# Patient Record
Sex: FEMALE | Race: BLACK OR AFRICAN AMERICAN | NOT HISPANIC OR LATINO
[De-identification: names, ages, dates, MRNs, and addresses within clinical notes are randomized per-mention and may not be internally consistent; named-entity substitution may affect disease eponyms.]

---

## 2017-04-05 ENCOUNTER — APPOINTMENT (OUTPATIENT)
Dept: THORACIC SURGERY | Facility: CLINIC | Age: 67
End: 2017-04-05

## 2017-04-05 VITALS
WEIGHT: 187 LBS | DIASTOLIC BLOOD PRESSURE: 84 MMHG | HEART RATE: 84 BPM | OXYGEN SATURATION: 98 % | BODY MASS INDEX: 31.16 KG/M2 | SYSTOLIC BLOOD PRESSURE: 150 MMHG | HEIGHT: 65 IN

## 2017-04-05 DIAGNOSIS — Z80.3 FAMILY HISTORY OF MALIGNANT NEOPLASM OF BREAST: ICD-10-CM

## 2017-04-05 DIAGNOSIS — Z86.59 PERSONAL HISTORY OF OTHER MENTAL AND BEHAVIORAL DISORDERS: ICD-10-CM

## 2017-04-05 DIAGNOSIS — Z91.89 OTHER SPECIFIED PERSONAL RISK FACTORS, NOT ELSEWHERE CLASSIFIED: ICD-10-CM

## 2017-04-19 ENCOUNTER — OUTPATIENT (OUTPATIENT)
Dept: OUTPATIENT SERVICES | Facility: HOSPITAL | Age: 67
LOS: 1 days | End: 2017-04-19
Payer: MEDICARE

## 2017-04-19 VITALS
HEART RATE: 78 BPM | DIASTOLIC BLOOD PRESSURE: 90 MMHG | HEIGHT: 63 IN | SYSTOLIC BLOOD PRESSURE: 160 MMHG | RESPIRATION RATE: 16 BRPM | WEIGHT: 190.04 LBS | TEMPERATURE: 98 F | OXYGEN SATURATION: 99 %

## 2017-04-19 DIAGNOSIS — Z90.710 ACQUIRED ABSENCE OF BOTH CERVIX AND UTERUS: Chronic | ICD-10-CM

## 2017-04-19 DIAGNOSIS — J84.9 INTERSTITIAL PULMONARY DISEASE, UNSPECIFIED: ICD-10-CM

## 2017-04-19 DIAGNOSIS — Z90.11 ACQUIRED ABSENCE OF RIGHT BREAST AND NIPPLE: Chronic | ICD-10-CM

## 2017-04-19 DIAGNOSIS — Z79.82 LONG TERM (CURRENT) USE OF ASPIRIN: ICD-10-CM

## 2017-04-19 DIAGNOSIS — E11.9 TYPE 2 DIABETES MELLITUS WITHOUT COMPLICATIONS: ICD-10-CM

## 2017-04-19 DIAGNOSIS — R91.8 OTHER NONSPECIFIC ABNORMAL FINDING OF LUNG FIELD: ICD-10-CM

## 2017-04-19 DIAGNOSIS — R07.9 CHEST PAIN, UNSPECIFIED: ICD-10-CM

## 2017-04-19 DIAGNOSIS — Z90.49 ACQUIRED ABSENCE OF OTHER SPECIFIED PARTS OF DIGESTIVE TRACT: Chronic | ICD-10-CM

## 2017-04-19 DIAGNOSIS — Z98.42 CATARACT EXTRACTION STATUS, LEFT EYE: Chronic | ICD-10-CM

## 2017-04-19 DIAGNOSIS — G47.33 OBSTRUCTIVE SLEEP APNEA (ADULT) (PEDIATRIC): ICD-10-CM

## 2017-04-19 LAB
ALBUMIN SERPL ELPH-MCNC: 4.5 G/DL — SIGNIFICANT CHANGE UP (ref 3.3–5)
ALP SERPL-CCNC: 71 U/L — SIGNIFICANT CHANGE UP (ref 40–120)
ALT FLD-CCNC: 13 U/L — SIGNIFICANT CHANGE UP (ref 4–33)
APPEARANCE UR: SIGNIFICANT CHANGE UP
AST SERPL-CCNC: 20 U/L — SIGNIFICANT CHANGE UP (ref 4–32)
BILIRUB SERPL-MCNC: 0.4 MG/DL — SIGNIFICANT CHANGE UP (ref 0.2–1.2)
BILIRUB UR-MCNC: NEGATIVE — SIGNIFICANT CHANGE UP
BLD GP AB SCN SERPL QL: NEGATIVE — SIGNIFICANT CHANGE UP
BLOOD UR QL VISUAL: NEGATIVE — SIGNIFICANT CHANGE UP
BUN SERPL-MCNC: 7 MG/DL — SIGNIFICANT CHANGE UP (ref 7–23)
CALCIUM SERPL-MCNC: 9.7 MG/DL — SIGNIFICANT CHANGE UP (ref 8.4–10.5)
CHLORIDE SERPL-SCNC: 100 MMOL/L — SIGNIFICANT CHANGE UP (ref 98–107)
CO2 SERPL-SCNC: 28 MMOL/L — SIGNIFICANT CHANGE UP (ref 22–31)
COLOR SPEC: SIGNIFICANT CHANGE UP
CREAT SERPL-MCNC: 0.68 MG/DL — SIGNIFICANT CHANGE UP (ref 0.5–1.3)
GLUCOSE SERPL-MCNC: 95 MG/DL — SIGNIFICANT CHANGE UP (ref 70–99)
GLUCOSE UR-MCNC: NEGATIVE — SIGNIFICANT CHANGE UP
HBA1C BLD-MCNC: 6.2 % — HIGH (ref 4–5.6)
HCT VFR BLD CALC: 43.6 % — SIGNIFICANT CHANGE UP (ref 34.5–45)
HGB BLD-MCNC: 13.3 G/DL — SIGNIFICANT CHANGE UP (ref 11.5–15.5)
KETONES UR-MCNC: NEGATIVE — SIGNIFICANT CHANGE UP
LEUKOCYTE ESTERASE UR-ACNC: NEGATIVE — SIGNIFICANT CHANGE UP
MCHC RBC-ENTMCNC: 22.4 PG — LOW (ref 27–34)
MCHC RBC-ENTMCNC: 30.5 % — LOW (ref 32–36)
MCV RBC AUTO: 73.4 FL — LOW (ref 80–100)
MUCOUS THREADS # UR AUTO: SIGNIFICANT CHANGE UP
NITRITE UR-MCNC: NEGATIVE — SIGNIFICANT CHANGE UP
PH UR: 7.5 — SIGNIFICANT CHANGE UP (ref 4.6–8)
PLATELET # BLD AUTO: 231 K/UL — SIGNIFICANT CHANGE UP (ref 150–400)
PMV BLD: 11.3 FL — SIGNIFICANT CHANGE UP (ref 7–13)
POTASSIUM SERPL-MCNC: 3.6 MMOL/L — SIGNIFICANT CHANGE UP (ref 3.5–5.3)
POTASSIUM SERPL-SCNC: 3.6 MMOL/L — SIGNIFICANT CHANGE UP (ref 3.5–5.3)
PROT SERPL-MCNC: 7.5 G/DL — SIGNIFICANT CHANGE UP (ref 6–8.3)
PROT UR-MCNC: NEGATIVE — SIGNIFICANT CHANGE UP
RBC # BLD: 5.94 M/UL — HIGH (ref 3.8–5.2)
RBC # FLD: 15.1 % — HIGH (ref 10.3–14.5)
RBC CASTS # UR COMP ASSIST: SIGNIFICANT CHANGE UP (ref 0–?)
RH IG SCN BLD-IMP: POSITIVE — SIGNIFICANT CHANGE UP
SODIUM SERPL-SCNC: 143 MMOL/L — SIGNIFICANT CHANGE UP (ref 135–145)
SP GR SPEC: 1.01 — SIGNIFICANT CHANGE UP (ref 1–1.03)
SQUAMOUS # UR AUTO: SIGNIFICANT CHANGE UP
UROBILINOGEN FLD QL: NORMAL E.U. — SIGNIFICANT CHANGE UP (ref 0.1–0.2)
WBC # BLD: 3.29 K/UL — LOW (ref 3.8–10.5)
WBC # FLD AUTO: 3.29 K/UL — LOW (ref 3.8–10.5)
WBC UR QL: SIGNIFICANT CHANGE UP (ref 0–?)

## 2017-04-19 PROCEDURE — 93010 ELECTROCARDIOGRAM REPORT: CPT

## 2017-04-19 NOTE — H&P PST ADULT - ASSESSMENT
Pt. is a 67 yo Creole speaking female.  Pt. speaks some English.  She refused  services.  Pt. is accompanied by her daughter who provided translation when needed.  Pt. has right lung masses.

## 2017-04-19 NOTE — H&P PST ADULT - REASON FOR ADMISSION
"my breathing test is low...something not normal in both lungs...I think the right had more than the left...when I was walking I had SOB"

## 2017-04-19 NOTE — H&P PST ADULT - HISTORY OF PRESENT ILLNESS
Pt. is a 65 yo female that has HOWELL.  Pt. went to a Pulmonologist.  Pt. had abnormal results with PFT's.  Pt. had a CXR which revealed DOROTEO lung nodules.

## 2017-04-19 NOTE — H&P PST ADULT - PSH
History of cholecystectomy  1980's  History of left cataract surgery  1990's  History of lumpectomy of right breast  1984  S/P FERNANDO-BSO (total abdominal hysterectomy and bilateral salpingo-oophorectomy)  1980's

## 2017-04-19 NOTE — H&P PST ADULT - PROBLEM SELECTOR PLAN 1
Pt. is scheduled for a right video assisted thoracoscopy, lung resection 4/28/17.  Pt. has paperwork that states 4/27.  Surgical Coordinator, Elaina was unavailable.  Spoke with Michelle who confirmed surgery is scheduled for 4/28/17. Pt. is scheduled for a right video assisted thoracoscopy, lung resection 4/28/17.  Pt. has paperwork that states 4/27.  Surgical Coordinator, Elaina was unavailable.  Spoke with Michelle who confirmed surgery is scheduled for 4/28/17.  Pt. had medical clearance last week.

## 2017-04-19 NOTE — H&P PST ADULT - NSANTHOSAYNRD_GEN_A_CORE
No. YRIS screening performed.  STOP BANG Legend: 0-2 = LOW Risk; 3-4 = INTERMEDIATE Risk; 5-8 = HIGH Risk

## 2017-04-19 NOTE — H&P PST ADULT - PMH
Arrhythmia    Asthma    Cataract of right eye    Depression    DM (diabetes mellitus)    GERD (gastroesophageal reflux disease)    HTN (hypertension)    Palpitations    Seasonal allergies Arrhythmia    Asthma    Cataract of right eye    Depression    DM (diabetes mellitus)    GERD (gastroesophageal reflux disease)    HTN (hypertension)    Obese    Palpitations    Seasonal allergies

## 2017-04-21 ENCOUNTER — NON-APPOINTMENT (OUTPATIENT)
Age: 67
End: 2017-04-21

## 2017-04-21 ENCOUNTER — APPOINTMENT (OUTPATIENT)
Dept: CARDIOLOGY | Facility: CLINIC | Age: 67
End: 2017-04-21

## 2017-04-21 VITALS
BODY MASS INDEX: 30.29 KG/M2 | OXYGEN SATURATION: 96 % | SYSTOLIC BLOOD PRESSURE: 157 MMHG | WEIGHT: 182 LBS | TEMPERATURE: 98.1 F | DIASTOLIC BLOOD PRESSURE: 72 MMHG | HEART RATE: 86 BPM

## 2017-04-21 VITALS — DIASTOLIC BLOOD PRESSURE: 80 MMHG | SYSTOLIC BLOOD PRESSURE: 150 MMHG

## 2017-04-21 DIAGNOSIS — M17.10 UNILATERAL PRIMARY OSTEOARTHRITIS, UNSPECIFIED KNEE: ICD-10-CM

## 2017-04-21 LAB
BACTERIA UR CULT: SIGNIFICANT CHANGE UP
SPECIMEN SOURCE: SIGNIFICANT CHANGE UP

## 2017-04-26 ENCOUNTER — APPOINTMENT (OUTPATIENT)
Dept: CARDIOLOGY | Facility: CLINIC | Age: 67
End: 2017-04-26

## 2017-04-28 ENCOUNTER — RESULT REVIEW (OUTPATIENT)
Age: 67
End: 2017-04-28

## 2017-04-28 ENCOUNTER — TRANSCRIPTION ENCOUNTER (OUTPATIENT)
Age: 67
End: 2017-04-28

## 2017-04-28 ENCOUNTER — INPATIENT (INPATIENT)
Facility: HOSPITAL | Age: 67
LOS: 0 days | Discharge: HOME | End: 2017-04-29
Attending: THORACIC SURGERY (CARDIOTHORACIC VASCULAR SURGERY) | Admitting: THORACIC SURGERY (CARDIOTHORACIC VASCULAR SURGERY)
Payer: MEDICARE

## 2017-04-28 VITALS
WEIGHT: 190.04 LBS | RESPIRATION RATE: 16 BRPM | OXYGEN SATURATION: 97 % | SYSTOLIC BLOOD PRESSURE: 153 MMHG | HEART RATE: 79 BPM | HEIGHT: 63 IN | TEMPERATURE: 98 F | DIASTOLIC BLOOD PRESSURE: 75 MMHG

## 2017-04-28 DIAGNOSIS — Z90.710 ACQUIRED ABSENCE OF BOTH CERVIX AND UTERUS: Chronic | ICD-10-CM

## 2017-04-28 DIAGNOSIS — Z90.49 ACQUIRED ABSENCE OF OTHER SPECIFIED PARTS OF DIGESTIVE TRACT: Chronic | ICD-10-CM

## 2017-04-28 DIAGNOSIS — J84.9 INTERSTITIAL PULMONARY DISEASE, UNSPECIFIED: ICD-10-CM

## 2017-04-28 DIAGNOSIS — Z98.42 CATARACT EXTRACTION STATUS, LEFT EYE: Chronic | ICD-10-CM

## 2017-04-28 DIAGNOSIS — Z90.11 ACQUIRED ABSENCE OF RIGHT BREAST AND NIPPLE: Chronic | ICD-10-CM

## 2017-04-28 LAB
CULTURE - ACID FAST SMEAR CONCENTRATED: SIGNIFICANT CHANGE UP
RH IG SCN BLD-IMP: POSITIVE — SIGNIFICANT CHANGE UP
SPECIMEN SOURCE: SIGNIFICANT CHANGE UP

## 2017-04-28 PROCEDURE — 71010: CPT | Mod: 26

## 2017-04-28 PROCEDURE — 88307 TISSUE EXAM BY PATHOLOGIST: CPT | Mod: 26

## 2017-04-28 PROCEDURE — 32666 THORACOSCOPY W/WEDGE RESECT: CPT

## 2017-04-28 RX ORDER — HYDROMORPHONE HYDROCHLORIDE 2 MG/ML
0.5 INJECTION INTRAMUSCULAR; INTRAVENOUS; SUBCUTANEOUS
Qty: 0 | Refills: 0 | Status: DISCONTINUED | OUTPATIENT
Start: 2017-04-28 | End: 2017-04-28

## 2017-04-28 RX ORDER — SODIUM CHLORIDE 9 MG/ML
1000 INJECTION, SOLUTION INTRAVENOUS
Qty: 0 | Refills: 0 | Status: DISCONTINUED | OUTPATIENT
Start: 2017-04-28 | End: 2017-04-29

## 2017-04-28 RX ORDER — ONDANSETRON 8 MG/1
4 TABLET, FILM COATED ORAL EVERY 6 HOURS
Qty: 0 | Refills: 0 | Status: DISCONTINUED | OUTPATIENT
Start: 2017-04-28 | End: 2017-04-28

## 2017-04-28 RX ORDER — HYDROMORPHONE HYDROCHLORIDE 2 MG/ML
30 INJECTION INTRAMUSCULAR; INTRAVENOUS; SUBCUTANEOUS
Qty: 0 | Refills: 0 | Status: DISCONTINUED | OUTPATIENT
Start: 2017-04-28 | End: 2017-04-29

## 2017-04-28 RX ORDER — DEXTROSE 50 % IN WATER 50 %
12.5 SYRINGE (ML) INTRAVENOUS ONCE
Qty: 0 | Refills: 0 | Status: DISCONTINUED | OUTPATIENT
Start: 2017-04-28 | End: 2017-04-29

## 2017-04-28 RX ORDER — BENZOCAINE AND MENTHOL 5; 1 G/100ML; G/100ML
1 LIQUID ORAL EVERY 6 HOURS
Qty: 0 | Refills: 0 | Status: DISCONTINUED | OUTPATIENT
Start: 2017-04-28 | End: 2017-04-29

## 2017-04-28 RX ORDER — INSULIN LISPRO 100/ML
VIAL (ML) SUBCUTANEOUS
Qty: 0 | Refills: 0 | Status: DISCONTINUED | OUTPATIENT
Start: 2017-04-28 | End: 2017-04-29

## 2017-04-28 RX ORDER — HYDROMORPHONE HYDROCHLORIDE 2 MG/ML
0.5 INJECTION INTRAMUSCULAR; INTRAVENOUS; SUBCUTANEOUS
Qty: 0 | Refills: 0 | Status: DISCONTINUED | OUTPATIENT
Start: 2017-04-28 | End: 2017-04-29

## 2017-04-28 RX ORDER — HEPARIN SODIUM 5000 [USP'U]/ML
5000 INJECTION INTRAVENOUS; SUBCUTANEOUS EVERY 8 HOURS
Qty: 0 | Refills: 0 | Status: DISCONTINUED | OUTPATIENT
Start: 2017-04-28 | End: 2017-04-29

## 2017-04-28 RX ORDER — DEXTROSE 50 % IN WATER 50 %
25 SYRINGE (ML) INTRAVENOUS ONCE
Qty: 0 | Refills: 0 | Status: DISCONTINUED | OUTPATIENT
Start: 2017-04-28 | End: 2017-04-29

## 2017-04-28 RX ORDER — HEPARIN SODIUM 5000 [USP'U]/ML
5000 INJECTION INTRAVENOUS; SUBCUTANEOUS ONCE
Qty: 0 | Refills: 0 | Status: COMPLETED | OUTPATIENT
Start: 2017-04-28 | End: 2017-04-28

## 2017-04-28 RX ORDER — ONDANSETRON 8 MG/1
4 TABLET, FILM COATED ORAL EVERY 6 HOURS
Qty: 0 | Refills: 0 | Status: DISCONTINUED | OUTPATIENT
Start: 2017-04-28 | End: 2017-04-29

## 2017-04-28 RX ORDER — DEXTROSE 50 % IN WATER 50 %
1 SYRINGE (ML) INTRAVENOUS ONCE
Qty: 0 | Refills: 0 | Status: DISCONTINUED | OUTPATIENT
Start: 2017-04-28 | End: 2017-04-29

## 2017-04-28 RX ORDER — NALOXONE HYDROCHLORIDE 4 MG/.1ML
0.1 SPRAY NASAL
Qty: 0 | Refills: 0 | Status: DISCONTINUED | OUTPATIENT
Start: 2017-04-28 | End: 2017-04-29

## 2017-04-28 RX ORDER — METFORMIN HYDROCHLORIDE 850 MG/1
500 TABLET ORAL
Qty: 0 | Refills: 0 | Status: DISCONTINUED | OUTPATIENT
Start: 2017-04-29 | End: 2017-04-29

## 2017-04-28 RX ORDER — DOCUSATE SODIUM 100 MG
100 CAPSULE ORAL THREE TIMES A DAY
Qty: 0 | Refills: 0 | Status: DISCONTINUED | OUTPATIENT
Start: 2017-04-28 | End: 2017-04-29

## 2017-04-28 RX ORDER — FAMOTIDINE 10 MG/ML
20 INJECTION INTRAVENOUS EVERY 12 HOURS
Qty: 0 | Refills: 0 | Status: DISCONTINUED | OUTPATIENT
Start: 2017-04-28 | End: 2017-04-29

## 2017-04-28 RX ORDER — NALOXONE HYDROCHLORIDE 4 MG/.1ML
0.1 SPRAY NASAL
Qty: 0 | Refills: 0 | Status: DISCONTINUED | OUTPATIENT
Start: 2017-04-28 | End: 2017-04-28

## 2017-04-28 RX ORDER — GLUCAGON INJECTION, SOLUTION 0.5 MG/.1ML
1 INJECTION, SOLUTION SUBCUTANEOUS ONCE
Qty: 0 | Refills: 0 | Status: DISCONTINUED | OUTPATIENT
Start: 2017-04-28 | End: 2017-04-29

## 2017-04-28 RX ADMIN — HYDROMORPHONE HYDROCHLORIDE 30 MILLILITER(S): 2 INJECTION INTRAMUSCULAR; INTRAVENOUS; SUBCUTANEOUS at 19:20

## 2017-04-28 RX ADMIN — Medication 20 MILLIGRAM(S): at 18:56

## 2017-04-28 RX ADMIN — Medication 100 MILLIGRAM(S): at 23:03

## 2017-04-28 RX ADMIN — HYDROMORPHONE HYDROCHLORIDE 0.5 MILLIGRAM(S): 2 INJECTION INTRAMUSCULAR; INTRAVENOUS; SUBCUTANEOUS at 10:05

## 2017-04-28 RX ADMIN — HEPARIN SODIUM 5000 UNIT(S): 5000 INJECTION INTRAVENOUS; SUBCUTANEOUS at 23:03

## 2017-04-28 RX ADMIN — HYDROMORPHONE HYDROCHLORIDE 0.5 MILLIGRAM(S): 2 INJECTION INTRAMUSCULAR; INTRAVENOUS; SUBCUTANEOUS at 10:20

## 2017-04-28 RX ADMIN — HEPARIN SODIUM 5000 UNIT(S): 5000 INJECTION INTRAVENOUS; SUBCUTANEOUS at 07:09

## 2017-04-28 RX ADMIN — FAMOTIDINE 20 MILLIGRAM(S): 10 INJECTION INTRAVENOUS at 18:02

## 2017-04-28 RX ADMIN — BENZOCAINE AND MENTHOL 1 LOZENGE: 5; 1 LIQUID ORAL at 23:03

## 2017-04-28 RX ADMIN — SODIUM CHLORIDE 30 MILLILITER(S): 9 INJECTION, SOLUTION INTRAVENOUS at 19:49

## 2017-04-28 RX ADMIN — HYDROMORPHONE HYDROCHLORIDE 0.5 MILLIGRAM(S): 2 INJECTION INTRAMUSCULAR; INTRAVENOUS; SUBCUTANEOUS at 10:27

## 2017-04-28 RX ADMIN — HYDROMORPHONE HYDROCHLORIDE 30 MILLILITER(S): 2 INJECTION INTRAMUSCULAR; INTRAVENOUS; SUBCUTANEOUS at 10:40

## 2017-04-28 RX ADMIN — HYDROMORPHONE HYDROCHLORIDE 0.5 MILLIGRAM(S): 2 INJECTION INTRAMUSCULAR; INTRAVENOUS; SUBCUTANEOUS at 11:00

## 2017-04-28 RX ADMIN — HEPARIN SODIUM 5000 UNIT(S): 5000 INJECTION INTRAVENOUS; SUBCUTANEOUS at 14:38

## 2017-04-28 RX ADMIN — Medication 100 MILLIGRAM(S): at 14:38

## 2017-04-28 RX ADMIN — SODIUM CHLORIDE 30 MILLILITER(S): 9 INJECTION, SOLUTION INTRAVENOUS at 10:00

## 2017-04-28 NOTE — ASU PATIENT PROFILE, ADULT - PMH
Arrhythmia    Asthma    Cataract of right eye    Depression    DM (diabetes mellitus)    GERD (gastroesophageal reflux disease)    HTN (hypertension)    Obese    Palpitations    Seasonal allergies

## 2017-04-29 VITALS
SYSTOLIC BLOOD PRESSURE: 120 MMHG | DIASTOLIC BLOOD PRESSURE: 86 MMHG | OXYGEN SATURATION: 96 % | HEART RATE: 70 BPM | RESPIRATION RATE: 18 BRPM | TEMPERATURE: 99 F

## 2017-04-29 LAB
BASOPHILS # BLD AUTO: 0 K/UL — SIGNIFICANT CHANGE UP (ref 0–0.2)
BASOPHILS NFR BLD AUTO: 0 % — SIGNIFICANT CHANGE UP (ref 0–2)
BUN SERPL-MCNC: 21 MG/DL — SIGNIFICANT CHANGE UP (ref 7–23)
CALCIUM SERPL-MCNC: 9.7 MG/DL — SIGNIFICANT CHANGE UP (ref 8.4–10.5)
CHLORIDE SERPL-SCNC: 99 MMOL/L — SIGNIFICANT CHANGE UP (ref 98–107)
CO2 SERPL-SCNC: 25 MMOL/L — SIGNIFICANT CHANGE UP (ref 22–31)
CREAT SERPL-MCNC: 0.8 MG/DL — SIGNIFICANT CHANGE UP (ref 0.5–1.3)
EOSINOPHIL # BLD AUTO: 0 K/UL — SIGNIFICANT CHANGE UP (ref 0–0.5)
EOSINOPHIL NFR BLD AUTO: 0 % — SIGNIFICANT CHANGE UP (ref 0–6)
GLUCOSE SERPL-MCNC: 136 MG/DL — HIGH (ref 70–99)
HCT VFR BLD CALC: 38.4 % — SIGNIFICANT CHANGE UP (ref 34.5–45)
HGB BLD-MCNC: 12 G/DL — SIGNIFICANT CHANGE UP (ref 11.5–15.5)
IMM GRANULOCYTES NFR BLD AUTO: 0.1 % — SIGNIFICANT CHANGE UP (ref 0–1.5)
LYMPHOCYTES # BLD AUTO: 0.83 K/UL — LOW (ref 1–3.3)
LYMPHOCYTES # BLD AUTO: 9.8 % — LOW (ref 13–44)
MCHC RBC-ENTMCNC: 22.7 PG — LOW (ref 27–34)
MCHC RBC-ENTMCNC: 31.3 % — LOW (ref 32–36)
MCV RBC AUTO: 72.7 FL — LOW (ref 80–100)
MONOCYTES # BLD AUTO: 0.68 K/UL — SIGNIFICANT CHANGE UP (ref 0–0.9)
MONOCYTES NFR BLD AUTO: 8 % — SIGNIFICANT CHANGE UP (ref 2–14)
NEUTROPHILS # BLD AUTO: 6.99 K/UL — SIGNIFICANT CHANGE UP (ref 1.8–7.4)
NEUTROPHILS NFR BLD AUTO: 82.1 % — HIGH (ref 43–77)
PLATELET # BLD AUTO: 194 K/UL — SIGNIFICANT CHANGE UP (ref 150–400)
PMV BLD: 11.3 FL — SIGNIFICANT CHANGE UP (ref 7–13)
POTASSIUM SERPL-MCNC: 4.4 MMOL/L — SIGNIFICANT CHANGE UP (ref 3.5–5.3)
POTASSIUM SERPL-SCNC: 4.4 MMOL/L — SIGNIFICANT CHANGE UP (ref 3.5–5.3)
RBC # BLD: 5.28 M/UL — HIGH (ref 3.8–5.2)
RBC # FLD: 15.2 % — HIGH (ref 10.3–14.5)
SODIUM SERPL-SCNC: 136 MMOL/L — SIGNIFICANT CHANGE UP (ref 135–145)
WBC # BLD: 8.51 K/UL — SIGNIFICANT CHANGE UP (ref 3.8–10.5)
WBC # FLD AUTO: 8.51 K/UL — SIGNIFICANT CHANGE UP (ref 3.8–10.5)

## 2017-04-29 PROCEDURE — 71010: CPT | Mod: 26,76

## 2017-04-29 RX ORDER — SENNA PLUS 8.6 MG/1
2 TABLET ORAL
Qty: 40 | Refills: 0
Start: 2017-04-29 | End: 2017-05-19

## 2017-04-29 RX ORDER — DOCUSATE SODIUM 100 MG
1 CAPSULE ORAL
Qty: 60 | Refills: 0
Start: 2017-04-29 | End: 2017-05-19

## 2017-04-29 RX ORDER — OXYCODONE HYDROCHLORIDE 5 MG/1
5 TABLET ORAL EVERY 4 HOURS
Qty: 0 | Refills: 0 | Status: DISCONTINUED | OUTPATIENT
Start: 2017-04-29 | End: 2017-04-29

## 2017-04-29 RX ORDER — OXYCODONE HYDROCHLORIDE 5 MG/1
10 TABLET ORAL EVERY 4 HOURS
Qty: 0 | Refills: 0 | Status: DISCONTINUED | OUTPATIENT
Start: 2017-04-29 | End: 2017-04-29

## 2017-04-29 RX ORDER — OXYCODONE HYDROCHLORIDE 5 MG/1
1 TABLET ORAL
Qty: 40 | Refills: 0 | OUTPATIENT
Start: 2017-04-29 | End: 2017-05-04

## 2017-04-29 RX ORDER — OXYCODONE HYDROCHLORIDE 5 MG/1
1 TABLET ORAL
Qty: 40 | Refills: 0
Start: 2017-04-29 | End: 2017-05-04

## 2017-04-29 RX ORDER — ACETAMINOPHEN 500 MG
650 TABLET ORAL EVERY 6 HOURS
Qty: 0 | Refills: 0 | Status: DISCONTINUED | OUTPATIENT
Start: 2017-04-29 | End: 2017-04-29

## 2017-04-29 RX ORDER — ACETAMINOPHEN 500 MG
2 TABLET ORAL
Qty: 0 | Refills: 0 | DISCHARGE
Start: 2017-04-29

## 2017-04-29 RX ORDER — DIPHENHYDRAMINE HCL 50 MG
25 CAPSULE ORAL EVERY 4 HOURS
Qty: 0 | Refills: 0 | Status: DISCONTINUED | OUTPATIENT
Start: 2017-04-29 | End: 2017-04-29

## 2017-04-29 RX ADMIN — FAMOTIDINE 20 MILLIGRAM(S): 10 INJECTION INTRAVENOUS at 18:04

## 2017-04-29 RX ADMIN — HEPARIN SODIUM 5000 UNIT(S): 5000 INJECTION INTRAVENOUS; SUBCUTANEOUS at 12:36

## 2017-04-29 RX ADMIN — Medication 100 MILLIGRAM(S): at 12:35

## 2017-04-29 RX ADMIN — HEPARIN SODIUM 5000 UNIT(S): 5000 INJECTION INTRAVENOUS; SUBCUTANEOUS at 06:00

## 2017-04-29 RX ADMIN — OXYCODONE HYDROCHLORIDE 5 MILLIGRAM(S): 5 TABLET ORAL at 16:17

## 2017-04-29 RX ADMIN — Medication 100 MILLIGRAM(S): at 06:00

## 2017-04-29 RX ADMIN — HYDROMORPHONE HYDROCHLORIDE 30 MILLILITER(S): 2 INJECTION INTRAMUSCULAR; INTRAVENOUS; SUBCUTANEOUS at 07:13

## 2017-04-29 RX ADMIN — Medication 25 MILLIGRAM(S): at 09:18

## 2017-04-29 RX ADMIN — SODIUM CHLORIDE 30 MILLILITER(S): 9 INJECTION, SOLUTION INTRAVENOUS at 07:53

## 2017-04-29 RX ADMIN — FAMOTIDINE 20 MILLIGRAM(S): 10 INJECTION INTRAVENOUS at 06:01

## 2017-04-29 RX ADMIN — Medication 20 MILLIGRAM(S): at 12:35

## 2017-04-29 RX ADMIN — METFORMIN HYDROCHLORIDE 500 MILLIGRAM(S): 850 TABLET ORAL at 18:04

## 2017-04-29 RX ADMIN — METFORMIN HYDROCHLORIDE 500 MILLIGRAM(S): 850 TABLET ORAL at 09:18

## 2017-04-29 NOTE — DISCHARGE NOTE ADULT - PLAN OF CARE
s/p right vats, Rll and RML wedge resection- continued wound healing Follow up with Dr. Seals in 7-10 days   Follow up with primary care provider in one week   Increase ambulation  Pain management  Use incentive spirometer Follow up with Dr. Seals in 7-10 days, you must call  to make an appointment   Follow up with primary care provider in one week   Increase ambulation  Pain management  Use incentive spirometer Follow up with Dr. Seals in 7-10 days, you must call  to make an appointment   Follow up with primary care provider in one week, specifically about the urinary symptoms  Increase ambulation  Pain management  Use incentive spirometer

## 2017-04-29 NOTE — DISCHARGE NOTE ADULT - HOSPITAL COURSE
Pt. is a 67 yo female that has HOWELL.  Pt. went to a Pulmonologist.  Pt. had abnormal results with PFT's.  Pt. had a CXR which revealed DOROTEO lung nodules. Patient s/p Right vats, right lower and middle lobe wedge resection on 4/28/17.  Post op course uncomplicated. Pt. is a 67 yo female that has HOWELL.  Pt. went to a Pulmonologist.  Pt. had abnormal results with PFT's.  Pt. had a CXR which revealed DOROTEO lung nodules. Patient s/p Right vats, right lower and middle lobe wedge resection on 4/28/17.  Post op course uncomplicated, chest tube was removed and she was discharged home Pt. is a 67 yo female that has HOWELL.  Pt. went to a Pulmonologist.  Pt. had abnormal results with PFT's.  Pt. had a CXR which revealed DOROTEO lung nodules. Patient s/p Right vats, right lower and middle lobe wedge resection on 4/28/17.  Post op course uncomplicated, chest tube was removed and she was discharged home. Pt. is a 65 yo female that has HOWELL.  Pt. went to a Pulmonologist.  Pt. had abnormal results with PFT's.  Pt. had a CXR which revealed DOROTEO lung nodules. Patient s/p Right vats, right lower and middle lobe wedge resection on 4/28/17.  Post op course uncomplicated, chest tube was removed and she was discharged home. Just before discharge pt complained of bladder fullness stating "hard to pee," bladder scan revealed 500ml and after getting up she was able to void 350ml. She was instructed to follow up with primary care early in the week, to continue ambulating and try to void regularly. If symptoms persist it was recommended that she return to PCP or urgent for bladder scan.

## 2017-04-29 NOTE — DISCHARGE NOTE ADULT - CARE PROVIDER_API CALL
Reese Seals (MD), Thoracic Surgery  [address] [place] [address]  Phone: [phone]  Fax: [phone]

## 2017-04-29 NOTE — DISCHARGE NOTE ADULT - MEDICATION SUMMARY - MEDICATIONS TO TAKE
I will START or STAY ON the medications listed below when I get home from the hospital:    acetaminophen 325 mg oral tablet  -- 2 tab(s) by mouth every 6 hours, As needed, Mild Pain (1 - 3)  -- Indication: For Pain    oxyCODONE 5 mg oral tablet  -- 1 to 2 tab(s) by mouth every 4 to 6  hours, As needed, Moderate Pain (4 - 6) MDD:8  -- Indication: For Pain    aspirin 81 mg oral tablet, chewable  -- 1 tab(s) by mouth once a day  -- Indication: For Vessel protection    verapamil 24 hour extended release 240 mg/24 hours oral capsule, extended release  -- 1 cap(s) by mouth once a day  -- Indication: For Blood pressure    paroxetine 20 mg oral tablet  -- 1 tab(s) by mouth once a day  -- Indication: For Home med    metformin 500 mg oral tablet  -- 1 tab(s) by mouth 2 times a day  -- Indication: For Glucose control    Pepcid AC Maximum Strength 20 mg oral tablet  -- 1 tab(s) by mouth once a day  -- It is very important that you take or use this exactly as directed.  Do not skip doses or discontinue unless directed by your doctor.  Obtain medical advice before taking any non-prescription drugs as some may affect the action of this medication.      -- Indication: For Acid reflux    Colace 100 mg oral capsule  -- 1 cap(s) by mouth 3 times a day as needed for constipation  -- Medication should be taken with plenty of water.    -- Indication: For Constipation    senna oral tablet  -- 2 tab(s) by mouth once a day (at bedtime) as needed for constipation  -- Indication: For Constipation    Vitamin D3 2000 intl units oral capsule  -- 1 cap(s) by mouth once a day  -- Indication: For Supplement

## 2017-04-29 NOTE — DISCHARGE NOTE ADULT - ADDITIONAL INSTRUCTIONS
You must remove the dressing when you return home. Take a shower, pat dry and leave to air. Continue with daily ambulation and use of incentive spirometer. Call  if you have any questions or concerns.

## 2017-04-29 NOTE — DISCHARGE NOTE ADULT - CARE PROVIDERS DIRECT ADDRESSES
[email or web address]

## 2017-04-29 NOTE — DISCHARGE NOTE ADULT - CARE PLAN
Principal Discharge DX:	Lung mass  Goal:	s/p right vats, Rll and RML wedge resection- continued wound healing  Instructions for follow-up, activity and diet:	Follow up with Dr. Seals in 7-10 days   Follow up with primary care provider in one week   Increase ambulation  Pain management  Use incentive spirometer Principal Discharge DX:	Lung mass  Goal:	s/p right vats, Rll and RML wedge resection- continued wound healing  Instructions for follow-up, activity and diet:	Follow up with Dr. Seals in 7-10 days, you must call  to make an appointment   Follow up with primary care provider in one week   Increase ambulation  Pain management  Use incentive spirometer Principal Discharge DX:	Lung mass  Goal:	s/p right vats, Rll and RML wedge resection- continued wound healing  Instructions for follow-up, activity and diet:	Follow up with Dr. Seals in 7-10 days, you must call  to make an appointment   Follow up with primary care provider in one week, specifically about the urinary symptoms  Increase ambulation  Pain management  Use incentive spirometer

## 2017-04-29 NOTE — DISCHARGE NOTE ADULT - PATIENT PORTAL LINK FT
“You can access the FollowHealth Patient Portal, offered by [facility], by registering with the following website: [email or web address]”

## 2017-05-03 LAB — CULTURE - SURGICAL SITE: SIGNIFICANT CHANGE UP

## 2017-05-05 LAB — SPECIMEN SOURCE: SIGNIFICANT CHANGE UP

## 2017-05-08 LAB — SURGICAL PATHOLOGY STUDY: SIGNIFICANT CHANGE UP

## 2017-05-10 ENCOUNTER — APPOINTMENT (OUTPATIENT)
Dept: THORACIC SURGERY | Facility: CLINIC | Age: 67
End: 2017-05-10

## 2017-05-10 VITALS
OXYGEN SATURATION: 96 % | DIASTOLIC BLOOD PRESSURE: 85 MMHG | HEIGHT: 65 IN | WEIGHT: 182 LBS | SYSTOLIC BLOOD PRESSURE: 150 MMHG | RESPIRATION RATE: 16 BRPM | HEART RATE: 85 BPM | BODY MASS INDEX: 30.32 KG/M2

## 2017-05-29 LAB — FUNGUS SPEC QL CULT: SIGNIFICANT CHANGE UP

## 2017-06-02 ENCOUNTER — APPOINTMENT (OUTPATIENT)
Dept: THORACIC SURGERY | Facility: CLINIC | Age: 67
End: 2017-06-02

## 2017-06-02 VITALS
HEIGHT: 65 IN | OXYGEN SATURATION: 98 % | DIASTOLIC BLOOD PRESSURE: 82 MMHG | HEART RATE: 82 BPM | WEIGHT: 182 LBS | BODY MASS INDEX: 30.32 KG/M2 | RESPIRATION RATE: 16 BRPM | SYSTOLIC BLOOD PRESSURE: 136 MMHG

## 2017-06-09 LAB — ACID FAST STN SPEC: SIGNIFICANT CHANGE UP

## 2017-09-01 ENCOUNTER — APPOINTMENT (OUTPATIENT)
Dept: PULMONOLOGY | Facility: CLINIC | Age: 67
End: 2017-09-01
Payer: MEDICARE

## 2017-09-01 VITALS
WEIGHT: 180 LBS | HEIGHT: 65 IN | BODY MASS INDEX: 29.99 KG/M2 | SYSTOLIC BLOOD PRESSURE: 126 MMHG | OXYGEN SATURATION: 98 % | RESPIRATION RATE: 17 BRPM | DIASTOLIC BLOOD PRESSURE: 82 MMHG | HEART RATE: 97 BPM

## 2017-09-01 PROCEDURE — 94729 DIFFUSING CAPACITY: CPT

## 2017-09-01 PROCEDURE — 94060 EVALUATION OF WHEEZING: CPT | Mod: 59

## 2017-09-01 PROCEDURE — 94620 PULMONARY STRESS TESTING SIMPLE: CPT

## 2017-09-01 PROCEDURE — 99205 OFFICE O/P NEW HI 60 MIN: CPT | Mod: 25

## 2017-09-01 PROCEDURE — 94727 GAS DIL/WSHOT DETER LNG VOL: CPT

## 2017-09-01 PROCEDURE — 71020: CPT

## 2017-09-01 RX ORDER — BUDESONIDE AND FORMOTEROL FUMARATE DIHYDRATE 160; 4.5 UG/1; UG/1
160-4.5 AEROSOL RESPIRATORY (INHALATION)
Qty: 10 | Refills: 0 | Status: DISCONTINUED | COMMUNITY
Start: 2017-05-01 | End: 2017-09-01

## 2017-09-01 RX ORDER — UMECLIDINIUM 62.5 UG/1
62.5 AEROSOL, POWDER ORAL
Qty: 30 | Refills: 0 | Status: DISCONTINUED | COMMUNITY
Start: 2017-05-01 | End: 2017-09-01

## 2017-09-01 RX ORDER — ALBUTEROL SULFATE 108 UG/1
108 (90 BASE) AEROSOL, METERED RESPIRATORY (INHALATION)
Refills: 0 | Status: DISCONTINUED | COMMUNITY
End: 2017-09-01

## 2017-10-12 ENCOUNTER — APPOINTMENT (OUTPATIENT)
Dept: ORTHOPEDIC SURGERY | Facility: CLINIC | Age: 67
End: 2017-10-12
Payer: MEDICARE

## 2017-10-12 VITALS — DIASTOLIC BLOOD PRESSURE: 86 MMHG | HEIGHT: 63 IN | SYSTOLIC BLOOD PRESSURE: 145 MMHG | HEART RATE: 68 BPM

## 2017-10-12 PROCEDURE — 99203 OFFICE O/P NEW LOW 30 MIN: CPT

## 2017-10-12 PROCEDURE — 73562 X-RAY EXAM OF KNEE 3: CPT | Mod: RT

## 2017-10-26 ENCOUNTER — APPOINTMENT (OUTPATIENT)
Dept: ORTHOPEDIC SURGERY | Facility: CLINIC | Age: 67
End: 2017-10-26
Payer: MEDICARE

## 2017-10-26 PROCEDURE — 99213 OFFICE O/P EST LOW 20 MIN: CPT

## 2017-11-03 ENCOUNTER — APPOINTMENT (OUTPATIENT)
Dept: PULMONOLOGY | Facility: CLINIC | Age: 67
End: 2017-11-03

## 2017-11-04 ENCOUNTER — LABORATORY RESULT (OUTPATIENT)
Age: 67
End: 2017-11-04

## 2017-11-04 LAB — IGE SER-MCNC: 24 IU/ML

## 2017-11-06 LAB
24R-OH-CALCIDIOL SERPL-MCNC: 57.2 PG/ML
25(OH)D3 SERPL-MCNC: 26.1 NG/ML
BASOPHILS # BLD AUTO: 0.04 K/UL
BASOPHILS NFR BLD AUTO: 0.9 %
EOSINOPHIL # BLD AUTO: 0.22 K/UL
EOSINOPHIL NFR BLD AUTO: 5.3 %
HCT VFR BLD CALC: 41.4 %
HGB BLD-MCNC: 13.2 G/DL
LYMPHOCYTES # BLD AUTO: 1.28 K/UL
LYMPHOCYTES NFR BLD AUTO: 30.7 %
MAN DIFF?: NORMAL
MCHC RBC-ENTMCNC: 22.8 PG
MCHC RBC-ENTMCNC: 31.9 GM/DL
MCV RBC AUTO: 71.4 FL
MONOCYTES # BLD AUTO: 0.25 K/UL
MONOCYTES NFR BLD AUTO: 6.1 %
NEUTROPHILS # BLD AUTO: 2.01 K/UL
NEUTROPHILS NFR BLD AUTO: 47.4 %
PLATELET # BLD AUTO: 234 K/UL
RBC # BLD: 5.8 M/UL
RBC # FLD: 15.1 %
WBC # FLD AUTO: 4.16 K/UL

## 2017-11-07 ENCOUNTER — MEDICATION RENEWAL (OUTPATIENT)
Age: 67
End: 2017-11-07

## 2017-11-07 LAB
A ALTERNATA IGE QN: <0.1 KUA/L
A FUMIGATUS IGE QN: <0.1 KUA/L
C ALBICANS IGE QN: 0.21 KUA/L
C HERBARUM IGE QN: <0.1 KUA/L
CAT DANDER IGE QN: <0.1 KUA/L
CLAM IGE QN: <0.1 KUA/L
CODFISH IGE QN: <0.1 KUA/L
COMMON RAGWEED IGE QN: <0.1 KUA/L
CORN IGE QN: <0.1 KUA/L
COW MILK IGE QN: <0.1 KUA/L
D FARINAE IGE QN: <0.1 KUA/L
D PTERONYSS IGE QN: <0.1 KUA/L
DEPRECATED A ALTERNATA IGE RAST QL: 0
DEPRECATED A FUMIGATUS IGE RAST QL: 0
DEPRECATED C ALBICANS IGE RAST QL: NORMAL
DEPRECATED C HERBARUM IGE RAST QL: 0
DEPRECATED CAT DANDER IGE RAST QL: 0
DEPRECATED CLAM IGE RAST QL: 0
DEPRECATED CODFISH IGE RAST QL: 0
DEPRECATED COMMON RAGWEED IGE RAST QL: 0
DEPRECATED CORN IGE RAST QL: 0
DEPRECATED COW MILK IGE RAST QL: 0
DEPRECATED D FARINAE IGE RAST QL: 0
DEPRECATED D PTERONYSS IGE RAST QL: 0
DEPRECATED DOG DANDER IGE RAST QL: 0
DEPRECATED EGG WHITE IGE RAST QL: 0
DEPRECATED M RACEMOSUS IGE RAST QL: 0
DEPRECATED PEANUT IGE RAST QL: 0
DEPRECATED ROACH IGE RAST QL: NORMAL
DEPRECATED SCALLOP IGE RAST QL: <0.1 KUA/L
DEPRECATED SESAME SEED IGE RAST QL: 0
DEPRECATED SHRIMP IGE RAST QL: 0
DEPRECATED SOYBEAN IGE RAST QL: 0
DEPRECATED TIMOTHY IGE RAST QL: 0
DEPRECATED WALNUT IGE RAST QL: 0
DEPRECATED WHEAT IGE RAST QL: 0
DEPRECATED WHITE OAK IGE RAST QL: 0
DOG DANDER IGE QN: <0.1 KUA/L
EGG WHITE IGE QN: <0.1 KUA/L
M RACEMOSUS IGE QN: <0.1 KUA/L
PEANUT IGE QN: <0.1 KUA/L
ROACH IGE QN: 0.24 KUA/L
SCALLOP IGE QN: 0
SCALLOP IGE QN: <0.1 KUA/L
SESAME SEED IGE QN: <0.1 KUA/L
SOYBEAN IGE QN: <0.1 KUA/L
TIMOTHY IGE QN: <0.1 KUA/L
WALNUT IGE QN: <0.1 KUA/L
WHEAT IGE QN: <0.1 KUA/L
WHITE OAK IGE QN: <0.1 KUA/L

## 2017-11-08 ENCOUNTER — RX RENEWAL (OUTPATIENT)
Age: 67
End: 2017-11-08

## 2017-11-13 ENCOUNTER — APPOINTMENT (OUTPATIENT)
Dept: PULMONOLOGY | Facility: CLINIC | Age: 67
End: 2017-11-13
Payer: MEDICARE

## 2017-11-13 VITALS
DIASTOLIC BLOOD PRESSURE: 80 MMHG | HEART RATE: 93 BPM | OXYGEN SATURATION: 99 % | SYSTOLIC BLOOD PRESSURE: 128 MMHG | WEIGHT: 180 LBS | HEIGHT: 63 IN | BODY MASS INDEX: 31.89 KG/M2

## 2017-11-13 DIAGNOSIS — Z78.9 OTHER SPECIFIED HEALTH STATUS: ICD-10-CM

## 2017-11-13 DIAGNOSIS — Z86.39 PERSONAL HISTORY OF OTHER ENDOCRINE, NUTRITIONAL AND METABOLIC DISEASE: ICD-10-CM

## 2017-11-13 DIAGNOSIS — Z86.79 PERSONAL HISTORY OF OTHER DISEASES OF THE CIRCULATORY SYSTEM: ICD-10-CM

## 2017-11-13 DIAGNOSIS — Z80.3 FAMILY HISTORY OF MALIGNANT NEOPLASM OF BREAST: ICD-10-CM

## 2017-11-13 DIAGNOSIS — Z87.09 PERSONAL HISTORY OF OTHER DISEASES OF THE RESPIRATORY SYSTEM: ICD-10-CM

## 2017-11-13 PROCEDURE — G0008: CPT

## 2017-11-13 PROCEDURE — 99214 OFFICE O/P EST MOD 30 MIN: CPT | Mod: 25

## 2017-11-13 PROCEDURE — 94010 BREATHING CAPACITY TEST: CPT

## 2017-11-13 PROCEDURE — 90662 IIV NO PRSV INCREASED AG IM: CPT

## 2017-11-27 ENCOUNTER — APPOINTMENT (OUTPATIENT)
Dept: ORTHOPEDIC SURGERY | Facility: CLINIC | Age: 67
End: 2017-11-27
Payer: MEDICARE

## 2017-11-27 PROCEDURE — 99214 OFFICE O/P EST MOD 30 MIN: CPT | Mod: 25

## 2017-11-27 PROCEDURE — 20610 DRAIN/INJ JOINT/BURSA W/O US: CPT | Mod: RT

## 2017-12-11 ENCOUNTER — APPOINTMENT (OUTPATIENT)
Dept: GASTROENTEROLOGY | Facility: CLINIC | Age: 67
End: 2017-12-11
Payer: MEDICARE

## 2017-12-11 VITALS
WEIGHT: 182 LBS | TEMPERATURE: 98.6 F | OXYGEN SATURATION: 98 % | BODY MASS INDEX: 30.32 KG/M2 | SYSTOLIC BLOOD PRESSURE: 124 MMHG | DIASTOLIC BLOOD PRESSURE: 82 MMHG | HEIGHT: 65 IN | HEART RATE: 72 BPM | RESPIRATION RATE: 15 BRPM

## 2017-12-11 DIAGNOSIS — K59.00 CONSTIPATION, UNSPECIFIED: ICD-10-CM

## 2017-12-11 PROCEDURE — 99204 OFFICE O/P NEW MOD 45 MIN: CPT

## 2018-01-11 ENCOUNTER — APPOINTMENT (OUTPATIENT)
Dept: ORTHOPEDIC SURGERY | Facility: CLINIC | Age: 68
End: 2018-01-11
Payer: MEDICARE

## 2018-01-11 PROCEDURE — 73564 X-RAY EXAM KNEE 4 OR MORE: CPT | Mod: RT

## 2018-01-11 PROCEDURE — 99213 OFFICE O/P EST LOW 20 MIN: CPT

## 2018-01-17 ENCOUNTER — APPOINTMENT (OUTPATIENT)
Dept: ORTHOPEDIC SURGERY | Facility: CLINIC | Age: 68
End: 2018-01-17
Payer: MEDICARE

## 2018-01-17 VITALS
HEART RATE: 82 BPM | BODY MASS INDEX: 30.49 KG/M2 | SYSTOLIC BLOOD PRESSURE: 153 MMHG | DIASTOLIC BLOOD PRESSURE: 79 MMHG | HEIGHT: 65 IN | WEIGHT: 183 LBS

## 2018-01-17 VITALS
DIASTOLIC BLOOD PRESSURE: 79 MMHG | SYSTOLIC BLOOD PRESSURE: 143 MMHG | HEIGHT: 65 IN | BODY MASS INDEX: 30.49 KG/M2 | WEIGHT: 183 LBS | HEART RATE: 85 BPM

## 2018-01-17 DIAGNOSIS — M95.8 OTHER SPECIFIED ACQUIRED DEFORMITIES OF MUSCULOSKELETAL SYSTEM: ICD-10-CM

## 2018-01-17 PROCEDURE — 99213 OFFICE O/P EST LOW 20 MIN: CPT

## 2018-02-12 ENCOUNTER — APPOINTMENT (OUTPATIENT)
Dept: PULMONOLOGY | Facility: CLINIC | Age: 68
End: 2018-02-12
Payer: MEDICARE

## 2018-02-12 VITALS
BODY MASS INDEX: 30.49 KG/M2 | DIASTOLIC BLOOD PRESSURE: 80 MMHG | SYSTOLIC BLOOD PRESSURE: 124 MMHG | HEIGHT: 65 IN | WEIGHT: 183 LBS | HEART RATE: 78 BPM | OXYGEN SATURATION: 97 %

## 2018-02-12 PROCEDURE — 99214 OFFICE O/P EST MOD 30 MIN: CPT | Mod: 25

## 2018-02-12 PROCEDURE — 94010 BREATHING CAPACITY TEST: CPT

## 2018-03-05 ENCOUNTER — MEDICATION RENEWAL (OUTPATIENT)
Age: 68
End: 2018-03-05

## 2018-03-27 ENCOUNTER — CHART COPY (OUTPATIENT)
Age: 68
End: 2018-03-27

## 2018-04-16 ENCOUNTER — APPOINTMENT (OUTPATIENT)
Dept: GASTROENTEROLOGY | Facility: CLINIC | Age: 68
End: 2018-04-16

## 2018-04-26 ENCOUNTER — APPOINTMENT (OUTPATIENT)
Dept: PULMONOLOGY | Facility: CLINIC | Age: 68
End: 2018-04-26
Payer: MEDICARE

## 2018-04-26 VITALS
SYSTOLIC BLOOD PRESSURE: 130 MMHG | HEART RATE: 77 BPM | OXYGEN SATURATION: 98 % | DIASTOLIC BLOOD PRESSURE: 80 MMHG | HEIGHT: 62 IN | BODY MASS INDEX: 33.49 KG/M2 | WEIGHT: 182 LBS

## 2018-04-26 PROCEDURE — 94010 BREATHING CAPACITY TEST: CPT | Mod: 59

## 2018-04-26 PROCEDURE — 94618 PULMONARY STRESS TESTING: CPT

## 2018-04-26 PROCEDURE — 99214 OFFICE O/P EST MOD 30 MIN: CPT | Mod: 25

## 2018-04-30 ENCOUNTER — APPOINTMENT (OUTPATIENT)
Dept: GASTROENTEROLOGY | Facility: CLINIC | Age: 68
End: 2018-04-30

## 2018-05-09 ENCOUNTER — APPOINTMENT (OUTPATIENT)
Dept: THORACIC SURGERY | Facility: CLINIC | Age: 68
End: 2018-05-09

## 2018-05-11 NOTE — ASU PREOP CHECKLIST - SITE MARKED BY ANESTHESIOLOGIST
Patient is being driven home by his wife. Dr Christian spoke with patient and his wife prior to discharge. Patient brought to car via wheelchair by nurse. Patient denies needing to use the bathroom at this time.   n/a

## 2018-06-08 NOTE — H&P PST ADULT - OTHER CARE PROVIDERS
2018    MD Pascale Ng [address] [facility] [address]    Patient: Bailee Casas   YOB: 2009   Date of Visit: 2018     Encounter Diagnosis     ICD-10-CM    1  Cervical (neck) region somatic dysfunction M99 01        Dear Dr Trimble Marcy:    Please review the attached Plan of Care from [facility] recent visit  Please verify that you agree therapy should continue by signing the attached document and sending it back to our office  If you have any questions or concerns, please don't hesitate to call  Sincerely,    Chaz Jeffers, PT      Referring Provider:      I certify that I have read the below Plan of Care and certify the need for these services furnished under this plan of treatment while under my care  MD Pascale Ng [address]: [phone]          PT Evaluation     Today's date: 2018  Patient name: Bailee Casas  : 2009  MRN: [id number]  Referring provider: Olga Fox MD  Dx:   Encounter Diagnosis     ICD-10-CM    1  Cervical (neck) region somatic dysfunction M99 01                   Assessment  Impairments: abnormal muscle tone, abnormal or restricted ROM and pain with function    Assessment details: Slight decrease in ROM with mild to moderate spasm left rhomboid, bilateral levator, left  Upper trap   Would benefit from manual intervention, stretching, and strengthening along with desensitazation  Understanding of Dx/Px/POC: good   Prognosis: good    Goals  STG decrease pain 4-5/10  Increase ROM WFL  LTG decrease pain 0-1/10  Upright posture  Return to normal play and activities    Plan  Patient would benefit from: skilled physical therapy  Planned therapy interventions: manual therapy, postural training, stretching, strengthening and neuromuscular re-education  Frequency: 1x week  Duration in weeks: 4  Plan details: Continue manual intervention with stretching and strengthening as tolerated        Subjective Evaluation    History of Present Illness  Mechanism of injury: I have pain in my jaw, by upper traps, my cervical paraspinals, levators,and rhomboids  Also reporting pain in arches of feet  Pain  Current pain ratin  At best pain ratin  At worst pain ratin  Quality: dull ache and tight  Relieving factors: heat  Aggravating factors: eating          Objective     Static Posture     Head  Forward  Shoulders  Rounded  Palpation   Left   Hypertonic in the levator scapulae and upper trapezius  Muscle spasm in the rhomboids  Tenderness of the cervical interspinals, intercostals, sternocleidomastoid and suboccipitals  Right   Hypertonic in the levator scapulae and upper trapezius  Tenderness of the cervical interspinals, intercostals, rhomboids, sternocleidomastoid and suboccipitals       Active Range of Motion   Cervical/Thoracic Spine   Cervical    Flexion: WFL and with pain  Extension: WFL  Left lateral flexion: WFL  Right lateral flexion: WFL  Left rotation: WFL  Right rotation: Neck active rotation right: 90%     Thoracic   Flexion: WFL  Left rotation: WFL  Right rotation: WFL      Flowsheet Rows      Most Recent Value   PT/OT G-Codes   Current Score  62   Projected Score  76   FOTO information reviewed  Yes   Assessment Type  Evaluation   G code set  Carrying, Moving & Handling Objects   Carrying, Moving and Handling Objects Current Status ()  CJ   Carrying, Moving and Handling Objects Goal Status ()  CJ          Precautions: asthma, back pain, headaches    Daily Treatment Diary     Manual              Manual MFR 10 min                                                                    Exercise Diary Modalities Dr. Markham, Pulmonologist [phone]

## 2018-06-12 ENCOUNTER — APPOINTMENT (OUTPATIENT)
Dept: PULMONOLOGY | Facility: CLINIC | Age: 68
End: 2018-06-12

## 2018-08-27 ENCOUNTER — APPOINTMENT (OUTPATIENT)
Dept: PULMONOLOGY | Facility: CLINIC | Age: 68
End: 2018-08-27

## 2018-08-31 ENCOUNTER — MOBILE ON CALL (OUTPATIENT)
Age: 68
End: 2018-08-31

## 2018-09-04 ENCOUNTER — RX RENEWAL (OUTPATIENT)
Age: 68
End: 2018-09-04

## 2018-10-18 PROBLEM — H26.9 UNSPECIFIED CATARACT: Chronic | Status: ACTIVE | Noted: 2017-04-19

## 2018-10-18 PROBLEM — J30.2 OTHER SEASONAL ALLERGIC RHINITIS: Chronic | Status: ACTIVE | Noted: 2017-04-19

## 2018-10-18 PROBLEM — E66.9 OBESITY, UNSPECIFIED: Chronic | Status: ACTIVE | Noted: 2017-04-19

## 2018-11-08 ENCOUNTER — APPOINTMENT (OUTPATIENT)
Dept: PULMONOLOGY | Facility: CLINIC | Age: 68
End: 2018-11-08
Payer: MEDICARE

## 2018-11-08 ENCOUNTER — NON-APPOINTMENT (OUTPATIENT)
Age: 68
End: 2018-11-08

## 2018-11-08 VITALS
DIASTOLIC BLOOD PRESSURE: 60 MMHG | WEIGHT: 171 LBS | RESPIRATION RATE: 17 BRPM | OXYGEN SATURATION: 99 % | HEIGHT: 64 IN | BODY MASS INDEX: 29.19 KG/M2 | HEART RATE: 67 BPM | SYSTOLIC BLOOD PRESSURE: 120 MMHG

## 2018-11-08 DIAGNOSIS — J45.909 UNSPECIFIED ASTHMA, UNCOMPLICATED: ICD-10-CM

## 2018-11-08 PROCEDURE — 94010 BREATHING CAPACITY TEST: CPT

## 2018-11-08 PROCEDURE — G0008: CPT

## 2018-11-08 PROCEDURE — 90662 IIV NO PRSV INCREASED AG IM: CPT

## 2018-11-08 PROCEDURE — 99214 OFFICE O/P EST MOD 30 MIN: CPT | Mod: 25

## 2018-11-08 NOTE — HISTORY OF PRESENT ILLNESS
[FreeTextEntry1] : Ms. Mcclain is a 68 year old female presenting to the office for a follow up visit for abnormal chest CT, asthma, GERD, carcinoid tumor of the lung, eosinophilic asthma, low vitamin D, lung opacity, snoring and shortness of breath. Her chief complaint is chest tightness. \par -She states she has pain and cramps in her toes\par -She states she has been having chest pressure lately during the day and night\par -She reports it sometimes presents with food and with cold air exposure\par -She states she has been having abdominal pain also\par -She states her weight is stable and her diet is good\par -SHe reports her balance is poor, likely secondary to her knee issues\par -She states her energy level is good\par -She states her appetite is intermittent and changes day by day\par -She states she has had some leg swelling\par -she reports her wheezing has improved\par -She states her breathing has been labored\par -she states she snores at times\par -She states she sometimes does not feel rested upon waking\par -She states she has been using her inhalers\par -She reports she had an endoscopy performed recently\par -She denies wheezing, nasal congestion, rhinitis,

## 2018-11-08 NOTE — ADDENDUM
[FreeTextEntry1] : Documented by Ramona Montano acting as a scribe for Dr. Gustavo Cornelius on 11/8/2018.\par \par All medical record entries made by the Scribe were at my, Dr. Gustavo Cornelius's, direction and personally dictated by me on 11/8/2018. I have reviewed the chart and agree that the record accurately reflects my personal performance of the history, physical exam, assessment and plan. I have also personally directed, reviewed, and agree with the discharge instructions.

## 2018-11-08 NOTE — ASSESSMENT
[FreeTextEntry1] : Ms. 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 She is currently stable from a pulmonary perspective.\par \par Ms. Najera' SOB is multifactorial due to:\par -overweight\par -out of shape\par -poor mechanics of breathing\par -asthma/restrictive dysfunction (s/p surgery)\par -emphysema \par -?cardiac\par \par Her chronic cough is multifactorial due to:\par -asthma (eosinophilic)\par -LPR/reflux\par \par problem 1: asthma\par -continue Breo Ellipta 100 1 puff QD \par -continue Tudorza 1 puff BID \par -continue Singulair 10 mg QHS \par -continue Albuterol via nebulizer PRN\par -Inhaler technique reviewed as well as oral hygiene techniques reviewed with patient. Avoidance of cold air, extremes of temperature, rescue inhaler should be used before exercise. Order of medication reviewed with patient. Recommended use of a cool mist humidifier in the bedroom. \par -Asthma is believed to be caused by inherited (genetic) and environmental factor, but its exact cause is unknown. Asthma may be triggered by allergens, lung infections, or irritants in the air. Asthma triggers are different for each person\par \par problem 2: poor mechanics of breathing\par -Proper breathing techniques were reviewed with an emphasis of exhalation. Patient instructed to breath in for 1 second and out for four seconds. Patient was encouraged to not talk while walking. \par \par problem 3: overweight\par -Weight loss, exercise, and diet control were discussed and are highly encouraged. Treatment options were given such as, aqua therapy, and contacting a nutritionist. Recommended to use the elliptical, stationary bike, less use of treadmill. Mindful eating was explained to the patient Obesity is associated with worsening asthma, shortness of breath, and potential for cardiac disease, diabetes, and other underlying medical conditions. \par \par problem 4: LPR/reflux\par -continue Zantac QHS \par -add Protonix 40mg QD in the morning \par -Rule of 2s: avoid eating too much, eating too late, eating too spicy, eating two hours before bed\par -Things to avoid including overeating, spicy foods, tight clothing, eating within three hours of bed, this list is not all inclusive. \par -For treatment of reflux, possible options discussed including diet control, H2 blockers, PPIs, as well as coating motility agents discussed as treatment options. Timing of meals and proximity of last meal to sleep were discussed. If symptoms persist, a formal gastrointestinal evaluation is needed.\par \par problem 5: allergy/postnasal drip\par -s/p blood work including: allergen panel food (-), IgE panel (-), asthma panel(-), eosinophil level (-), vitamin D level (low)\par -Recommend saline solution \par \par problem 6: carcinoid tumorlets\par -follow up CT scan 4/2018\par \par problem 7: r/o YRIS\par -recommend  home sleep study\par Treatment options discussed including CPAP/BiPAP machine, oral appliance, ProVent therapy, Oxy-Aid by [employer], new technologies, or positional sleep.Recommended use of the CPAP machine for moderate (AHI >15), moderate to severe (AHI 15-30) and severe patients (AHI > 30). Recommended weight loss which can reduce AHI especially in weight loss of greater than 5% of BMI. Positional sleep is recommended in those with low AHI, low-moderate MBI, and younger age. For severe sleep apnea, the hypoglossal nerve stimulator was recommended as well.\par \par problem 8: eosinophilia\par -she is a candidate for Nucala injections\par -The safety and efficacy of Nucala was established in three double-blind, randomized, placebo-controlled trials in patients with severe asthma. Compared to a placebo, patients with severe asthma receiving Nucala had fewer exacerbation requiring hospitalization and/or emergency department visits, and a longer time to first exacerbation.  In addition, patients with severe asthma receiving Nucala experienced greater reductions in their daily maintenance oral corticosteroid dose, while maintaining asthma control compared with patients receiving placebo. Treatment with Nucala did not result in a significant improvement in lung function, as measured by the volume of air exhaled by patients in one second. The most common side effects include: headache, injection site reactions, back pain, weakness, and fatigue; hypersensitivity reactions can occur within hours or days including swelling of the face, mouth, and tongue, fainting, dizziness, hives, breathing problems, and rash; herpes zoster infections have occurred. The drug is a monoclonal antibody that inhibits interleukin -5 which helps regular eosinophils, a type of white blood cell that contributes to asthma. The over-production of eosinophils can cause inflammation in the lungs, increasing the frequency of asthma attacks. Patients must also take other medications, including high dose inhaled corticosteroids and at least one additional asthma drug.\par \par problem 9: low vitamin D\par -continue supplemental vitamin D 50,000 every 2 weeks\par -Has been associated with asthma exacerbations and increased allergic symptoms. The goal based on recent information is maintaining levels between 50-70 and low normal is 30. Recommended 50,000 units every two weeks to once a month depending on the level. \par \par problem 10: anemia\par -referred to have a GI evaluation and was given the name of Dr. Liban Samuel's office \par \par problem 11: health maintenance \par -s/p 2018 influenza vaccine (given in office 11.8.18)\par -recommended strep pneumonia vaccines: Prevnar-13 vaccine, followed by Pneumo vaccine 23 one year following\par -recommended early intervention for URIs\par -recommended regular osteoporosis evaluations\par -recommended early dermatological evaluations\par -recommended after the age of 50 to the age of 70, colonoscopy every 5 years \par \par F/U in 4 months \par She is encouraged to call with any questions, changes, or concerns.

## 2018-11-08 NOTE — REVIEW OF SYSTEMS
[Poor Appetite] : poor appetite [Dyspnea] : dyspnea [Chest Tightness] : chest tightness [As Noted in HPI] : as noted in HPI [Snoring] : snoring [Negative] : Pulmonary Hypertension

## 2018-11-08 NOTE — PROCEDURE
[FreeTextEntry1] : PFT-spi reveals mild obstructive and mild restrictive dysfunction with FEV1 of  1.16 ,which is   62% of predicted, normal flow volume loop

## 2018-11-08 NOTE — REASON FOR VISIT
[Follow-Up] : a follow-up visit [FreeTextEntry1] : abnormal chest CT, asthma, GERD, carcinoid tumor of the lung, eosinophilic asthma, low vitamin D, lung opacity, snoring and shortness of breath

## 2019-02-22 ENCOUNTER — APPOINTMENT (OUTPATIENT)
Dept: PULMONOLOGY | Facility: CLINIC | Age: 69
End: 2019-02-22
Payer: MEDICARE

## 2019-02-22 ENCOUNTER — NON-APPOINTMENT (OUTPATIENT)
Age: 69
End: 2019-02-22

## 2019-02-22 VITALS
SYSTOLIC BLOOD PRESSURE: 110 MMHG | RESPIRATION RATE: 14 BRPM | DIASTOLIC BLOOD PRESSURE: 70 MMHG | OXYGEN SATURATION: 98 % | HEART RATE: 73 BPM | WEIGHT: 173 LBS | BODY MASS INDEX: 29.53 KG/M2 | HEIGHT: 64 IN

## 2019-02-22 VITALS
DIASTOLIC BLOOD PRESSURE: 70 MMHG | SYSTOLIC BLOOD PRESSURE: 110 MMHG | HEART RATE: 80 BPM | RESPIRATION RATE: 17 BRPM | OXYGEN SATURATION: 98 % | HEIGHT: 64 IN | BODY MASS INDEX: 29.53 KG/M2 | WEIGHT: 173 LBS

## 2019-02-22 VITALS
WEIGHT: 79.7 LBS | RESPIRATION RATE: 14 BRPM | OXYGEN SATURATION: 97 % | HEIGHT: 64 IN | BODY MASS INDEX: 13.61 KG/M2 | HEART RATE: 76 BPM | DIASTOLIC BLOOD PRESSURE: 85 MMHG | SYSTOLIC BLOOD PRESSURE: 120 MMHG

## 2019-02-22 DIAGNOSIS — Z84.2 FAMILY HISTORY OF OTHER DISEASES OF THE GENITOURINARY SYSTEM: ICD-10-CM

## 2019-02-22 DIAGNOSIS — Z83.1 FAMILY HISTORY OF OTHER INFECTIOUS AND PARASITIC DISEASES: ICD-10-CM

## 2019-02-22 DIAGNOSIS — Z80.3 FAMILY HISTORY OF MALIGNANT NEOPLASM OF BREAST: ICD-10-CM

## 2019-02-22 PROCEDURE — 71046 X-RAY EXAM CHEST 2 VIEWS: CPT

## 2019-02-22 PROCEDURE — 94060 EVALUATION OF WHEEZING: CPT

## 2019-02-22 PROCEDURE — 94727 GAS DIL/WSHOT DETER LNG VOL: CPT

## 2019-02-22 PROCEDURE — 94729 DIFFUSING CAPACITY: CPT

## 2019-02-22 PROCEDURE — 99214 OFFICE O/P EST MOD 30 MIN: CPT | Mod: 25

## 2019-02-22 NOTE — HISTORY OF PRESENT ILLNESS
[FreeTextEntry1] : Ms. Najera is a 68 year old female with a history of abnormal chest CT, asthma, GERD, dyspepsia, eosinophilic asthma, low vitamin D, lung opacity, OW, snoring and SOB presenting to the office tody for a follow up visit. Her chief complaint is \par -she states that she has been feeling depressed lately\par -she state that she has been using her albuterol inhaler, but not Breo\par -she notes that she has been sleeping poorly some nights\par -she reports that she has still been having some mild reflux\par \par -she denies any headaches, nausea, vomiting, fever, chills, sweats, chest pain, chest pressure, diarrhea, constipation, dysphagia, dizziness, leg swelling, leg pain, itchy eyes, itchy ears, or sour taste in the mouth.

## 2019-02-22 NOTE — ASSESSMENT
[FreeTextEntry1] : Ms. 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 She is currently stable from a pulmonary perspective (noncompliance).\par \par Ms. Najera' SOB is multifactorial due to:\par -overweight\par -out of shape\par -poor mechanics of breathing\par -asthma/restrictive dysfunction (s/p surgery)\par -emphysema \par -?cardiac\par \par Her chronic cough is multifactorial due to:\par -asthma (eosinophilic)\par -LPR/reflux\par \par problem 1: asthma (active) - noncompliant \par -transition to Trelegy 1 puff QD \par -continue Singulair 10 mg QHS \par -continue Albuterol via nebulizer PRN\par -Inhaler technique reviewed as well as oral hygiene techniques reviewed with patient. Avoidance of cold air, extremes of temperature, rescue inhaler should be used before exercise. Order of medication reviewed with patient. Recommended use of a cool mist humidifier in the bedroom. \par -Asthma is believed to be caused by inherited (genetic) and environmental factor, but its exact cause is unknown. Asthma may be triggered by allergens, lung infections, or irritants in the air. Asthma triggers are different for each person\par \par problem 2: poor mechanics of breathing\par -Proper breathing techniques were reviewed with an emphasis of exhalation. Patient instructed to breath in for 1 second and out for four seconds. Patient was encouraged to not talk while walking. \par \par problem 3: overweight\par -Weight loss, exercise, and diet control were discussed and are highly encouraged. Treatment options were given such as, aqua therapy, and contacting a nutritionist. Recommended to use the elliptical, stationary bike, less use of treadmill. Mindful eating was explained to the patient Obesity is associated with worsening asthma, shortness of breath, and potential for cardiac disease, diabetes, and other underlying medical conditions. \par \par problem 4: LPR/reflux (controlled)\par -continue Zantac QHS \par -continue Protonix 40mg QD in the morning \par -Rule of 2s: avoid eating too much, eating too late, eating too spicy, eating two hours before bed\par -Things to avoid including overeating, spicy foods, tight clothing, eating within three hours of bed, this list is not all inclusive. \par -For treatment of reflux, possible options discussed including diet control, H2 blockers, PPIs, as well as coating motility agents discussed as treatment options. Timing of meals and proximity of last meal to sleep were discussed. 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 Recommended weight loss which can reduce AHI especially in weight loss of greater than 5% of BMI. Positional sleep is recommended in those with low AHI, low-moderate MBI, and younger age. For severe sleep apnea, the hypoglossal nerve stimulator was recommended as well.\par \par problem 8: eosinophilia\par -she is a candidate for Nucala injections\par -The safety and efficacy of Nucala was established in three double-blind, randomized, placebo-controlled trials in patients with severe asthma. Compared to a placebo, patients with severe asthma receiving Nucala had fewer exacerbation requiring hospitalization and/or emergency department visits, and a longer time to first exacerbation.  In addition, patients with severe asthma receiving Nucala experienced greater reductions in their daily maintenance oral corticosteroid dose, while maintaining asthma control compared with patients receiving placebo. Treatment with Nucala did not result in a significant improvement in lung function, as measured by the volume of air exhaled by patients in one second. The most common side effects include: headache, injection site reactions, back pain, weakness, and fatigue; hypersensitivity reactions can occur within hours or days including swelling of the face, mouth, and tongue, fainting, dizziness, hives, breathing problems, and rash; herpes zoster infections have occurred. The drug is a monoclonal antibody that inhibits interleukin -5 which helps regular eosinophils, a type of white blood cell that contributes to asthma. The over-production of eosinophils can cause inflammation in the lungs, increasing the frequency of asthma attacks. Patients must also take other medications, including high dose inhaled corticosteroids and at least one additional asthma drug.\par \par problem 9: low vitamin D\par -continue supplemental vitamin D 50,000 every 2 weeks\par -Has been associated with asthma exacerbations and increased allergic symptoms. The goal based on recent information is maintaining levels between 50-70 and low normal is 30. Recommended 50,000 units every two weeks to once a month depending on the level. \par \par problem 10: anemia\par -referred to have a GI evaluation and was given the name of Dr. Liban Samuel's office \par \par problem 11: health maintenance \par -s/p 2018 influenza vaccine (given in office 11.8.18)\par -recommended strep pneumonia vaccines: Prevnar-13 vaccine, followed by Pneumo vaccine 23 one year following\par -recommended early intervention for URIs\par -recommended regular osteoporosis evaluations\par -recommended early dermatological evaluations\par -recommended after the age of 50 to the age of 70, colonoscopy every 5 years \par \par F/U in 4 months \par She is encouraged to call with any questions, changes, or concerns.

## 2019-02-22 NOTE — REASON FOR VISIT
[Follow-Up] : a follow-up visit [FreeTextEntry1] : abnormal chest CT, asthma, GERD, dyspepsia, eosinophilic asthma, low vitamin D, lung opacity, OW, snoring and SOB

## 2019-02-22 NOTE — PROCEDURE
[FreeTextEntry1] : PFT - spi reveals mild restrictive / moderate obstructive ; FEV1 is 0.91  which is 44% of predicted, normal flow volume loop, 31% improvement with bronchodilators. Normal volume. Mildly reduced diffusion, DLCO is 13.8 which is 67% of predicted. \par \par CXR reveals a normal sized heart; no evidence of infiltrate or effusion--s/p right lung surgery\par \par Chest CT (nov 14, 2018) reveals no active pulmonary disease

## 2019-02-22 NOTE — ADDENDUM
[FreeTextEntry1] : All medical record entries made by denisse Boland were at Dr. Gustavo Cornelius's, direction and personally dictated by me on 02/22/2019. I have reviewed the chart and agree that the record accurately reflects my personal performance of the history, physical exam, assessment and plan. I have also personally directed, reviewed, and agree with the discharge instructions.

## 2019-03-06 ENCOUNTER — RX RENEWAL (OUTPATIENT)
Age: 69
End: 2019-03-06

## 2019-03-12 ENCOUNTER — APPOINTMENT (OUTPATIENT)
Dept: PULMONOLOGY | Facility: CLINIC | Age: 69
End: 2019-03-12

## 2019-06-26 ENCOUNTER — APPOINTMENT (OUTPATIENT)
Dept: PULMONOLOGY | Facility: CLINIC | Age: 69
End: 2019-06-26
Payer: MEDICARE

## 2019-06-26 ENCOUNTER — NON-APPOINTMENT (OUTPATIENT)
Age: 69
End: 2019-06-26

## 2019-06-26 VITALS
BODY MASS INDEX: 30.9 KG/M2 | HEART RATE: 64 BPM | OXYGEN SATURATION: 98 % | DIASTOLIC BLOOD PRESSURE: 80 MMHG | SYSTOLIC BLOOD PRESSURE: 110 MMHG | HEIGHT: 64 IN | RESPIRATION RATE: 17 BRPM | WEIGHT: 181 LBS

## 2019-06-26 PROCEDURE — 94010 BREATHING CAPACITY TEST: CPT

## 2019-06-26 PROCEDURE — 99214 OFFICE O/P EST MOD 30 MIN: CPT | Mod: 25

## 2019-06-26 NOTE — REASON FOR VISIT
[Follow-Up] : a follow-up visit [FreeTextEntry1] : abnormal chest CT, asthma, carcinoid tumor of lung, chronic GERD, eosinophilic asthma, low vitamin D, snoring, OW, and SOB

## 2019-06-26 NOTE — PHYSICAL EXAM
[Normal Appearance] : normal appearance [General Appearance - Well Developed] : well developed [Well Groomed] : well groomed [General Appearance - Well Nourished] : well nourished [No Deformities] : no deformities [General Appearance - In No Acute Distress] : no acute distress [Normal Conjunctiva] : the conjunctiva exhibited no abnormalities [Eyelids - No Xanthelasma] : the eyelids demonstrated no xanthelasmas [Normal Oropharynx] : normal oropharynx [Neck Appearance] : the appearance of the neck was normal [Jugular Venous Distention Increased] : there was no jugular-venous distention [Neck Cervical Mass (___cm)] : no neck mass was observed [Thyroid Nodule] : there were no palpable thyroid nodules [Thyroid Diffuse Enlargement] : the thyroid was not enlarged [Heart Rate And Rhythm] : heart rate and rhythm were normal [Heart Sounds] : normal S1 and S2 [Murmurs] : no murmurs present [Exaggerated Use Of Accessory Muscles For Inspiration] : no accessory muscle use [Respiration, Rhythm And Depth] : normal respiratory rhythm and effort [Auscultation Breath Sounds / Voice Sounds] : lungs were clear to auscultation bilaterally [Abdomen Soft] : soft [Abdomen Tenderness] : non-tender [Abdomen Mass (___ Cm)] : no abdominal mass palpated [Abnormal Walk] : normal gait [Gait - Sufficient For Exercise Testing] : the gait was sufficient for exercise testing [Petechial Hemorrhages (___cm)] : no petechial hemorrhages [Nail Clubbing] : no clubbing of the fingernails [Cyanosis, Localized] : no localized cyanosis [Skin Color & Pigmentation] : normal skin color and pigmentation [] : no rash [No Venous Stasis] : no venous stasis [Skin Lesions] : no skin lesions [No Skin Ulcers] : no skin ulcer [No Xanthoma] : no  xanthoma was observed [Deep Tendon Reflexes (DTR)] : deep tendon reflexes were 2+ and symmetric [Sensation] : the sensory exam was normal to light touch and pinprick [No Focal Deficits] : no focal deficits [Oriented To Time, Place, And Person] : oriented to person, place, and time [Impaired Insight] : insight and judgment were intact [Affect] : the affect was normal [II] : II [FreeTextEntry1] : I:E ratio 1:3; clear

## 2019-06-26 NOTE — ADDENDUM
[FreeTextEntry1] : All medical record entries made by denisse Boland were at Dr. Gustavo Cornelius's, direction and personally dictated by me on 06/26/2019. I have reviewed the chart and agree that the record accurately reflects my personal performance of the history, physical exam, assessment and plan. I have also personally directed, reviewed, and agree with the discharge instructions.

## 2019-06-26 NOTE — PROCEDURE
[FreeTextEntry1] : PFT - spi reveals low normal flows; FEV1 is 1.03 which is 56% of predicted, normal flow volume loop

## 2019-06-26 NOTE — ASSESSMENT
[FreeTextEntry1] : Ms. 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 She is currently mildly symptomatic from a pulmonary perspective (noncompliance).\par \par Ms. Najera' SOB is multifactorial due to:\par -overweight / out of shape\par -poor mechanics of breathing\par -asthma/restrictive dysfunction (s/p surgery)\par -emphysema \par -?cardiac\par \par Her chronic cough is multifactorial due to:\par -asthma (eosinophilic)\par -LPR/reflux\par \par problem 1: asthma (improved) - compliant \par -continue Trelegy 1 puff QD \par -continue Singulair 10 mg QHS \par -continue Albuterol via nebulizer PRN\par -Inhaler technique reviewed as well as oral hygiene techniques reviewed with patient. Avoidance of cold air, extremes of temperature, rescue inhaler should be used before exercise. Order of medication reviewed with patient. Recommended use of a cool mist humidifier in the bedroom. \par -Asthma is believed to be caused by inherited (genetic) and environmental factor, but its exact cause is unknown. Asthma may be triggered by allergens, lung infections, or irritants in the air. Asthma triggers are different for each person\par \par problem 2: poor mechanics of breathing\par -Proper breathing techniques were reviewed with an emphasis of exhalation. Patient instructed to breath in for 1 second and out for four seconds. Patient was encouraged to not talk while walking. \par \par problem 3: overweight\par -Weight loss, exercise, and diet control were discussed and are highly encouraged. Treatment options were given such as, aqua therapy, and contacting a nutritionist. Recommended to use the elliptical, stationary bike, less use of treadmill. Mindful eating was explained to the patient Obesity is associated with worsening asthma, shortness of breath, and potential for cardiac disease, diabetes, and other underlying medical conditions. \par \par problem 4: LPR/reflux (controlled)\par -continue Zantac QHS \par -continue Protonix 40mg QD in the morning \par -Rule of 2s: avoid eating too much, eating too late, eating too spicy, eating two hours before bed\par -Things to avoid including overeating, spicy foods, tight clothing, eating within three hours of bed, this list is not all inclusive. \par -For treatment of reflux, possible options discussed including diet control, H2 blockers, PPIs, as well as coating motility agents discussed as treatment options. Timing of meals and proximity of last meal to sleep were discussed. 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 Recommended weight loss which can reduce AHI especially in weight loss of greater than 5% of BMI. Positional sleep is recommended in those with low AHI, low-moderate MBI, and younger age. For severe sleep apnea, the hypoglossal nerve stimulator was recommended as well.\par \par problem 8: eosinophilia\par -she is a candidate for Nucala injections\par -The safety and efficacy of Nucala was established in three double-blind, randomized, placebo-controlled trials in patients with severe asthma. Compared to a placebo, patients with severe asthma receiving Nucala had fewer exacerbation requiring hospitalization and/or emergency department visits, and a longer time to first exacerbation.  In addition, patients with severe asthma receiving Nucala experienced greater reductions in their daily maintenance oral corticosteroid dose, while maintaining asthma control compared with patients receiving placebo. Treatment with Nucala did not result in a significant improvement in lung function, as measured by the volume of air exhaled by patients in one second. The most common side effects include: headache, injection site reactions, back pain, weakness, and fatigue; hypersensitivity reactions can occur within hours or days including swelling of the face, mouth, and tongue, fainting, dizziness, hives, breathing problems, and rash; herpes zoster infections have occurred. The drug is a monoclonal antibody that inhibits interleukin -5 which helps regular eosinophils, a type of white blood cell that contributes to asthma. The over-production of eosinophils can cause inflammation in the lungs, increasing the frequency of asthma attacks. Patients must also take other medications, including high dose inhaled corticosteroids and at least one additional asthma drug.\par \par problem 9: low vitamin D\par -get blood work to include: CBC, vitamin D, LFTs, HbA1c\par -continue supplemental vitamin D 50,000 every 2 weeks (recheck)\par -Has been associated with asthma exacerbations and increased allergic symptoms. The goal based on recent information is maintaining levels between 50-70 and low normal is 30. Recommended 50,000 units every two weeks to once a month depending on the level. \par \par problem 10: anemia\par -referred to have a GI evaluation and was given the name of Dr. Liban Samuel's office  (recheck)\par \par problem 11: health maintenance \par -s/p 2018 influenza vaccine (given in office 11.8.18)\par -recommended strep pneumonia vaccines: Prevnar-13 vaccine, followed by Pneumo vaccine 23 one year following\par -recommended early intervention for URIs\par -recommended regular osteoporosis evaluations\par -recommended early dermatological evaluations\par -recommended after the age of 50 to the age of 70, colonoscopy every 5 years \par \par F/U in 4 months \par She is encouraged to call with any questions, changes, or concerns.

## 2019-09-06 ENCOUNTER — EMERGENCY (EMERGENCY)
Facility: HOSPITAL | Age: 69
LOS: 0 days | Discharge: HOME | End: 2019-09-06
Payer: MEDICARE

## 2019-09-06 VITALS
SYSTOLIC BLOOD PRESSURE: 170 MMHG | RESPIRATION RATE: 19 BRPM | WEIGHT: 178.57 LBS | DIASTOLIC BLOOD PRESSURE: 77 MMHG | TEMPERATURE: 98 F | HEIGHT: 62 IN | HEART RATE: 83 BPM

## 2019-09-06 DIAGNOSIS — E66.9 OBESITY, UNSPECIFIED: ICD-10-CM

## 2019-09-06 DIAGNOSIS — I10 ESSENTIAL (PRIMARY) HYPERTENSION: ICD-10-CM

## 2019-09-06 DIAGNOSIS — Z90.710 ACQUIRED ABSENCE OF BOTH CERVIX AND UTERUS: Chronic | ICD-10-CM

## 2019-09-06 DIAGNOSIS — H59.021 CATARACT (LENS) FRAGMENTS IN EYE FOLLOWING CATARACT SURGERY, RIGHT EYE: ICD-10-CM

## 2019-09-06 DIAGNOSIS — J30.2 OTHER SEASONAL ALLERGIC RHINITIS: ICD-10-CM

## 2019-09-06 DIAGNOSIS — M54.5 LOW BACK PAIN: ICD-10-CM

## 2019-09-06 DIAGNOSIS — Z98.42 CATARACT EXTRACTION STATUS, LEFT EYE: Chronic | ICD-10-CM

## 2019-09-06 DIAGNOSIS — J45.909 UNSPECIFIED ASTHMA, UNCOMPLICATED: ICD-10-CM

## 2019-09-06 DIAGNOSIS — R00.2 PALPITATIONS: ICD-10-CM

## 2019-09-06 DIAGNOSIS — Z79.84 LONG TERM (CURRENT) USE OF ORAL HYPOGLYCEMIC DRUGS: ICD-10-CM

## 2019-09-06 DIAGNOSIS — I49.9 CARDIAC ARRHYTHMIA, UNSPECIFIED: ICD-10-CM

## 2019-09-06 DIAGNOSIS — F32.9 MAJOR DEPRESSIVE DISORDER, SINGLE EPISODE, UNSPECIFIED: ICD-10-CM

## 2019-09-06 DIAGNOSIS — Z79.82 LONG TERM (CURRENT) USE OF ASPIRIN: ICD-10-CM

## 2019-09-06 DIAGNOSIS — M25.552 PAIN IN LEFT HIP: ICD-10-CM

## 2019-09-06 DIAGNOSIS — K21.9 GASTRO-ESOPHAGEAL REFLUX DISEASE WITHOUT ESOPHAGITIS: ICD-10-CM

## 2019-09-06 DIAGNOSIS — Z90.49 ACQUIRED ABSENCE OF OTHER SPECIFIED PARTS OF DIGESTIVE TRACT: Chronic | ICD-10-CM

## 2019-09-06 DIAGNOSIS — Z90.11 ACQUIRED ABSENCE OF RIGHT BREAST AND NIPPLE: Chronic | ICD-10-CM

## 2019-09-06 DIAGNOSIS — E11.9 TYPE 2 DIABETES MELLITUS WITHOUT COMPLICATIONS: ICD-10-CM

## 2019-09-06 PROCEDURE — 99283 EMERGENCY DEPT VISIT LOW MDM: CPT

## 2019-09-06 RX ORDER — CYCLOBENZAPRINE HYDROCHLORIDE 10 MG/1
1 TABLET, FILM COATED ORAL
Qty: 15 | Refills: 0
Start: 2019-09-06

## 2019-09-06 RX ORDER — IBUPROFEN 200 MG
1 TABLET ORAL
Qty: 15 | Refills: 0
Start: 2019-09-06

## 2019-09-06 RX ORDER — IBUPROFEN 200 MG
600 TABLET ORAL ONCE
Refills: 0 | Status: COMPLETED | OUTPATIENT
Start: 2019-09-06 | End: 2019-09-06

## 2019-09-06 RX ORDER — CYCLOBENZAPRINE HYDROCHLORIDE 10 MG/1
10 TABLET, FILM COATED ORAL ONCE
Refills: 0 | Status: COMPLETED | OUTPATIENT
Start: 2019-09-06 | End: 2019-09-06

## 2019-09-06 RX ADMIN — Medication 600 MILLIGRAM(S): at 17:54

## 2019-09-06 RX ADMIN — CYCLOBENZAPRINE HYDROCHLORIDE 10 MILLIGRAM(S): 10 TABLET, FILM COATED ORAL at 17:30

## 2019-09-06 RX ADMIN — Medication 600 MILLIGRAM(S): at 17:30

## 2019-09-06 NOTE — ED PROVIDER NOTE - OBJECTIVE STATEMENT
68 yo F PMHx DM, GERD, HTN, asthma presents to ED c/o left low back/hip pain x a few months. Pt states that she was seen by her primary doctor a few months ago, was given a medication that helped alleviate the pain for a time but now it is getting worse again. Pt states that she was evaluated by an orthopedist 4 days ago, who ordered an MRI. Pt plans to schedule an appt for next week for the study. Pt here requesting medication for pain. Pt describes pain as a constant, dull aching sensation, radiating down left leg. Denies numbness/tingling, dysuria/hematuria, trauma, fever/chills, chest pain, abd pain.

## 2019-09-06 NOTE — ED PROVIDER NOTE - CLINICAL SUMMARY MEDICAL DECISION MAKING FREE TEXT BOX
70 yo F with hx of low back pain x a few months, currently under care of orthopedics, has MRI referral, here for analgesia---> flexeril/ibuprofen, d/c

## 2019-09-06 NOTE — ED PROVIDER NOTE - PATIENT PORTAL LINK FT
You can access the FollowMyHealth Patient Portal offered by [facility] by registering at the following website: [email or web address]. By joining [employer]’s FollowMyHealth portal, you will also be able to view your health information using other applications (apps) compatible with our system.

## 2019-09-06 NOTE — ED ADULT NURSE NOTE - NSIMPLEMENTINTERV_GEN_ALL_ED
Implemented All Universal Safety Interventions:  [place] to call system. Call bell, personal items and telephone within reach. Instruct patient to call for assistance. Room bathroom lighting operational. Non-slip footwear when patient is off stretcher. Physically safe environment: no spills, clutter or unnecessary equipment. Stretcher in lowest position, wheels locked, appropriate side rails in place.

## 2019-09-06 NOTE — ED PROVIDER NOTE - PROGRESS NOTE DETAILS
Pt reports symptomatic improvement and is requesting to be discharged. Pt well appearing, ambulating without difficulty. Pt to f/u with ortho/PMD outpatient. ED return precautions given. Pt verbalizes agreement and understanding of plan. No emergent concerns at this time. Pt stable for DC home.

## 2019-09-06 NOTE — ED ADULT NURSE NOTE - CAS EDN DISCHARGE ASSESSMENT
Premarin cream as directed (no hx or fhx of breast/ovarian/uterine CA)  Vaginal affirm culture for BV and Candida-will call with results  Call or return sooner if problems/concerns
No adverse reaction to first time med in ED/Alert and oriented to person, place and time

## 2019-10-23 ENCOUNTER — APPOINTMENT (OUTPATIENT)
Dept: PULMONOLOGY | Facility: CLINIC | Age: 69
End: 2019-10-23
Payer: MEDICARE

## 2019-10-23 ENCOUNTER — NON-APPOINTMENT (OUTPATIENT)
Age: 69
End: 2019-10-23

## 2019-10-23 VITALS
OXYGEN SATURATION: 98 % | BODY MASS INDEX: 31.65 KG/M2 | HEART RATE: 81 BPM | SYSTOLIC BLOOD PRESSURE: 120 MMHG | RESPIRATION RATE: 16 BRPM | WEIGHT: 172 LBS | DIASTOLIC BLOOD PRESSURE: 70 MMHG | HEIGHT: 62 IN

## 2019-10-23 PROCEDURE — 99214 OFFICE O/P EST MOD 30 MIN: CPT | Mod: 25

## 2019-10-23 PROCEDURE — 94010 BREATHING CAPACITY TEST: CPT

## 2019-10-23 PROCEDURE — G0008: CPT

## 2019-10-23 PROCEDURE — 95012 NITRIC OXIDE EXP GAS DETER: CPT

## 2019-10-23 PROCEDURE — 90662 IIV NO PRSV INCREASED AG IM: CPT

## 2019-10-23 NOTE — HISTORY OF PRESENT ILLNESS
[FreeTextEntry1] : Ms. Najera is a 69 year old female with a history of abnormal chest CT, asthma, carcinoid tumor of lung, chronic GERD, eosinophilic asthma, low vitamin D, snoring, OW, and SOB presenting to the office today for a follow up visit. Her chief complaint is SOB.\par -she reports that she has been feeling somewhat better, although she has still been having occasional chest pain/pressure\par -she reports occasional constipation and difficulty swallowing\par -she has been sleeping well and does not snore\par -she reports occasional leg swelling at night\par -she has bene walking for exercise regularly\par -she reports occasional reflux and an associated cough \par -she repots that she still gets SOB frequently\par -she state that her energy level is often poor\par -she denies any headaches, nausea, vomiting, fever, chills, sweats, chest pain, chest pressure, diarrhea, dysphagia, dizziness, leg pain, itchy eyes, itchy ears, or sour taste in the mouth.

## 2019-10-23 NOTE — PROCEDURE
[FreeTextEntry1] : PFT - spi reveals mild restrictive / obstructive dysfunction; FEV1 is 1.09 which is 66% of predicted, normal flow volume loop \par \par FENO was 9; a normal value being less than 25\par \par Fractional exhaled nitric oxide (FENO) is regarded as a simple, noninvasive method for assessing eosinophilic airway inflammation. Produced by a variety of cells within the lung, nitric oxide (NO) concentrations are generally low in healthy individuals. However, high concentrations of NO appear to be involved in nonspecific host defense mechanisms and chronic inflammatory diseases such as asthma. The American Thoracic Society (ATS) therefore has recommended using FENO to aid in the diagnosis and monitoring of eosinophilic airway inflammation and asthma, and for identifying steroid responsive individuals whose chronic respiratory symptoms may be caused by airway inflammation.

## 2019-10-23 NOTE — ADDENDUM
[FreeTextEntry1] : All medical record entries made by denisse Boland were at Dr. Gustavo Cornelius's direction and personally dictated by me on 10/23/2019. I have reviewed the chart and agree that the record accurately reflects my personal performance of the history, physical exam, assessment and plan. I have also personally directed, reviewed, and agree with the discharge instructions.

## 2019-10-23 NOTE — PHYSICAL EXAM
[General Appearance - Well Developed] : well developed [Normal Appearance] : normal appearance [General Appearance - Well Nourished] : well nourished [Well Groomed] : well groomed [General Appearance - In No Acute Distress] : no acute distress [No Deformities] : no deformities [Normal Conjunctiva] : the conjunctiva exhibited no abnormalities [Eyelids - No Xanthelasma] : the eyelids demonstrated no xanthelasmas [Normal Oropharynx] : normal oropharynx [Neck Appearance] : the appearance of the neck was normal [Neck Cervical Mass (___cm)] : no neck mass was observed [Jugular Venous Distention Increased] : there was no jugular-venous distention [Thyroid Diffuse Enlargement] : the thyroid was not enlarged [Heart Rate And Rhythm] : heart rate and rhythm were normal [Thyroid Nodule] : there were no palpable thyroid nodules [Heart Sounds] : normal S1 and S2 [Murmurs] : no murmurs present [Exaggerated Use Of Accessory Muscles For Inspiration] : no accessory muscle use [Respiration, Rhythm And Depth] : normal respiratory rhythm and effort [Auscultation Breath Sounds / Voice Sounds] : lungs were clear to auscultation bilaterally [Abdomen Tenderness] : non-tender [Abdomen Soft] : soft [Abnormal Walk] : normal gait [Abdomen Mass (___ Cm)] : no abdominal mass palpated [Gait - Sufficient For Exercise Testing] : the gait was sufficient for exercise testing [Nail Clubbing] : no clubbing of the fingernails [Cyanosis, Localized] : no localized cyanosis [Petechial Hemorrhages (___cm)] : no petechial hemorrhages [Skin Color & Pigmentation] : normal skin color and pigmentation [] : no rash [Skin Lesions] : no skin lesions [No Venous Stasis] : no venous stasis [No Skin Ulcers] : no skin ulcer [No Xanthoma] : no  xanthoma was observed [Deep Tendon Reflexes (DTR)] : deep tendon reflexes were 2+ and symmetric [No Focal Deficits] : no focal deficits [Sensation] : the sensory exam was normal to light touch and pinprick [Oriented To Time, Place, And Person] : oriented to person, place, and time [Impaired Insight] : insight and judgment were intact [Affect] : the affect was normal [III] : III [FreeTextEntry1] : I:E ratio 1:3; clear

## 2019-10-23 NOTE — ASSESSMENT
[FreeTextEntry1] : Ms. 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 She is currently improved from a pulmonary perspective (noncompliance).\par \par Ms. Najera' SOB is multifactorial due to:\par -overweight / out of shape\par -poor mechanics of breathing\par -asthma/restrictive dysfunction (s/p surgery)\par -emphysema \par -?cardiac\par \par Her chronic cough is multifactorial due to: -(improved)\par -asthma (eosinophilic)\par -LPR/reflux\par \par problem 1: asthma (improved) - compliant \par -continue Trelegy 1 puff QD \par -continue Singulair 10 mg QHS \par -continue Albuterol via nebulizer PRN q6H\par -Inhaler technique reviewed as well as oral hygiene techniques reviewed with patient. Avoidance of cold air, extremes of temperature, rescue inhaler should be used before exercise. Order of medication reviewed with patient. Recommended use of a cool mist humidifier in the bedroom. \par -Asthma is believed to be caused by inherited (genetic) and environmental factor, but its exact cause is unknown. Asthma may be triggered by allergens, lung infections, or irritants in the air. Asthma triggers are different for each person\par \par problem 2: poor mechanics of breathing\par -Proper breathing techniques were reviewed with an emphasis of exhalation. Patient instructed to breath in for 1 second and out for four seconds. Patient was encouraged to not talk while walking. \par \par problem 3: overweight\par -Weight loss, exercise, and diet control were discussed and are highly encouraged. Treatment options were given such as, aqua therapy, and contacting a nutritionist. Recommended to use the elliptical, stationary bike, less use of treadmill. Mindful eating was explained to the patient Obesity is associated with worsening asthma, shortness of breath, and potential for cardiac disease, diabetes, and other underlying medical conditions. \par \par problem 4: LPR/reflux (controlled)\par -continue Pepcid 40 mg QHS \par -continue Protonix 40 mg QD in the morning \par -Rule of 2s: avoid eating too much, eating too late, eating too spicy, eating two hours before bed\par -Things to avoid including overeating, spicy foods, tight clothing, eating within three hours of bed, this list is not all inclusive. \par -For treatment of reflux, possible options discussed including diet control, H2 blockers, PPIs, as well as coating motility agents discussed as treatment options. Timing of meals and proximity of last meal to sleep were discussed. 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 Recommended weight loss which can reduce AHI especially in weight loss of greater than 5% of BMI. Positional sleep is recommended in those with low AHI, low-moderate MBI, and younger age. For severe sleep apnea, the hypoglossal nerve stimulator was recommended as well.\par \par problem 8: eosinophilia\par -she is a candidate for Nucala injections\par -The safety and efficacy of Nucala was established in three double-blind, randomized, placebo-controlled trials in patients with severe asthma. Compared to a placebo, patients with severe asthma receiving Nucala had fewer exacerbation requiring hospitalization and/or emergency department visits, and a longer time to first exacerbation.  In addition, patients with severe asthma receiving Nucala experienced greater reductions in their daily maintenance oral corticosteroid dose, while maintaining asthma control compared with patients receiving placebo. Treatment with Nucala did not result in a significant improvement in lung function, as measured by the volume of air exhaled by patients in one second. The most common side effects include: headache, injection site reactions, back pain, weakness, and fatigue; hypersensitivity reactions can occur within hours or days including swelling of the face, mouth, and tongue, fainting, dizziness, hives, breathing problems, and rash; herpes zoster infections have occurred. The drug is a monoclonal antibody that inhibits interleukin -5 which helps regular eosinophils, a type of white blood cell that contributes to asthma. The over-production of eosinophils can cause inflammation in the lungs, increasing the frequency of asthma attacks. Patients must also take other medications, including high dose inhaled corticosteroids and at least one additional asthma drug.\par \par problem 9: low vitamin D\par -s/p blood work to include: CBC, vitamin D, LFTs, HbA1c\par -continue supplemental vitamin D 50,000 every 2 weeks (recheck)\par -Has been associated with asthma exacerbations and increased allergic symptoms. The goal based on recent information is maintaining levels between 50-70 and low normal is 30. Recommended 50,000 units every two weeks to once a month depending on the level. \par \par problem 10: anemia\par -referred to have a GI evaluation and was given the name of Dr. Liban Samuel's office  (recheck)\par \par problem 11: health maintenance \par -s/p 2018 influenza vaccine (given in office 10/23/2019)\par -recommended strep pneumonia vaccines: Prevnar-13 vaccine, followed by Pneumo vaccine 23 one year following\par -recommended early intervention for URIs\par -recommended regular osteoporosis evaluations\par -recommended early dermatological evaluations\par -recommended after the age of 50 to the age of 70, colonoscopy every 5 years \par \par F/U in 4 months \par She is encouraged to call with any questions, changes, or concerns.

## 2020-01-29 ENCOUNTER — APPOINTMENT (OUTPATIENT)
Dept: INTERNAL MEDICINE | Facility: CLINIC | Age: 70
End: 2020-01-29
Payer: OTHER GOVERNMENT

## 2020-01-29 PROCEDURE — 99387 INIT PM E/M NEW PAT 65+ YRS: CPT

## 2020-01-29 NOTE — ASSESSMENT
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

## 2020-02-24 ENCOUNTER — APPOINTMENT (OUTPATIENT)
Dept: PULMONOLOGY | Facility: CLINIC | Age: 70
End: 2020-02-24

## 2020-04-07 ENCOUNTER — APPOINTMENT (OUTPATIENT)
Dept: PULMONOLOGY | Facility: CLINIC | Age: 70
End: 2020-04-07
Payer: MEDICARE

## 2020-04-07 VITALS
RESPIRATION RATE: 17 BRPM | SYSTOLIC BLOOD PRESSURE: 130 MMHG | DIASTOLIC BLOOD PRESSURE: 70 MMHG | TEMPERATURE: 98.5 F | HEART RATE: 77 BPM | WEIGHT: 177.6 LBS | BODY MASS INDEX: 32.68 KG/M2 | HEIGHT: 62 IN | OXYGEN SATURATION: 99 %

## 2020-04-07 PROCEDURE — 99214 OFFICE O/P EST MOD 30 MIN: CPT | Mod: 25

## 2020-04-07 NOTE — HISTORY OF PRESENT ILLNESS
[FreeTextEntry1] : Ms. Najera is a 69 year old female with a history of abnormal chest CT, asthma, carcinoid tumor of lung, chronic GERD, eosinophilic asthma, low vitamin D, snoring, OW, and SOB presenting to the office today for a follow up visit. Her chief complaint is \par - she notes she finds it hard to climb the stairways, it gets her SOB. \par - She notes she feels cramping in the abd / chest \par - she notes her bowels are fine\par - she notes she has some itchy eyes / ears \par - she denies coughing / wheezing \par - she notes her weight has been stable\par - she notes sometimes she has some leg swelling \par - she notes she has been using the trelegy \par - she notes she sometimes has hoarseness \par - she notes she has been outside and walking \par - she notes her allergies have not been too bad \par -she denies any headaches, nausea, vomiting, fever, chills, sweats, diarrhea, constipation, dysphagia, dizziness, sour taste in the mouth. \par

## 2020-04-07 NOTE — ADDENDUM
[FreeTextEntry1] : Documented by Obdulia Griffiths acting as a scribe for Dr. Gustavo Cornelius on 04/07/2020.\par \par All medical record entries made by the Scribe were at my, Dr. Gustavo Cornelius's, direction and personally dictated by me on 04/07/2020. I have reviewed the chart and agree that the record accurately reflects my personal performance of the history, physical exam, assessment and plan. I have also personally directed, reviewed, and agree with the discharge instructions.\par 
No

## 2020-04-07 NOTE — PHYSICAL EXAM
[General Appearance - Well Developed] : well developed [Normal Appearance] : normal appearance [Well Groomed] : well groomed [General Appearance - Well Nourished] : well nourished [No Deformities] : no deformities [General Appearance - In No Acute Distress] : no acute distress [Normal Conjunctiva] : the conjunctiva exhibited no abnormalities [Eyelids - No Xanthelasma] : the eyelids demonstrated no xanthelasmas [Normal Oropharynx] : normal oropharynx [III] : III [Neck Appearance] : the appearance of the neck was normal [Neck Cervical Mass (___cm)] : no neck mass was observed [Jugular Venous Distention Increased] : there was no jugular-venous distention [Thyroid Diffuse Enlargement] : the thyroid was not enlarged [Thyroid Nodule] : there were no palpable thyroid nodules [Heart Rate And Rhythm] : heart rate and rhythm were normal [Heart Sounds] : normal S1 and S2 [Murmurs] : no murmurs present [Respiration, Rhythm And Depth] : normal respiratory rhythm and effort [Exaggerated Use Of Accessory Muscles For Inspiration] : no accessory muscle use [Auscultation Breath Sounds / Voice Sounds] : lungs were clear to auscultation bilaterally [Abdomen Soft] : soft [Abdomen Tenderness] : non-tender [Abdomen Mass (___ Cm)] : no abdominal mass palpated [Abnormal Walk] : normal gait [Gait - Sufficient For Exercise Testing] : the gait was sufficient for exercise testing [Nail Clubbing] : no clubbing of the fingernails [Cyanosis, Localized] : no localized cyanosis [Petechial Hemorrhages (___cm)] : no petechial hemorrhages [Skin Color & Pigmentation] : normal skin color and pigmentation [] : no rash [No Venous Stasis] : no venous stasis [Skin Lesions] : no skin lesions [No Skin Ulcers] : no skin ulcer [No Xanthoma] : no  xanthoma was observed [Deep Tendon Reflexes (DTR)] : deep tendon reflexes were 2+ and symmetric [Sensation] : the sensory exam was normal to light touch and pinprick [No Focal Deficits] : no focal deficits [Oriented To Time, Place, And Person] : oriented to person, place, and time [Impaired Insight] : insight and judgment were intact [Affect] : the affect was normal [FreeTextEntry1] : mild kyphosis

## 2020-06-03 ENCOUNTER — APPOINTMENT (OUTPATIENT)
Dept: PULMONOLOGY | Facility: CLINIC | Age: 70
End: 2020-06-03
Payer: MEDICARE

## 2020-06-03 VITALS
OXYGEN SATURATION: 97 % | BODY MASS INDEX: 31.28 KG/M2 | HEIGHT: 62 IN | DIASTOLIC BLOOD PRESSURE: 60 MMHG | SYSTOLIC BLOOD PRESSURE: 110 MMHG | RESPIRATION RATE: 17 BRPM | HEART RATE: 80 BPM | WEIGHT: 170 LBS | TEMPERATURE: 98 F

## 2020-06-03 PROCEDURE — 71046 X-RAY EXAM CHEST 2 VIEWS: CPT

## 2020-06-03 PROCEDURE — 99214 OFFICE O/P EST MOD 30 MIN: CPT | Mod: 25

## 2020-06-03 NOTE — PROCEDURE
[FreeTextEntry1] : CT Chest w/o Contrast (11.14.2018) reveals no active pulmonary disease. \par  \par \par CXR reveals mild cardiomegaly; no evidence of infiltrate or effusion--a normal appearing chest radiograph

## 2020-06-03 NOTE — PHYSICAL EXAM
[General Appearance - Well Developed] : well developed [Normal Appearance] : normal appearance [Well Groomed] : well groomed [General Appearance - Well Nourished] : well nourished [General Appearance - In No Acute Distress] : no acute distress [No Deformities] : no deformities [Normal Conjunctiva] : the conjunctiva exhibited no abnormalities [Eyelids - No Xanthelasma] : the eyelids demonstrated no xanthelasmas [Normal Oropharynx] : normal oropharynx [III] : III [Neck Appearance] : the appearance of the neck was normal [Neck Cervical Mass (___cm)] : no neck mass was observed [Thyroid Diffuse Enlargement] : the thyroid was not enlarged [Jugular Venous Distention Increased] : there was no jugular-venous distention [Thyroid Nodule] : there were no palpable thyroid nodules [Heart Rate And Rhythm] : heart rate and rhythm were normal [Murmurs] : no murmurs present [Heart Sounds] : normal S1 and S2 [Respiration, Rhythm And Depth] : normal respiratory rhythm and effort [Exaggerated Use Of Accessory Muscles For Inspiration] : no accessory muscle use [Auscultation Breath Sounds / Voice Sounds] : lungs were clear to auscultation bilaterally [Abdomen Soft] : soft [Abdomen Tenderness] : non-tender [Abdomen Mass (___ Cm)] : no abdominal mass palpated [Abnormal Walk] : normal gait [Gait - Sufficient For Exercise Testing] : the gait was sufficient for exercise testing [Nail Clubbing] : no clubbing of the fingernails [Petechial Hemorrhages (___cm)] : no petechial hemorrhages [Cyanosis, Localized] : no localized cyanosis [] : no rash [Skin Color & Pigmentation] : normal skin color and pigmentation [No Venous Stasis] : no venous stasis [Skin Lesions] : no skin lesions [No Skin Ulcers] : no skin ulcer [No Xanthoma] : no  xanthoma was observed [Deep Tendon Reflexes (DTR)] : deep tendon reflexes were 2+ and symmetric [Sensation] : the sensory exam was normal to light touch and pinprick [No Focal Deficits] : no focal deficits [Oriented To Time, Place, And Person] : oriented to person, place, and time [Impaired Insight] : insight and judgment were intact [Affect] : the affect was normal [FreeTextEntry1] : mild kyphosis

## 2020-06-03 NOTE — REASON FOR VISIT
[Follow-Up] : a follow-up visit [Family Member] : family member [FreeTextEntry1] : abnormal chest CT, asthma, carcinoid tumor of lung, chronic GERD, eosinophilic asthma, low vitamin D, snoring, OW, and SOB

## 2020-06-03 NOTE — ADDENDUM
[FreeTextEntry1] : Documented by Abhijeet Chris acting as a scribe for Dr. Gustavo Cornelius on 06/03/2020.\par \par All medical record entries made by the Scribe were at my, Dr. Gustavo Cornelius's, direction and personally dictated by me on 06/03/2020. I have reviewed the chart and agree that the record accurately reflects my personal performance of the history, physical exam, assessment and plan. I have also personally directed, reviewed, and agree with the discharge instructions.

## 2020-06-03 NOTE — ASSESSMENT
[FreeTextEntry1] : Ms. 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 She is currently has intermittent cough.\par \par Ms. Najera' SOB is multifactorial due to:\par -overweight / out of shape\par -poor mechanics of breathing\par -asthma/restrictive dysfunction (s/p surgery)\par -emphysema \par -?cardiac\par \par Her chronic cough is multifactorial due to: -(active)\par -asthma (eosinophilic)\par -LPR/reflux\par \par problem 1: asthma (improved) - compliant \par -continue Ventolin rescue inhaler 2 inhalations before exercise, Q6H\par -continue Incruse 1 inhalation daily\par -continue Breo Ellipta 200 at 1 inhalation daily \par -continue Singulair 10 mg QHS \par -continue Albuterol via nebulizer PRN q6H\par -Inhaler technique reviewed as well as oral hygiene techniques reviewed with patient. Avoidance of cold air, extremes of temperature, rescue inhaler should be used before exercise. Order of medication reviewed with patient. Recommended use of a cool mist humidifier in the bedroom. \par -Asthma is believed to be caused by inherited (genetic) and environmental factor, but its exact cause is unknown. Asthma may be triggered by allergens, lung infections, or irritants in the air. Asthma triggers are different for each person\par \par problem 2: poor mechanics of breathing\par -Proper breathing techniques were reviewed with an emphasis of exhalation. Patient instructed to breath in for 1 second and out for four seconds. Patient was encouraged to not talk while walking. \par \par problem 3: overweight\par -Weight loss, exercise, and diet control were discussed and are highly encouraged. Treatment options were given such as, aqua therapy, and contacting a nutritionist. Recommended to use the elliptical, stationary bike, less use of treadmill. Mindful eating was explained to the patient Obesity is associated with worsening asthma, shortness of breath, and potential for cardiac disease, diabetes, and other underlying medical conditions. \par \par problem 4: LPR/reflux (?active)\par -continue Pepcid 40 mg QHS \par -continue Protonix 40 mg QD in the morning \par -Rule of 2s: avoid eating too much, eating too late, eating too spicy, eating two hours before bed\par -Things to avoid including overeating, spicy foods, tight clothing, eating within three hours of bed, this list is not all inclusive. \par -For treatment of reflux, possible options discussed including diet control, H2 blockers, PPIs, as well as coating motility agents discussed as treatment options. Timing of meals and proximity of last meal to sleep were discussed. 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 Recommended weight loss which can reduce AHI especially in weight loss of greater than 5% of BMI. Positional sleep is recommended in those with low AHI, low-moderate MBI, and younger age. For severe sleep apnea, the hypoglossal nerve stimulator was recommended as well.\par \par problem 8: eosinophilia\par -she is a candidate for Nucala injections\par -The safety and efficacy of Nucala was established in three double-blind, randomized, placebo-controlled trials in patients with severe asthma. Compared to a placebo, patients with severe asthma receiving Nucala had fewer exacerbation requiring hospitalization and/or emergency department visits, and a longer time to first exacerbation.  In addition, patients with severe asthma receiving Nucala experienced greater reductions in their daily maintenance oral corticosteroid dose, while maintaining asthma control compared with patients receiving placebo. Treatment with Nucala did not result in a significant improvement in lung function, as measured by the volume of air exhaled by patients in one second. The most common side effects include: headache, injection site reactions, back pain, weakness, and fatigue; hypersensitivity reactions can occur within hours or days including swelling of the face, mouth, and tongue, fainting, dizziness, hives, breathing problems, and rash; herpes zoster infections have occurred. The drug is a monoclonal antibody that inhibits interleukin -5 which helps regular eosinophils, a type of white blood cell that contributes to asthma. The over-production of eosinophils can cause inflammation in the lungs, increasing the frequency of asthma attacks. Patients must also take other medications, including high dose inhaled corticosteroids and at least one additional asthma drug.\par \par problem 9: low vitamin D\par -s/p blood work to include: CBC, vitamin D, LFTs, HbA1c\par -continue supplemental vitamin D 50,000 every 2 weeks (recheck)\par -Has been associated with asthma exacerbations and increased allergic symptoms. The goal based on recent information is maintaining levels between 50-70 and low normal is 30. Recommended 50,000 units every two weeks to once a month depending on the level. \par \par problem 10: anemia\par -referred to have a GI evaluation and was given the name of Dr. Liban Samuel's office  (recheck)\par \par problem 11: Health Maintenance/COVID19 Precautions:\par - Clean your hands often. Wash your hands often with soap and water for at least 20 seconds, especially after blowing your nose, coughing, or sneezing, or having been in a public place.\par - If soap and water are not available, use a hand  that contains at least 60% alcohol.\par - To the extent possible, avoid touching high-touch surfaces in public places - elevator buttons, door handles, handrails, handshaking with people, etc. Use a tissue or your sleeve to cover your hand or finger if you must touch something.\par - Wash your hands after touching surfaces in public places.\par - Avoid touching your face, nose, eyes, etc.\par - Clean and disinfect your home to remove germs: practice routine cleaning of frequently touched surfaces (for example: tables, doorknobs, light switches, handles, desks, toilets, faucets, sinks & cell phones)\par - Avoid crowds, especially in poorly ventilated spaces. Your risk of exposure to respiratory viruses like COVID-19 may increase in crowded, closed-in settings with little air circulation if there are people in the crowd who are sick. All patients are recommended to practice social distancing and stay at least 6 feet away from others.\par - Avoid all non-essential travel including plane trips, and especially avoid embarking on cruise ships.\par -If COVID-19 is spreading in your community, take extra measures to put distance between yourself and other people to further reduce your risk of being exposed to this new virus.\par -Stay home as much as possible.\par - Consider ways of getting food brought to your house through family, social, or commercial networks\par -Be aware that the virus has been known to live in the air up to 3 hours post exposure, cardboard up to 24 hours post exposure, copper up to 4 hours post exposure, steel and plastic up to 2-3 days post exposure. Risk of transmission from these surfaces are affected by many variables.\par Immune Support Recommendations:\par -OTC Vitamin C 500mg BID \par -OTC Quercetin 250-500mg BID \par -OTC Zinc 75-100mg per day \par -OTC Melatonin 1 or 2 mg a night \par -OTC Vitamin D 1-4000mg per day \par -OTC Tonic Water 8oz per day\par Asthma and COVID19:\par You need to make sure your asthma is under control. This often requires the use of inhaled corticosteroids (and sometimes oral corticosteroids). Inhaled corticosteroids do not likely reduce your immune system’s ability to fight infections, but oral corticosteroids may. It is important to use the steps above to protect yourself to limit your exposure to any respiratory virus. \par \par Problem 12: health maintenance\par -s/p influenza vaccine\par -recommended strep pneumonia vaccines after age 65: Prevnar-13 vaccine, followed by Pneumo vaccine 23 one year following\par -recommended early intervention for URIs\par -recommended regular osteoporosis evaluations\par -recommended early dermatological evaluations\par -recommended after the age of 50 to the age of 70, colonoscopy every 5 years \par \par F/U in 4 months \par She is encouraged to call with any questions, changes, or concerns.

## 2020-06-03 NOTE — HISTORY OF PRESENT ILLNESS
[FreeTextEntry1] : Ms. Najera is a 69 year old female with a history of abnormal chest CT, asthma, carcinoid tumor of lung, chronic GERD, eosinophilic asthma, low vitamin D, snoring, OW, and SOB presenting to the office today for a follow up visit. Her chief complaint is \par \par -she notes new dry intermittent unproductive cough originating late 5/2020\par -she notes cough feels to originate chest and lower throat\par -she notes sensation in throat when eating exacerbates cough\par -she notes active heartburn and reflux\par -she notes intermittent chest pain\par -she notes intermittent dysphagia\par -she notes sleeping well\par -she notes weight is stable\par -she notes on mirtazapine for sleep due to issues falling to sleep\par -she denies leaky, drippy, congested sinuses\par -she denies allergies active\par -she notes negative COVID antibody test\par -she notes breathing labor intermittently with out specific trigger\par -she notes SOB may be during dehydration\par \par -she denies any chest pressure, diarrhea, constipation, dysphagia, dizziness, sour taste in the mouth, leg swelling, leg pain, itchy eyes, itchy ears, myalgias or arthralgias.

## 2020-06-12 ENCOUNTER — NON-APPOINTMENT (OUTPATIENT)
Age: 70
End: 2020-06-12

## 2020-06-12 ENCOUNTER — APPOINTMENT (OUTPATIENT)
Dept: CARDIOLOGY | Facility: CLINIC | Age: 70
End: 2020-06-12
Payer: MEDICARE

## 2020-06-12 VITALS
HEART RATE: 82 BPM | HEIGHT: 62 IN | WEIGHT: 170 LBS | BODY MASS INDEX: 31.28 KG/M2 | DIASTOLIC BLOOD PRESSURE: 78 MMHG | SYSTOLIC BLOOD PRESSURE: 128 MMHG | RESPIRATION RATE: 16 BRPM

## 2020-06-12 PROCEDURE — 99204 OFFICE O/P NEW MOD 45 MIN: CPT

## 2020-06-12 PROCEDURE — 93000 ELECTROCARDIOGRAM COMPLETE: CPT

## 2020-06-15 RX ORDER — NAPROXEN 500 MG/1
500 TABLET ORAL TWICE DAILY
Qty: 180 | Refills: 0 | Status: COMPLETED | COMMUNITY
Start: 2017-10-26 | End: 2020-06-15

## 2020-06-15 RX ORDER — DOCUSATE SODIUM 100 MG/1
100 CAPSULE ORAL
Qty: 60 | Refills: 0 | Status: COMPLETED | COMMUNITY
Start: 2017-04-29 | End: 2020-06-15

## 2020-06-15 RX ORDER — ACLIDINIUM BROMIDE 400 UG/1
400 POWDER, METERED RESPIRATORY (INHALATION) TWICE DAILY
Qty: 3 | Refills: 2 | Status: COMPLETED | COMMUNITY
Start: 2017-09-01 | End: 2020-06-15

## 2020-06-15 RX ORDER — METFORMIN HYDROCHLORIDE 625 MG/1
TABLET ORAL
Refills: 0 | Status: COMPLETED | COMMUNITY
End: 2020-06-15

## 2020-06-15 RX ORDER — NAPROXEN SODIUM 220 MG
TABLET ORAL
Refills: 0 | Status: COMPLETED | COMMUNITY
End: 2020-06-15

## 2020-06-15 RX ORDER — PREDNISONE 10 MG/1
10 TABLET ORAL
Qty: 1 | Refills: 0 | Status: COMPLETED | COMMUNITY
Start: 2018-04-26 | End: 2020-06-15

## 2020-06-15 RX ORDER — VERAPAMIL HYDROCHLORIDE 80 MG/1
TABLET ORAL
Refills: 0 | Status: COMPLETED | COMMUNITY
End: 2020-06-15

## 2020-06-15 RX ORDER — OXYCODONE 5 MG/1
5 TABLET ORAL
Qty: 40 | Refills: 0 | Status: COMPLETED | COMMUNITY
Start: 2017-04-29 | End: 2020-06-15

## 2020-06-15 RX ORDER — OMEPRAZOLE 20 MG/1
20 CAPSULE, DELAYED RELEASE ORAL
Qty: 30 | Refills: 0 | Status: COMPLETED | COMMUNITY
Start: 2017-05-01 | End: 2020-06-15

## 2020-06-15 RX ORDER — PANTOPRAZOLE 40 MG/1
40 TABLET, DELAYED RELEASE ORAL
Qty: 90 | Refills: 1 | Status: COMPLETED | COMMUNITY
Start: 2018-11-08 | End: 2020-06-15

## 2020-06-15 RX ORDER — MONTELUKAST 10 MG/1
10 TABLET, FILM COATED ORAL
Qty: 90 | Refills: 0 | Status: COMPLETED | COMMUNITY
Start: 2017-07-13 | End: 2020-06-15

## 2020-06-24 ENCOUNTER — TRANSCRIPTION ENCOUNTER (OUTPATIENT)
Age: 70
End: 2020-06-24

## 2020-07-20 ENCOUNTER — OUTPATIENT (OUTPATIENT)
Dept: OUTPATIENT SERVICES | Facility: HOSPITAL | Age: 70
LOS: 1 days | End: 2020-07-20
Payer: MEDICARE

## 2020-07-20 ENCOUNTER — APPOINTMENT (OUTPATIENT)
Dept: CT IMAGING | Facility: IMAGING CENTER | Age: 70
End: 2020-07-20
Payer: MEDICARE

## 2020-07-20 ENCOUNTER — RESULT REVIEW (OUTPATIENT)
Age: 70
End: 2020-07-20

## 2020-07-20 DIAGNOSIS — Z90.710 ACQUIRED ABSENCE OF BOTH CERVIX AND UTERUS: Chronic | ICD-10-CM

## 2020-07-20 DIAGNOSIS — R93.89 ABNORMAL FINDINGS ON DIAGNOSTIC IMAGING OF OTHER SPECIFIED BODY STRUCTURES: ICD-10-CM

## 2020-07-20 DIAGNOSIS — Z71.89 OTHER SPECIFIED COUNSELING: ICD-10-CM

## 2020-07-20 DIAGNOSIS — Z90.11 ACQUIRED ABSENCE OF RIGHT BREAST AND NIPPLE: Chronic | ICD-10-CM

## 2020-07-20 DIAGNOSIS — Z98.42 CATARACT EXTRACTION STATUS, LEFT EYE: Chronic | ICD-10-CM

## 2020-07-20 DIAGNOSIS — Z90.49 ACQUIRED ABSENCE OF OTHER SPECIFIED PARTS OF DIGESTIVE TRACT: Chronic | ICD-10-CM

## 2020-07-20 PROCEDURE — 71046 X-RAY EXAM CHEST 2 VIEWS: CPT | Mod: 26

## 2020-07-20 PROCEDURE — 71250 CT THORAX DX C-: CPT | Mod: 26

## 2020-07-20 PROCEDURE — 71046 X-RAY EXAM CHEST 2 VIEWS: CPT

## 2020-07-20 PROCEDURE — 71250 CT THORAX DX C-: CPT

## 2020-07-24 ENCOUNTER — LABORATORY RESULT (OUTPATIENT)
Age: 70
End: 2020-07-24

## 2020-07-24 ENCOUNTER — OUTPATIENT (OUTPATIENT)
Dept: OUTPATIENT SERVICES | Facility: HOSPITAL | Age: 70
LOS: 1 days | End: 2020-07-24
Payer: MEDICARE

## 2020-07-24 ENCOUNTER — APPOINTMENT (OUTPATIENT)
Dept: INTERNAL MEDICINE | Facility: CLINIC | Age: 70
End: 2020-07-24

## 2020-07-24 VITALS
DIASTOLIC BLOOD PRESSURE: 70 MMHG | HEIGHT: 63 IN | WEIGHT: 176 LBS | BODY MASS INDEX: 31.18 KG/M2 | SYSTOLIC BLOOD PRESSURE: 128 MMHG

## 2020-07-24 DIAGNOSIS — Z90.11 ACQUIRED ABSENCE OF RIGHT BREAST AND NIPPLE: Chronic | ICD-10-CM

## 2020-07-24 DIAGNOSIS — Z90.49 ACQUIRED ABSENCE OF OTHER SPECIFIED PARTS OF DIGESTIVE TRACT: Chronic | ICD-10-CM

## 2020-07-24 DIAGNOSIS — I10 ESSENTIAL (PRIMARY) HYPERTENSION: ICD-10-CM

## 2020-07-24 DIAGNOSIS — Z98.42 CATARACT EXTRACTION STATUS, LEFT EYE: Chronic | ICD-10-CM

## 2020-07-24 DIAGNOSIS — Z90.710 ACQUIRED ABSENCE OF BOTH CERVIX AND UTERUS: Chronic | ICD-10-CM

## 2020-07-24 DIAGNOSIS — E11.9 TYPE 2 DIABETES MELLITUS WITHOUT COMPLICATIONS: ICD-10-CM

## 2020-07-24 DIAGNOSIS — K21.9 GASTRO-ESOPHAGEAL REFLUX DISEASE WITHOUT ESOPHAGITIS: ICD-10-CM

## 2020-07-24 DIAGNOSIS — Z00.00 ENCOUNTER FOR GENERAL ADULT MEDICAL EXAMINATION WITHOUT ABNORMAL FINDINGS: ICD-10-CM

## 2020-07-24 DIAGNOSIS — J45.909 UNSPECIFIED ASTHMA, UNCOMPLICATED: ICD-10-CM

## 2020-07-24 PROCEDURE — 83036 HEMOGLOBIN GLYCOSYLATED A1C: CPT

## 2020-07-24 PROCEDURE — 80053 COMPREHEN METABOLIC PANEL: CPT

## 2020-07-24 PROCEDURE — 85027 COMPLETE CBC AUTOMATED: CPT

## 2020-07-24 PROCEDURE — 87389 HIV-1 AG W/HIV-1&-2 AB AG IA: CPT

## 2020-07-24 PROCEDURE — G0439: CPT

## 2020-07-24 PROCEDURE — 86803 HEPATITIS C AB TEST: CPT

## 2020-07-24 PROCEDURE — 80061 LIPID PANEL: CPT

## 2020-07-24 PROCEDURE — 84443 ASSAY THYROID STIM HORMONE: CPT

## 2020-07-24 RX ORDER — FLUTICASONE FUROATE, UMECLIDINIUM BROMIDE AND VILANTEROL TRIFENATATE 100; 62.5; 25 UG/1; UG/1; UG/1
100-62.5-25 POWDER RESPIRATORY (INHALATION)
Qty: 3 | Refills: 1 | Status: DISCONTINUED | COMMUNITY
Start: 2019-02-22 | End: 2020-07-24

## 2020-07-24 RX ORDER — NABUMETONE 500 MG/1
500 TABLET, FILM COATED ORAL
Qty: 30 | Refills: 0 | Status: DISCONTINUED | COMMUNITY
Start: 2017-08-08 | End: 2020-07-24

## 2020-07-24 RX ORDER — LANSOPRAZOLE 30 MG/1
30 CAPSULE, DELAYED RELEASE ORAL
Refills: 0 | Status: DISCONTINUED | COMMUNITY
End: 2020-07-24

## 2020-07-24 RX ORDER — OMEPRAZOLE 40 MG/1
40 CAPSULE, DELAYED RELEASE ORAL
Qty: 30 | Refills: 3 | Status: DISCONTINUED | COMMUNITY
Start: 2017-12-11 | End: 2020-07-24

## 2020-07-24 RX ORDER — RANITIDINE 300 MG/1
300 TABLET ORAL
Qty: 1 | Refills: 1 | Status: DISCONTINUED | COMMUNITY
Start: 2017-09-01 | End: 2020-07-24

## 2020-07-24 RX ORDER — IBUPROFEN 800 MG
TABLET ORAL
Refills: 0 | Status: DISCONTINUED | COMMUNITY
End: 2020-07-24

## 2020-07-24 RX ORDER — DICLOFENAC SODIUM 10 MG/G
1 GEL TOPICAL
Qty: 100 | Refills: 0 | Status: DISCONTINUED | COMMUNITY
Start: 2017-08-08 | End: 2020-07-24

## 2020-07-24 RX ORDER — DESLORATADINE 5 MG/1
5 TABLET ORAL DAILY
Qty: 90 | Refills: 1 | Status: DISCONTINUED | COMMUNITY
Start: 2019-06-26 | End: 2020-07-24

## 2020-07-24 RX ORDER — PAROXETINE HYDROCHLORIDE 20 MG/1
20 TABLET, FILM COATED ORAL
Refills: 0 | Status: DISCONTINUED | COMMUNITY
End: 2020-07-24

## 2020-07-24 RX ORDER — NAPROXEN 500 MG/1
500 TABLET ORAL
Qty: 1 | Refills: 1 | Status: DISCONTINUED | COMMUNITY
Start: 2018-01-11 | End: 2020-07-24

## 2020-07-24 RX ORDER — FLUTICASONE PROPIONATE 50 UG/1
50 SPRAY, METERED NASAL
Qty: 16 | Refills: 0 | Status: DISCONTINUED | COMMUNITY
Start: 2017-05-01 | End: 2020-07-24

## 2020-07-24 RX ORDER — ACETAMINOPHEN 325 MG/1
325 TABLET, FILM COATED ORAL
Refills: 0 | Status: DISCONTINUED | COMMUNITY
End: 2020-07-24

## 2020-07-24 RX ORDER — WHEAT DEXTRIN 3 G/4 G
POWDER (GRAM) ORAL
Qty: 1 | Refills: 5 | Status: DISCONTINUED | OUTPATIENT
Start: 2017-12-11 | End: 2020-07-24

## 2020-07-24 RX ORDER — METFORMIN HYDROCHLORIDE 500 MG/1
500 TABLET, COATED ORAL
Refills: 0 | Status: DISCONTINUED | COMMUNITY
End: 2020-07-24

## 2020-07-24 RX ORDER — SENNOSIDES 8.6 MG
8.6 TABLET ORAL
Qty: 40 | Refills: 0 | Status: DISCONTINUED | COMMUNITY
Start: 2017-04-29 | End: 2020-07-24

## 2020-07-24 RX ORDER — IBUPROFEN 600 MG/1
600 TABLET, FILM COATED ORAL
Qty: 90 | Refills: 0 | Status: DISCONTINUED | COMMUNITY
Start: 2017-09-20 | End: 2020-07-24

## 2020-07-24 RX ORDER — MELOXICAM 15 MG/1
15 TABLET ORAL DAILY
Qty: 90 | Refills: 1 | Status: DISCONTINUED | COMMUNITY
Start: 2017-10-12 | End: 2020-07-24

## 2020-07-24 RX ORDER — DOCUSATE SODIUM 100 MG/1
100 CAPSULE ORAL
Qty: 30 | Refills: 0 | Status: DISCONTINUED | OUTPATIENT
Start: 2017-12-11 | End: 2020-07-24

## 2020-07-24 RX ORDER — ASPIRIN ENTERIC COATED TABLETS 81 MG 81 MG/1
81 TABLET, DELAYED RELEASE ORAL
Refills: 0 | Status: DISCONTINUED | COMMUNITY
End: 2020-07-24

## 2020-07-24 NOTE — PHYSICAL EXAM
[No Acute Distress] : no acute distress [Well Developed] : well developed [Well-Appearing] : well-appearing [Well Nourished] : well nourished [Normal Sclera/Conjunctiva] : normal sclera/conjunctiva [EOMI] : extraocular movements intact [PERRL] : pupils equal round and reactive to light [Normal Outer Ear/Nose] : the outer ears and nose were normal in appearance [No JVD] : no jugular venous distention [Normal Oropharynx] : the oropharynx was normal [No Lymphadenopathy] : no lymphadenopathy [Supple] : supple [Thyroid Normal, No Nodules] : the thyroid was normal and there were no nodules present [No Respiratory Distress] : no respiratory distress  [Clear to Auscultation] : lungs were clear to auscultation bilaterally [No Accessory Muscle Use] : no accessory muscle use [Normal Rate] : normal rate  [No Murmur] : no murmur heard [Normal S1, S2] : normal S1 and S2 [Regular Rhythm] : with a regular rhythm [No Varicosities] : no varicosities [No Abdominal Bruit] : a ~M bruit was not heard ~T in the abdomen [No Carotid Bruits] : no carotid bruits [No Edema] : there was no peripheral edema [Pedal Pulses Present] : the pedal pulses are present [No Extremity Clubbing/Cyanosis] : no extremity clubbing/cyanosis [No Palpable Aorta] : no palpable aorta [Soft] : abdomen soft [Non-distended] : non-distended [Non Tender] : non-tender [No HSM] : no HSM [No Masses] : no abdominal mass palpated [Normal Bowel Sounds] : normal bowel sounds [Normal Posterior Cervical Nodes] : no posterior cervical lymphadenopathy [Normal Anterior Cervical Nodes] : no anterior cervical lymphadenopathy [No CVA Tenderness] : no CVA  tenderness [No Spinal Tenderness] : no spinal tenderness [No Joint Swelling] : no joint swelling [No Rash] : no rash [Grossly Normal Strength/Tone] : grossly normal strength/tone [Normal Gait] : normal gait [Coordination Grossly Intact] : coordination grossly intact [Deep Tendon Reflexes (DTR)] : deep tendon reflexes were 2+ and symmetric [No Focal Deficits] : no focal deficits [Normal Affect] : the affect was normal [Normal Insight/Judgement] : insight and judgment were intact

## 2020-07-26 NOTE — HISTORY OF PRESENT ILLNESS
[de-identified] : 69-year-old female with past medical history significant for hypertension, asthma, history of bronchitis, non-insulin-dependent diabetes mellitus, hyperlipidemia, status post right knee replacement, who comes in to establish care. Patient has no recent illnesses, exposures, travel. Patient has been feeling well. ROS negative except for occasional HOWELL for which she follows up with cardiology and pulmnology. Patient still works as a  and can walk ten minutes unassisted with no history of falls. [facility] and will try to get [facility]. Patient lives with daughter who assists in health care management.

## 2020-07-26 NOTE — END OF VISIT
[] : Resident [FreeTextEntry3] : Agree with plan. Review of labs reveals minimal elevation of serum calcium. Repeat at next visit with PTH.

## 2020-07-26 NOTE — PLAN
[FreeTextEntry1] : #DM\par continue metformin 500 BID\par Patient checking sugars daily in normal range\par follows with opto and podiatrist although diabetic foot exam today was normal \par Patient has endocrine follow up appoint scheduled although instructed that would likely not be necessary given good control of diabetes.\par reinforced control of diet and exercise and weight\par f/u A1c and Microalb/Cr although already on ARB\par \par #asthma \par follows with Pulm\par continue controller Breo, has not required albuterol PRN\par continue singuilar \par \par #HTN \par BP controlled with current regimen will continue losartan HCTZ 50/12.5 \par ASA 81, verapamil 240 for occasional palpitations and BP control\par also follows with cardiology \par \par #GERD\par patient tried multiple PPIs in the past, now controlled on daily panto 40 and daily pepcid in PM although could be downgraded for as necessary\par will be f/u by GI for colon cancer screening \par \par # HCM\par CBC, CMP, TSH, A1c, Lipid Panel, Microalb/Cr, Hep C, HIV\par DEXA scan ordered\par patient has mammogram scheduled and will fax the results over\par Cscope referral in \par patient will bring in immunization record \par PHQ2 neg\par \par can follow up in 3-6 months or earlier if necessary\par \par Medardo Butcher PGY2\par

## 2020-07-26 NOTE — ASSESSMENT
[FreeTextEntry1] : 69-year-old female with past medical history significant for hypertension, asthma, history of bronchitis, non-insulin-dependent diabetes mellitus, hyperlipidemia, status post right knee replacement, who comes in to establish care

## 2020-07-27 ENCOUNTER — TRANSCRIPTION ENCOUNTER (OUTPATIENT)
Age: 70
End: 2020-07-27

## 2020-07-28 LAB
CREAT SPEC-SCNC: 52 MG/DL
MICROALBUMIN 24H UR DL<=1MG/L-MCNC: <1.2 MG/DL
MICROALBUMIN/CREAT 24H UR-RTO: NORMAL MG/G

## 2020-08-07 ENCOUNTER — APPOINTMENT (OUTPATIENT)
Dept: PULMONOLOGY | Facility: CLINIC | Age: 70
End: 2020-08-07

## 2020-08-07 ENCOUNTER — APPOINTMENT (OUTPATIENT)
Dept: CARDIOLOGY | Facility: CLINIC | Age: 70
End: 2020-08-07

## 2020-08-13 ENCOUNTER — TRANSCRIPTION ENCOUNTER (OUTPATIENT)
Age: 70
End: 2020-08-13

## 2020-08-17 ENCOUNTER — APPOINTMENT (OUTPATIENT)
Dept: OBGYN | Facility: CLINIC | Age: 70
End: 2020-08-17
Payer: MEDICARE

## 2020-08-17 VITALS
DIASTOLIC BLOOD PRESSURE: 85 MMHG | HEIGHT: 63 IN | WEIGHT: 177 LBS | BODY MASS INDEX: 31.36 KG/M2 | SYSTOLIC BLOOD PRESSURE: 152 MMHG

## 2020-08-17 DIAGNOSIS — R92.2 INCONCLUSIVE MAMMOGRAM: ICD-10-CM

## 2020-08-17 PROCEDURE — 99397 PER PM REEVAL EST PAT 65+ YR: CPT

## 2020-08-17 NOTE — PHYSICAL EXAM
[Awake] : awake [Alert] : alert [Acute Distress] : no acute distress [Mass] : no breast mass [Nipple Discharge] : no nipple discharge [Axillary LAD] : no axillary lymphadenopathy [Soft] : soft [Oriented x3] : oriented to person, place, and time [Tender] : non tender [Normal] : external genitalia [No Bleeding] : there was no active vaginal bleeding [Absent] : absent [FreeTextEntry4] : mild atrophy

## 2020-08-19 ENCOUNTER — APPOINTMENT (OUTPATIENT)
Dept: RADIOLOGY | Facility: IMAGING CENTER | Age: 70
End: 2020-08-19
Payer: MEDICARE

## 2020-08-19 ENCOUNTER — OUTPATIENT (OUTPATIENT)
Dept: OUTPATIENT SERVICES | Facility: HOSPITAL | Age: 70
LOS: 1 days | End: 2020-08-19
Payer: MEDICARE

## 2020-08-19 ENCOUNTER — RESULT REVIEW (OUTPATIENT)
Age: 70
End: 2020-08-19

## 2020-08-19 ENCOUNTER — APPOINTMENT (OUTPATIENT)
Dept: DERMATOLOGY | Facility: CLINIC | Age: 70
End: 2020-08-19
Payer: MEDICARE

## 2020-08-19 VITALS — TEMPERATURE: 96.4 F

## 2020-08-19 DIAGNOSIS — Z90.49 ACQUIRED ABSENCE OF OTHER SPECIFIED PARTS OF DIGESTIVE TRACT: Chronic | ICD-10-CM

## 2020-08-19 DIAGNOSIS — Z90.11 ACQUIRED ABSENCE OF RIGHT BREAST AND NIPPLE: Chronic | ICD-10-CM

## 2020-08-19 DIAGNOSIS — Z98.42 CATARACT EXTRACTION STATUS, LEFT EYE: Chronic | ICD-10-CM

## 2020-08-19 DIAGNOSIS — Z00.8 ENCOUNTER FOR OTHER GENERAL EXAMINATION: ICD-10-CM

## 2020-08-19 DIAGNOSIS — Z90.710 ACQUIRED ABSENCE OF BOTH CERVIX AND UTERUS: Chronic | ICD-10-CM

## 2020-08-19 PROCEDURE — 77080 DXA BONE DENSITY AXIAL: CPT

## 2020-08-19 PROCEDURE — 99203 OFFICE O/P NEW LOW 30 MIN: CPT

## 2020-08-19 PROCEDURE — 77080 DXA BONE DENSITY AXIAL: CPT | Mod: 26

## 2020-08-24 ENCOUNTER — APPOINTMENT (OUTPATIENT)
Dept: CT IMAGING | Facility: CLINIC | Age: 70
End: 2020-08-24

## 2020-08-24 ENCOUNTER — APPOINTMENT (OUTPATIENT)
Dept: RADIOLOGY | Facility: HOSPITAL | Age: 70
End: 2020-08-24

## 2020-09-03 ENCOUNTER — OUTPATIENT (OUTPATIENT)
Dept: OUTPATIENT SERVICES | Facility: HOSPITAL | Age: 70
LOS: 1 days | End: 2020-09-03
Payer: MEDICARE

## 2020-09-03 ENCOUNTER — RESULT REVIEW (OUTPATIENT)
Age: 70
End: 2020-09-03

## 2020-09-03 ENCOUNTER — APPOINTMENT (OUTPATIENT)
Dept: CT IMAGING | Facility: CLINIC | Age: 70
End: 2020-09-03
Payer: MEDICARE

## 2020-09-03 DIAGNOSIS — Z90.710 ACQUIRED ABSENCE OF BOTH CERVIX AND UTERUS: Chronic | ICD-10-CM

## 2020-09-03 DIAGNOSIS — Z90.49 ACQUIRED ABSENCE OF OTHER SPECIFIED PARTS OF DIGESTIVE TRACT: Chronic | ICD-10-CM

## 2020-09-03 DIAGNOSIS — Z98.42 CATARACT EXTRACTION STATUS, LEFT EYE: Chronic | ICD-10-CM

## 2020-09-03 DIAGNOSIS — Z90.11 ACQUIRED ABSENCE OF RIGHT BREAST AND NIPPLE: Chronic | ICD-10-CM

## 2020-09-03 DIAGNOSIS — Z00.8 ENCOUNTER FOR OTHER GENERAL EXAMINATION: ICD-10-CM

## 2020-09-03 PROCEDURE — 74263 CT COLONOGRAPHY SCREENING: CPT | Mod: 26

## 2020-09-03 PROCEDURE — 74263 CT COLONOGRAPHY SCREENING: CPT

## 2020-09-12 ENCOUNTER — OUTPATIENT (OUTPATIENT)
Dept: OUTPATIENT SERVICES | Facility: HOSPITAL | Age: 70
LOS: 1 days | End: 2020-09-12
Payer: MEDICARE

## 2020-09-12 ENCOUNTER — APPOINTMENT (OUTPATIENT)
Dept: ULTRASOUND IMAGING | Facility: IMAGING CENTER | Age: 70
End: 2020-09-12
Payer: MEDICARE

## 2020-09-12 DIAGNOSIS — Z98.42 CATARACT EXTRACTION STATUS, LEFT EYE: Chronic | ICD-10-CM

## 2020-09-12 DIAGNOSIS — Z90.710 ACQUIRED ABSENCE OF BOTH CERVIX AND UTERUS: Chronic | ICD-10-CM

## 2020-09-12 DIAGNOSIS — Z90.11 ACQUIRED ABSENCE OF RIGHT BREAST AND NIPPLE: Chronic | ICD-10-CM

## 2020-09-12 DIAGNOSIS — R10.2 PELVIC AND PERINEAL PAIN: ICD-10-CM

## 2020-09-12 DIAGNOSIS — Z90.49 ACQUIRED ABSENCE OF OTHER SPECIFIED PARTS OF DIGESTIVE TRACT: Chronic | ICD-10-CM

## 2020-09-12 DIAGNOSIS — Z00.8 ENCOUNTER FOR OTHER GENERAL EXAMINATION: ICD-10-CM

## 2020-09-12 PROCEDURE — 76856 US EXAM PELVIC COMPLETE: CPT | Mod: 26

## 2020-09-12 PROCEDURE — 76856 US EXAM PELVIC COMPLETE: CPT

## 2020-09-12 PROCEDURE — 76830 TRANSVAGINAL US NON-OB: CPT

## 2020-09-12 PROCEDURE — 76830 TRANSVAGINAL US NON-OB: CPT | Mod: 26

## 2020-10-02 ENCOUNTER — APPOINTMENT (OUTPATIENT)
Dept: INTERNAL MEDICINE | Facility: CLINIC | Age: 70
End: 2020-10-02

## 2020-10-06 ENCOUNTER — APPOINTMENT (OUTPATIENT)
Dept: PLASTIC SURGERY | Facility: CLINIC | Age: 70
End: 2020-10-06
Payer: MEDICARE

## 2020-10-06 VITALS — WEIGHT: 177 LBS | HEIGHT: 63 IN | BODY MASS INDEX: 31.36 KG/M2

## 2020-10-06 PROCEDURE — 99203 OFFICE O/P NEW LOW 30 MIN: CPT

## 2020-10-07 NOTE — HISTORY OF PRESENT ILLNESS
[FreeTextEntry1] : 70 years old Patient presents to the office at the request of Dr Wilkerson for a possible lipoma on her left upper arm.\par Pt states area has been increasing in size since august 2020 and denies any pain, discharge or discomfort.\par Pt saw derm and no treatment was given.\par No imaging done.\par Here to discuss possible excision. 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

## 2020-10-07 NOTE — REASON FOR VISIT
[Consultation] : a consultation visit [Family Member] : family member [FreeTextEntry1] : Dr Katiuska Wilkerson

## 2020-10-07 NOTE — ASSESSMENT
[FreeTextEntry1] : Pt was seen and examined together by RON Kingsley and Dr. Kory Barker. Assessment and plan formulated and discussed at time of visit.\par

## 2020-10-27 ENCOUNTER — APPOINTMENT (OUTPATIENT)
Dept: CARDIOLOGY | Facility: CLINIC | Age: 70
End: 2020-10-27
Payer: MEDICARE

## 2020-10-27 ENCOUNTER — APPOINTMENT (OUTPATIENT)
Dept: PULMONOLOGY | Facility: CLINIC | Age: 70
End: 2020-10-27
Payer: MEDICARE

## 2020-10-27 VITALS
SYSTOLIC BLOOD PRESSURE: 120 MMHG | WEIGHT: 175 LBS | BODY MASS INDEX: 31.01 KG/M2 | OXYGEN SATURATION: 98 % | HEIGHT: 63 IN | DIASTOLIC BLOOD PRESSURE: 70 MMHG | TEMPERATURE: 97.3 F | HEART RATE: 90 BPM | RESPIRATION RATE: 17 BRPM

## 2020-10-27 VITALS
RESPIRATION RATE: 15 BRPM | HEART RATE: 74 BPM | SYSTOLIC BLOOD PRESSURE: 128 MMHG | BODY MASS INDEX: 31.01 KG/M2 | DIASTOLIC BLOOD PRESSURE: 85 MMHG | HEIGHT: 63 IN | WEIGHT: 175 LBS

## 2020-10-27 PROCEDURE — 99072 ADDL SUPL MATRL&STAF TM PHE: CPT

## 2020-10-27 PROCEDURE — 99214 OFFICE O/P EST MOD 30 MIN: CPT

## 2020-10-27 PROCEDURE — 99213 OFFICE O/P EST LOW 20 MIN: CPT

## 2020-10-27 NOTE — ADDENDUM
[FreeTextEntry1] : Documented by Abhijeet Chris acting as a scribe for Dr. Gustavo Cornelius on 10/27/2020.\par \par All medical record entries made by the Scribe were at my, Dr. Gustavo Cornelius's, direction and personally dictated by me on 10/27/2020 . I have reviewed the chart and agree that the record accurately reflects my personal performance of the history, physical exam, assessment and plan. I have also personally directed, reviewed, and agree with the discharge instructions. \par

## 2020-10-27 NOTE — PROCEDURE
[FreeTextEntry1] : CT (7/20/2020) reveals s/p right middle/right lower lobe wedge resection without evidence of new or recurrent disease. \par

## 2020-10-27 NOTE — HISTORY OF PRESENT ILLNESS
[FreeTextEntry1] : Ms. Najera is a 70 year old female with a history of abnormal chest CT, asthma, carcinoid tumor of lung, chronic GERD, eosinophilic asthma, low vitamin D, snoring, OW, and SOB presenting to the office today for a follow up visit. Her chief complaint is \par \par -she notes generally feeling well\par -she notes compliance with Rx\par -she notes intermittent Dizziness and poor balance during exertion, when changing positions, lasting a couple minutes then resolving\par -she notes hydrating adequately\par -she notes poor balance exacerbated by orthopedic discomforts, improved upon sitting and resting\par -she notes intermittent palpitations, generally rare and lasting two to three minutes\par -she denies ankle swelling\par -she denies cough\par -she denies wheeze\par -she denies sinus issues during change of weather\par -she notes sleeping well\par \par -denies any chest pain, chest pressure, diarrhea, constipation, dysphagia, dizziness, leg swelling, leg pain, myalgias, arthralgias, itchy eyes, itchy ears, heartburn, reflux, or sour taste in the mouth.

## 2020-10-31 ENCOUNTER — OUTPATIENT (OUTPATIENT)
Dept: OUTPATIENT SERVICES | Facility: HOSPITAL | Age: 70
LOS: 1 days | End: 2020-10-31
Payer: MEDICARE

## 2020-10-31 ENCOUNTER — APPOINTMENT (OUTPATIENT)
Dept: ULTRASOUND IMAGING | Facility: IMAGING CENTER | Age: 70
End: 2020-10-31
Payer: MEDICARE

## 2020-10-31 DIAGNOSIS — Z90.11 ACQUIRED ABSENCE OF RIGHT BREAST AND NIPPLE: Chronic | ICD-10-CM

## 2020-10-31 DIAGNOSIS — Z90.710 ACQUIRED ABSENCE OF BOTH CERVIX AND UTERUS: Chronic | ICD-10-CM

## 2020-10-31 DIAGNOSIS — Z98.42 CATARACT EXTRACTION STATUS, LEFT EYE: Chronic | ICD-10-CM

## 2020-10-31 DIAGNOSIS — Z90.49 ACQUIRED ABSENCE OF OTHER SPECIFIED PARTS OF DIGESTIVE TRACT: Chronic | ICD-10-CM

## 2020-10-31 DIAGNOSIS — R22.9 LOCALIZED SWELLING, MASS AND LUMP, UNSPECIFIED: ICD-10-CM

## 2020-10-31 PROCEDURE — 76882 US LMTD JT/FCL EVL NVASC XTR: CPT | Mod: 26,LT

## 2020-10-31 PROCEDURE — 76882 US LMTD JT/FCL EVL NVASC XTR: CPT

## 2020-11-03 ENCOUNTER — LABORATORY RESULT (OUTPATIENT)
Age: 70
End: 2020-11-03

## 2020-11-03 ENCOUNTER — APPOINTMENT (OUTPATIENT)
Dept: PLASTIC SURGERY | Facility: CLINIC | Age: 70
End: 2020-11-03
Payer: MEDICARE

## 2020-11-03 PROCEDURE — 99072 ADDL SUPL MATRL&STAF TM PHE: CPT

## 2020-11-03 PROCEDURE — 24075 EXC ARM/ELBOW LES SC < 3 CM: CPT

## 2020-11-03 PROCEDURE — 13120 CMPLX RPR S/A/L 1.1-2.5 CM: CPT | Mod: 58

## 2020-11-03 NOTE — REASON FOR VISIT
[Procedure: _________] : a [unfilled] procedure visit [Family Member] : family member [FreeTextEntry1] : ex bx and closure of Left upper arm mass.

## 2020-11-03 NOTE — PROCEDURE
[FreeTextEntry6] : Preop dx: lipoma,left arm\par Postopdx: same\par Procedure: excision 2.5m lipoma left arm  and complex closure of arm 2.5cm\par Anesthesia: local 1% w/ epi\par Specimens: to path\par Procedure:\par 	IC obtained. Lesion demarcated.  Lido 1% w/ epi injected.  15 blade used to incise skin.  Lesion encountered in the subcutaneous plane and dissected circumferentially. Removed in its entirety, 2.5cm.  Skin edges widely undermined and closed in layers with monocryl for a 2.5cm complex closure. Mastisol and steristrips placed.  No complications.  Specimen sent to path\par

## 2020-11-11 ENCOUNTER — APPOINTMENT (OUTPATIENT)
Dept: PLASTIC SURGERY | Facility: CLINIC | Age: 70
End: 2020-11-11
Payer: MEDICARE

## 2020-11-11 PROCEDURE — 99024 POSTOP FOLLOW-UP VISIT: CPT

## 2020-11-12 NOTE — HISTORY OF PRESENT ILLNESS
[FreeTextEntry1] : DOP: 11/3/20 s/p ex bx and closure of Left upper arm mass.  No excessive bleeding. No fevers. No odor. No purulent discharge. No excessive pain.\par Path c/w lipoma

## 2020-11-24 ENCOUNTER — APPOINTMENT (OUTPATIENT)
Dept: GASTROENTEROLOGY | Facility: HOSPITAL | Age: 70
End: 2020-11-24

## 2020-12-06 ENCOUNTER — INPATIENT (INPATIENT)
Facility: HOSPITAL | Age: 70
LOS: 2 days | Discharge: HOME | DRG: 179 | End: 2020-12-09
Attending: GENERAL ACUTE CARE HOSPITAL | Admitting: HOSPITALIST
Payer: MEDICARE

## 2020-12-06 VITALS
RESPIRATION RATE: 18 BRPM | HEIGHT: 62 IN | OXYGEN SATURATION: 97 % | TEMPERATURE: 99 F | HEART RATE: 122 BPM | WEIGHT: 169.98 LBS | DIASTOLIC BLOOD PRESSURE: 98 MMHG | SYSTOLIC BLOOD PRESSURE: 187 MMHG

## 2020-12-06 DIAGNOSIS — I10 ESSENTIAL (PRIMARY) HYPERTENSION: ICD-10-CM

## 2020-12-06 DIAGNOSIS — K21.9 GASTRO-ESOPHAGEAL REFLUX DISEASE WITHOUT ESOPHAGITIS: ICD-10-CM

## 2020-12-06 DIAGNOSIS — J45.909 UNSPECIFIED ASTHMA, UNCOMPLICATED: ICD-10-CM

## 2020-12-06 DIAGNOSIS — Z90.49 ACQUIRED ABSENCE OF OTHER SPECIFIED PARTS OF DIGESTIVE TRACT: Chronic | ICD-10-CM

## 2020-12-06 DIAGNOSIS — Z29.9 ENCOUNTER FOR PROPHYLACTIC MEASURES, UNSPECIFIED: ICD-10-CM

## 2020-12-06 DIAGNOSIS — Z98.42 CATARACT EXTRACTION STATUS, LEFT EYE: Chronic | ICD-10-CM

## 2020-12-06 DIAGNOSIS — U07.1 COVID-19: ICD-10-CM

## 2020-12-06 DIAGNOSIS — R00.0 TACHYCARDIA, UNSPECIFIED: ICD-10-CM

## 2020-12-06 DIAGNOSIS — Z90.710 ACQUIRED ABSENCE OF BOTH CERVIX AND UTERUS: Chronic | ICD-10-CM

## 2020-12-06 DIAGNOSIS — F32.9 MAJOR DEPRESSIVE DISORDER, SINGLE EPISODE, UNSPECIFIED: ICD-10-CM

## 2020-12-06 DIAGNOSIS — Z90.11 ACQUIRED ABSENCE OF RIGHT BREAST AND NIPPLE: Chronic | ICD-10-CM

## 2020-12-06 DIAGNOSIS — E11.9 TYPE 2 DIABETES MELLITUS WITHOUT COMPLICATIONS: ICD-10-CM

## 2020-12-06 LAB
ALBUMIN SERPL ELPH-MCNC: 4.7 G/DL — SIGNIFICANT CHANGE UP (ref 3.3–5)
ALP SERPL-CCNC: 81 U/L — SIGNIFICANT CHANGE UP (ref 40–120)
ALT FLD-CCNC: 48 U/L — HIGH (ref 10–45)
ANION GAP SERPL CALC-SCNC: 12 MMOL/L — SIGNIFICANT CHANGE UP (ref 5–17)
ANISOCYTOSIS BLD QL: SLIGHT — SIGNIFICANT CHANGE UP
APPEARANCE UR: CLEAR — SIGNIFICANT CHANGE UP
AST SERPL-CCNC: 38 U/L — SIGNIFICANT CHANGE UP (ref 10–40)
BASE EXCESS BLDV CALC-SCNC: 5.3 MMOL/L — HIGH (ref -2–2)
BASE EXCESS BLDV CALC-SCNC: 5.6 MMOL/L — HIGH (ref -2–2)
BASOPHILS # BLD AUTO: 0 K/UL — SIGNIFICANT CHANGE UP (ref 0–0.2)
BASOPHILS NFR BLD AUTO: 0 % — SIGNIFICANT CHANGE UP (ref 0–2)
BILIRUB SERPL-MCNC: 0.4 MG/DL — SIGNIFICANT CHANGE UP (ref 0.2–1.2)
BILIRUB UR-MCNC: NEGATIVE — SIGNIFICANT CHANGE UP
BUN SERPL-MCNC: 13 MG/DL — SIGNIFICANT CHANGE UP (ref 7–23)
CA-I SERPL-SCNC: 1.26 MMOL/L — SIGNIFICANT CHANGE UP (ref 1.12–1.3)
CA-I SERPL-SCNC: 1.35 MMOL/L — HIGH (ref 1.12–1.3)
CALCIUM SERPL-MCNC: 11.7 MG/DL — HIGH (ref 8.4–10.5)
CHLORIDE BLDV-SCNC: 103 MMOL/L — SIGNIFICANT CHANGE UP (ref 96–108)
CHLORIDE BLDV-SCNC: 108 MMOL/L — SIGNIFICANT CHANGE UP (ref 96–108)
CHLORIDE SERPL-SCNC: 100 MMOL/L — SIGNIFICANT CHANGE UP (ref 96–108)
CO2 BLDV-SCNC: 33 MMOL/L — HIGH (ref 22–30)
CO2 BLDV-SCNC: 34 MMOL/L — HIGH (ref 22–30)
CO2 SERPL-SCNC: 27 MMOL/L — SIGNIFICANT CHANGE UP (ref 22–31)
COLOR SPEC: COLORLESS — SIGNIFICANT CHANGE UP
CREAT SERPL-MCNC: 0.72 MG/DL — SIGNIFICANT CHANGE UP (ref 0.5–1.3)
D DIMER BLD IA.RAPID-MCNC: 438 NG/ML DDU — HIGH
DACRYOCYTES BLD QL SMEAR: SLIGHT — SIGNIFICANT CHANGE UP
DIFF PNL FLD: NEGATIVE — SIGNIFICANT CHANGE UP
EOSINOPHIL # BLD AUTO: 0.19 K/UL — SIGNIFICANT CHANGE UP (ref 0–0.5)
EOSINOPHIL NFR BLD AUTO: 3.6 % — SIGNIFICANT CHANGE UP (ref 0–6)
GAS PNL BLDV: 136 MMOL/L — SIGNIFICANT CHANGE UP (ref 135–145)
GAS PNL BLDV: 140 MMOL/L — SIGNIFICANT CHANGE UP (ref 135–145)
GAS PNL BLDV: SIGNIFICANT CHANGE UP
GLUCOSE BLDV-MCNC: 115 MG/DL — HIGH (ref 70–99)
GLUCOSE BLDV-MCNC: 137 MG/DL — HIGH (ref 70–99)
GLUCOSE SERPL-MCNC: 109 MG/DL — HIGH (ref 70–99)
GLUCOSE UR QL: NEGATIVE — SIGNIFICANT CHANGE UP
HCO3 BLDV-SCNC: 31 MMOL/L — HIGH (ref 21–29)
HCO3 BLDV-SCNC: 32 MMOL/L — HIGH (ref 21–29)
HCT VFR BLD CALC: 44.6 % — SIGNIFICANT CHANGE UP (ref 34.5–45)
HCT VFR BLDA CALC: 37 % — LOW (ref 39–50)
HCT VFR BLDA CALC: 43 % — SIGNIFICANT CHANGE UP (ref 39–50)
HGB BLD CALC-MCNC: 12.1 G/DL — SIGNIFICANT CHANGE UP (ref 11.5–15.5)
HGB BLD CALC-MCNC: 13.9 G/DL — SIGNIFICANT CHANGE UP (ref 11.5–15.5)
HGB BLD-MCNC: 13.5 G/DL — SIGNIFICANT CHANGE UP (ref 11.5–15.5)
HYPOCHROMIA BLD QL: SLIGHT — SIGNIFICANT CHANGE UP
KETONES UR-MCNC: NEGATIVE — SIGNIFICANT CHANGE UP
LACTATE BLDV-MCNC: 1.5 MMOL/L — SIGNIFICANT CHANGE UP (ref 0.7–2)
LACTATE BLDV-MCNC: 2.5 MMOL/L — HIGH (ref 0.7–2)
LEUKOCYTE ESTERASE UR-ACNC: NEGATIVE — SIGNIFICANT CHANGE UP
LYMPHOCYTES # BLD AUTO: 1.04 K/UL — SIGNIFICANT CHANGE UP (ref 1–3.3)
LYMPHOCYTES # BLD AUTO: 20 % — SIGNIFICANT CHANGE UP (ref 13–44)
MANUAL SMEAR VERIFICATION: SIGNIFICANT CHANGE UP
MCHC RBC-ENTMCNC: 22.5 PG — LOW (ref 27–34)
MCHC RBC-ENTMCNC: 30.3 GM/DL — LOW (ref 32–36)
MCV RBC AUTO: 74.3 FL — LOW (ref 80–100)
MICROCYTES BLD QL: SLIGHT — SIGNIFICANT CHANGE UP
MONOCYTES # BLD AUTO: 0.61 K/UL — SIGNIFICANT CHANGE UP (ref 0–0.9)
MONOCYTES NFR BLD AUTO: 11.8 % — SIGNIFICANT CHANGE UP (ref 2–14)
NEUTROPHILS # BLD AUTO: 3.36 K/UL — SIGNIFICANT CHANGE UP (ref 1.8–7.4)
NEUTROPHILS NFR BLD AUTO: 64.6 % — SIGNIFICANT CHANGE UP (ref 43–77)
NITRITE UR-MCNC: NEGATIVE — SIGNIFICANT CHANGE UP
NT-PROBNP SERPL-SCNC: 33 PG/ML — SIGNIFICANT CHANGE UP (ref 0–300)
OTHER CELLS CSF MANUAL: 5 ML/DL — LOW (ref 18–22)
OTHER CELLS CSF MANUAL: 7 ML/DL — LOW (ref 18–22)
OVALOCYTES BLD QL SMEAR: SLIGHT — SIGNIFICANT CHANGE UP
PCO2 BLDV: 52 MMHG — HIGH (ref 35–50)
PCO2 BLDV: 58 MMHG — HIGH (ref 35–50)
PH BLDV: 7.36 — SIGNIFICANT CHANGE UP (ref 7.35–7.45)
PH BLDV: 7.39 — SIGNIFICANT CHANGE UP (ref 7.35–7.45)
PH UR: 7 — SIGNIFICANT CHANGE UP (ref 5–8)
PLAT MORPH BLD: NORMAL — SIGNIFICANT CHANGE UP
PLATELET # BLD AUTO: 217 K/UL — SIGNIFICANT CHANGE UP (ref 150–400)
PO2 BLDV: 23 MMHG — LOW (ref 25–45)
PO2 BLDV: 23 MMHG — LOW (ref 25–45)
POIKILOCYTOSIS BLD QL AUTO: SLIGHT — SIGNIFICANT CHANGE UP
POLYCHROMASIA BLD QL SMEAR: SLIGHT — SIGNIFICANT CHANGE UP
POTASSIUM BLDV-SCNC: 3.6 MMOL/L — SIGNIFICANT CHANGE UP (ref 3.5–5.3)
POTASSIUM BLDV-SCNC: 4.3 MMOL/L — SIGNIFICANT CHANGE UP (ref 3.5–5.3)
POTASSIUM SERPL-MCNC: 3.8 MMOL/L — SIGNIFICANT CHANGE UP (ref 3.5–5.3)
POTASSIUM SERPL-SCNC: 3.8 MMOL/L — SIGNIFICANT CHANGE UP (ref 3.5–5.3)
PROT SERPL-MCNC: 7.8 G/DL — SIGNIFICANT CHANGE UP (ref 6–8.3)
PROT UR-MCNC: NEGATIVE — SIGNIFICANT CHANGE UP
RBC # BLD: 6 M/UL — HIGH (ref 3.8–5.2)
RBC # FLD: 15.2 % — HIGH (ref 10.3–14.5)
RBC BLD AUTO: ABNORMAL
SAO2 % BLDV: 30 % — LOW (ref 67–88)
SAO2 % BLDV: 34 % — LOW (ref 67–88)
SARS-COV-2 RNA SPEC QL NAA+PROBE: DETECTED
SODIUM SERPL-SCNC: 139 MMOL/L — SIGNIFICANT CHANGE UP (ref 135–145)
SP GR SPEC: 1.01 — LOW (ref 1.01–1.02)
TARGETS BLD QL SMEAR: SLIGHT — SIGNIFICANT CHANGE UP
TROPONIN T, HIGH SENSITIVITY RESULT: 8 NG/L — SIGNIFICANT CHANGE UP (ref 0–51)
TROPONIN T, HIGH SENSITIVITY RESULT: 8 NG/L — SIGNIFICANT CHANGE UP (ref 0–51)
UROBILINOGEN FLD QL: NEGATIVE — SIGNIFICANT CHANGE UP
WBC # BLD: 5.2 K/UL — SIGNIFICANT CHANGE UP (ref 3.8–10.5)
WBC # FLD AUTO: 5.2 K/UL — SIGNIFICANT CHANGE UP (ref 3.8–10.5)

## 2020-12-06 PROCEDURE — 71045 X-RAY EXAM CHEST 1 VIEW: CPT | Mod: 26

## 2020-12-06 PROCEDURE — 99223 1ST HOSP IP/OBS HIGH 75: CPT | Mod: AI

## 2020-12-06 PROCEDURE — 71275 CT ANGIOGRAPHY CHEST: CPT | Mod: 26

## 2020-12-06 PROCEDURE — 93010 ELECTROCARDIOGRAM REPORT: CPT

## 2020-12-06 PROCEDURE — 99285 EMERGENCY DEPT VISIT HI MDM: CPT

## 2020-12-06 RX ORDER — ENOXAPARIN SODIUM 100 MG/ML
40 INJECTION SUBCUTANEOUS EVERY 12 HOURS
Refills: 0 | Status: DISCONTINUED | OUTPATIENT
Start: 2020-12-06 | End: 2020-12-09

## 2020-12-06 RX ORDER — MONTELUKAST 4 MG/1
10 TABLET, CHEWABLE ORAL DAILY
Refills: 0 | Status: DISCONTINUED | OUTPATIENT
Start: 2020-12-06 | End: 2020-12-09

## 2020-12-06 RX ORDER — MIRTAZAPINE 45 MG/1
30 TABLET, ORALLY DISINTEGRATING ORAL AT BEDTIME
Refills: 0 | Status: DISCONTINUED | OUTPATIENT
Start: 2020-12-06 | End: 2020-12-09

## 2020-12-06 RX ORDER — VERAPAMIL HCL 240 MG
240 CAPSULE, EXTENDED RELEASE PELLETS 24 HR ORAL AT BEDTIME
Refills: 0 | Status: DISCONTINUED | OUTPATIENT
Start: 2020-12-06 | End: 2020-12-09

## 2020-12-06 RX ORDER — FLUTICASONE FUROATE AND VILANTEROL TRIFENATATE 100; 25 UG/1; UG/1
1 POWDER RESPIRATORY (INHALATION)
Qty: 0 | Refills: 0 | DISCHARGE

## 2020-12-06 RX ORDER — ASPIRIN/CALCIUM CARB/MAGNESIUM 324 MG
81 TABLET ORAL DAILY
Refills: 0 | Status: DISCONTINUED | OUTPATIENT
Start: 2020-12-07 | End: 2020-12-09

## 2020-12-06 RX ORDER — DEXAMETHASONE 0.5 MG/5ML
6 ELIXIR ORAL DAILY
Refills: 0 | Status: DISCONTINUED | OUTPATIENT
Start: 2020-12-07 | End: 2020-12-08

## 2020-12-06 RX ORDER — PANTOPRAZOLE SODIUM 20 MG/1
40 TABLET, DELAYED RELEASE ORAL
Refills: 0 | Status: DISCONTINUED | OUTPATIENT
Start: 2020-12-06 | End: 2020-12-09

## 2020-12-06 RX ORDER — SODIUM CHLORIDE 9 MG/ML
500 INJECTION INTRAMUSCULAR; INTRAVENOUS; SUBCUTANEOUS ONCE
Refills: 0 | Status: COMPLETED | OUTPATIENT
Start: 2020-12-06 | End: 2020-12-06

## 2020-12-06 RX ORDER — ARIPIPRAZOLE 15 MG/1
5 TABLET ORAL DAILY
Refills: 0 | Status: DISCONTINUED | OUTPATIENT
Start: 2020-12-06 | End: 2020-12-09

## 2020-12-06 RX ORDER — ALBUTEROL 90 UG/1
2 AEROSOL, METERED ORAL EVERY 6 HOURS
Refills: 0 | Status: DISCONTINUED | OUTPATIENT
Start: 2020-12-06 | End: 2020-12-09

## 2020-12-06 RX ORDER — ATORVASTATIN CALCIUM 80 MG/1
20 TABLET, FILM COATED ORAL AT BEDTIME
Refills: 0 | Status: DISCONTINUED | OUTPATIENT
Start: 2020-12-06 | End: 2020-12-09

## 2020-12-06 RX ORDER — DEXAMETHASONE 0.5 MG/5ML
6 ELIXIR ORAL ONCE
Refills: 0 | Status: COMPLETED | OUTPATIENT
Start: 2020-12-06 | End: 2020-12-06

## 2020-12-06 RX ADMIN — Medication 6 MILLIGRAM(S): at 16:53

## 2020-12-06 RX ADMIN — SODIUM CHLORIDE 500 MILLILITER(S): 9 INJECTION INTRAMUSCULAR; INTRAVENOUS; SUBCUTANEOUS at 16:56

## 2020-12-06 RX ADMIN — ALBUTEROL 2 PUFF(S): 90 AEROSOL, METERED ORAL at 16:55

## 2020-12-06 RX ADMIN — ALBUTEROL 2 PUFF(S): 90 AEROSOL, METERED ORAL at 23:23

## 2020-12-06 RX ADMIN — SODIUM CHLORIDE 500 MILLILITER(S): 9 INJECTION INTRAMUSCULAR; INTRAVENOUS; SUBCUTANEOUS at 19:10

## 2020-12-06 NOTE — H&P ADULT - PROBLEM SELECTOR PLAN 1
cough, maintaining O2 saturation on RA.  Mildly tachycardic, CTA negative for PE  - c/w dexamethason 6 mg IV x10 days  - start anticoagulation to prevent clotting  - obtain covid immunology labs  - monitor LFTs (mild AST elev noted at admission)   - continuous pulse ox cough, maintaining O2 saturation on RA.  Mildly tachycardic, CTA negative for PE  D dimer 438  Mild ALT elevation: 48  Covid 19 pcr positive  - c/w dexamethason 6 mg IV x10 days  - start anticoagulation: enoxaparin 40 mg IV bid  - F/up covid immunology labs  - monitor LFTs (mild AST elev noted at admission)   - continuous pulse ox  - tessalon tid

## 2020-12-06 NOTE — H&P ADULT - NSHPPHYSICALEXAM_GEN_ALL_CORE
PHYSICAL EXAM:   GENERAL: Alert. Not confused. No acute distress. Not thin. Not cachectic. Not obese.  HEAD:  Atraumatic. Normocephalic.  EYES: EOMI. PERRLA. Normal conjunctiva/sclera.  ENT: Neck supple. No JVD. Moist oral mucosa. Not edentulous. No thrush.  LYMPH: Normal supraclavicular/cervical lymph nodes.   CARDIAC: +tachy, Not joselyn. Regular rhythm. Not irregularly irregular. S1. S2. No murmur. No rub. No distant heart sounds.  LUNG/CHEST: CTAB. BS equal bilaterally. No wheezes. No rales. No rhonchi.  ABDOMEN: Soft. No tenderness. No distension. No fluid wave. Normal bowel sounds.  BACK: No midline/vertebral tenderness. No flank tenderness.  VASCULAR: +2 b/l radial or ulnar pulses. Palpable DP pulses.  EXTREMITIES:  No clubbing. No cyanosis. No edema. Moving all 4.  NEUROLOGY: A&Ox3. Non-focal exam. Cranial nerves intact. Normal speech. Sensation intact.  PSYCH: Normal behavior. Normal affect.  SKIN: No jaundice. No erythema. No rash/lesion.  Vascular Access:     ICU Vital Signs Last 24 Hrs  T(C): 37.2 (06 Dec 2020 20:15), Max: 37.2 (06 Dec 2020 12:21)  T(F): 99 (06 Dec 2020 20:15), Max: 99 (06 Dec 2020 20:15)  HR: 105 (06 Dec 2020 20:15) (99 - 122)  BP: 142/76 (06 Dec 2020 20:15) (133/69 - 187/98)  BP(mean): --  ABP: --  ABP(mean): --  RR: 18 (06 Dec 2020 20:15) (17 - 20)  SpO2: 97% (06 Dec 2020 20:15) (97% - 100%)      I&O's Summary

## 2020-12-06 NOTE — ED PROVIDER NOTE - CLINICAL SUMMARY MEDICAL DECISION MAKING FREE TEXT BOX
Adult female with cough shortness of breath, Hx asthma concerns for asthma, acs, covid, pna, check xr, labs, ekg, trop tx neb and reassess  Raul Lopes MD, Facep

## 2020-12-06 NOTE — ED ADULT NURSE NOTE - OBJECTIVE STATEMENT
70y f pt c/o cough this am; pt describes it as dry, no sputum, no blood; pt denies contact with covid; pt and daughter concerned with pt hx of asthma; pt feels symptoms are similar to asthma exacerbation; aox3; no resp distress; no sob; no chest pain; no abd pain; no n/v/d; denies fever/chills; pt and daughter state concern because diabetic; iv placed; labs drawn; call bell within reach; safety/comfort maintained; adv pt need urine sample; ekg done; pt on cardiac monitor in sinus tach; pt swabbed for covid; pt tolerated all procedures well

## 2020-12-06 NOTE — H&P ADULT - NSHPLABSRESULTS_GEN_ALL_CORE
Personally reviewed labs.   Personally reviewed imaging.   Personally reviewed EKG.                         13.5   5.20  )-----------( 217      ( 06 Dec 2020 14:20 )             44.6   12-    139  |  100  |  13  ----------------------------<  109<H>  3.8   |  27  |  0.72    Ca    11.7<H>      06 Dec 2020 14:20    TPro  7.8  /  Alb  4.7  /  TBili  0.4  /  DBili  x   /  AST  38  /  ALT  48<H>  /  AlkPhos  81  12      LIVER FUNCTIONS - ( 06 Dec 2020 14:20 )  Alb: 4.7 g/dL / Pro: 7.8 g/dL / ALK PHOS: 81 U/L / ALT: 48 U/L / AST: 38 U/L / GGT: x           Urinalysis Basic - ( 06 Dec 2020 15:02 )    Color: Colorless / Appearance: Clear / S.008 / pH: x  Gluc: x / Ketone: Negative  / Bili: Negative / Urobili: Negative   Blood: x / Protein: Negative / Nitrite: Negative   Leuk Esterase: Negative / RBC: x / WBC x   Sq Epi: x / Non Sq Epi: x / Bacteria: x Personally reviewed labs.   Personally reviewed imaging.   Personally reviewed EKG.                         13.5   5.20  )-----------( 217      ( 06 Dec 2020 14:20 )             44.6   12-    139  |  100  |  13  ----------------------------<  109<H>  3.8   |  27  |  0.72    Ca    11.7<H>      06 Dec 2020 14:20    TPro  7.8  /  Alb  4.7  /  TBili  0.4  /  DBili  x   /  AST  38  /  ALT  48<H>  /  AlkPhos  81  12      LIVER FUNCTIONS - ( 06 Dec 2020 14:20 )  Alb: 4.7 g/dL / Pro: 7.8 g/dL / ALK PHOS: 81 U/L / ALT: 48 U/L / AST: 38 U/L / GGT: x           Urinalysis Basic - ( 06 Dec 2020 15:02 )    Color: Colorless / Appearance: Clear / S.008 / pH: x  Gluc: x / Ketone: Negative  / Bili: Negative / Urobili: Negative   Blood: x / Protein: Negative / Nitrite: Negative   Leuk Esterase: Negative / RBC: x / WBC x   Sq Epi: x / Non Sq Epi: x / Bacteria: x    EKG: NSR w/o acute ischemic changes  CTA: no pulmonary emboli.

## 2020-12-06 NOTE — ED PROVIDER NOTE - CARE PLAN
Principal Discharge DX:	Tachycardia   Principal Discharge DX:	Tachycardia  Secondary Diagnosis:	COVID-19

## 2020-12-06 NOTE — H&P ADULT - NSHPREVIEWOFSYSTEMS_GEN_ALL_CORE
REVIEW OF SYSTEMS:  CONSTITUTIONAL: No weakness. No fevers. No chills. No rigors. No weight loss. No night sweats. No poor appetite.  EYES: No blurry or double vision. No eye pain.  ENT: No hearing difficulty. No vertigo. No dysphagia. No sore throat. No Sinusitis/rhinorrhea.   NECK: No pain. No stiffness/rigidity.  CARDIAC: No chest pain. No palpitations. No lightheadedness. No syncope.  RESPIRATORY: +cough. No SOB. No hemoptysis.  GASTROINTESTINAL: No abdominal pain. No nausea. No vomiting. No hematemesis. No diarrhea. No constipation. No melena. No hematochezia.  GENITOURINARY: No dysuria. No frequency. No hesitancy. No hematuria. No oliguria.  NEUROLOGICAL: No numbness/tingling. No focal weakness. No urinary or fecal incontinence. No headache. No unsteady gait.  BACK: No back pain. No flank pain.  EXTREMITIES: No lower extremity edema. Full ROM. No joint pain.  SKIN: No rashes. No itching. No other lesions.  PSYCHIATRIC: No depression. No anxiety. No SI/HI.  ALLERGIC: No lip swelling. No hives.  All other review of systems is negative unless indicated above.  Unless indicated above, unable to assess ROS 2/2

## 2020-12-06 NOTE — ED PROVIDER NOTE - OBJECTIVE STATEMENT
hx w pt daughter, phone/ pt  Private Physician Zachariah Otero, PCP, Gustavo Cornelius Pulmonary, [facility]  70y female pmh Asthma, Depression, DMT2,Gerd,Palps, HTN, Sp FERNANDO, Astrid, Lumpectomy pt comes c/o cough since this am. No sputum,hemoptysis,fever,chills. PT states symptoms might be c/w prior Ashtma attack, No abd pain,cp, nvdc, , hx w pt daughter, phone/ pt  Private Physician Zachariah Otero, PCP, Gustavo Cornelius [facility], [facility]  70y female pmh Asthma, Depression, DMT2,Gerd,Palps, HTN, Sp FERNANDO, Astrid, Lumpectomy pt comes c/o cough since this am. No sputum,hemoptysis,fever,chills. PT states symptoms might be c/w prior Ashtma attack, No abd pain,cp, nvdc, ,no sore throat/pain. hx w pt daughter, phone/ pt  Private Physician Zachariah Otero, PCP, Gust[facility] Cornelius [facility], [facility]  70y female pmh Asthma, Depression, DMT2,Gerd,Palps, HTN, Sp FERNANDO, Astrid, Lumpectomy pt comes c/o cough since this am. No sputum,hemoptysis,fever,chills. PT states symptoms might be c/w prior Ashtma attack, No abd pain,cp, nvdc, ,no sore throat/pain.    PA note: 70 year olf female with hx of asthma, nonsmoker, DM, HTN presents to the ED with mild dry cough x 1 day. reports sob only with coughing and denies chest pain. no fever/chills/body aches. patient states she does not "feel bad" however her sx feel different than her typical asthma. never been hospitalized for asthma. patient used her PRN pump this morning with some relief.

## 2020-12-06 NOTE — H&P ADULT - HISTORY OF PRESENT ILLNESS
70 yr old female with history of Asthma, Depression, DMT2, Gerd, and 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 Daughter notes that the patient was with her sister and another friend who had been coughing last night. Patient notes that she had a sore throat the day before and then woke up today with a dry, non productive cough. Denies associated shortness of breath, chest pain, or palpitations. Although does note that she has a history of exertional dyspnea (ie up and down stairs, walking a few blocks) but this has been at her baseline. Dtr also endorses history of palpitations for which she is followed by cards outpt, but has not recently complained of palpitations.  Patient also denies fever/chills/body aches. Patient with history of asthma and feels that current symptoms are different than her typical asthma.    In ED, maintaining O2 saturation on room air but noted to be tachycardic. CTA obtained revealing no pulmonary emboli.     EKG: NSR w/ normal axis, no acute ischemic changes   70 yr old female with history of Asthma, Depression, DMT2, Gerd, and 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 Daughter notes that the patient was with her sister and another friend who had been coughing last night. Patient notes that she had a sore throat the day before and then woke up today with a dry, non productive cough. Denies associated shortness of breath, chest pain, or palpitations. Although does note that she has a history of exertional dyspnea (ie up and down stairs, walking a few blocks) but this has been at her baseline. Dtr also endorses history of palpitations for which she is followed by cards outpt, but has not recently complained of palpitations.  Patient also denies fever/chills/body aches. Patient with history of asthma and feels that current symptoms are different than her typical asthma.    In ED, maintaining O2 saturation on room air but noted to be tachycardic. CTA obtained revealing no pulmonary emboli.     EKG: NSR w/ normal axis, no acute ischemic changes    Hx and med rec obtained from daughter at [phone].

## 2020-12-06 NOTE — H&P ADULT - NSICDXPASTSURGICALHX_GEN_ALL_CORE_FT
PAST SURGICAL HISTORY:  History of cholecystectomy 1980's    History of left cataract surgery 1990's    History of lumpectomy of right breast 1984    S/P FERNANDO-BSO (total abdominal hysterectomy and bilateral salpingo-oophorectomy) 1980's

## 2020-12-06 NOTE — H&P ADULT - NSICDXPASTMEDICALHX_GEN_ALL_CORE_FT
PAST MEDICAL HISTORY:  Arrhythmia     Asthma     Cataract of right eye     Depression     DM (diabetes mellitus)     GERD (gastroesophageal reflux disease)     HTN (hypertension)     Obese     Palpitations     Seasonal allergies

## 2020-12-06 NOTE — H&P ADULT - PROBLEM SELECTOR PLAN 3
-stable  -c/w albuterol inhaler prn -stable; not in acute exacerbation  - home meds continued: montelukast, breo equialent, albuterol prn

## 2020-12-07 LAB
24R-OH-CALCIDIOL SERPL-MCNC: 35 NG/ML — SIGNIFICANT CHANGE UP (ref 30–80)
ALBUMIN SERPL ELPH-MCNC: 4.4 G/DL — SIGNIFICANT CHANGE UP (ref 3.3–5)
ALP SERPL-CCNC: 71 U/L — SIGNIFICANT CHANGE UP (ref 40–120)
ALT FLD-CCNC: 46 U/L — HIGH (ref 10–45)
ANION GAP SERPL CALC-SCNC: 14 MMOL/L — SIGNIFICANT CHANGE UP (ref 5–17)
AST SERPL-CCNC: 28 U/L — SIGNIFICANT CHANGE UP (ref 10–40)
BASOPHILS # BLD AUTO: 0.01 K/UL — SIGNIFICANT CHANGE UP (ref 0–0.2)
BASOPHILS NFR BLD AUTO: 0.2 % — SIGNIFICANT CHANGE UP (ref 0–2)
BILIRUB SERPL-MCNC: 0.3 MG/DL — SIGNIFICANT CHANGE UP (ref 0.2–1.2)
BUN SERPL-MCNC: 15 MG/DL — SIGNIFICANT CHANGE UP (ref 7–23)
CALCIUM SERPL-MCNC: 11.3 MG/DL — HIGH (ref 8.4–10.5)
CHLORIDE SERPL-SCNC: 100 MMOL/L — SIGNIFICANT CHANGE UP (ref 96–108)
CK SERPL-CCNC: 85 U/L — SIGNIFICANT CHANGE UP (ref 25–170)
CO2 SERPL-SCNC: 24 MMOL/L — SIGNIFICANT CHANGE UP (ref 22–31)
CREAT SERPL-MCNC: 0.91 MG/DL — SIGNIFICANT CHANGE UP (ref 0.5–1.3)
CRP SERPL-MCNC: 0.55 MG/DL — HIGH (ref 0–0.4)
CRP SERPL-MCNC: 0.56 MG/DL — HIGH (ref 0–0.4)
EOSINOPHIL # BLD AUTO: 0 K/UL — SIGNIFICANT CHANGE UP (ref 0–0.5)
EOSINOPHIL NFR BLD AUTO: 0 % — SIGNIFICANT CHANGE UP (ref 0–6)
FERRITIN SERPL-MCNC: 90 NG/ML — SIGNIFICANT CHANGE UP (ref 15–150)
FIBRINOGEN PPP-MCNC: 559 MG/DL — HIGH (ref 290–520)
GLUCOSE BLDC GLUCOMTR-MCNC: 145 MG/DL — HIGH (ref 70–99)
GLUCOSE BLDC GLUCOMTR-MCNC: 152 MG/DL — HIGH (ref 70–99)
GLUCOSE BLDC GLUCOMTR-MCNC: 159 MG/DL — HIGH (ref 70–99)
GLUCOSE BLDC GLUCOMTR-MCNC: 168 MG/DL — HIGH (ref 70–99)
GLUCOSE SERPL-MCNC: 159 MG/DL — HIGH (ref 70–99)
HCT VFR BLD CALC: 43.6 % — SIGNIFICANT CHANGE UP (ref 34.5–45)
HGB BLD-MCNC: 13.2 G/DL — SIGNIFICANT CHANGE UP (ref 11.5–15.5)
IGA FLD-MCNC: 289 MG/DL — SIGNIFICANT CHANGE UP (ref 84–499)
IGG FLD-MCNC: 1111 MG/DL — SIGNIFICANT CHANGE UP (ref 610–1660)
IGM SERPL-MCNC: 80 MG/DL — SIGNIFICANT CHANGE UP (ref 35–242)
IMM GRANULOCYTES NFR BLD AUTO: 0.2 % — SIGNIFICANT CHANGE UP (ref 0–1.5)
KAPPA LC SER QL IFE: 1.91 MG/DL — SIGNIFICANT CHANGE UP (ref 0.33–1.94)
KAPPA/LAMBDA FREE LIGHT CHAIN RATIO, SERUM: 1.57 RATIO — SIGNIFICANT CHANGE UP (ref 0.26–1.65)
LAMBDA LC SER QL IFE: 1.22 MG/DL — SIGNIFICANT CHANGE UP (ref 0.57–2.63)
LDH SERPL L TO P-CCNC: 157 U/L — SIGNIFICANT CHANGE UP (ref 50–242)
LDH SERPL L TO P-CCNC: 161 U/L — SIGNIFICANT CHANGE UP (ref 50–242)
LYMPHOCYTES # BLD AUTO: 1.44 K/UL — SIGNIFICANT CHANGE UP (ref 1–3.3)
LYMPHOCYTES # BLD AUTO: 28 % — SIGNIFICANT CHANGE UP (ref 13–44)
MCHC RBC-ENTMCNC: 22.1 PG — LOW (ref 27–34)
MCHC RBC-ENTMCNC: 30.3 GM/DL — LOW (ref 32–36)
MCV RBC AUTO: 73 FL — LOW (ref 80–100)
MONOCYTES # BLD AUTO: 0.78 K/UL — SIGNIFICANT CHANGE UP (ref 0–0.9)
MONOCYTES NFR BLD AUTO: 15.1 % — HIGH (ref 2–14)
NEUTROPHILS # BLD AUTO: 2.91 K/UL — SIGNIFICANT CHANGE UP (ref 1.8–7.4)
NEUTROPHILS NFR BLD AUTO: 56.5 % — SIGNIFICANT CHANGE UP (ref 43–77)
NRBC # BLD: 0 /100 WBCS — SIGNIFICANT CHANGE UP (ref 0–0)
NT-PROBNP SERPL-SCNC: 70 PG/ML — SIGNIFICANT CHANGE UP (ref 0–300)
PHOSPHATE SERPL-MCNC: 2.6 MG/DL — SIGNIFICANT CHANGE UP (ref 2.5–4.5)
PLATELET # BLD AUTO: 248 K/UL — SIGNIFICANT CHANGE UP (ref 150–400)
POTASSIUM SERPL-MCNC: 3.8 MMOL/L — SIGNIFICANT CHANGE UP (ref 3.5–5.3)
POTASSIUM SERPL-SCNC: 3.8 MMOL/L — SIGNIFICANT CHANGE UP (ref 3.5–5.3)
PROT SERPL-MCNC: 7.7 G/DL — SIGNIFICANT CHANGE UP (ref 6–8.3)
PTH-INTACT FLD-MCNC: 71 PG/ML — HIGH (ref 15–65)
RBC # BLD: 5.97 M/UL — HIGH (ref 3.8–5.2)
RBC # FLD: 15.1 % — HIGH (ref 10.3–14.5)
SARS-COV-2 IGG SERPL QL IA: NEGATIVE — SIGNIFICANT CHANGE UP
SARS-COV-2 IGM SERPL IA-ACNC: <0.1 INDEX — SIGNIFICANT CHANGE UP
SODIUM SERPL-SCNC: 138 MMOL/L — SIGNIFICANT CHANGE UP (ref 135–145)
T4 FREE SERPL-MCNC: 1.6 NG/DL — SIGNIFICANT CHANGE UP (ref 0.9–1.8)
TRIGL SERPL-MCNC: 30 MG/DL — SIGNIFICANT CHANGE UP
TSH SERPL-MCNC: 0.34 UIU/ML — SIGNIFICANT CHANGE UP (ref 0.27–4.2)
WBC # BLD: 5.15 K/UL — SIGNIFICANT CHANGE UP (ref 3.8–10.5)
WBC # FLD AUTO: 5.15 K/UL — SIGNIFICANT CHANGE UP (ref 3.8–10.5)

## 2020-12-07 RX ORDER — INSULIN LISPRO 100/ML
VIAL (ML) SUBCUTANEOUS
Refills: 0 | Status: DISCONTINUED | OUTPATIENT
Start: 2020-12-07 | End: 2020-12-09

## 2020-12-07 RX ORDER — DEXTROSE 50 % IN WATER 50 %
25 SYRINGE (ML) INTRAVENOUS ONCE
Refills: 0 | Status: DISCONTINUED | OUTPATIENT
Start: 2020-12-07 | End: 2020-12-09

## 2020-12-07 RX ORDER — SODIUM CHLORIDE 9 MG/ML
1000 INJECTION, SOLUTION INTRAVENOUS
Refills: 0 | Status: DISCONTINUED | OUTPATIENT
Start: 2020-12-07 | End: 2020-12-09

## 2020-12-07 RX ORDER — BUDESONIDE AND FORMOTEROL FUMARATE DIHYDRATE 160; 4.5 UG/1; UG/1
2 AEROSOL RESPIRATORY (INHALATION) DAILY
Refills: 0 | Status: DISCONTINUED | OUTPATIENT
Start: 2020-12-07 | End: 2020-12-09

## 2020-12-07 RX ORDER — HYDROCHLOROTHIAZIDE 25 MG
12.5 TABLET ORAL DAILY
Refills: 0 | Status: DISCONTINUED | OUTPATIENT
Start: 2020-12-07 | End: 2020-12-08

## 2020-12-07 RX ORDER — GLUCAGON INJECTION, SOLUTION 0.5 MG/.1ML
1 INJECTION, SOLUTION SUBCUTANEOUS ONCE
Refills: 0 | Status: DISCONTINUED | OUTPATIENT
Start: 2020-12-07 | End: 2020-12-09

## 2020-12-07 RX ORDER — INSULIN LISPRO 100/ML
VIAL (ML) SUBCUTANEOUS AT BEDTIME
Refills: 0 | Status: DISCONTINUED | OUTPATIENT
Start: 2020-12-07 | End: 2020-12-09

## 2020-12-07 RX ORDER — METFORMIN HYDROCHLORIDE 850 MG/1
1 TABLET ORAL
Qty: 0 | Refills: 0 | DISCHARGE

## 2020-12-07 RX ORDER — LOSARTAN POTASSIUM 100 MG/1
50 TABLET, FILM COATED ORAL DAILY
Refills: 0 | Status: DISCONTINUED | OUTPATIENT
Start: 2020-12-07 | End: 2020-12-09

## 2020-12-07 RX ORDER — DEXTROSE 50 % IN WATER 50 %
15 SYRINGE (ML) INTRAVENOUS ONCE
Refills: 0 | Status: DISCONTINUED | OUTPATIENT
Start: 2020-12-07 | End: 2020-12-09

## 2020-12-07 RX ORDER — VERAPAMIL HCL 240 MG
1 CAPSULE, EXTENDED RELEASE PELLETS 24 HR ORAL
Qty: 0 | Refills: 0 | DISCHARGE

## 2020-12-07 RX ORDER — DEXTROSE 50 % IN WATER 50 %
12.5 SYRINGE (ML) INTRAVENOUS ONCE
Refills: 0 | Status: DISCONTINUED | OUTPATIENT
Start: 2020-12-07 | End: 2020-12-09

## 2020-12-07 RX ADMIN — ATORVASTATIN CALCIUM 20 MILLIGRAM(S): 80 TABLET, FILM COATED ORAL at 00:13

## 2020-12-07 RX ADMIN — ALBUTEROL 2 PUFF(S): 90 AEROSOL, METERED ORAL at 05:17

## 2020-12-07 RX ADMIN — ALBUTEROL 2 PUFF(S): 90 AEROSOL, METERED ORAL at 17:12

## 2020-12-07 RX ADMIN — Medication 100 MILLIGRAM(S): at 21:33

## 2020-12-07 RX ADMIN — Medication 100 MILLIGRAM(S): at 05:17

## 2020-12-07 RX ADMIN — PANTOPRAZOLE SODIUM 40 MILLIGRAM(S): 20 TABLET, DELAYED RELEASE ORAL at 05:17

## 2020-12-07 RX ADMIN — Medication 100 MILLIGRAM(S): at 13:49

## 2020-12-07 RX ADMIN — Medication 81 MILLIGRAM(S): at 13:49

## 2020-12-07 RX ADMIN — ALBUTEROL 2 PUFF(S): 90 AEROSOL, METERED ORAL at 13:52

## 2020-12-07 RX ADMIN — MONTELUKAST 10 MILLIGRAM(S): 4 TABLET, CHEWABLE ORAL at 13:49

## 2020-12-07 RX ADMIN — Medication 240 MILLIGRAM(S): at 00:13

## 2020-12-07 RX ADMIN — ARIPIPRAZOLE 5 MILLIGRAM(S): 15 TABLET ORAL at 13:52

## 2020-12-07 RX ADMIN — ENOXAPARIN SODIUM 40 MILLIGRAM(S): 100 INJECTION SUBCUTANEOUS at 17:12

## 2020-12-07 RX ADMIN — ATORVASTATIN CALCIUM 20 MILLIGRAM(S): 80 TABLET, FILM COATED ORAL at 21:33

## 2020-12-07 RX ADMIN — LOSARTAN POTASSIUM 50 MILLIGRAM(S): 100 TABLET, FILM COATED ORAL at 05:17

## 2020-12-07 RX ADMIN — MIRTAZAPINE 30 MILLIGRAM(S): 45 TABLET, ORALLY DISINTEGRATING ORAL at 00:13

## 2020-12-07 RX ADMIN — MIRTAZAPINE 30 MILLIGRAM(S): 45 TABLET, ORALLY DISINTEGRATING ORAL at 21:33

## 2020-12-07 RX ADMIN — ALBUTEROL 2 PUFF(S): 90 AEROSOL, METERED ORAL at 23:08

## 2020-12-07 RX ADMIN — Medication 12.5 MILLIGRAM(S): at 05:17

## 2020-12-07 RX ADMIN — Medication 240 MILLIGRAM(S): at 21:33

## 2020-12-07 RX ADMIN — ENOXAPARIN SODIUM 40 MILLIGRAM(S): 100 INJECTION SUBCUTANEOUS at 05:17

## 2020-12-07 RX ADMIN — BUDESONIDE AND FORMOTEROL FUMARATE DIHYDRATE 2 PUFF(S): 160; 4.5 AEROSOL RESPIRATORY (INHALATION) at 13:52

## 2020-12-07 NOTE — PROGRESS NOTE ADULT - SUBJECTIVE AND OBJECTIVE BOX
Patient is a 70y old  Female who presents with a chief complaint of Covid 19 (06 Dec 2020 21:16)                                                               INTERVAL HPI/OVERNIGHT EVENTS:    REVIEW OF SYSTEMS:     CONSTITUTIONAL: No weakness, fevers or chills  RESPIRATORY: No cough, wheezing,  No shortness of breath  CARDIOVASCULAR: No chest pain or palpitations  GASTROINTESTINAL: No abdominal pain  . No nausea, vomiting, or hematemesis; No diarrhea or constipation. No melena or hematochezia.  GENITOURINARY: No dysuria, frequency or hematuria  NEUROLOGICAL: No numbness or weakness                                                                                                                                                                                                                                                                               Medications:  MEDICATIONS  (STANDING):  ALBUTerol    90 MICROgram(s) HFA Inhaler 2 Puff(s) Inhalation every 6 hours  ARIPiprazole 5 milliGRAM(s) Oral daily  aspirin  chewable 81 milliGRAM(s) Oral daily  atorvastatin 20 milliGRAM(s) Oral at bedtime  benzonatate 100 milliGRAM(s) Oral three times a day  budesonide  80 MICROgram(s)/formoterol 4.5 MICROgram(s) Inhaler 2 Puff(s) Inhalation daily  dexAMETHasone  Injectable 6 milliGRAM(s) IV Push daily  enoxaparin Injectable 40 milliGRAM(s) SubCutaneous every 12 hours  hydrochlorothiazide 12.5 milliGRAM(s) Oral daily  losartan 50 milliGRAM(s) Oral daily  mirtazapine 30 milliGRAM(s) Oral at bedtime  montelukast 10 milliGRAM(s) Oral daily  pantoprazole    Tablet 40 milliGRAM(s) Oral before breakfast  verapamil  milliGRAM(s) Oral at bedtime    MEDICATIONS  (PRN):       Allergies    No Known Allergies    Intolerances      Vital Signs Last 24 Hrs  T(C): 36.9 (07 Dec 2020 11:05), Max: 37.2 (06 Dec 2020 20:15)  T(F): 98.4 (07 Dec 2020 11:05), Max: 99 (06 Dec 2020 20:15)  HR: 80 (07 Dec 2020 11:05) (80 - 105)  BP: 136/78 (07 Dec 2020 13:48) (134/72 - 159/92)  BP(mean): --  RR: 18 (07 Dec 2020 19:24) (17 - 18)  SpO2: 97% (07 Dec 2020 19:24) (96% - 97%)  CAPILLARY BLOOD GLUCOSE      POCT Blood Glucose.: 152 mg/dL (07 Dec 2020 16:53)  POCT Blood Glucose.: 168 mg/dL (07 Dec 2020 11:55)  POCT Blood Glucose.: 145 mg/dL (07 Dec 2020 07:51)      12-06 @ 07:01  -  12-07 @ 07:00  --------------------------------------------------------  IN: 200 mL / OUT: 0 mL / NET: 200 mL    12-07 @ 07:01  -  12-07 @ 19:41  --------------------------------------------------------  IN: 240 mL / OUT: 0 mL / NET: 240 mL      Physical Exam:    Daily     Daily   General:  NAD   HEENT:  Nonicteric, PERRLA  CV:  RRR, S1S2   Lungs:  CTA B/L, no wheezes, rales, rhonchi  Abdomen:  Soft, non-tender, no distended, positive BS  Extremities:   no edema   Neuro:  AAOx3, non-focal, grossly intact                                                                                                                                                                                                                                                                                                LABS:                               13.2   5.15  )-----------( 248      ( 07 Dec 2020 15:00 )             43.6                      12-07    138  |  100  |  15  ----------------------------<  159<H>  3.8   |  24  |  0.91    Ca    11.3<H>      07 Dec 2020 15:00  Phos  2.6     12-07    TPro  7.7  /  Alb  4.4  /  TBili  0.3  /  DBili  x   /  AST  28  /  ALT  46<H>  /  AlkPhos  71  12-07                       RADIOLOGY & ADDITIONAL TESTS         I personally reviewed: [  ]EKG   [  ]CXR    [  ] CT      A/P:         Discussed with :     [facility] consultants' Notes   Time spent :

## 2020-12-07 NOTE — PROGRESS NOTE ADULT - ASSESSMENT
70 yr old female presents with cough. Tachycardic in ED. Found to be Covid 19 positive.     Problem/Plan - 1:  ·  Problem: COVID-19.  Plan: cough, maintaining O2 saturation on RA.  Mildly tachycardic, CTA negative for PE  D dimer 438  Mild ALT elevation: 48  Covid 19 pcr positive  no indication for dexamethason : will dc   cont  anticoagulation: enoxaparin 40 mg IV bid  - continuous pulse ox  - tessalon tid.     Problem/Plan - 2:  ·  Problem: sinus Tachycardia.  Plan: -  CTA neg  for pe   likley sec to COVID   will check TSH and free T4    Problem/Plan - 3:  ·  Problem: Asthma.  Plan: -stable;    Problem/Plan - 4:  ·  Problem: GERD (gastroesophageal reflux disease).  Plan: c/w pantoprazole.     Problem/Plan - 5:  ·  Problem: Depression.  Plan: c/w mirtazapine and aripiprazole.    Problem/Plan - 6:  Problem: HTN (hypertension). Plan: Controlled  c/w home meds- losartan, hctz, verapamil.    Problem/Plan - 7:  ·  Problem: DM (diabetes mellitus).  Plan: SS insulin w/ fingersticks  home med: metfomin 500 mg bid.     Problem/Plan - 8:  ·  Problem: hypercalcemia : repeat and check Obrien ..  ? etiology   will consult renal

## 2020-12-08 LAB
A1C WITH ESTIMATED AVERAGE GLUCOSE RESULT: 7 % — HIGH (ref 4–5.6)
BASOPHILS # BLD AUTO: 0.02 K/UL — SIGNIFICANT CHANGE UP (ref 0–0.2)
BASOPHILS NFR BLD AUTO: 0.3 % — SIGNIFICANT CHANGE UP (ref 0–2)
CRP SERPL-MCNC: 0.36 MG/DL — SIGNIFICANT CHANGE UP (ref 0–0.4)
EOSINOPHIL # BLD AUTO: 0.02 K/UL — SIGNIFICANT CHANGE UP (ref 0–0.5)
EOSINOPHIL NFR BLD AUTO: 0.3 % — SIGNIFICANT CHANGE UP (ref 0–6)
ESTIMATED AVERAGE GLUCOSE: 154 MG/DL — HIGH (ref 68–114)
FERRITIN SERPL-MCNC: 99 NG/ML — SIGNIFICANT CHANGE UP (ref 15–150)
GLUCOSE BLDC GLUCOMTR-MCNC: 170 MG/DL — HIGH (ref 70–99)
GLUCOSE BLDC GLUCOMTR-MCNC: 201 MG/DL — HIGH (ref 70–99)
GLUCOSE BLDC GLUCOMTR-MCNC: 250 MG/DL — HIGH (ref 70–99)
GLUCOSE BLDC GLUCOMTR-MCNC: 260 MG/DL — HIGH (ref 70–99)
HCT VFR BLD CALC: 40.7 % — SIGNIFICANT CHANGE UP (ref 34.5–45)
HGB BLD-MCNC: 12.7 G/DL — SIGNIFICANT CHANGE UP (ref 11.5–15.5)
IMM GRANULOCYTES NFR BLD AUTO: 0.2 % — SIGNIFICANT CHANGE UP (ref 0–1.5)
LDH SERPL L TO P-CCNC: 190 U/L — SIGNIFICANT CHANGE UP (ref 50–242)
LYMPHOCYTES # BLD AUTO: 1.35 K/UL — SIGNIFICANT CHANGE UP (ref 1–3.3)
LYMPHOCYTES # BLD AUTO: 22.4 % — SIGNIFICANT CHANGE UP (ref 13–44)
MCHC RBC-ENTMCNC: 22.7 PG — LOW (ref 27–34)
MCHC RBC-ENTMCNC: 31.2 GM/DL — LOW (ref 32–36)
MCV RBC AUTO: 72.7 FL — LOW (ref 80–100)
MONOCYTES # BLD AUTO: 0.69 K/UL — SIGNIFICANT CHANGE UP (ref 0–0.9)
MONOCYTES NFR BLD AUTO: 11.5 % — SIGNIFICANT CHANGE UP (ref 2–14)
NEUTROPHILS # BLD AUTO: 3.93 K/UL — SIGNIFICANT CHANGE UP (ref 1.8–7.4)
NEUTROPHILS NFR BLD AUTO: 65.3 % — SIGNIFICANT CHANGE UP (ref 43–77)
NRBC # BLD: 0 /100 WBCS — SIGNIFICANT CHANGE UP (ref 0–0)
PLATELET # BLD AUTO: 213 K/UL — SIGNIFICANT CHANGE UP (ref 150–400)
RBC # BLD: 5.6 M/UL — HIGH (ref 3.8–5.2)
RBC # FLD: 14.8 % — HIGH (ref 10.3–14.5)
WBC # BLD: 6.02 K/UL — SIGNIFICANT CHANGE UP (ref 3.8–10.5)
WBC # FLD AUTO: 6.02 K/UL — SIGNIFICANT CHANGE UP (ref 3.8–10.5)

## 2020-12-08 RX ORDER — CINACALCET 30 MG/1
30 TABLET, FILM COATED ORAL DAILY
Refills: 0 | Status: DISCONTINUED | OUTPATIENT
Start: 2020-12-08 | End: 2020-12-09

## 2020-12-08 RX ORDER — SODIUM CHLORIDE 9 MG/ML
1000 INJECTION INTRAMUSCULAR; INTRAVENOUS; SUBCUTANEOUS
Refills: 0 | Status: DISCONTINUED | OUTPATIENT
Start: 2020-12-08 | End: 2020-12-08

## 2020-12-08 RX ORDER — SODIUM CHLORIDE 9 MG/ML
1000 INJECTION INTRAMUSCULAR; INTRAVENOUS; SUBCUTANEOUS
Refills: 0 | Status: DISCONTINUED | OUTPATIENT
Start: 2020-12-08 | End: 2020-12-09

## 2020-12-08 RX ADMIN — BUDESONIDE AND FORMOTEROL FUMARATE DIHYDRATE 2 PUFF(S): 160; 4.5 AEROSOL RESPIRATORY (INHALATION) at 12:24

## 2020-12-08 RX ADMIN — Medication 81 MILLIGRAM(S): at 12:23

## 2020-12-08 RX ADMIN — Medication 100 MILLIGRAM(S): at 13:23

## 2020-12-08 RX ADMIN — Medication 1: at 08:16

## 2020-12-08 RX ADMIN — Medication 12.5 MILLIGRAM(S): at 05:58

## 2020-12-08 RX ADMIN — MONTELUKAST 10 MILLIGRAM(S): 4 TABLET, CHEWABLE ORAL at 12:23

## 2020-12-08 RX ADMIN — ARIPIPRAZOLE 5 MILLIGRAM(S): 15 TABLET ORAL at 12:23

## 2020-12-08 RX ADMIN — ENOXAPARIN SODIUM 40 MILLIGRAM(S): 100 INJECTION SUBCUTANEOUS at 17:36

## 2020-12-08 RX ADMIN — ALBUTEROL 2 PUFF(S): 90 AEROSOL, METERED ORAL at 23:11

## 2020-12-08 RX ADMIN — ALBUTEROL 2 PUFF(S): 90 AEROSOL, METERED ORAL at 12:23

## 2020-12-08 RX ADMIN — SODIUM CHLORIDE 50 MILLILITER(S): 9 INJECTION INTRAMUSCULAR; INTRAVENOUS; SUBCUTANEOUS at 13:23

## 2020-12-08 RX ADMIN — Medication 2: at 17:08

## 2020-12-08 RX ADMIN — Medication 6 MILLIGRAM(S): at 05:58

## 2020-12-08 RX ADMIN — PANTOPRAZOLE SODIUM 40 MILLIGRAM(S): 20 TABLET, DELAYED RELEASE ORAL at 05:58

## 2020-12-08 RX ADMIN — MIRTAZAPINE 30 MILLIGRAM(S): 45 TABLET, ORALLY DISINTEGRATING ORAL at 21:05

## 2020-12-08 RX ADMIN — ATORVASTATIN CALCIUM 20 MILLIGRAM(S): 80 TABLET, FILM COATED ORAL at 21:05

## 2020-12-08 RX ADMIN — Medication 2: at 12:26

## 2020-12-08 RX ADMIN — ENOXAPARIN SODIUM 40 MILLIGRAM(S): 100 INJECTION SUBCUTANEOUS at 05:58

## 2020-12-08 RX ADMIN — ALBUTEROL 2 PUFF(S): 90 AEROSOL, METERED ORAL at 05:58

## 2020-12-08 RX ADMIN — Medication 240 MILLIGRAM(S): at 21:05

## 2020-12-08 RX ADMIN — ALBUTEROL 2 PUFF(S): 90 AEROSOL, METERED ORAL at 17:37

## 2020-12-08 RX ADMIN — Medication 1: at 21:26

## 2020-12-08 RX ADMIN — Medication 100 MILLIGRAM(S): at 21:06

## 2020-12-08 RX ADMIN — LOSARTAN POTASSIUM 50 MILLIGRAM(S): 100 TABLET, FILM COATED ORAL at 05:58

## 2020-12-08 RX ADMIN — Medication 100 MILLIGRAM(S): at 05:58

## 2020-12-08 NOTE — PHYSICAL THERAPY INITIAL EVALUATION ADULT - GENERAL OBSERVATIONS, REHAB EVAL
pt maximo 30 min eval well. pt rec'd at [facility], tele, pulse ox, on room air, NAD. pt agreed to session.

## 2020-12-08 NOTE — PHYSICAL THERAPY INITIAL EVALUATION ADULT - ADDITIONAL COMMENTS
pt states she lives with daughter in an apartment, 3 steps to enter, no steps inside. Pt states prior to admission being independent with all functional mobility and ADLs. pt states she was working and active prior to admission. pt states daughter available to assist as needed. pt denies owning any DME.

## 2020-12-08 NOTE — CONSULT NOTE ADULT - SUBJECTIVE AND OBJECTIVE BOX
NEPHROLOGY - NSN    Patient seen and examined.    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 Daughter notes that the patient was with her sister and another friend who had been coughing last night. Patient notes that she had a sore throat the day before and then woke up today with a dry, non productive cough. Denies associated shortness of breath, chest pain, or palpitations. Although does note that she has a history of exertional dyspnea (ie up and down stairs, walking a few blocks) but this has been at her baseline. Dtr also endorses history of palpitations for which she is followed by cards outpt, but has not recently complained of palpitations.  Patient also denies fever/chills/body aches. Patient with history of asthma and feels that current symptoms are different than her typical asthma.    In ED, maintaining O2 saturation on room air but noted to be tachycardic. CTA obtained revealing no pulmonary emboli.     EKG: NSR w/ normal axis, no acute ischemic changes    Hx and med rec obtained from daughter at [phone]. (06 Dec 2020 21:16)      PAST MEDICAL & SURGICAL HISTORY:  Obese    Cataract of right eye    Palpitations    Seasonal allergies    Asthma    GERD (gastroesophageal reflux disease)    Depression    Arrhythmia    HTN (hypertension)    DM (diabetes mellitus)    History of lumpectomy of right breast      History of left cataract surgery  &#x27;s    History of cholecystectomy  &#x27;s    S/P FERNANDO-BSO (total abdominal hysterectomy and bilateral salpingo-oophorectomy)  &#x27;s        MEDICATIONS  (STANDING):  ALBUTerol    90 MICROgram(s) HFA Inhaler 2 Puff(s) Inhalation every 6 hours  ARIPiprazole 5 milliGRAM(s) Oral daily  aspirin  chewable 81 milliGRAM(s) Oral daily  atorvastatin 20 milliGRAM(s) Oral at bedtime  benzonatate 100 milliGRAM(s) Oral three times a day  budesonide  80 MICROgram(s)/formoterol 4.5 MICROgram(s) Inhaler 2 Puff(s) Inhalation daily  dexAMETHasone  Injectable 6 milliGRAM(s) IV Push daily  dextrose 40% Gel 15 Gram(s) Oral once  dextrose 5%. 1000 milliLiter(s) (50 mL/Hr) IV Continuous <Continuous>  dextrose 5%. 1000 milliLiter(s) (100 mL/Hr) IV Continuous <Continuous>  dextrose 50% Injectable 25 Gram(s) IV Push once  dextrose 50% Injectable 12.5 Gram(s) IV Push once  dextrose 50% Injectable 25 Gram(s) IV Push once  enoxaparin Injectable 40 milliGRAM(s) SubCutaneous every 12 hours  glucagon  Injectable 1 milliGRAM(s) IntraMuscular once  hydrochlorothiazide 12.5 milliGRAM(s) Oral daily  insulin lispro (ADMELOG) corrective regimen sliding scale   SubCutaneous three times a day before meals  insulin lispro (ADMELOG) corrective regimen sliding scale   SubCutaneous at bedtime  losartan 50 milliGRAM(s) Oral daily  mirtazapine 30 milliGRAM(s) Oral at bedtime  montelukast 10 milliGRAM(s) Oral daily  pantoprazole    Tablet 40 milliGRAM(s) Oral before breakfast  verapamil  milliGRAM(s) Oral at bedtime      Allergies    No Known Allergies    Intolerances        SOCIAL HISTORY:  Denies alcohol abuse, drug abuse or tobacco usage.     FAMILY HISTORY:  No family history of COPD        VITALS:  T(C): 37.6 (20 @ 05:20), Max: 37.6 (20 @ 05:20)  HR: 94 (20 @ 05:20) (80 - 94)  BP: 132/75 (20 @ 05:20) (132/75 - 159/92)  RR: 18 (20 @ 05:20) (17 - 18)  SpO2: 96% (20 @ 05:20) (96% - 97%)    REVIEW OF SYSTEMS:  Denies any nausea, vomiting, diarrhea + cough  All other pertinent systems are reviewed and are negative.    PHYSICAL EXAM:  Constitutional: NAD  HEENT: EOMI  Neck:  No JVD, supple   Respiratory: CTA B/L  Cardiovascular: S1 and S2, RRR  Gastrointestinal: + BS, soft, NT, ND  Extremities: No peripheral edema, + peripheral pulses  Neurological: A/O x 3, CN2-12 intact  Psychiatric: Normal mood, normal affect  : No Grewal  Skin: No rashes, C/D/I  Access: Not applicable    I and O's:     @ 07:01  -  12-07 @ 07:00  --------------------------------------------------------  IN: 200 mL / OUT: 0 mL / NET: 200 mL     @ 07:01  -   @ 07:00  --------------------------------------------------------  IN: 240 mL / OUT: 0 mL / NET: 240 mL          LABS:                        12.7   6.02  )-----------( 213      ( 08 Dec 2020 06:24 )             40.7         138  |  100  |  15  ----------------------------<  159<H>  3.8   |  24  |  0.91    Ca    11.3<H>      07 Dec 2020 15:00  Phos  2.6         TPro  7.7  /  Alb  4.4  /  TBili  0.3  /  DBili  x   /  AST  28  /  ALT  46<H>  /  AlkPhos  71        URINE:  Urinalysis Basic - ( 06 Dec 2020 15:02 )    Color: Colorless / Appearance: Clear / S.008 / pH: x  Gluc: x / Ketone: Negative  / Bili: Negative / Urobili: Negative   Blood: x / Protein: Negative / Nitrite: Negative   Leuk Esterase: Negative / RBC: x / WBC x   Sq Epi: x / Non Sq Epi: x / Bacteria: x        RADIOLOGY & ADDITIONAL STUDIES:      < from: CT Angio Chest w/ IV Cont (20 @ 17:44) >    EXAM:  CT ANGIO CHEST (W)AW IC                            PROCEDURE DATE:  2020            INTERPRETATION:  CLINICAL INFORMATION: Shortness of breath, tachycardic. History of carcinoid tumor, status post right middle lobe/right lower lobe wedge resection.    COMPARISON: Chest CT 2020.    PROCEDURE:  CT Angiography of the Chest.  90 ml of Omnipaque 350 was injected intravenously. 10 ml were discarded.  Sagittal and coronal reformats were performed as well as 3D (MIP) reconstructions.    FINDINGS:    LUNGS AND AIRWAYS: Patent central airways. Subsegmental bibasilar, lingular and right middle lobe atelectasis. Status post wedge resection of the right middle/right lower lobe with stable postsurgical changes.  PLEURA: No pleural effusion.  MEDIASTINUM AND MARIA DEL CARMEN: No lymphadenopathy.  VESSELS: No pulmonary emboli. Thoracic aorta and main pulmonary artery normal in caliber.  HEART: Heart size is normal. No pericardial effusion. Trace pericardial fluid.  CHEST WALL AND LOWER NECK: Unchanged bilateral breast calcifications.  VISUALIZED UPPER ABDOMEN: Within normal limits.  BONES: Mild degenerative changes.    IMPRESSION:  No pulmonary emboli.    Stable post right middle/right lower lobe wedge resection changes.                NEHA JOHNSON MD; Fellow Radiology  This document has been electronically signed.  FLAQUITO BENITES MD; Attending Radiologist  This document has been electronically signed. Dec  6 2020  6:01PM    < end of copied text >   NEPHROLOGY - NSN    Patient seen and examined.    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 Daughter notes that the patient was with her sister and another friend who had been coughing last night. Patient notes that she had a sore throat the day before and then woke up today with a dry, non productive cough. Denies associated shortness of breath, chest pain, or palpitations. Although does note that she has a history of exertional dyspnea (ie up and down stairs, walking a few blocks) but this has been at her baseline. Dtr also endorses history of palpitations for which she is followed by cards outpt, but has not recently complained of palpitations.  Patient also denies fever/chills/body aches. Patient with history of asthma and feels that current symptoms are different than her typical asthma.    In ED, maintaining O2 saturation on room air but noted to be tachycardic. CTA obtained revealing no pulmonary emboli.     EKG: NSR w/ normal axis, no acute ischemic changes    Hx and med rec obtained from daughter at [phone]. (06 Dec 2020 21:16)  Pt found to have hypercalcemia.  no prior hx of hypercalcemia.  per the pt she was taking Vit D once a week ()      PAST MEDICAL & SURGICAL HISTORY:  Obese    Cataract of right eye    Palpitations    Seasonal allergies    Asthma    GERD (gastroesophageal reflux disease)    Depression    Arrhythmia    HTN (hypertension)    DM (diabetes mellitus)    History of lumpectomy of right breast      History of left cataract surgery  &#x27;s    History of cholecystectomy  &#x27;s    S/P FERNANDO-BSO (total abdominal hysterectomy and bilateral salpingo-oophorectomy)  &#x27;s        MEDICATIONS  (STANDING):  ALBUTerol    90 MICROgram(s) HFA Inhaler 2 Puff(s) Inhalation every 6 hours  ARIPiprazole 5 milliGRAM(s) Oral daily  aspirin  chewable 81 milliGRAM(s) Oral daily  atorvastatin 20 milliGRAM(s) Oral at bedtime  benzonatate 100 milliGRAM(s) Oral three times a day  budesonide  80 MICROgram(s)/formoterol 4.5 MICROgram(s) Inhaler 2 Puff(s) Inhalation daily  dexAMETHasone  Injectable 6 milliGRAM(s) IV Push daily  dextrose 40% Gel 15 Gram(s) Oral once  dextrose 5%. 1000 milliLiter(s) (50 mL/Hr) IV Continuous <Continuous>  dextrose 5%. 1000 milliLiter(s) (100 mL/Hr) IV Continuous <Continuous>  dextrose 50% Injectable 25 Gram(s) IV Push once  dextrose 50% Injectable 12.5 Gram(s) IV Push once  dextrose 50% Injectable 25 Gram(s) IV Push once  enoxaparin Injectable 40 milliGRAM(s) SubCutaneous every 12 hours  glucagon  Injectable 1 milliGRAM(s) IntraMuscular once  hydrochlorothiazide 12.5 milliGRAM(s) Oral daily  insulin lispro (ADMELOG) corrective regimen sliding scale   SubCutaneous three times a day before meals  insulin lispro (ADMELOG) corrective regimen sliding scale   SubCutaneous at bedtime  losartan 50 milliGRAM(s) Oral daily  mirtazapine 30 milliGRAM(s) Oral at bedtime  montelukast 10 milliGRAM(s) Oral daily  pantoprazole    Tablet 40 milliGRAM(s) Oral before breakfast  verapamil  milliGRAM(s) Oral at bedtime      Allergies    No Known Allergies    Intolerances        SOCIAL HISTORY:  Denies alcohol abuse, drug abuse or tobacco usage.     FAMILY HISTORY:  No family history of COPD        VITALS:  T(C): 37.6 (20 @ 05:20), Max: 37.6 (20 @ 05:20)  HR: 94 (20 @ 05:20) (80 - 94)  BP: 132/75 (20 @ 05:20) (132/75 - 159/92)  RR: 18 (20 @ 05:20) (17 - 18)  SpO2: 96% (20 @ 05:20) (96% - 97%)    REVIEW OF SYSTEMS:  Denies any nausea, vomiting, diarrhea + cough  All other pertinent systems are reviewed and are negative.    PHYSICAL EXAM:  Constitutional: NAD  HEENT: EOMI  Neck:  No JVD, supple   Respiratory: CTA B/L  Cardiovascular: S1 and S2, RRR  Gastrointestinal: + BS, soft, NT, ND  Extremities: No peripheral edema, + peripheral pulses  Neurological: A/O x 3, CN2-12 intact  Psychiatric: Normal mood, normal affect  : No Grewal  Skin: No rashes, C/D/I  Access: Not applicable    I and O's:     @ 07:  -   @ 07:00  --------------------------------------------------------  IN: 200 mL / OUT: 0 mL / NET: 200 mL     @ 07:  -   @ 07:00  --------------------------------------------------------  IN: 240 mL / OUT: 0 mL / NET: 240 mL          LABS:                        12.7   6.02  )-----------( 213      ( 08 Dec 2020 06:24 )             40.7         138  |  100  |  15  ----------------------------<  159<H>  3.8   |  24  |  0.91    Ca    11.3<H>      07 Dec 2020 15:00  Phos  2.6         TPro  7.7  /  Alb  4.4  /  TBili  0.3  /  DBili  x   /  AST  28  /  ALT  46<H>  /  AlkPhos  71        URINE:  Urinalysis Basic - ( 06 Dec 2020 15:02 )    Color: Colorless / Appearance: Clear / S.008 / pH: x  Gluc: x / Ketone: Negative  / Bili: Negative / Urobili: Negative   Blood: x / Protein: Negative / Nitrite: Negative   Leuk Esterase: Negative / RBC: x / WBC x   Sq Epi: x / Non Sq Epi: x / Bacteria: x        RADIOLOGY & ADDITIONAL STUDIES:      < from: CT Angio Chest w/ IV Cont (20 @ 17:44) >    EXAM:  CT ANGIO CHEST (W)AW IC                            PROCEDURE DATE:  2020            INTERPRETATION:  CLINICAL INFORMATION: Shortness of breath, tachycardic. History of carcinoid tumor, status post right middle lobe/right lower lobe wedge resection.    COMPARISON: Chest CT 2020.    PROCEDURE:  CT Angiography of the Chest.  90 ml of Omnipaque 350 was injected intravenously. 10 ml were discarded.  Sagittal and coronal reformats were performed as well as 3D (MIP) reconstructions.    FINDINGS:    LUNGS AND AIRWAYS: Patent central airways. Subsegmental bibasilar, lingular and right middle lobe atelectasis. Status post wedge resection of the right middle/right lower lobe with stable postsurgical changes.  PLEURA: No pleural effusion.  MEDIASTINUM AND MARIA DEL CARMEN: No lymphadenopathy.  VESSELS: No pulmonary emboli. Thoracic aorta and main pulmonary artery normal in caliber.  HEART: Heart size is normal. No pericardial effusion. Trace pericardial fluid.  CHEST WALL AND LOWER NECK: Unchanged bilateral breast calcifications.  VISUALIZED UPPER ABDOMEN: Within normal limits.  BONES: Mild degenerative changes.    IMPRESSION:  No pulmonary emboli.    Stable post right middle/right lower lobe wedge resection changes.                NEHA JOHNSON MD; Fellow Radiology  This document has been electronically signed.  FLAQUITO BENITES MD; Attending Radiologist  This document has been electronically signed. Dec  6 2020  6:01PM    < end of copied text >

## 2020-12-08 NOTE — PROGRESS NOTE ADULT - ASSESSMENT
70 yr old female presents with cough. Tachycardic in ED. Found to be Covid 19 positive.     Problem/Plan - 1:  ·  Problem: COVID-19.  Plan: cough, maintaining O2 saturation on RA.  Mildly tachycardic, CTA negative for PE  D dimer 438  Mild ALT elevation: 48  Covid 19 pcr positive  no indication for dexamethason : will dc   cont  anticoagulation: enoxaparin 40 mg IV bid  - continuous pulse ox  - tessalon tid.     Problem/Plan - 2:  ·  Problem: sinus Tachycardia.  Plan: -  CTA neg  for pe   likley sec to COVID    TSH and free T4: border line hyper : consult endo     Problem/Plan - 3:  ·  Problem: Asthma.  Plan: -stable;    Problem/Plan - 4:  ·  Problem: GERD (gastroesophageal reflux disease).  Plan: c/w pantoprazole.     Problem/Plan - 5:  ·  Problem: Depression.  Plan: c/w mirtazapine and aripiprazole.    Problem/Plan - 6:  Problem: HTN (hypertension). Plan: Controlled  c/w home meds- losartan, hctz, verapamil.    Problem/Plan - 7:  ·  Problem: DM (diabetes mellitus).  Plan: SS insulin w/ fingersticks  home med: metfomin 500 mg bid.     Problem/Plan - 8:  ·  Problem: hypercalcemia : repeat and check Obrien ..  primary hyperthyroid   d/w  : sensipar started ..ivf for 10 hours   monitor arnav

## 2020-12-08 NOTE — PHYSICAL THERAPY INITIAL EVALUATION ADULT - PERTINENT HX OF CURRENT PROBLEM, REHAB EVAL
69 y/o F with h/o asthma, depression, RML and RLL wedge resection now p/w cough, tachycardia, hypercalcemia, +COVID-19. CHEST XRAY: clear. CTA CHEST: no PE. stable post right middle/right lower lobe wedge resection changes.

## 2020-12-08 NOTE — CONSULT NOTE ADULT - ASSESSMENT
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

## 2020-12-08 NOTE — PROGRESS NOTE ADULT - SUBJECTIVE AND OBJECTIVE BOX
Patient is a 70y old  Female who presents with a chief complaint of Covid 19 (08 Dec 2020 08:20)                                                               INTERVAL HPI/OVERNIGHT EVENTS:    REVIEW OF SYSTEMS:     CONSTITUTIONAL: No weakness, fevers or chills  EYES/ENT: No visual changes , no ear ache   NECK: No pain or stiffness  RESPIRATORY: No cough, wheezing,  No shortness of breath  CARDIOVASCULAR: No chest pain or palpitations  GASTROINTESTINAL: No abdominal pain  . No nausea, vomiting, or hematemesis; No diarrhea or constipation. No melena or hematochezia.  GENITOURINARY: No dysuria, frequency or hematuria  NEUROLOGICAL: No numbness or weakness  SKIN: No itching, burning, rashes, or lesions                                                                                                                                                                                                                                                                                 Medications:  MEDICATIONS  (STANDING):  ALBUTerol    90 MICROgram(s) HFA Inhaler 2 Puff(s) Inhalation every 6 hours  ARIPiprazole 5 milliGRAM(s) Oral daily  aspirin  chewable 81 milliGRAM(s) Oral daily  atorvastatin 20 milliGRAM(s) Oral at bedtime  benzonatate 100 milliGRAM(s) Oral three times a day  budesonide  80 MICROgram(s)/formoterol 4.5 MICROgram(s) Inhaler 2 Puff(s) Inhalation daily  cinacalcet 30 milliGRAM(s) Oral daily  dextrose 40% Gel 15 Gram(s) Oral once  dextrose 5%. 1000 milliLiter(s) (50 mL/Hr) IV Continuous <Continuous>  dextrose 5%. 1000 milliLiter(s) (100 mL/Hr) IV Continuous <Continuous>  dextrose 50% Injectable 25 Gram(s) IV Push once  dextrose 50% Injectable 12.5 Gram(s) IV Push once  dextrose 50% Injectable 25 Gram(s) IV Push once  enoxaparin Injectable 40 milliGRAM(s) SubCutaneous every 12 hours  glucagon  Injectable 1 milliGRAM(s) IntraMuscular once  insulin lispro (ADMELOG) corrective regimen sliding scale   SubCutaneous three times a day before meals  insulin lispro (ADMELOG) corrective regimen sliding scale   SubCutaneous at bedtime  losartan 50 milliGRAM(s) Oral daily  mirtazapine 30 milliGRAM(s) Oral at bedtime  montelukast 10 milliGRAM(s) Oral daily  pantoprazole    Tablet 40 milliGRAM(s) Oral before breakfast  sodium chloride 0.9%. 1000 milliLiter(s) (50 mL/Hr) IV Continuous <Continuous>  verapamil  milliGRAM(s) Oral at bedtime    MEDICATIONS  (PRN):       Allergies    No Known Allergies    Intolerances      Vital Signs Last 24 Hrs  T(C): 37.4 (08 Dec 2020 11:48), Max: 37.6 (08 Dec 2020 05:20)  T(F): 99.3 (08 Dec 2020 11:48), Max: 99.7 (08 Dec 2020 05:20)  HR: 112 (08 Dec 2020 12:40) (80 - 112)  BP: 132/87 (08 Dec 2020 12:40) (126/82 - 146/78)  BP(mean): --  RR: 18 (08 Dec 2020 11:48) (18 - 18)  SpO2: 97% (08 Dec 2020 12:40) (94% - 97%)  CAPILLARY BLOOD GLUCOSE      POCT Blood Glucose.: 250 mg/dL (08 Dec 2020 12:20)  POCT Blood Glucose.: 170 mg/dL (08 Dec 2020 07:53)  POCT Blood Glucose.: 159 mg/dL (07 Dec 2020 21:49)      12-07 @ 07:01  -  12-08 @ 07:00  --------------------------------------------------------  IN: 240 mL / OUT: 0 mL / NET: 240 mL      Physical Exam:    Daily     Daily   General:  Well appearing, NAD, not cachetic  HEENT:  Nonicteric, PERRLA  CV:  RRR, S1S2   Lungs:  CTA B/L, no wheezes, rales, rhonchi  Abdomen:  Soft, non-tender, no distended, positive BS  Extremities:  2+ pulses, no c/c, no edema  Skin:  Warm and dry, no rashes  :  No prince  Neuro:  AAOx3, non-focal, grossly intact                                                                                                                                                                                                                                                                                                LABS:                               12.7   6.02  )-----------( 213      ( 08 Dec 2020 06:24 )             40.7                      12-07    138  |  100  |  15  ----------------------------<  159<H>  3.8   |  24  |  0.91    Ca    11.3<H>      07 Dec 2020 15:00  Phos  2.6     12-07    TPro  7.7  /  Alb  4.4  /  TBili  0.3  /  DBili  x   /  AST  28  /  ALT  46<H>  /  AlkPhos  71  12-07                       RADIOLOGY & ADDITIONAL TESTS         I personally reviewed: [  ]EKG   [  ]CXR    [  ] CT      A/P:         Discussed with :     [facility] consultants' Notes   Time spent :

## 2020-12-09 ENCOUNTER — TRANSCRIPTION ENCOUNTER (OUTPATIENT)
Age: 70
End: 2020-12-09

## 2020-12-09 VITALS
OXYGEN SATURATION: 95 % | RESPIRATION RATE: 18 BRPM | TEMPERATURE: 100 F | HEART RATE: 86 BPM | DIASTOLIC BLOOD PRESSURE: 79 MMHG | SYSTOLIC BLOOD PRESSURE: 133 MMHG

## 2020-12-09 LAB
% ALBUMIN: 53.8 % — SIGNIFICANT CHANGE UP
% ALPHA 1: 4.9 % — SIGNIFICANT CHANGE UP
% ALPHA 2: 10.3 % — SIGNIFICANT CHANGE UP
% BETA: 13.6 % — SIGNIFICANT CHANGE UP
% GAMMA: 17.4 % — SIGNIFICANT CHANGE UP
ALBUMIN SERPL ELPH-MCNC: 3.5 G/DL — LOW (ref 3.6–5.5)
ALBUMIN SERPL ELPH-MCNC: 4.1 G/DL — SIGNIFICANT CHANGE UP (ref 3.3–5)
ALBUMIN/GLOB SERPL ELPH: 1.2 RATIO — SIGNIFICANT CHANGE UP
ALP SERPL-CCNC: 65 U/L — SIGNIFICANT CHANGE UP (ref 40–120)
ALPHA1 GLOB SERPL ELPH-MCNC: 0.3 G/DL — SIGNIFICANT CHANGE UP (ref 0.1–0.4)
ALPHA2 GLOB SERPL ELPH-MCNC: 0.7 G/DL — SIGNIFICANT CHANGE UP (ref 0.5–1)
ALT FLD-CCNC: 40 U/L — SIGNIFICANT CHANGE UP (ref 10–45)
ANION GAP SERPL CALC-SCNC: 14 MMOL/L — SIGNIFICANT CHANGE UP (ref 5–17)
AST SERPL-CCNC: 28 U/L — SIGNIFICANT CHANGE UP (ref 10–40)
B-GLOBULIN SERPL ELPH-MCNC: 0.9 G/DL — SIGNIFICANT CHANGE UP (ref 0.5–1)
BILIRUB SERPL-MCNC: 0.2 MG/DL — SIGNIFICANT CHANGE UP (ref 0.2–1.2)
BUN SERPL-MCNC: 22 MG/DL — SIGNIFICANT CHANGE UP (ref 7–23)
CALCIUM SERPL-MCNC: 10.5 MG/DL — SIGNIFICANT CHANGE UP (ref 8.4–10.5)
CHLORIDE SERPL-SCNC: 99 MMOL/L — SIGNIFICANT CHANGE UP (ref 96–108)
CO2 SERPL-SCNC: 24 MMOL/L — SIGNIFICANT CHANGE UP (ref 22–31)
CREAT SERPL-MCNC: 0.79 MG/DL — SIGNIFICANT CHANGE UP (ref 0.5–1.3)
CRP SERPL-MCNC: 0.7 MG/DL — HIGH (ref 0–0.4)
FERRITIN SERPL-MCNC: 113 NG/ML — SIGNIFICANT CHANGE UP (ref 15–150)
FIBRINOGEN PPP-MCNC: 552 MG/DL — HIGH (ref 290–520)
GAMMA GLOBULIN: 1.1 G/DL — SIGNIFICANT CHANGE UP (ref 0.6–1.6)
GLUCOSE BLDC GLUCOMTR-MCNC: 143 MG/DL — HIGH (ref 70–99)
GLUCOSE BLDC GLUCOMTR-MCNC: 156 MG/DL — HIGH (ref 70–99)
GLUCOSE SERPL-MCNC: 166 MG/DL — HIGH (ref 70–99)
HCT VFR BLD CALC: 40.5 % — SIGNIFICANT CHANGE UP (ref 34.5–45)
HGB BLD-MCNC: 12.4 G/DL — SIGNIFICANT CHANGE UP (ref 11.5–15.5)
LDH SERPL L TO P-CCNC: 174 U/L — SIGNIFICANT CHANGE UP (ref 50–242)
MCHC RBC-ENTMCNC: 22.1 PG — LOW (ref 27–34)
MCHC RBC-ENTMCNC: 30.6 GM/DL — LOW (ref 32–36)
MCV RBC AUTO: 72.3 FL — LOW (ref 80–100)
NRBC # BLD: 0 /100 WBCS — SIGNIFICANT CHANGE UP (ref 0–0)
NT-PROBNP SERPL-SCNC: 46 PG/ML — SIGNIFICANT CHANGE UP (ref 0–300)
PLATELET # BLD AUTO: 210 K/UL — SIGNIFICANT CHANGE UP (ref 150–400)
POTASSIUM SERPL-MCNC: 3.9 MMOL/L — SIGNIFICANT CHANGE UP (ref 3.5–5.3)
POTASSIUM SERPL-SCNC: 3.9 MMOL/L — SIGNIFICANT CHANGE UP (ref 3.5–5.3)
PROT PATTERN SERPL ELPH-IMP: SIGNIFICANT CHANGE UP
PROT SERPL-MCNC: 6.5 G/DL — SIGNIFICANT CHANGE UP (ref 6–8.3)
PROT SERPL-MCNC: 6.5 G/DL — SIGNIFICANT CHANGE UP (ref 6–8.3)
RBC # BLD: 5.6 M/UL — HIGH (ref 3.8–5.2)
RBC # FLD: 14.6 % — HIGH (ref 10.3–14.5)
SODIUM SERPL-SCNC: 137 MMOL/L — SIGNIFICANT CHANGE UP (ref 135–145)
WBC # BLD: 4.79 K/UL — SIGNIFICANT CHANGE UP (ref 3.8–10.5)
WBC # FLD AUTO: 4.79 K/UL — SIGNIFICANT CHANGE UP (ref 3.8–10.5)

## 2020-12-09 RX ORDER — ALBUTEROL 90 UG/1
2 AEROSOL, METERED ORAL
Qty: 240 | Refills: 0
Start: 2020-12-09 | End: 2021-01-07

## 2020-12-09 RX ORDER — LOSARTAN POTASSIUM 100 MG/1
1 TABLET, FILM COATED ORAL
Qty: 30 | Refills: 0
Start: 2020-12-09 | End: 2021-01-07

## 2020-12-09 RX ORDER — ALBUTEROL 90 UG/1
2 AEROSOL, METERED ORAL
Qty: 0 | Refills: 0 | DISCHARGE

## 2020-12-09 RX ORDER — CINACALCET 30 MG/1
1 TABLET, FILM COATED ORAL
Qty: 30 | Refills: 0
Start: 2020-12-09 | End: 2021-01-07

## 2020-12-09 RX ORDER — LOSARTAN POTASSIUM 100 MG/1
1 TABLET, FILM COATED ORAL
Qty: 2 | Refills: 0
Start: 2020-12-09 | End: 2020-12-10

## 2020-12-09 RX ORDER — CHOLECALCIFEROL (VITAMIN D3) 125 MCG
1 CAPSULE ORAL
Qty: 0 | Refills: 0 | DISCHARGE

## 2020-12-09 RX ORDER — LOSARTAN/HYDROCHLOROTHIAZIDE 100MG-25MG
1 TABLET ORAL
Qty: 0 | Refills: 0 | DISCHARGE

## 2020-12-09 RX ADMIN — ENOXAPARIN SODIUM 40 MILLIGRAM(S): 100 INJECTION SUBCUTANEOUS at 05:14

## 2020-12-09 RX ADMIN — Medication 100 MILLIGRAM(S): at 05:15

## 2020-12-09 RX ADMIN — PANTOPRAZOLE SODIUM 40 MILLIGRAM(S): 20 TABLET, DELAYED RELEASE ORAL at 05:15

## 2020-12-09 RX ADMIN — BUDESONIDE AND FORMOTEROL FUMARATE DIHYDRATE 2 PUFF(S): 160; 4.5 AEROSOL RESPIRATORY (INHALATION) at 12:35

## 2020-12-09 RX ADMIN — ALBUTEROL 2 PUFF(S): 90 AEROSOL, METERED ORAL at 12:35

## 2020-12-09 RX ADMIN — ARIPIPRAZOLE 5 MILLIGRAM(S): 15 TABLET ORAL at 12:34

## 2020-12-09 RX ADMIN — ALBUTEROL 2 PUFF(S): 90 AEROSOL, METERED ORAL at 05:14

## 2020-12-09 RX ADMIN — LOSARTAN POTASSIUM 50 MILLIGRAM(S): 100 TABLET, FILM COATED ORAL at 05:15

## 2020-12-09 RX ADMIN — CINACALCET 30 MILLIGRAM(S): 30 TABLET, FILM COATED ORAL at 12:35

## 2020-12-09 RX ADMIN — Medication 1: at 12:14

## 2020-12-09 RX ADMIN — Medication 100 MILLIGRAM(S): at 15:30

## 2020-12-09 RX ADMIN — MONTELUKAST 10 MILLIGRAM(S): 4 TABLET, CHEWABLE ORAL at 12:35

## 2020-12-09 RX ADMIN — Medication 81 MILLIGRAM(S): at 12:34

## 2020-12-09 NOTE — DISCHARGE NOTE PROVIDER - NSDCFUADDINST_GEN_ALL_CORE_FT
-Follow up with your PCP or endocrinology in 4-6 weeks to have repeat thyroid function tests. -Follow up with your PCP or endocrinology in 4-6 weeks to have repeat thyroid function tests.   -Restart hydrochlorothiazide on 12/12 Saturday  -follow up with nephrology  -follow up with PCP -Follow up with your PCP or endocrinology in 4-6 weeks to have repeat thyroid function tests.   -Restart hydrochlorothiazide/Losartan (home medication) on 12/12 Saturday. Take losartan 50 mg daily until then (for two days).  -follow up with nephrology  -follow up with PCP

## 2020-12-09 NOTE — DISCHARGE NOTE PROVIDER - NSDCMRMEDTOKEN_GEN_ALL_CORE_FT
albuterol 200 mcg inhalation capsule: 2 puff(s) inhaled every 6 hours, As Needed  ARIPiprazole 5 mg oral tablet: 1 tab(s) orally once a day  aspirin 81 mg oral tablet, chewable: 1 tab(s) orally once a day  atorvastatin 20 mg oral tablet: 1 tab(s) orally once a day  Breo Ellipta 100 mcg-25 mcg/inh inhalation powder: 1 puff(s) inhaled once a day  losartan-hydrochlorothiazide 50mg-12.5mg oral tablet: 1 tab(s) orally once a day  metformin 500 mg oral tablet: 1 tab(s) orally 2 times a day  mirtazapine 30 mg oral tablet: 1 tab(s) orally once a day (at bedtime)  montelukast 10 mg oral tablet: 1 tab(s) orally once a day  Multiple Vitamins oral tablet: 1 tab(s) orally once a day  pantoprazole 40 mg oral delayed release tablet: 1 tab(s) orally once a day  verapamil 24 hour extended release 240 mg/24 hours oral capsule, extended release: 1 cap(s) orally once a day  Vitamin D3 2000 intl units oral capsule: 1 cap(s) orally once a day  Vraylar 1.5 mg oral capsule: 1 cap(s) orally once a day   ARIPiprazole 5 mg oral tablet: 1 tab(s) orally once a day  aspirin 81 mg oral tablet, chewable: 1 tab(s) orally once a day  atorvastatin 20 mg oral tablet: 1 tab(s) orally once a day  Breo Ellipta 100 mcg-25 mcg/inh inhalation powder: 1 puff(s) inhaled once a day  metformin 500 mg oral tablet: 1 tab(s) orally 2 times a day  mirtazapine 30 mg oral tablet: 1 tab(s) orally once a day (at bedtime)  montelukast 10 mg oral tablet: 1 tab(s) orally once a day  pantoprazole 40 mg oral delayed release tablet: 1 tab(s) orally once a day  verapamil 24 hour extended release 240 mg/24 hours oral capsule, extended release: 1 cap(s) orally once a day  Vraylar 1.5 mg oral capsule: 1 cap(s) orally once a day   albuterol 90 mcg/inh inhalation aerosol: 2 puff(s) inhaled every 6 hours  as needed for SOB/wheezing  ARIPiprazole 5 mg oral tablet: 1 tab(s) orally once a day  aspirin 81 mg oral tablet, chewable: 1 tab(s) orally once a day  atorvastatin 20 mg oral tablet: 1 tab(s) orally once a day  benzonatate 100 mg oral capsule: 1 cap(s) orally 3 times a day as needed for cough  Breo Ellipta 100 mcg-25 mcg/inh inhalation powder: 1 puff(s) inhaled once a day  cinacalcet 30 mg oral tablet: 1 tab(s) orally once a day  losartan 50 mg oral tablet: 1 tab(s) orally once a day  metformin 500 mg oral tablet: 1 tab(s) orally 2 times a day  mirtazapine 30 mg oral tablet: 1 tab(s) orally once a day (at bedtime)  montelukast 10 mg oral tablet: 1 tab(s) orally once a day  pantoprazole 40 mg oral delayed release tablet: 1 tab(s) orally once a day  verapamil 24 hour extended release 240 mg/24 hours oral capsule, extended release: 1 cap(s) orally once a day  Vraylar 1.5 mg oral capsule: 1 cap(s) orally once a day   albuterol 90 mcg/inh inhalation aerosol: 2 puff(s) inhaled every 6 hours  as needed for SOB/wheezing  ARIPiprazole 5 mg oral tablet: 1 tab(s) orally once a day  aspirin 81 mg oral tablet, chewable: 1 tab(s) orally once a day  atorvastatin 20 mg oral tablet: 1 tab(s) orally once a day  benzonatate 100 mg oral capsule: 1 cap(s) orally 3 times a day as needed for cough  Breo Ellipta 100 mcg-25 mcg/inh inhalation powder: 1 puff(s) inhaled once a day  cinacalcet 30 mg oral tablet: 1 tab(s) orally once a day  losartan 50 mg oral tablet: 1 tab(s) orally once a day  stop on 12/11/20.  Restart Hydrochlorthiazide 12.5 mg-Losartan 50 mg on 12/12/20.  metformin 500 mg oral tablet: 1 tab(s) orally 2 times a day  mirtazapine 30 mg oral tablet: 1 tab(s) orally once a day (at bedtime)  montelukast 10 mg oral tablet: 1 tab(s) orally once a day  pantoprazole 40 mg oral delayed release tablet: 1 tab(s) orally once a day  verapamil 24 hour extended release 240 mg/24 hours oral capsule, extended release: 1 cap(s) orally once a day  Vraylar 1.5 mg oral capsule: 1 cap(s) orally once a day

## 2020-12-09 NOTE — CHART NOTE - NSCHARTNOTEFT_GEN_A_CORE
[facility] Discharge Note    Patient medically cleared for discharge today, discussed with Dr. King.  Medications for discharge reviewed and confirmed with attending.  Discharge plan reviewed with patient and daughter.    Kezia Floyd PA-C  Dept of Medicine  #[id number]

## 2020-12-09 NOTE — CONSULT NOTE ADULT - PROBLEM SELECTOR RECOMMENDATION 9
Will continue current insulin regimen for now. Will continue monitoring FS, log, and FU.  Patient well-controlled on oral meds, A1C 7%. Suggest DC home on prior meds with endo FU 3 months.  Patient counseled for compliance with consistent low carb diet and exercise as tolerated outpatient.

## 2020-12-09 NOTE — DISCHARGE NOTE PROVIDER - HOSPITAL COURSE
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 Found to be tachycardic in ED and Covid 19 positive.   - For COVID-19;maintaining O2 saturation on RA.  Mildly tachycardic, CTA negative for PE  D dimer 438; Mild ALT elevation: 48. Decadron stopped. Continue tessalon TID. ON Lovenox 40 mg BID while in hospital.  - For sinus Tachycardia;   CTA neg  for pe. Tachycardia likely sec to COVID    TSH and free T4: border line hyperthyroid. Endocrinology consulted.   For hx of asthma, patient stable. For GERD, continue PPI. For hx of depression, continue mirtazapine and aripiprazole.   For HTN, controlled. Continue losartan and verapamil. For DM, on ISS in hospital. Restart home metformin on discharge.  - Course c/b hypercalcemia. Nephrology was consulted. HCTZ was stopped. S/p IVF x10 hours.  Hypercalcemia in light of elevated serum Alb ;Primary hyperparathyroidism   Per renal, can restart the  HCTZ  in 2-3 days and No need for renal sono. Sensipar started.  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 Found to be tachycardic in ED and Covid 19 positive.   - For COVID-19;maintaining O2 saturation on RA.  Mildly tachycardic, CTA negative for PE  D dimer 438; Mild ALT elevation: 48. Decadron stopped. Continue tessalon TID. ON Lovenox 40 mg BID while in hospital.  - For sinus Tachycardia;   CTA neg  for pe. Tachycardia likely sec to COVID    TSH and free T4: border line hyperthyroid. Endocrinology consulted-Patient with Borderline though euthyroid, FT4 in acceptable range.   No need for management at this time. Suggest to repeat TFTs in 4-6 weeks.  For hx of asthma, patient stable. For GERD, continue PPI. For hx of depression, continue mirtazapine and aripiprazole.   For HTN, controlled. Continue losartan and verapamil. For DM, on ISS in hospital. Restart home metformin on discharge.  - Course c/b hypercalcemia. Nephrology was consulted. HCTZ was stopped. S/p IVF x10 hours.  Hypercalcemia in light of elevated serum Alb ;Primary hyperparathyroidism   Per renal, can restart the  HCTZ  in 2-3 days and No need for renal sono. Sensipar started.  No role for surgery at her age.       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 Found to be tachycardic in ED and Covid 19 positive.   - For COVID-19;maintaining O2 saturation on RA.  Mildly tachycardic, CTA negative for PE  D dimer 438; Mild ALT elevation: 48. Decadron stopped. Continue tessalon TID. ON Lovenox 40 mg BID while in hospital.  - For sinus Tachycardia;   CTA neg  for pe. Tachycardia likely sec to COVID    TSH and free T4: border line hyperthyroid. Endocrinology consulted-Patient with Borderline though euthyroid, FT4 in acceptable range.   No need for management at this time. Suggest to repeat TFTs in 4-6 weeks.  For hx of asthma, patient stable. For GERD, continue PPI. For hx of depression, continue mirtazapine and aripiprazole.   For HTN, controlled. Continue losartan and verapamil. For DM, on ISS in hospital. Restart home metformin on discharge.  - Course c/b hypercalcemia. Nephrology was consulted. HCTZ was stopped. S/p IVF x10 hours.  Hypercalcemia in light of elevated serum Alb ;Primary hyperparathyroidism   Per renal, can restart the  HCTZ  in 2-3 days and No need for renal sono. Sensipar started.  No role for surgery at her age.      cleared for d/c on 12/9/20

## 2020-12-09 NOTE — DISCHARGE NOTE PROVIDER - NSDCCPCAREPLAN_GEN_ALL_CORE_FT
PRINCIPAL DISCHARGE DIAGNOSIS  Diagnosis: Tachycardia  Assessment and Plan of Treatment:       SECONDARY DISCHARGE DIAGNOSES  Diagnosis: COVID-19  Assessment and Plan of Treatment: You tested positive for COVID 19.  You no longer require hospitalization.  Please restrict activities outside of your home except for getting medical care.  Do not go to work, school, or public areas.  Avoid using public transportation, ride-sharing, or taxis.  Separate yourself from other people and animals in your home.  Call ahead before visiting your doctor.  Wear a facemask when you are around other people. Cover your cough and sneezes.  Clean your hands often.  Avoid sharing personal household items.  Clean all frequently touched surfaces daily.      Diagnosis: Hypercalcemia  Assessment and Plan of Treatment:     Diagnosis: HTN (hypertension)  Assessment and Plan of Treatment: Low salt diet  Activity as tolerated.  Take all medication as prescribed.  Follow up with your medical doctor for routine blood pressure monitoring at your next visit.  Notify your doctor if you have any of the following symptoms:   Dizziness, Lightheadedness, Blurry vision, Headache, Chest pain, Shortness of breath      Diagnosis: DM (diabetes mellitus)  Assessment and Plan of Treatment: HgA1C this admission.  Make sure you get your HgA1c checked every three months.  If you take oral diabetes medications, check your blood glucose two times a day.  If you take insulin, check your blood glucose before meals and at bedtime.  It's important not to skip any meals.  Keep a log of your blood glucose results and always take it with you to your doctor appointments.  Keep a list of your current medications including injectables and over the counter medications and bring this medication list with you to all your doctor appointments.  If you have not seen your ophthalmologist this year call for appointment.  Check your feet daily for redness, sores, or openings. Do not self treat. If no improvement in two days call your primary care physician for an appointment.  Low blood sugar (hypoglycemia) is a blood sugar below 70mg/dl. Check your blood sugar if you feel signs/symptoms of hypoglycemia. If your blood sugar is below 70 take 15 grams of carbohydrates (ex 4 oz of apple juice, 3-4 glucose tablets, or 4-6 oz of regular soda) wait 15 minutes and repeat blood sugar to make sure it comes up above 70.  If your blood sugar is above 70 and you are due for a meal, have a meal.  If you are not due for a meal have a snack.  This snack helps keeps your blood sugar at a safe range.      Diagnosis: GERD (gastroesophageal reflux disease)  Assessment and Plan of Treatment: HOME CARE INSTRUCTIONS  Change the factors that you can control. Ask your caregiver for guidance concerning weight loss, quitting smoking, and alcohol consumption.  Avoid foods and drinks that make your symptoms worse, such as:   Caffeine or alcoholic drinks.   Chocolate.   Peppermint or mint flavorings.  Garlic and onions.  Spicy foods.   Citrus fruits, such as oranges, yovani, or limes.   Tomato-based foods such as sauce, chili, salsa, and pizza.  Fried and fatty foods.  Avoid lying down for the 3 hours prior to your bedtime or prior to taking a nap.  Eat small, frequent meals instead of large meals.   Wear loose-fitting clothing. Do not wear anything tight around your waist that causes pressure on your stomach.  Raise the head of your bed 6 to 8 inches with wood blocks to help you sleep. Extra pillows will not help.  Only take over-the-counter or prescription medicines for pain, discomfort, or fever as directed by your caregiver.   Do not take aspirin, ibuprofen, or other nonsteroidal anti-inflammatory drugs (NSAIDs).  SEEK IMMEDIATE MEDICAL CARE IF:  You have pain in your arms, neck, jaw, teeth, or back.   Your pain increases or changes in intensity or duration.   You develop nausea, vomiting, or sweating (diaphoresis).  You develop shortness of breath, or you faint.  Your vomit is green, yellow, black, or looks like coffee grounds or blood.  Your stool is red, bloody, or black.  These symptoms could be signs of other problems, such as heart disease, gastric bleeding, or esophageal bleeding.      Diagnosis: Asthma  Assessment and Plan of Treatment: Asthma attacks happen when the airways in the lungs become narrow and inflamed  Asthma symptoms can include - Wheezing or noisy breathing, coughing, tight feeling in the chest, shortness of breath  Almost everyone with asthma has a quick-relief inhaler that works in 5- 10mins that they carry with them and long-term controller medicines control asthma and prevent future symptoms. People with frequent asthma symptoms take these 1 or 2 times each day.  You can stay away from things that cause your symptoms or make them worse. Doctors call these "triggers."  avoid them as much as possible. Some common triggers include - Dust, mold, animals, such as dogs and cats, pollen and plants, cigarette smoke, getting sick with a cold or flu (that's why it's important to get a flu shot), stress  Talk to your caregiver about an action plan for managing asthma attacks. This includes the use of a peak flow meter which measures the severity of the attack and medicines that can help stop the attack.   SEEK MEDICAL CARE IF:  You have wheezing, shortness of breath, or a cough even if taking medicine to prevent attacks.   You have thickening of sputum, sputum changes from clear or white to yellow, green, gray, or bloody.   You have any problems that may be related to the medicines you are taking (such as a rash, itching, swelling, or trouble breathing).  You are using a reliever medicine more than 2–3 times per week.  Your peak flow is still at 50–79% of personal best after following your action plan for 1 hour.  SEEK IMMEDIATE MEDICAL CARE IF:  You are short of breath even at rest,or with very little physical activity, chest pain, heart beating fast, bluish color to your lips or fingernails, fever, dizziness,   You seem to be getting worse and are unresponsive to treatment during an asthma attack.   Your peak flow is less than 50% of personal best.       PRINCIPAL DISCHARGE DIAGNOSIS  Diagnosis: COVID-19  Assessment and Plan of Treatment: You tested positive for COVID 19.  You no longer require hospitalization.  Please restrict activities outside of your home except for getting medical care.  Do not go to work, school, or public areas.  Avoid using public transportation, ride-sharing, or taxis.  Separate yourself from other people and animals in your home.  Call ahead before visiting your doctor.  Wear a facemask when you are around other people. Cover your cough and sneezes.  Clean your hands often.  Avoid sharing personal household items.  Clean all frequently touched surfaces daily.        SECONDARY DISCHARGE DIAGNOSES  Diagnosis: Tachycardia  Assessment and Plan of Treatment: Mildly tachycardic, CTA negative for Pulmonary embolism.  Follow up with PCP    Diagnosis: Hypercalcemia  Assessment and Plan of Treatment: Started on sensipar. You were given IV fluids.  Hydrochlorothiazide was stopped. But RESTART it in 3 days   on 12/12 Saturday  Per nephrology, no role for surgery.  Follow up with your PCP and nephrology as an outpatient.    Diagnosis: Abnormal thyroid blood test  Assessment and Plan of Treatment: Endocrinology called to rule out hyperthyroidism-borderline.   No need for management at this time.   Follow up with your PCP or endocrinology in 4-6 weeks to have repeat thyroid function tests.    Diagnosis: HTN (hypertension)  Assessment and Plan of Treatment:   Low salt diet  Activity as tolerated.  Take all medication as prescribed.  Follow up with your medical doctor for routine blood pressure monitoring at your next visit.  Notify your doctor if you have any of the following symptoms:   Dizziness, Lightheadedness, Blurry vision, Headache, Chest pain, Shortness of breath      Diagnosis: Depression  Assessment and Plan of Treatment: Depression  Depression is a mental illness that usually causes feelings of sadness, hopelessness, or helplessness. Some people with this disorder do not feel particularly sad but lose interest in doing things they used to enjoy. Major depressive disorder also can cause physical symptoms. It can interfere with work, school, relationships, and other normal everyday activities. If you were started on a medication, make sure to take exactly as prescribed and follow up with a psychiatrist.  SEEK IMMEDIATE MEDICAL CARE IF YOU HAVE ANY OF THE FOLLOWING SYMPTOMS: thoughts about hurting or killing yourself, thoughts about hurting or killing somebody else, hallucinations, or worsening depression.      Diagnosis: DM (diabetes mellitus)  Assessment and Plan of Treatment: HgA1C this admission 7.0  Follow up with endocrinology in 3 months.  Make sure you get your HgA1c checked every three months.  If you take oral diabetes medications, check your blood glucose two times a day.  If you take insulin, check your blood glucose before meals and at bedtime.  It's important not to skip any meals.  Keep a log of your blood glucose results and always take it with you to your doctor appointments.  Keep a list of your current medications including injectables and over the counter medications and bring this medication list with you to all your doctor appointments.  If you have not seen your ophthalmologist this year call for appointment.  Check your feet daily for redness, sores, or openings. Do not self treat. If no improvement in two days call your primary care physician for an appointment.  Low blood sugar (hypoglycemia) is a blood sugar below 70mg/dl. Check your blood sugar if you feel signs/symptoms of hypoglycemia. If your blood sugar is below 70 take 15 grams of carbohydrates (ex 4 oz of apple juice, 3-4 glucose tablets, or 4-6 oz of regular soda) wait 15 minutes and repeat blood sugar to make sure it comes up above 70.  If your blood sugar is above 70 and you are due for a meal, have a meal.  If you are not due for a meal have a snack.  This snack helps keeps your blood sugar at a safe range.      Diagnosis: GERD (gastroesophageal reflux disease)  Assessment and Plan of Treatment: HOME CARE INSTRUCTIONS  Change the factors that you can control. Ask your caregiver for guidance concerning weight loss, quitting smoking, and alcohol consumption.  Avoid foods and drinks that make your symptoms worse, such as:   Caffeine or alcoholic drinks.   Chocolate.   Peppermint or mint flavorings.  Garlic and onions.  Spicy foods.   Citrus fruits, such as oranges, yovani, or limes.   Tomato-based foods such as sauce, chili, salsa, and pizza.  Fried and fatty foods.  Avoid lying down for the 3 hours prior to your bedtime or prior to taking a nap.  Eat small, frequent meals instead of large meals.   Wear loose-fitting clothing. Do not wear anything tight around your waist that causes pressure on your stomach.  Raise the head of your bed 6 to 8 inches with wood blocks to help you sleep. Extra pillows will not help.  Only take over-the-counter or prescription medicines for pain, discomfort, or fever as directed by your caregiver.   Do not take aspirin, ibuprofen, or other nonsteroidal anti-inflammatory drugs (NSAIDs).  SEEK IMMEDIATE MEDICAL CARE IF:  You have pain in your arms, neck, jaw, teeth, or back.   Your pain increases or changes in intensity or duration.   You develop nausea, vomiting, or sweating (diaphoresis).  You develop shortness of breath, or you faint.  Your vomit is green, yellow, black, or looks like coffee grounds or blood.  Your stool is red, bloody, or black.  These symptoms could be signs of other problems, such as heart disease, gastric bleeding, or esophageal bleeding.      Diagnosis: Asthma  Assessment and Plan of Treatment: Asthma attacks happen when the airways in the lungs become narrow and inflamed  Asthma symptoms can include - Wheezing or noisy breathing, coughing, tight feeling in the chest, shortness of breath  Almost everyone with asthma has a quick-relief inhaler that works in 5- 10mins that they carry with them and long-term controller medicines control asthma and prevent future symptoms. People with frequent asthma symptoms take these 1 or 2 times each day.  You can stay away from things that cause your symptoms or make them worse. Doctors call these "triggers."  avoid them as much as possible. Some common triggers include - Dust, mold, animals, such as dogs and cats, pollen and plants, cigarette smoke, getting sick with a cold or flu (that's why it's important to get a flu shot), stress  Talk to your caregiver about an action plan for managing asthma attacks. This includes the use of a peak flow meter which measures the severity of the attack and medicines that can help stop the attack.   SEEK MEDICAL CARE IF:  You have wheezing, shortness of breath, or a cough even if taking medicine to prevent attacks.   You have thickening of sputum, sputum changes from clear or white to yellow, green, gray, or bloody.   You have any problems that may be related to the medicines you are taking (such as a rash, itching, swelling, or trouble breathing).  You are using a reliever medicine more than 2–3 times per week.  Your peak flow is still at 50–79% of personal best after following your action plan for 1 hour.  SEEK IMMEDIATE MEDICAL CARE IF:  You are short of breath even at rest,or with very little physical activity, chest pain, heart beating fast, bluish color to your lips or fingernails, fever, dizziness,   You seem to be getting worse and are unresponsive to treatment during an asthma attack.   Your peak flow is less than 50% of personal best.       PRINCIPAL DISCHARGE DIAGNOSIS  Diagnosis: COVID-19  Assessment and Plan of Treatment: You tested positive for COVID 19.  You no longer require hospitalization.  Please restrict activities outside of your home except for getting medical care.  Do not go to work, school, or public areas.  Avoid using public transportation, ride-sharing, or taxis.  Separate yourself from other people and animals in your home.  Call ahead before visiting your doctor.  Wear a facemask when you are around other people. Cover your cough and sneezes.  Clean your hands often.  Avoid sharing personal household items.  Clean all frequently touched surfaces daily.        SECONDARY DISCHARGE DIAGNOSES  Diagnosis: Tachycardia  Assessment and Plan of Treatment: Mildly tachycardic, CTA negative for Pulmonary embolism.  Follow up with PCP    Diagnosis: Hypercalcemia  Assessment and Plan of Treatment: -Started on sensipar. You were given IV fluids.  -Hydrochlorothiazide was stopped. But RESTART it in 3 days   on 12/12 Saturday.  -Restart hydrochlorothiazide/Losartan (home medication) on 12/12/20 Saturday. Take losartan 50 mg daily until then (for two days).  Per nephrology, no role for surgery.  Follow up with your PCP and nephrology as an outpatient.    Diagnosis: Abnormal thyroid blood test  Assessment and Plan of Treatment: Endocrinology called to rule out hyperthyroidism-borderline.   No need for management at this time.   Follow up with your PCP or endocrinology in 4-6 weeks to have repeat thyroid function tests.    Diagnosis: HTN (hypertension)  Assessment and Plan of Treatment:   Low salt diet  Activity as tolerated.  Take all medication as prescribed.  Follow up with your medical doctor for routine blood pressure monitoring at your next visit.  Notify your doctor if you have any of the following symptoms:   Dizziness, Lightheadedness, Blurry vision, Headache, Chest pain, Shortness of breath      Diagnosis: Depression  Assessment and Plan of Treatment: Depression  Depression is a mental illness that usually causes feelings of sadness, hopelessness, or helplessness. Some people with this disorder do not feel particularly sad but lose interest in doing things they used to enjoy. Major depressive disorder also can cause physical symptoms. It can interfere with work, school, relationships, and other normal everyday activities. If you were started on a medication, make sure to take exactly as prescribed and follow up with a psychiatrist.  SEEK IMMEDIATE MEDICAL CARE IF YOU HAVE ANY OF THE FOLLOWING SYMPTOMS: thoughts about hurting or killing yourself, thoughts about hurting or killing somebody else, hallucinations, or worsening depression.      Diagnosis: DM (diabetes mellitus)  Assessment and Plan of Treatment: HgA1C this admission 7.0  Follow up with endocrinology in 3 months.  Make sure you get your HgA1c checked every three months.  If you take oral diabetes medications, check your blood glucose two times a day.  If you take insulin, check your blood glucose before meals and at bedtime.  It's important not to skip any meals.  Keep a log of your blood glucose results and always take it with you to your doctor appointments.  Keep a list of your current medications including injectables and over the counter medications and bring this medication list with you to all your doctor appointments.  If you have not seen your ophthalmologist this year call for appointment.  Check your feet daily for redness, sores, or openings. Do not self treat. If no improvement in two days call your primary care physician for an appointment.  Low blood sugar (hypoglycemia) is a blood sugar below 70mg/dl. Check your blood sugar if you feel signs/symptoms of hypoglycemia. If your blood sugar is below 70 take 15 grams of carbohydrates (ex 4 oz of apple juice, 3-4 glucose tablets, or 4-6 oz of regular soda) wait 15 minutes and repeat blood sugar to make sure it comes up above 70.  If your blood sugar is above 70 and you are due for a meal, have a meal.  If you are not due for a meal have a snack.  This snack helps keeps your blood sugar at a safe range.      Diagnosis: GERD (gastroesophageal reflux disease)  Assessment and Plan of Treatment: HOME CARE INSTRUCTIONS  Change the factors that you can control. Ask your caregiver for guidance concerning weight loss, quitting smoking, and alcohol consumption.  Avoid foods and drinks that make your symptoms worse, such as:   Caffeine or alcoholic drinks.   Chocolate.   Peppermint or mint flavorings.  Garlic and onions.  Spicy foods.   Citrus fruits, such as oranges, yovani, or limes.   Tomato-based foods such as sauce, chili, salsa, and pizza.  Fried and fatty foods.  Avoid lying down for the 3 hours prior to your bedtime or prior to taking a nap.  Eat small, frequent meals instead of large meals.   Wear loose-fitting clothing. Do not wear anything tight around your waist that causes pressure on your stomach.  Raise the head of your bed 6 to 8 inches with wood blocks to help you sleep. Extra pillows will not help.  Only take over-the-counter or prescription medicines for pain, discomfort, or fever as directed by your caregiver.   Do not take aspirin, ibuprofen, or other nonsteroidal anti-inflammatory drugs (NSAIDs).  SEEK IMMEDIATE MEDICAL CARE IF:  You have pain in your arms, neck, jaw, teeth, or back.   Your pain increases or changes in intensity or duration.   You develop nausea, vomiting, or sweating (diaphoresis).  You develop shortness of breath, or you faint.  Your vomit is green, yellow, black, or looks like coffee grounds or blood.  Your stool is red, bloody, or black.  These symptoms could be signs of other problems, such as heart disease, gastric bleeding, or esophageal bleeding.      Diagnosis: Asthma  Assessment and Plan of Treatment: Asthma attacks happen when the airways in the lungs become narrow and inflamed  Asthma symptoms can include - Wheezing or noisy breathing, coughing, tight feeling in the chest, shortness of breath  Almost everyone with asthma has a quick-relief inhaler that works in 5- 10mins that they carry with them and long-term controller medicines control asthma and prevent future symptoms. People with frequent asthma symptoms take these 1 or 2 times each day.  You can stay away from things that cause your symptoms or make them worse. Doctors call these "triggers."  avoid them as much as possible. Some common triggers include - Dust, mold, animals, such as dogs and cats, pollen and plants, cigarette smoke, getting sick with a cold or flu (that's why it's important to get a flu shot), stress  Talk to your caregiver about an action plan for managing asthma attacks. This includes the use of a peak flow meter which measures the severity of the attack and medicines that can help stop the attack.   SEEK MEDICAL CARE IF:  You have wheezing, shortness of breath, or a cough even if taking medicine to prevent attacks.   You have thickening of sputum, sputum changes from clear or white to yellow, green, gray, or bloody.   You have any problems that may be related to the medicines you are taking (such as a rash, itching, swelling, or trouble breathing).  You are using a reliever medicine more than 2–3 times per week.  Your peak flow is still at 50–79% of personal best after following your action plan for 1 hour.  SEEK IMMEDIATE MEDICAL CARE IF:  You are short of breath even at rest,or with very little physical activity, chest pain, heart beating fast, bluish color to your lips or fingernails, fever, dizziness,   You seem to be getting worse and are unresponsive to treatment during an asthma attack.   Your peak flow is less than 50% of personal best.

## 2020-12-09 NOTE — PROGRESS NOTE ADULT - ASSESSMENT
70 yr old female presents with cough. Tachycardic in ED. Found to be Covid 19 positive.     Problem/Plan - 1:  ·  Problem: COVID-19.  Plan: cough, maintaining O2 saturation on RA.  Mildly tachycardic, CTA negative for PE  D dimer 438  Mild ALT elevation: 48  Covid 19 pcr positive  no indication for dexamethason : will dc   cont  anticoagulation: enoxaparin 40 mg IV bid  - acceptable Pulse ox on RA on exertion   - tessalon tid.     Problem/Plan - 2:  ·  Problem: sinus Tachycardia.  Plan: -  CTA neg  for pe   likley sec to COVID    TSH and free T4: border line hyper : appreciate endo input      Problem/Plan - 3:  ·  Problem: Asthma.  Plan: -stable;    Problem/Plan - 4:  ·  Problem: GERD (gastroesophageal reflux disease).  Plan: c/w pantoprazole.     Problem/Plan - 5:  ·  Problem: Depression.  Plan: c/w mirtazapine and aripiprazole.    Problem/Plan - 6:  Problem: HTN (hypertension). Plan: Controlled  c/w home meds- losartan, hctz, verapamil.    Problem/Plan - 7:  ·  Problem: DM (diabetes mellitus).  Plan: SS insulin w/ fingersticks  home med: metfomin 500 mg bid.     Problem/Plan - 8:  ·  Problem: hypercalcemia : repeat and check Obrien ..  primary hyperthyroid   d/w  : sensipar started ..ivf for 10 hours   monitor arnav     discussed with pt , np , nurse and daughter     Also discussed ACP : pt and family did not give a final decision .. full code as of now

## 2020-12-09 NOTE — CONSULT NOTE ADULT - SUBJECTIVE AND OBJECTIVE BOX
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 Daughter notes that the patient was with her sister and another friend who had been coughing last night. Patient notes that she had a sore throat the day before and then woke up today with a dry, non productive cough. Denies associated shortness of breath, chest pain, or palpitations. Although does note that she has a history of exertional dyspnea (ie up and down stairs, walking a few blocks) but this has been at her baseline. Dtr also endorses history of palpitations for which she is followed by cards outpt, but has not recently complained of palpitations.  Patient also denies fever/chills/body aches. Patient with history of asthma and feels that current symptoms are different than her typical asthma.    In ED, maintaining O2 saturation on room air but noted to be tachycardic. CTA obtained revealing no pulmonary emboli.     EKG: NSR w/ normal axis, no acute ischemic changes    Hx and med rec obtained from daughter at [phone]. (06 Dec 2020 21:16)  Patient has history of diabetes, A1C 7%, on oral meds/Metformin at home, no recent hypoglycemic episodes, no polyuria polydipsia. Patient follows up with PCP for diabetes management.  Endo was consulted for glycemic control as well as "borderline hyperthyroid".    PAST MEDICAL & SURGICAL HISTORY:  Obese    Cataract of right eye    Palpitations    Seasonal allergies    Asthma    GERD (gastroesophageal reflux disease)    Depression    Arrhythmia    HTN (hypertension)    DM (diabetes mellitus)    History of lumpectomy of right breast  1984    History of left cataract surgery  1990&#x27;s    History of cholecystectomy  1980&#x27;s    S/P FERNANDO-BSO (total abdominal hysterectomy and bilateral salpingo-oophorectomy)  1980&#x27;s        FAMILY HISTORY:  No family history of COPD        Social History:    Outpatient Medications:    MEDICATIONS  (STANDING):  ALBUTerol    90 MICROgram(s) HFA Inhaler 2 Puff(s) Inhalation every 6 hours  ARIPiprazole 5 milliGRAM(s) Oral daily  aspirin  chewable 81 milliGRAM(s) Oral daily  atorvastatin 20 milliGRAM(s) Oral at bedtime  benzonatate 100 milliGRAM(s) Oral three times a day  budesonide  80 MICROgram(s)/formoterol 4.5 MICROgram(s) Inhaler 2 Puff(s) Inhalation daily  cinacalcet 30 milliGRAM(s) Oral daily  dextrose 40% Gel 15 Gram(s) Oral once  dextrose 5%. 1000 milliLiter(s) (50 mL/Hr) IV Continuous <Continuous>  dextrose 5%. 1000 milliLiter(s) (100 mL/Hr) IV Continuous <Continuous>  dextrose 50% Injectable 25 Gram(s) IV Push once  dextrose 50% Injectable 12.5 Gram(s) IV Push once  dextrose 50% Injectable 25 Gram(s) IV Push once  enoxaparin Injectable 40 milliGRAM(s) SubCutaneous every 12 hours  glucagon  Injectable 1 milliGRAM(s) IntraMuscular once  insulin lispro (ADMELOG) corrective regimen sliding scale   SubCutaneous three times a day before meals  insulin lispro (ADMELOG) corrective regimen sliding scale   SubCutaneous at bedtime  losartan 50 milliGRAM(s) Oral daily  mirtazapine 30 milliGRAM(s) Oral at bedtime  montelukast 10 milliGRAM(s) Oral daily  pantoprazole    Tablet 40 milliGRAM(s) Oral before breakfast  sodium chloride 0.9%. 1000 milliLiter(s) (50 mL/Hr) IV Continuous <Continuous>  verapamil  milliGRAM(s) Oral at bedtime    MEDICATIONS  (PRN):      Allergies    No Known Allergies    Intolerances      Review of Systems:  Constitutional: No fever, no chills  Eyes: No blurry vision  Neuro: No tremors  HEENT: No pain, no neck swelling  Cardiovascular: No chest pain, no palpitations  Respiratory: Has SOB, no cough  GI: No nausea, vomiting, abdominal pain  : No dysuria  Skin: no rash  MSK: Has leg swelling.  Psych: no depression  Endocrine: no polyuria, polydipsia    ALL OTHER SYSTEMS REVIEWED AND NEGATIVE    UNABLE TO OBTAIN    PHYSICAL EXAM:  VITALS: T(C): 37.7 (12-09-20 @ 11:56)  T(F): 99.9 (12-09-20 @ 11:56), Max: 99.9 (12-09-20 @ 11:56)  HR: 86 (12-09-20 @ 11:56) (81 - 103)  BP: 133/79 (12-09-20 @ 11:56) (123/73 - 138/85)  RR:  (18 - 18)  SpO2:  (93% - 96%)  Wt(kg): --  GENERAL: NAD, well-groomed, well-developed  EYES: No proptosis, no lid lag  HEENT:  Atraumatic, Normocephalic  THYROID: Normal size, no palpable nodules  RESPIRATORY: Clear to auscultation bilaterally; No rales, rhonchi, wheezing  CARDIOVASCULAR: Si S2, No murmurs;  GI: Soft, non distended, normal bowel sounds  SKIN: Dry, intact, No rashes or lesions  MUSCULOSKELETAL: Has BL lower extremity edema.  NEURO:  no tremor, sensation decreased in feet BL,    POCT Blood Glucose.: 156 mg/dL (12-09-20 @ 12:07)  POCT Blood Glucose.: 143 mg/dL (12-09-20 @ 07:56)  POCT Blood Glucose.: 260 mg/dL (12-08-20 @ 21:19)  POCT Blood Glucose.: 201 mg/dL (12-08-20 @ 17:02)  POCT Blood Glucose.: 250 mg/dL (12-08-20 @ 12:20)  POCT Blood Glucose.: 170 mg/dL (12-08-20 @ 07:53)  POCT Blood Glucose.: 159 mg/dL (12-07-20 @ 21:49)  POCT Blood Glucose.: 152 mg/dL (12-07-20 @ 16:53)  POCT Blood Glucose.: 168 mg/dL (12-07-20 @ 11:55)  POCT Blood Glucose.: 145 mg/dL (12-07-20 @ 07:51)                            12.4   4.79  )-----------( 210      ( 09 Dec 2020 05:39 )             40.5       12-09    137  |  99  |  22  ----------------------------<  166<H>  3.9   |  24  |  0.79    EGFR if : 87  EGFR if non : 75    Ca    10.5      12-09  Phos  2.6     12-07    TPro  6.5  /  Alb  4.1  /  TBili  0.2  /  DBili  x   /  AST  28  /  ALT  40  /  AlkPhos  65  12-09      Thyroid Function Tests:  12-07 @ 17:45 TSH 0.34 FreeT4 1.6 T3 -- Anti TPO -- Anti Thyroglobulin Ab -- TSI --          12-07 Chol -- LDL -- HDL -- Trig 30    Radiology:                    HPI:  70 yr old female with history of Asthma, Depression, DMT2, Gerd, and 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 Daughter notes that the patient was with her sister and another friend who had been coughing last night. Patient notes that she had a sore throat the day before and then woke up today with a dry, non productive cough. Denies associated shortness of breath, chest pain, or palpitations. Although does  note that she has a history of exertional dyspnea (ie up and down stairs, walking a few blocks) but this has been at her baseline. Dtr also endorses history of palpitations for which she is followed by cards outpt, but has not recently complained of palpitations.  Patient also denies fever/chills/body aches. Patient with history of asthma and feels that current symptoms are different than her typical asthma.    In ED, maintaining O2 saturation on room air but noted to be tachycardic. CTA obtained revealing no pulmonary emboli.     EKG: NSR w/ normal axis, no acute ischemic changes    Hx and med rec obtained from daughter at [phone]. (06 Dec 2020 21:16)  Patient has history of diabetes, A1C 7%, on oral meds/Metformin at home, no recent hypoglycemic episodes, no polyuria polydipsia. Patient follows up with PCP for diabetes management.  Endo was consulted for glycemic control as well as "borderline hyperthyroid".    PAST MEDICAL & SURGICAL HISTORY:  Obese    Cataract of right eye    Palpitations    Seasonal allergies    Asthma    GERD (gastroesophageal reflux disease)    Depression    Arrhythmia    HTN (hypertension)    DM (diabetes mellitus)    History of lumpectomy of right breast  1984    History of left cataract surgery  1990&#x27;s    History of cholecystectomy  1980&#x27;s    S/P FERNANDO-BSO (total abdominal hysterectomy and bilateral salpingo-oophorectomy)  1980&#x27;s        FAMILY HISTORY:  No family history of COPD        Social History:    Outpatient Medications:    MEDICATIONS  (STANDING):  ALBUTerol    90 MICROgram(s) HFA Inhaler 2 Puff(s) Inhalation every 6 hours  ARIPiprazole 5 milliGRAM(s) Oral daily  aspirin  chewable 81 milliGRAM(s) Oral daily  atorvastatin 20 milliGRAM(s) Oral at bedtime  benzonatate 100 milliGRAM(s) Oral three times a day  budesonide  80 MICROgram(s)/formoterol 4.5 MICROgram(s) Inhaler 2 Puff(s) Inhalation daily  cinacalcet 30 milliGRAM(s) Oral daily  dextrose 40% Gel 15 Gram(s) Oral once  dextrose 5%. 1000 milliLiter(s) (50 mL/Hr) IV Continuous <Continuous>  dextrose 5%. 1000 milliLiter(s) (100 mL/Hr) IV Continuous <Continuous>  dextrose 50% Injectable 25 Gram(s) IV Push once  dextrose 50% Injectable 12.5 Gram(s) IV Push once  dextrose 50% Injectable 25 Gram(s) IV Push once  enoxaparin Injectable 40 milliGRAM(s) SubCutaneous every 12 hours  glucagon  Injectable 1 milliGRAM(s) IntraMuscular once  insulin lispro (ADMELOG) corrective regimen sliding scale   SubCutaneous three times a day before meals  insulin lispro (ADMELOG) corrective regimen sliding scale   SubCutaneous at bedtime  losartan 50 milliGRAM(s) Oral daily  mirtazapine 30 milliGRAM(s) Oral at bedtime  montelukast 10 milliGRAM(s) Oral daily  pantoprazole    Tablet 40 milliGRAM(s) Oral before breakfast  sodium chloride 0.9%. 1000 milliLiter(s) (50 mL/Hr) IV Continuous <Continuous>  verapamil  milliGRAM(s) Oral at bedtime    MEDICATIONS  (PRN):      Allergies    No Known Allergies    Intolerances      Review of Systems:  Constitutional: No fever, no chills  Eyes: No blurry vision  Neuro: No tremors  HEENT: No pain, no neck swelling  Cardiovascular: No chest pain, no palpitations  Respiratory: Has SOB, no cough  GI: No nausea, vomiting, abdominal pain  : No dysuria  Skin: no rash  MSK: Has leg swelling.  Psych: no depression  Endocrine: no polyuria, polydipsia    ALL OTHER SYSTEMS REVIEWED AND NEGATIVE    UNABLE TO OBTAIN    PHYSICAL EXAM:  VITALS: T(C): 37.7 (12-09-20 @ 11:56)  T(F): 99.9 (12-09-20 @ 11:56), Max: 99.9 (12-09-20 @ 11:56)  HR: 86 (12-09-20 @ 11:56) (81 - 103)  BP: 133/79 (12-09-20 @ 11:56) (123/73 - 138/85)  RR:  (18 - 18)  SpO2:  (93% - 96%)  Wt(kg): --  GENERAL: NAD, well-groomed, well-developed  EYES: No proptosis, no lid lag  HEENT:  Atraumatic, Normocephalic  THYROID: Normal size, no palpable nodules  RESPIRATORY: Clear to auscultation bilaterally; No rales, rhonchi, wheezing  CARDIOVASCULAR: Si S2, No murmurs;  GI: Soft, non distended, normal bowel sounds  SKIN: Dry, intact, No rashes or lesions  MUSCULOSKELETAL: Has BL lower extremity edema.  NEURO:  no tremor, sensation decreased in feet BL,    POCT Blood Glucose.: 156 mg/dL (12-09-20 @ 12:07)  POCT Blood Glucose.: 143 mg/dL (12-09-20 @ 07:56)  POCT Blood Glucose.: 260 mg/dL (12-08-20 @ 21:19)  POCT Blood Glucose.: 201 mg/dL (12-08-20 @ 17:02)  POCT Blood Glucose.: 250 mg/dL (12-08-20 @ 12:20)  POCT Blood Glucose.: 170 mg/dL (12-08-20 @ 07:53)  POCT Blood Glucose.: 159 mg/dL (12-07-20 @ 21:49)  POCT Blood Glucose.: 152 mg/dL (12-07-20 @ 16:53)  POCT Blood Glucose.: 168 mg/dL (12-07-20 @ 11:55)  POCT Blood Glucose.: 145 mg/dL (12-07-20 @ 07:51)                            12.4   4.79  )-----------( 210      ( 09 Dec 2020 05:39 )             40.5       12-09    137  |  99  |  22  ----------------------------<  166<H>  3.9   |  24  |  0.79    EGFR if : 87  EGFR if non : 75    Ca    10.5      12-09  Phos  2.6     12-07    TPro  6.5  /  Alb  4.1  /  TBili  0.2  /  DBili  x   /  AST  28  /  ALT  40  /  AlkPhos  65  12-09      Thyroid Function Tests:  12-07 @ 17:45 TSH 0.34 FreeT4 1.6 T3 -- Anti TPO -- Anti Thyroglobulin Ab -- TSI --          12-07 Chol -- LDL -- HDL -- Trig 30    Radiology:

## 2020-12-09 NOTE — PROGRESS NOTE ADULT - ASSESSMENT
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

## 2020-12-09 NOTE — DISCHARGE NOTE PROVIDER - CARE PROVIDER_API CALL
[facility]  INTERNAL MEDICINE  [address] [place] [address]  Phone: [phone]  Fax: [phone]  Follow Up Time:    Nrianjan Soni   INTERNAL MEDICINE  [address] [place] [address]  Phone: [phone]  Fax: [phone]  Follow Up Time:     Chava Almendarez)  Internal Medicine; Nephrology  [address] [place] [address], [address]  [place] [address]  Phone: [phone]  Fax: [phone]  Follow Up Time:     Sara Karimi  Endocrinology, Diabetes and Metabolism  [address] [place] [address]  Phone: [phone]  Fax: [phone]  Follow Up Time:

## 2020-12-09 NOTE — DISCHARGE NOTE PROVIDER - PROVIDER TOKENS
PROVIDER:[TOKEN:[[id number]]] PROVIDER:[TOKEN:[[id number]]],PROVIDER:[TOKEN:[[id number]]],PROVIDER:[TOKEN:[[id number]]]

## 2020-12-09 NOTE — PROGRESS NOTE ADULT - SUBJECTIVE AND OBJECTIVE BOX
NEPHROLOGY-[facility] [phone]        Patient seen and examined         MEDICATIONS  (STANDING):  ALBUTerol    90 MICROgram(s) HFA Inhaler 2 Puff(s) Inhalation every 6 hours  ARIPiprazole 5 milliGRAM(s) Oral daily  aspirin  chewable 81 milliGRAM(s) Oral daily  atorvastatin 20 milliGRAM(s) Oral at bedtime  benzonatate 100 milliGRAM(s) Oral three times a day  budesonide  80 MICROgram(s)/formoterol 4.5 MICROgram(s) Inhaler 2 Puff(s) Inhalation daily  cinacalcet 30 milliGRAM(s) Oral daily  dextrose 40% Gel 15 Gram(s) Oral once  dextrose 5%. 1000 milliLiter(s) (50 mL/Hr) IV Continuous <Continuous>  dextrose 5%. 1000 milliLiter(s) (100 mL/Hr) IV Continuous <Continuous>  dextrose 50% Injectable 25 Gram(s) IV Push once  dextrose 50% Injectable 12.5 Gram(s) IV Push once  dextrose 50% Injectable 25 Gram(s) IV Push once  enoxaparin Injectable 40 milliGRAM(s) SubCutaneous every 12 hours  glucagon  Injectable 1 milliGRAM(s) IntraMuscular once  insulin lispro (ADMELOG) corrective regimen sliding scale   SubCutaneous three times a day before meals  insulin lispro (ADMELOG) corrective regimen sliding scale   SubCutaneous at bedtime  losartan 50 milliGRAM(s) Oral daily  mirtazapine 30 milliGRAM(s) Oral at bedtime  montelukast 10 milliGRAM(s) Oral daily  pantoprazole    Tablet 40 milliGRAM(s) Oral before breakfast  sodium chloride 0.9%. 1000 milliLiter(s) (50 mL/Hr) IV Continuous <Continuous>  verapamil  milliGRAM(s) Oral at bedtime      VITAL:  T(C): , Max: 37.4 (12-08-20 @ 11:48)  T(F): , Max: 99.4 (12-08-20 @ 20:32)  HR: 81 (12-09-20 @ 05:02)  BP: 123/73 (12-09-20 @ 05:02)  BP(mean): --  RR: 18 (12-09-20 @ 05:02)  SpO2: 93% (12-09-20 @ 11:00)  Wt(kg): --    I and O's:    12-08 @ 07:01  -  12-09 @ 07:00  --------------------------------------------------------  IN: 1330 mL / OUT: 0 mL / NET: 1330 mL          PHYSICAL EXAM:    Constitutional: NAD  Neck:  No JVD  Respiratory: CTAB/L  Cardiovascular: S1 and S2  Gastrointestinal: BS+, soft, NT/ND  Extremities: No peripheral edema  Neurological: A/O x 3, no focal deficits  Psychiatric: Normal mood, normal affect  : No Grewal  Skin: No rashes  Access: Not applicable    LABS:                        12.4   4.79  )-----------( 210      ( 09 Dec 2020 05:39 )             40.5     12-09    137  |  99  |  22  ----------------------------<  166<H>  3.9   |  24  |  0.79    Ca    10.5      09 Dec 2020 05:48  Phos  2.6     12-07    TPro  6.5  /  Alb  4.1  /  TBili  0.2  /  DBili  x   /  AST  28  /  ALT  40  /  AlkPhos  65  12-09          Urine Studies:          RADIOLOGY & ADDITIONAL STUDIES:

## 2020-12-09 NOTE — PROGRESS NOTE ADULT - SUBJECTIVE AND OBJECTIVE BOX
Patient is a 70y old  Female who presents with a chief complaint of Covid 19 (09 Dec 2020 14:24)                                                               INTERVAL HPI/OVERNIGHT EVENTS:    REVIEW OF SYSTEMS:     CONSTITUTIONAL: No weakness, fevers or chills  EYES/ENT: No visual changes , no ear ache   NECK: No pain or stiffness  RESPIRATORY: No cough, wheezing,  No shortness of breath  CARDIOVASCULAR: No chest pain or palpitations  GASTROINTESTINAL: No abdominal pain  . No nausea, vomiting, or hematemesis; No diarrhea or constipation. No melena or hematochezia.  GENITOURINARY: No dysuria, frequency or hematuria  NEUROLOGICAL: No numbness or weakness  SKIN: No itching, burning, rashes, or lesions                                                                                                                                                                                                                                                                                 Medications:  MEDICATIONS  (STANDING):  ALBUTerol    90 MICROgram(s) HFA Inhaler 2 Puff(s) Inhalation every 6 hours  ARIPiprazole 5 milliGRAM(s) Oral daily  aspirin  chewable 81 milliGRAM(s) Oral daily  atorvastatin 20 milliGRAM(s) Oral at bedtime  benzonatate 100 milliGRAM(s) Oral three times a day  budesonide  80 MICROgram(s)/formoterol 4.5 MICROgram(s) Inhaler 2 Puff(s) Inhalation daily  cinacalcet 30 milliGRAM(s) Oral daily  dextrose 40% Gel 15 Gram(s) Oral once  dextrose 5%. 1000 milliLiter(s) (50 mL/Hr) IV Continuous <Continuous>  dextrose 5%. 1000 milliLiter(s) (100 mL/Hr) IV Continuous <Continuous>  dextrose 50% Injectable 25 Gram(s) IV Push once  dextrose 50% Injectable 25 Gram(s) IV Push once  dextrose 50% Injectable 12.5 Gram(s) IV Push once  enoxaparin Injectable 40 milliGRAM(s) SubCutaneous every 12 hours  glucagon  Injectable 1 milliGRAM(s) IntraMuscular once  insulin lispro (ADMELOG) corrective regimen sliding scale   SubCutaneous three times a day before meals  insulin lispro (ADMELOG) corrective regimen sliding scale   SubCutaneous at bedtime  losartan 50 milliGRAM(s) Oral daily  mirtazapine 30 milliGRAM(s) Oral at bedtime  montelukast 10 milliGRAM(s) Oral daily  pantoprazole    Tablet 40 milliGRAM(s) Oral before breakfast  sodium chloride 0.9%. 1000 milliLiter(s) (50 mL/Hr) IV Continuous <Continuous>  verapamil  milliGRAM(s) Oral at bedtime    MEDICATIONS  (PRN):       Allergies    No Known Allergies    Intolerances      Vital Signs Last 24 Hrs  T(C): 37.7 (09 Dec 2020 11:56), Max: 37.7 (09 Dec 2020 11:56)  T(F): 99.9 (09 Dec 2020 11:56), Max: 99.9 (09 Dec 2020 11:56)  HR: 86 (09 Dec 2020 11:56) (81 - 103)  BP: 133/79 (09 Dec 2020 11:56) (123/73 - 138/85)  BP(mean): --  RR: 18 (09 Dec 2020 11:56) (18 - 18)  SpO2: 95% (09 Dec 2020 11:56) (93% - 96%)  CAPILLARY BLOOD GLUCOSE      POCT Blood Glucose.: 156 mg/dL (09 Dec 2020 12:07)  POCT Blood Glucose.: 143 mg/dL (09 Dec 2020 07:56)  POCT Blood Glucose.: 260 mg/dL (08 Dec 2020 21:19)  POCT Blood Glucose.: 201 mg/dL (08 Dec 2020 17:02)      12-08 @ 07:01  -  12-09 @ 07:00  --------------------------------------------------------  IN: 1330 mL / OUT: 0 mL / NET: 1330 mL    12-09 @ 07:01  -  12-09 @ 16:41  --------------------------------------------------------  IN: 720 mL / OUT: 0 mL / NET: 720 mL      Physical Exam:    Daily     Daily   General:  Well appearing, NAD, not cachetic  HEENT:  Nonicteric, PERRLA  CV:  RRR, S1S2   Lungs:  CTA B/L, no wheezes, rales, rhonchi  Abdomen:  Soft, non-tender, no distended, positive BS  Extremities:  2+ pulses, no c/c, no edema  Skin:  Warm and dry, no rashes  :  No prince  Neuro:  AAOx3, non-focal, grossly intact                                                                                                                                                                                                                                                                                                LABS:                               12.4   4.79  )-----------( 210      ( 09 Dec 2020 05:39 )             40.5                      12-09    137  |  99  |  22  ----------------------------<  166<H>  3.9   |  24  |  0.79    Ca    10.5      09 Dec 2020 05:48    TPro  x   /  Alb  3.5<L>  /  TBili  x   /  DBili  x   /  AST  x   /  ALT  x   /  AlkPhos  x   12-09                       RADIOLOGY & ADDITIONAL TESTS         I personally reviewed: [  ]EKG   [  ]CXR    [  ] CT      A/P:         Discussed with :     Landon consultants' Notes   Time spent :

## 2020-12-09 NOTE — DISCHARGE NOTE PROVIDER - CARE PROVIDERS DIRECT ADDRESSES
,DirectAddress_Unknown ,DirectAddress_Unknown,[email or web address],DirectAddress_Unknown

## 2020-12-09 NOTE — DISCHARGE NOTE PROVIDER - NSDCFUSCHEDAPPT_GEN_ALL_CORE_FT
PAULINA GAMBOA ; 12/21/2020 ; [facility] Cardio [address]  PAULINA GAMBOA ; 12/30/2020 ; [facility] Surg [facility] [address]  PAULINA GAMBOA ; 12/31/2020 ; [facility] Endocrin [address]  PAULINA GAMBOA ; 03/02/2021 ; [facility] PulmMed [address]  PAULINA GAMBOA ; 03/02/2021 ; [facility] [address]

## 2020-12-09 NOTE — CONSULT NOTE ADULT - PROBLEM SELECTOR RECOMMENDATION 2
Borderline though euthyroid, FT4 in acceptable range. No need for management at this time. Suggest to repeat TFTs in 4-6 weeks.

## 2020-12-09 NOTE — DISCHARGE NOTE NURSING/CASE MANAGEMENT/SOCIAL WORK - PATIENT PORTAL LINK FT
You can access the FollowMyHealth Patient Portal offered by [facility] by registering at the following website: [email or web address]. By joining [facility]’s FollowMyHealth portal, you will also be able to view your health information using other applications (apps) compatible with our system.

## 2020-12-09 NOTE — CONSULT NOTE ADULT - ASSESSMENT
Assessment  DMT2: 70y Female with DM T2 with hyperglycemia, A1C 7%, was on oral meds/Metformin at home, now on insulin coverage, blood sugars improving and trending within acceptable range today, no hypoglycemic episodes, eating meals, planning DC.  Covid: on medications, stable, monitored  Tachycardia: Patient is euthyroid, FT4 in acceptable range, HR stable, monitored.      Sara Karimi MD  Cell: [phone]  Office: [phone]       Assessment  DMT2: 70y Female with DM T2 with hyperglycemia, A1C 7%, was on oral meds/Metformin at home, now on insulin coverage, blood sugars improving and trending within  acceptable range today, no hypoglycemic episodes, eating meals, planning DC.  Covid: on medications, stable, monitored  Tachycardia: Patient is euthyroid, FT4 in acceptable range, HR stable, monitored.      Sara Karimi MD  Cell: [phone]  Office: [phone]

## 2020-12-10 ENCOUNTER — NON-APPOINTMENT (OUTPATIENT)
Age: 70
End: 2020-12-10

## 2020-12-10 LAB
A-TUMOR NECROSIS FACT SERPL-MCNC: <1.7 PG/ML — SIGNIFICANT CHANGE UP
IL10 SERPL-MCNC: <2.8 PG/ML — SIGNIFICANT CHANGE UP
IL12 SERPL-MCNC: <1.9 PG/ML — SIGNIFICANT CHANGE UP
IL13 SERPL-MCNC: <1.7 PG/ML — SIGNIFICANT CHANGE UP
IL17A SERPL-MCNC: <1.4 PG/ML — SIGNIFICANT CHANGE UP
IL2 SERPL-MCNC: 242.2 PG/ML — SIGNIFICANT CHANGE UP (ref 175.3–858.2)
IL2 SERPL-MCNC: <2.1 PG/ML — SIGNIFICANT CHANGE UP
IL4 SERPL-MCNC: <2.2 PG/ML — SIGNIFICANT CHANGE UP
IL6 SERPL-MCNC: <2 PG/ML — SIGNIFICANT CHANGE UP
IL8 SERPL-MCNC: <3 PG/ML — SIGNIFICANT CHANGE UP
INTERFERON GAMMA, BLOOD: <4.2 PG/ML — SIGNIFICANT CHANGE UP
INTERLEUKIN 1 BETA: <6.5 PG/ML — SIGNIFICANT CHANGE UP
INTERLEUKIN 5: <2.1 PG/ML — SIGNIFICANT CHANGE UP

## 2020-12-14 ENCOUNTER — EMERGENCY (EMERGENCY)
Facility: HOSPITAL | Age: 70
LOS: 0 days | Discharge: HOME | End: 2020-12-14
Attending: EMERGENCY MEDICINE
Payer: MEDICARE

## 2020-12-14 ENCOUNTER — OUTPATIENT (OUTPATIENT)
Dept: OUTPATIENT SERVICES | Facility: HOSPITAL | Age: 70
LOS: 1 days | End: 2020-12-14
Payer: MEDICARE

## 2020-12-14 ENCOUNTER — APPOINTMENT (OUTPATIENT)
Dept: INTERNAL MEDICINE | Facility: CLINIC | Age: 70
End: 2020-12-14
Payer: MEDICARE

## 2020-12-14 VITALS
HEART RATE: 90 BPM | TEMPERATURE: 99 F | OXYGEN SATURATION: 92 % | DIASTOLIC BLOOD PRESSURE: 64 MMHG | SYSTOLIC BLOOD PRESSURE: 131 MMHG

## 2020-12-14 VITALS
HEIGHT: 62 IN | DIASTOLIC BLOOD PRESSURE: 77 MMHG | WEIGHT: 173.06 LBS | TEMPERATURE: 99 F | SYSTOLIC BLOOD PRESSURE: 145 MMHG | HEART RATE: 109 BPM | OXYGEN SATURATION: 99 % | RESPIRATION RATE: 17 BRPM

## 2020-12-14 DIAGNOSIS — U07.1 COVID-19: ICD-10-CM

## 2020-12-14 DIAGNOSIS — Z90.49 ACQUIRED ABSENCE OF OTHER SPECIFIED PARTS OF DIGESTIVE TRACT: Chronic | ICD-10-CM

## 2020-12-14 DIAGNOSIS — F32.9 MAJOR DEPRESSIVE DISORDER, SINGLE EPISODE, UNSPECIFIED: ICD-10-CM

## 2020-12-14 DIAGNOSIS — Z79.84 LONG TERM (CURRENT) USE OF ORAL HYPOGLYCEMIC DRUGS: ICD-10-CM

## 2020-12-14 DIAGNOSIS — I10 ESSENTIAL (PRIMARY) HYPERTENSION: ICD-10-CM

## 2020-12-14 DIAGNOSIS — R26.81 UNSTEADINESS ON FEET: ICD-10-CM

## 2020-12-14 DIAGNOSIS — Z90.710 ACQUIRED ABSENCE OF BOTH CERVIX AND UTERUS: Chronic | ICD-10-CM

## 2020-12-14 DIAGNOSIS — Z85.110 PERSONAL HISTORY OF MALIGNANT CARCINOID TUMOR OF BRONCHUS AND LUNG: ICD-10-CM

## 2020-12-14 DIAGNOSIS — E11.9 TYPE 2 DIABETES MELLITUS WITHOUT COMPLICATIONS: ICD-10-CM

## 2020-12-14 DIAGNOSIS — Z90.11 ACQUIRED ABSENCE OF RIGHT BREAST AND NIPPLE: Chronic | ICD-10-CM

## 2020-12-14 DIAGNOSIS — J45.909 UNSPECIFIED ASTHMA, UNCOMPLICATED: ICD-10-CM

## 2020-12-14 DIAGNOSIS — Z98.42 CATARACT EXTRACTION STATUS, LEFT EYE: Chronic | ICD-10-CM

## 2020-12-14 DIAGNOSIS — R42 DIZZINESS AND GIDDINESS: ICD-10-CM

## 2020-12-14 DIAGNOSIS — K21.9 GASTRO-ESOPHAGEAL REFLUX DISEASE WITHOUT ESOPHAGITIS: ICD-10-CM

## 2020-12-14 DIAGNOSIS — Z79.82 LONG TERM (CURRENT) USE OF ASPIRIN: ICD-10-CM

## 2020-12-14 DIAGNOSIS — E83.42 HYPOMAGNESEMIA: ICD-10-CM

## 2020-12-14 LAB
ACETONE SERPL-MCNC: NEGATIVE — SIGNIFICANT CHANGE UP
ALBUMIN SERPL ELPH-MCNC: 3.4 G/DL — SIGNIFICANT CHANGE UP (ref 3.3–5)
ALP SERPL-CCNC: 58 U/L — SIGNIFICANT CHANGE UP (ref 40–120)
ALT FLD-CCNC: 117 U/L — HIGH (ref 12–78)
ANION GAP SERPL CALC-SCNC: 6 MMOL/L — SIGNIFICANT CHANGE UP (ref 5–17)
APTT BLD: 30.6 SEC — SIGNIFICANT CHANGE UP (ref 27.5–35.5)
AST SERPL-CCNC: 50 U/L — HIGH (ref 15–37)
BASOPHILS # BLD AUTO: 0.01 K/UL — SIGNIFICANT CHANGE UP (ref 0–0.2)
BASOPHILS NFR BLD AUTO: 0.3 % — SIGNIFICANT CHANGE UP (ref 0–2)
BILIRUB SERPL-MCNC: 0.4 MG/DL — SIGNIFICANT CHANGE UP (ref 0.2–1.2)
BUN SERPL-MCNC: 10 MG/DL — SIGNIFICANT CHANGE UP (ref 7–23)
CALCIUM SERPL-MCNC: 9.3 MG/DL — SIGNIFICANT CHANGE UP (ref 8.5–10.1)
CHLORIDE SERPL-SCNC: 102 MMOL/L — SIGNIFICANT CHANGE UP (ref 96–108)
CO2 SERPL-SCNC: 28 MMOL/L — SIGNIFICANT CHANGE UP (ref 22–31)
CREAT SERPL-MCNC: 0.85 MG/DL — SIGNIFICANT CHANGE UP (ref 0.5–1.3)
EOSINOPHIL # BLD AUTO: 0.01 K/UL — SIGNIFICANT CHANGE UP (ref 0–0.5)
EOSINOPHIL NFR BLD AUTO: 0.3 % — SIGNIFICANT CHANGE UP (ref 0–6)
GLUCOSE BLDC GLUCOMTR-MCNC: 217 MG/DL — HIGH (ref 70–99)
GLUCOSE SERPL-MCNC: 201 MG/DL — HIGH (ref 70–99)
HCT VFR BLD CALC: 41 % — SIGNIFICANT CHANGE UP (ref 34.5–45)
HGB BLD-MCNC: 12.8 G/DL — SIGNIFICANT CHANGE UP (ref 11.5–15.5)
IMM GRANULOCYTES NFR BLD AUTO: 0.3 % — SIGNIFICANT CHANGE UP (ref 0–1.5)
INR BLD: 1.06 RATIO — SIGNIFICANT CHANGE UP (ref 0.88–1.16)
LYMPHOCYTES # BLD AUTO: 0.79 K/UL — LOW (ref 1–3.3)
LYMPHOCYTES # BLD AUTO: 26.4 % — SIGNIFICANT CHANGE UP (ref 13–44)
MAGNESIUM SERPL-MCNC: 1.5 MG/DL — LOW (ref 1.6–2.6)
MCHC RBC-ENTMCNC: 22.1 PG — LOW (ref 27–34)
MCHC RBC-ENTMCNC: 31.2 GM/DL — LOW (ref 32–36)
MCV RBC AUTO: 70.8 FL — LOW (ref 80–100)
MONOCYTES # BLD AUTO: 0.29 K/UL — SIGNIFICANT CHANGE UP (ref 0–0.9)
MONOCYTES NFR BLD AUTO: 9.7 % — SIGNIFICANT CHANGE UP (ref 2–14)
NEUTROPHILS # BLD AUTO: 1.88 K/UL — SIGNIFICANT CHANGE UP (ref 1.8–7.4)
NEUTROPHILS NFR BLD AUTO: 63 % — SIGNIFICANT CHANGE UP (ref 43–77)
NRBC # BLD: 0 /100 WBCS — SIGNIFICANT CHANGE UP (ref 0–0)
NT-PROBNP SERPL-SCNC: 16 PG/ML — SIGNIFICANT CHANGE UP (ref 0–125)
PLATELET # BLD AUTO: 182 K/UL — SIGNIFICANT CHANGE UP (ref 150–400)
POTASSIUM SERPL-MCNC: 4.3 MMOL/L — SIGNIFICANT CHANGE UP (ref 3.5–5.3)
POTASSIUM SERPL-SCNC: 4.3 MMOL/L — SIGNIFICANT CHANGE UP (ref 3.5–5.3)
PROT SERPL-MCNC: 7.3 GM/DL — SIGNIFICANT CHANGE UP (ref 6–8.3)
PROTHROM AB SERPL-ACNC: 12.3 SEC — SIGNIFICANT CHANGE UP (ref 10.6–13.6)
RBC # BLD: 5.79 M/UL — HIGH (ref 3.8–5.2)
RBC # FLD: 14.7 % — HIGH (ref 10.3–14.5)
SODIUM SERPL-SCNC: 136 MMOL/L — SIGNIFICANT CHANGE UP (ref 135–145)
TROPONIN I SERPL-MCNC: <.015 NG/ML — SIGNIFICANT CHANGE UP (ref 0.01–0.04)
WBC # BLD: 2.99 K/UL — LOW (ref 3.8–10.5)
WBC # FLD AUTO: 2.99 K/UL — LOW (ref 3.8–10.5)

## 2020-12-14 PROCEDURE — G0463: CPT

## 2020-12-14 PROCEDURE — 99214 OFFICE O/P EST MOD 30 MIN: CPT | Mod: GC,95

## 2020-12-14 PROCEDURE — 93010 ELECTROCARDIOGRAM REPORT: CPT

## 2020-12-14 PROCEDURE — 71045 X-RAY EXAM CHEST 1 VIEW: CPT | Mod: 26

## 2020-12-14 PROCEDURE — 99285 EMERGENCY DEPT VISIT HI MDM: CPT

## 2020-12-14 PROCEDURE — 70450 CT HEAD/BRAIN W/O DYE: CPT | Mod: 26,MH

## 2020-12-14 RX ORDER — SODIUM CHLORIDE 9 MG/ML
500 INJECTION INTRAMUSCULAR; INTRAVENOUS; SUBCUTANEOUS ONCE
Refills: 0 | Status: COMPLETED | OUTPATIENT
Start: 2020-12-14 | End: 2020-12-14

## 2020-12-14 RX ORDER — MAGNESIUM SULFATE 500 MG/ML
2 VIAL (ML) INJECTION ONCE
Refills: 0 | Status: COMPLETED | OUTPATIENT
Start: 2020-12-14 | End: 2020-12-14

## 2020-12-14 RX ADMIN — SODIUM CHLORIDE 500 MILLILITER(S): 9 INJECTION INTRAMUSCULAR; INTRAVENOUS; SUBCUTANEOUS at 05:32

## 2020-12-14 RX ADMIN — SODIUM CHLORIDE 500 MILLILITER(S): 9 INJECTION INTRAMUSCULAR; INTRAVENOUS; SUBCUTANEOUS at 04:23

## 2020-12-14 RX ADMIN — Medication 2 GRAM(S): at 06:40

## 2020-12-14 RX ADMIN — Medication 50 GRAM(S): at 05:33

## 2020-12-14 NOTE — ED PROVIDER NOTE - OBJECTIVE STATEMENT
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 She reports she started the day she was dc with those symptoms. Pt noted her glucose inc so came to ER.  Pt states notices it from lying to standing and going downstairs. denies headache, cp, worsening sob, palpitations, abd pain, NVD.  Of note, pt noted to have hypercalcemia last admission and sinus tach with neg CTA.     No fever/chills, No photophobia/eye pain/changes in vision, No ear pain/sore throat/dysphagia, No chest pain/palpitations, no SOB/cough, no wheeze/stridor, No abdominal pain, No N/V/D, no dysuria/frequency/discharge, No neck/back pain, no rash, + dizzy

## 2020-12-14 NOTE — ED PROVIDER NOTE - PHYSICAL EXAMINATION
Gen: Alert, Well appearing. NAD    Head: NC, AT, PERRL, normal lids/conjunctiva   ENT: Bilateral TM WNL, patent oropharynx without erythema/exudate, uvula midline  Neck: supple, no tenderness/meningismus  Pulm: Bilateral clear BS, normal resp effort  CV: RRR, no M/R/G, +dist pulses   Abd: soft, NT/ND, +BS, no guarding/rebound tenderness  Mskel: extremities x4 with normal ROM. no ctl spine ttp. no edema/erythema/cyanosis   Skin: no rash, no bruising  Neuro: AAOx3, no sensory/motor deficits, CN 2-12 intact. stable gait. no nystagmus. normal finger to nose

## 2020-12-14 NOTE — ED PROVIDER NOTE - NSFOLLOWUPINSTRUCTIONS_ED_ALL_ED_FT
Follow up with your primary care doctor within the next 24-48 hours and bring copy of your results.  Return to the Emergency Department for worsening or persistent symptoms or any other concerns incl. chest pain, shortness of breath, dizziness, inability to tolerate oral intake., confusion, worsening gait  Rest, drink plenty of fluids.  Advance activity as tolerated.  Continue all previously prescribed medications as directed.    Please follow up with your primary doctor and scheduled follow ups.

## 2020-12-14 NOTE — ED ADULT TRIAGE NOTE - BEFAST FACE
No Terbinafine Counseling: Patient counseling regarding adverse effects of terbinafine including but not limited to headache, diarrhea, rash, upset stomach, liver function test abnormalities, itching, taste/smell disturbance, nausea, abdominal pain, and flatulence.  There is a rare possibility of liver failure that can occur when taking terbinafine.  The patient understands that a baseline LFT and kidney function test may be required. The patient verbalized understanding of the proper use and possible adverse effects of terbinafine.  All of the patient's questions and concerns were addressed.

## 2020-12-14 NOTE — ED PROVIDER NOTE - PATIENT PORTAL LINK FT
You can access the FollowMyHealth Patient Portal offered by [facility] by registering at the following website: [email or web address]. By joining [employer]’s FollowMyHealth portal, you will also be able to view your health information using other applications (apps) compatible with our system.

## 2020-12-14 NOTE — ED ADULT TRIAGE NOTE - CHIEF COMPLAINT QUOTE
PT recently in [facility] COID + 12/09. C/O dizziness and unsteady gait x 1 week. PT recently in [facility] COVID + 12/09. C/O dizziness and unsteady gait x 1 week.

## 2020-12-14 NOTE — ED PROVIDER NOTE - CLINICAL SUMMARY MEDICAL DECISION MAKING FREE TEXT BOX
Labs notable for leuopenia likely related to viral syndrome and hypomag that was replenished. Pt feeling much better after IVF and ambulating improved.

## 2020-12-15 NOTE — REVIEW OF SYSTEMS
[Cough] : cough [Dizziness] : dizziness [Unsteady Walking] : ataxia [Negative] : Cardiovascular [Fever] : no fever [Chills] : no chills [Earache] : no earache [Postnasal Drip] : no postnasal drip [Chest Pain] : no chest pain [Orthopnea] : no orthopnea [Shortness Of Breath] : no shortness of breath [Wheezing] : no wheezing [Dyspnea on Exertion] : no dyspnea on exertion [Abdominal Pain] : no abdominal pain [Nausea] : no nausea [Diarrhea] : diarrhea [Vomiting] : no vomiting [Headache] : no headache [Fainting] : no fainting [Confusion] : no confusion [Memory Loss] : no memory loss

## 2020-12-15 NOTE — PLAN
[FreeTextEntry1] : #Dizziness: likely orthostatic \par - Pt had negative orthostatics recorded in the ED this morning, however her poor PO intake, worsening symptoms with restarting HCTZ and improvement with IV fluids in the ED are consistent with dehydration/orthostatic hypotension\par - will stop her Losartan/HCTZ pill temporarily \par - daughter to keep log of daily blood pressures\par - clinic followup in 1-2 weeks to reassess and likely restart losartan and/or HCTZ if PO intake improved\par - Daughter to call clinic if symptoms worsen or BP increases significantly \par \par Case discussed with Dr. Barreto \par \par Total time on phone with patient: 30 minutes

## 2020-12-15 NOTE — HISTORY OF PRESENT ILLNESS
[FreeTextEntry1] : [facility] Followup/dizziness  [de-identified] : Ms. PAULINA GAMBOA is a 70 year old female with history of Asthma, Depression, DMT2, Gerd, and carcinoid tumor s/p RML and RLL wedge resection and COVID19 infection who presents for telephonic visit for dizziness.\par \par Pt was hospitalized 12/6-12/9 with COVID-19. She was noted to be tachycardic on admission and CT-PE was negative for PE. Pt was also noted to be hypercalcemic to 11.7 on admission and her HCTZ was stopped. \par \par ED visit 12/14: 71 yo female with PMH asthma, depression, DMT2,Gerd, and carcinoid tumor s/p RML and RLL wedge resection, recent admission for + COVID from 12/6-9, DC 5 days ago presents with dizziness and unsteady gait x 5 days. She reports she started the day she was DC with those symptoms. Pt noted her glucose inc so came to ER. Pt states notices it from lying to standing and going downstairs. denies headache, CP, worsening sob, palpitations, Abd pain, NVD. Of note, pt noted to have hypercalcemia last admission and sinus tach with neg CTA. \par \par In the ED she was given IVF with improvement in her symptoms. Daughter notes she had some loss of taste/smell with COVID and has been eating less than usual. \par Dizziness occurs on standing and improves with rest. As per daughter pt has been walking without issues, but pt endorses difficulty/unsteadiness when walking.

## 2020-12-16 ENCOUNTER — NON-APPOINTMENT (OUTPATIENT)
Age: 70
End: 2020-12-16

## 2020-12-16 NOTE — DISCUSSION/SUMMARY
[FreeTextEntry1] : Called patient for ED visit for dizziness. Patient's HCTZ was discontinued and patient has been trying to stay hydrated. Patient's dizziness is slightly improved however she is not back at baseline. Patient's blood glucose in the low 200s. Patient will continue to monitor FS. Patient has scheduled appointment on 12/28.

## 2020-12-17 DIAGNOSIS — R42 DIZZINESS AND GIDDINESS: ICD-10-CM

## 2020-12-17 RX ORDER — FAMOTIDINE 40 MG/1
40 TABLET, FILM COATED ORAL
Qty: 90 | Refills: 1 | Status: DISCONTINUED | COMMUNITY
Start: 2020-06-03 | End: 2020-12-17

## 2020-12-21 ENCOUNTER — NON-APPOINTMENT (OUTPATIENT)
Age: 70
End: 2020-12-21

## 2020-12-21 ENCOUNTER — APPOINTMENT (OUTPATIENT)
Dept: CARDIOLOGY | Facility: CLINIC | Age: 70
End: 2020-12-21
Payer: MEDICARE

## 2020-12-21 VITALS
DIASTOLIC BLOOD PRESSURE: 90 MMHG | BODY MASS INDEX: 31.71 KG/M2 | WEIGHT: 179 LBS | RESPIRATION RATE: 16 BRPM | SYSTOLIC BLOOD PRESSURE: 160 MMHG | HEART RATE: 103 BPM | HEIGHT: 63 IN | TEMPERATURE: 98.5 F

## 2020-12-21 PROCEDURE — 99072 ADDL SUPL MATRL&STAF TM PHE: CPT

## 2020-12-21 PROCEDURE — 99213 OFFICE O/P EST LOW 20 MIN: CPT

## 2020-12-21 RX ORDER — LOSARTAN POTASSIUM AND HYDROCHLOROTHIAZIDE 12.5; 5 MG/1; MG/1
50-12.5 TABLET ORAL DAILY
Qty: 90 | Refills: 1 | Status: DISCONTINUED | COMMUNITY
Start: 2020-06-15 | End: 2020-12-21

## 2020-12-21 RX ORDER — VERAPAMIL HYDROCHLORIDE 240 MG/1
240 TABLET ORAL
Refills: 0 | Status: DISCONTINUED | COMMUNITY
End: 2020-12-21

## 2020-12-22 RX ORDER — BLOOD-GLUCOSE METER
W/DEVICE KIT MISCELLANEOUS
Qty: 1 | Refills: 0 | Status: COMPLETED | COMMUNITY
Start: 2020-10-29 | End: 2020-12-22

## 2020-12-22 RX ORDER — LANCETS 28 GAUGE
EACH MISCELLANEOUS
Qty: 100 | Refills: 0 | Status: COMPLETED | COMMUNITY
Start: 2020-05-14 | End: 2020-12-22

## 2020-12-28 ENCOUNTER — APPOINTMENT (OUTPATIENT)
Dept: INTERNAL MEDICINE | Facility: CLINIC | Age: 70
End: 2020-12-28

## 2020-12-28 ENCOUNTER — OUTPATIENT (OUTPATIENT)
Dept: OUTPATIENT SERVICES | Facility: HOSPITAL | Age: 70
LOS: 1 days | End: 2020-12-28
Payer: MEDICARE

## 2020-12-28 DIAGNOSIS — Z90.11 ACQUIRED ABSENCE OF RIGHT BREAST AND NIPPLE: Chronic | ICD-10-CM

## 2020-12-28 DIAGNOSIS — Z90.49 ACQUIRED ABSENCE OF OTHER SPECIFIED PARTS OF DIGESTIVE TRACT: Chronic | ICD-10-CM

## 2020-12-28 DIAGNOSIS — Z98.42 CATARACT EXTRACTION STATUS, LEFT EYE: Chronic | ICD-10-CM

## 2020-12-28 DIAGNOSIS — I10 ESSENTIAL (PRIMARY) HYPERTENSION: ICD-10-CM

## 2020-12-28 DIAGNOSIS — Z90.710 ACQUIRED ABSENCE OF BOTH CERVIX AND UTERUS: Chronic | ICD-10-CM

## 2020-12-28 PROCEDURE — 97161 PT EVAL LOW COMPLEX 20 MIN: CPT

## 2020-12-28 PROCEDURE — 85025 COMPLETE CBC W/AUTO DIFF WBC: CPT

## 2020-12-28 PROCEDURE — 85027 COMPLETE CBC AUTOMATED: CPT

## 2020-12-28 PROCEDURE — 84100 ASSAY OF PHOSPHORUS: CPT

## 2020-12-28 PROCEDURE — 81003 URINALYSIS AUTO W/O SCOPE: CPT

## 2020-12-28 PROCEDURE — 82947 ASSAY GLUCOSE BLOOD QUANT: CPT

## 2020-12-28 PROCEDURE — 71045 X-RAY EXAM CHEST 1 VIEW: CPT

## 2020-12-28 PROCEDURE — U0003: CPT

## 2020-12-28 PROCEDURE — 83520 IMMUNOASSAY QUANT NOS NONAB: CPT

## 2020-12-28 PROCEDURE — 71275 CT ANGIOGRAPHY CHEST: CPT

## 2020-12-28 PROCEDURE — 83036 HEMOGLOBIN GLYCOSYLATED A1C: CPT

## 2020-12-28 PROCEDURE — 84155 ASSAY OF PROTEIN SERUM: CPT

## 2020-12-28 PROCEDURE — 82803 BLOOD GASES ANY COMBINATION: CPT

## 2020-12-28 PROCEDURE — 93005 ELECTROCARDIOGRAM TRACING: CPT

## 2020-12-28 PROCEDURE — 83615 LACTATE (LD) (LDH) ENZYME: CPT

## 2020-12-28 PROCEDURE — 82306 VITAMIN D 25 HYDROXY: CPT

## 2020-12-28 PROCEDURE — 82435 ASSAY OF BLOOD CHLORIDE: CPT

## 2020-12-28 PROCEDURE — 85014 HEMATOCRIT: CPT

## 2020-12-28 PROCEDURE — 84165 PROTEIN E-PHORESIS SERUM: CPT

## 2020-12-28 PROCEDURE — 83880 ASSAY OF NATRIURETIC PEPTIDE: CPT

## 2020-12-28 PROCEDURE — 84478 ASSAY OF TRIGLYCERIDES: CPT

## 2020-12-28 PROCEDURE — 84443 ASSAY THYROID STIM HORMONE: CPT

## 2020-12-28 PROCEDURE — 96374 THER/PROPH/DIAG INJ IV PUSH: CPT | Mod: XU

## 2020-12-28 PROCEDURE — 84439 ASSAY OF FREE THYROXINE: CPT

## 2020-12-28 PROCEDURE — 82310 ASSAY OF CALCIUM: CPT

## 2020-12-28 PROCEDURE — 82962 GLUCOSE BLOOD TEST: CPT

## 2020-12-28 PROCEDURE — 84484 ASSAY OF TROPONIN QUANT: CPT

## 2020-12-28 PROCEDURE — 82550 ASSAY OF CK (CPK): CPT

## 2020-12-28 PROCEDURE — 82728 ASSAY OF FERRITIN: CPT

## 2020-12-28 PROCEDURE — 82330 ASSAY OF CALCIUM: CPT

## 2020-12-28 PROCEDURE — 99285 EMERGENCY DEPT VISIT HI MDM: CPT | Mod: 25

## 2020-12-28 PROCEDURE — 85384 FIBRINOGEN ACTIVITY: CPT

## 2020-12-28 PROCEDURE — 84295 ASSAY OF SERUM SODIUM: CPT

## 2020-12-28 PROCEDURE — 80053 COMPREHEN METABOLIC PANEL: CPT

## 2020-12-28 PROCEDURE — 85379 FIBRIN DEGRADATION QUANT: CPT

## 2020-12-28 PROCEDURE — 83605 ASSAY OF LACTIC ACID: CPT

## 2020-12-28 PROCEDURE — 94640 AIRWAY INHALATION TREATMENT: CPT

## 2020-12-28 PROCEDURE — G0463: CPT

## 2020-12-28 PROCEDURE — 85018 HEMOGLOBIN: CPT

## 2020-12-28 PROCEDURE — 86769 SARS-COV-2 COVID-19 ANTIBODY: CPT

## 2020-12-28 PROCEDURE — 86140 C-REACTIVE PROTEIN: CPT

## 2020-12-28 PROCEDURE — 83970 ASSAY OF PARATHORMONE: CPT

## 2020-12-28 PROCEDURE — 82784 ASSAY IGA/IGD/IGG/IGM EACH: CPT

## 2020-12-28 PROCEDURE — 84132 ASSAY OF SERUM POTASSIUM: CPT

## 2020-12-28 RX ORDER — ARIPIPRAZOLE 5 MG/1
5 TABLET ORAL DAILY
Refills: 0 | Status: DISCONTINUED | COMMUNITY
Start: 2020-12-22 | End: 2020-12-28

## 2020-12-28 RX ORDER — HYDROCHLOROTHIAZIDE 12.5 MG/1
12.5 CAPSULE ORAL
Qty: 90 | Refills: 1 | Status: DISCONTINUED | COMMUNITY
Start: 2020-06-02 | End: 2020-12-28

## 2020-12-29 NOTE — PHYSICAL EXAM
[No Acute Distress] : no acute distress [Well Nourished] : well nourished [Well-Appearing] : well-appearing [No Respiratory Distress] : no respiratory distress  [No Focal Deficits] : no focal deficits [Normal Gait] : normal gait [Alert and Oriented x3] : oriented to person, place, and time

## 2020-12-30 ENCOUNTER — EMERGENCY (EMERGENCY)
Facility: HOSPITAL | Age: 70
LOS: 0 days | Discharge: HOME | End: 2020-12-31
Attending: EMERGENCY MEDICINE
Payer: MEDICARE

## 2020-12-30 ENCOUNTER — APPOINTMENT (OUTPATIENT)
Dept: PLASTIC SURGERY | Facility: CLINIC | Age: 70
End: 2020-12-30

## 2020-12-30 VITALS
TEMPERATURE: 99 F | OXYGEN SATURATION: 96 % | HEART RATE: 114 BPM | RESPIRATION RATE: 18 BRPM | WEIGHT: 179.02 LBS | DIASTOLIC BLOOD PRESSURE: 96 MMHG | SYSTOLIC BLOOD PRESSURE: 165 MMHG | HEIGHT: 62 IN

## 2020-12-30 DIAGNOSIS — U07.1 COVID-19: ICD-10-CM

## 2020-12-30 DIAGNOSIS — Z90.11 ACQUIRED ABSENCE OF RIGHT BREAST AND NIPPLE: Chronic | ICD-10-CM

## 2020-12-30 DIAGNOSIS — I49.9 CARDIAC ARRHYTHMIA, UNSPECIFIED: ICD-10-CM

## 2020-12-30 DIAGNOSIS — Z98.42 CATARACT EXTRACTION STATUS, LEFT EYE: Chronic | ICD-10-CM

## 2020-12-30 DIAGNOSIS — Z90.49 ACQUIRED ABSENCE OF OTHER SPECIFIED PARTS OF DIGESTIVE TRACT: Chronic | ICD-10-CM

## 2020-12-30 DIAGNOSIS — Z90.710 ACQUIRED ABSENCE OF BOTH CERVIX AND UTERUS: Chronic | ICD-10-CM

## 2020-12-30 DIAGNOSIS — R06.02 SHORTNESS OF BREATH: ICD-10-CM

## 2020-12-30 DIAGNOSIS — F32.9 MAJOR DEPRESSIVE DISORDER, SINGLE EPISODE, UNSPECIFIED: ICD-10-CM

## 2020-12-30 DIAGNOSIS — E11.9 TYPE 2 DIABETES MELLITUS WITHOUT COMPLICATIONS: ICD-10-CM

## 2020-12-30 DIAGNOSIS — H26.9 UNSPECIFIED CATARACT: ICD-10-CM

## 2020-12-30 DIAGNOSIS — J30.2 OTHER SEASONAL ALLERGIC RHINITIS: ICD-10-CM

## 2020-12-30 LAB
ALBUMIN SERPL ELPH-MCNC: 3.8 G/DL — SIGNIFICANT CHANGE UP (ref 3.3–5)
ALP SERPL-CCNC: 77 U/L — SIGNIFICANT CHANGE UP (ref 40–120)
ALT FLD-CCNC: 33 U/L — SIGNIFICANT CHANGE UP (ref 12–78)
ANION GAP SERPL CALC-SCNC: 8 MMOL/L — SIGNIFICANT CHANGE UP (ref 5–17)
APTT BLD: 30.4 SEC — SIGNIFICANT CHANGE UP (ref 27.5–35.5)
AST SERPL-CCNC: 17 U/L — SIGNIFICANT CHANGE UP (ref 15–37)
BASOPHILS # BLD AUTO: 0.02 K/UL — SIGNIFICANT CHANGE UP (ref 0–0.2)
BASOPHILS NFR BLD AUTO: 0.4 % — SIGNIFICANT CHANGE UP (ref 0–2)
BILIRUB SERPL-MCNC: 0.4 MG/DL — SIGNIFICANT CHANGE UP (ref 0.2–1.2)
BUN SERPL-MCNC: 6 MG/DL — LOW (ref 7–23)
CALCIUM SERPL-MCNC: 9.5 MG/DL — SIGNIFICANT CHANGE UP (ref 8.5–10.1)
CHLORIDE SERPL-SCNC: 107 MMOL/L — SIGNIFICANT CHANGE UP (ref 96–108)
CO2 SERPL-SCNC: 26 MMOL/L — SIGNIFICANT CHANGE UP (ref 22–31)
CREAT SERPL-MCNC: 0.76 MG/DL — SIGNIFICANT CHANGE UP (ref 0.5–1.3)
D DIMER BLD IA.RAPID-MCNC: 3335 NG/ML DDU — HIGH
EOSINOPHIL # BLD AUTO: 0.06 K/UL — SIGNIFICANT CHANGE UP (ref 0–0.5)
EOSINOPHIL NFR BLD AUTO: 1.3 % — SIGNIFICANT CHANGE UP (ref 0–6)
GLUCOSE SERPL-MCNC: 151 MG/DL — HIGH (ref 70–99)
HCT VFR BLD CALC: 39.6 % — SIGNIFICANT CHANGE UP (ref 34.5–45)
HGB BLD-MCNC: 12.2 G/DL — SIGNIFICANT CHANGE UP (ref 11.5–15.5)
IMM GRANULOCYTES NFR BLD AUTO: 0.2 % — SIGNIFICANT CHANGE UP (ref 0–1.5)
INR BLD: 1.04 RATIO — SIGNIFICANT CHANGE UP (ref 0.88–1.16)
LYMPHOCYTES # BLD AUTO: 0.93 K/UL — LOW (ref 1–3.3)
LYMPHOCYTES # BLD AUTO: 19.5 % — SIGNIFICANT CHANGE UP (ref 13–44)
MCHC RBC-ENTMCNC: 22.2 PG — LOW (ref 27–34)
MCHC RBC-ENTMCNC: 30.8 GM/DL — LOW (ref 32–36)
MCV RBC AUTO: 72.1 FL — LOW (ref 80–100)
MONOCYTES # BLD AUTO: 0.46 K/UL — SIGNIFICANT CHANGE UP (ref 0–0.9)
MONOCYTES NFR BLD AUTO: 9.6 % — SIGNIFICANT CHANGE UP (ref 2–14)
NEUTROPHILS # BLD AUTO: 3.29 K/UL — SIGNIFICANT CHANGE UP (ref 1.8–7.4)
NEUTROPHILS NFR BLD AUTO: 69 % — SIGNIFICANT CHANGE UP (ref 43–77)
NRBC # BLD: 0 /100 WBCS — SIGNIFICANT CHANGE UP (ref 0–0)
NT-PROBNP SERPL-SCNC: 38 PG/ML — SIGNIFICANT CHANGE UP (ref 0–125)
PLATELET # BLD AUTO: 249 K/UL — SIGNIFICANT CHANGE UP (ref 150–400)
POTASSIUM SERPL-MCNC: 3.7 MMOL/L — SIGNIFICANT CHANGE UP (ref 3.5–5.3)
POTASSIUM SERPL-SCNC: 3.7 MMOL/L — SIGNIFICANT CHANGE UP (ref 3.5–5.3)
PROT SERPL-MCNC: 7.6 GM/DL — SIGNIFICANT CHANGE UP (ref 6–8.3)
PROTHROM AB SERPL-ACNC: 12 SEC — SIGNIFICANT CHANGE UP (ref 10.6–13.6)
RAPID RVP RESULT: DETECTED
RBC # BLD: 5.49 M/UL — HIGH (ref 3.8–5.2)
RBC # FLD: 15.7 % — HIGH (ref 10.3–14.5)
SARS-COV-2 RNA SPEC QL NAA+PROBE: DETECTED
SODIUM SERPL-SCNC: 141 MMOL/L — SIGNIFICANT CHANGE UP (ref 135–145)
TROPONIN I SERPL-MCNC: <.015 NG/ML — SIGNIFICANT CHANGE UP (ref 0.01–0.04)
WBC # BLD: 4.77 K/UL — SIGNIFICANT CHANGE UP (ref 3.8–10.5)
WBC # FLD AUTO: 4.77 K/UL — SIGNIFICANT CHANGE UP (ref 3.8–10.5)

## 2020-12-30 PROCEDURE — 71275 CT ANGIOGRAPHY CHEST: CPT | Mod: 26,MA

## 2020-12-30 PROCEDURE — 93010 ELECTROCARDIOGRAM REPORT: CPT

## 2020-12-30 PROCEDURE — 71045 X-RAY EXAM CHEST 1 VIEW: CPT | Mod: 26

## 2020-12-30 PROCEDURE — 99285 EMERGENCY DEPT VISIT HI MDM: CPT

## 2020-12-30 RX ORDER — SODIUM CHLORIDE 9 MG/ML
1000 INJECTION INTRAMUSCULAR; INTRAVENOUS; SUBCUTANEOUS ONCE
Refills: 0 | Status: COMPLETED | OUTPATIENT
Start: 2020-12-30 | End: 2020-12-30

## 2020-12-30 RX ADMIN — SODIUM CHLORIDE 1000 MILLILITER(S): 9 INJECTION INTRAMUSCULAR; INTRAVENOUS; SUBCUTANEOUS at 22:00

## 2020-12-30 NOTE — ED PROVIDER NOTE - NS ED ROS FT
No fever/chills, No photophobia/eye pain/changes in vision, No ear pain/sore throat/dysphagia, No chest pain/palpitations, +Dyspnea, no SOB/cough/wheeze/stridor, No abdominal pain, No N/V/D, no dysuria/frequency/discharge, No neck/back pain, no rash, no changes in neurological status/function.

## 2020-12-30 NOTE — ED PROVIDER NOTE - OBJECTIVE STATEMENT
Triage Nurse's Notes: "c/o shortness of breath started tonight denies chest pain states covid + 12/6 denies fever or chills denies cough"    Pertinent PMH/PSH/FHx/SHx and Review of Systems contained within:     71 y/o F with a PMHx of HTN, GERD, T2-DM, Asthma (never intubated), PSHx of Cholecystectomy and Lumpectomy presents to the ED c/o dyspnea at rest and with exertion that started tonight. Currently in the ED, Patient appears comfortable but states she has been short of breath. Patient was diagnosed with COVID on 12/6 and was admitted at [facility] for SOB. However, Patient states she did not have difficulty breathing after discharge until today. Denies dizziness, chest pain, nausea, emesis, back pain, leg edema or Triage Nurse's Notes: "c/o shortness of breath started tonight denies chest pain states covid + 12/6 denies fever or chills denies cough"    .start     71 y/o F with a PMHx of HTN, GERD, T2-DM, Asthma (never intubated), PSHx of Cholecystectomy and Lumpectomy presents to the ED c/o dyspnea at rest and with exertion that started tonight. Currently in the ED, Patient appears comfortable but states she has been SOB. Patient was diagnosed with COVID on 12/6 and was admitted for SOB at [facility]. However, Patient states she did not have difficulty breathing after discharge until today. Denies dizziness, chest pain, nausea, emesis, back pain, leg edema or hemoptysis. Triage Nurse's Notes: "c/o shortness of breath started tonight denies chest pain states covid + 12/6 denies fever or chills denies cough"    Pertinent PMH/PSH/FHx/SHx and Review of Systems contained within:     71 y/o F with a PMHx of HTN, GERD, T2-DM, Asthma (never intubated), PSHx of Cholecystectomy and Lumpectomy presents to the ED c/o dyspnea at rest and with exertion that started tonight. Currently in the ED, Patient appears comfortable but states she has been SOB. Patient was diagnosed with COVID on 12/6 and was admitted for SOB at [place]. However, Patient states she did not have difficulty breathing after her discharge until today. Denies dizziness, chest pain, nausea, emesis, back pain, leg edema or hemoptysis. Patient denies EtOH/tobacco/illicit substance use. Triage Nurse's Notes: "c/o shortness of breath started tonight denies chest pain states covid + 12/6 denies fever or chills denies cough"    Pertinent PMH/PSH/FHx/SHx and Review of Systems contained within:     69 y/o F with a PMHx of HTN, GERD, T2-DM, Asthma (never intubated), PSHx of Cholecystectomy and Lumpectomy presents to the ED c/o dyspnea at rest and with exertion that started today. Currently in the ED, Patient appears comfortable but states she has been SOB. Patient was diagnosed with COVID on 12/6 and was admitted for SOB at [place]. However, Patient states she did not have difficulty breathing after her discharge until today. Denies dizziness, chest pain, nausea, emesis, back pain, leg edema or hemoptysis. Patient denies EtOH/tobacco/illicit substance use.

## 2020-12-30 NOTE — ED ADULT NURSE NOTE - OBJECTIVE STATEMENT
71 y/o F with Hx of HTN, GERD, T2-DM, Asthma  p/w c/o dyspnea at rest and with exertion that started tonight. Currently in the ED, Patient appears comfortable but states she has been SOB. Patient was diagnosed with COVID on 12/6 and was admitted for SOB at [place]. However, Patient states she did not have difficulty breathing after her discharge until today. Denies dizziness, chest pain, nausea, emesis, back pain, leg edema or hemoptysis. Patient denies EtOH/tobacco/illicit substance use

## 2020-12-30 NOTE — ED PROVIDER NOTE - CARE PROVIDER_API CALL
Jay Hadley  CARDIOLOGY  [address], [place] [id number]  Phone: [phone]  Fax: [phone]  Follow Up Time: 1-3 Days

## 2020-12-30 NOTE — ASSESSMENT
[FreeTextEntry1] : # Lightheadedness\par - most likely orthostatic hypotension given symptoms occur when patient gets up to walk\par - patient able to walk without any difficulty or instability, observed via video\par - will discontinue the losartan-HCTZ combo pill\par - will start losartan 50mg qd\par - encouraged patient to stay hydrated\par - patient to take precautions when changing positions, getting up from sitting position to standing\par - If symptoms don't improve after discontinuing HCTZ, will need to consider other causes of lightheadedness including mirtazapine which patient takes for her depression\par - can consider decreasing dose of mirtazapine if symptoms persist\par \par # HTN\par - dc losartan-HCTZ and start losartan\par - monitor BPs at home\par \par # T2DM\par - monitor blood glucose at home\par - continue metformin 500 BID \par \par # HCM\par - patient on aspirin, unclear why\par - no known hx of CAD or stroke\par - will need to weight risk/benefits at next visit \par \par Discussed with Dr. Perez \par  [place] Reporting?: no

## 2020-12-30 NOTE — ED PROVIDER NOTE - PHYSICAL EXAMINATION
Gen: Alert, NAD, well appearing  Head: NC, AT, PERRL, EOMI, normal lids/conjunctiva  ENT: normal hearing, patent oropharynx without erythema/exudate, uvula midline  Neck: +supple, no tenderness/meningismus/JVD, +Trachea midline  Pulm: Bilateral BS, normal resp effort, no wheeze/stridor/retractions  CV: RRR, no M/R/G, +dist pulses  Abd: soft, NT/ND, Negative Miramonte signs, +BS, no palpable masses  Mskel: no edema/erythema/cyanosis  Skin: no rash, warm/dry  Neuro: AAOx3, no apparent sensory/motor deficits, coordination intact

## 2020-12-30 NOTE — REVIEW OF SYSTEMS
[Negative] : Psychiatric [Fever] : no fever [Chills] : no chills [Fatigue] : no fatigue [Night Sweats] : no night sweats [Recent Change In Weight] : ~T no recent weight change [Headache] : no headache [Dizziness] : no dizziness [Fainting] : no fainting [Confusion] : no confusion [Memory Loss] : no memory loss [Unsteady Walking] : no ataxia [FreeTextEntry2] : +lightheadedness [de-identified] : + lightheadedness

## 2020-12-30 NOTE — ED PROVIDER NOTE - CLINICAL SUMMARY MEDICAL DECISION MAKING FREE TEXT BOX
Patient presents with shortness of breath in setting of COVID infection.  VSS, patient does not have objective hypoxia on monitor, she has been saturating 96-99% on RA.  Trop negative, EKG sinus rhythm, CTA negative for PE, patient has ground glass opacities which suggests some disease progression.  Patient advised to monitor her oxygenation, unable to provide pulse oximeter from ER as no more units are available.  Will write script for pulse ox, patient advised to continue aspirin.  Patient advised to return if her pulse ox drops or if condition worsens.  At present patient does not warrant admission to hospital.  Single trop obtained since sx present all day, no high suspicion for ACS.  Discussed results and outcome of today's visit with the patient.  Patient advised to please follow up with another healthcare provider within the next 24 hours and return to the Emergency Department for worsening symptoms or any other concerns.  Patient advised that their doctor may call  to follow up on the specific results of the tests performed today in the emergency department.   Patient appears well on discharge. Patient presents with shortness of breath in setting of COVID infection.  VSS, patient does not have objective hypoxia on monitor, she has been saturating 96-99% on RA.  Trop negative, EKG sinus rhythm, CTA negative for PE, patient has ground glass opacities which suggests some disease progression.  Patient advised to monitor her oxygenation, daughter says that family has pulse oximeter at home, patient also advised to continue aspirin.  Patient advised to return if her pulse ox drops or if condition worsens.  At present patient does not warrant admission to hospital.  Single trop obtained since sx present all day, no high suspicion for ACS.  Discussed results and outcome of today's visit with the patient.  Patient advised to please follow up with another healthcare provider within the next 24 hours and return to the Emergency Department for worsening symptoms or any other concerns.  Patient advised that their doctor may call  to follow up on the specific results of the tests performed today in the emergency department.   Patient appears well on discharge.

## 2020-12-30 NOTE — END OF VISIT
[Time Spent: ___ minutes] : I have spent [unfilled] minutes of time on the encounter. [] : Resident [FreeTextEntry3] : Pt clearly needs BP meds, would uncouple ARB-HCTZ combo to see if we can treat BP w/o orthostatic SE.

## 2020-12-30 NOTE — HISTORY OF PRESENT ILLNESS
[Home] : at home, [unfilled] , at the time of the visit. [Other Location: e.g. Home (Enter Location, City,State)___] : at [unfilled] [Verbal consent obtained from patient] : the patient, [unfilled] [Family Member] : family member [FreeTextEntry8] : 70F with PMH of Asthma, Depression, DM2, GERD, and carcinoid tumor s/p RML and RLL wedge resection, recent COVID19 infection who presents for telephonic visit for dizziness/lightheadedness. After getting discharged from the hospital in December, patient has been having dizziness and "problems with balance," especially when she gets up to walk. She denies the sensation of the room spinning. During her previous visit, there was concern that she was orthostatic so her losartan-HCTZ was discontinued. Patient was found to be hypertensive during her cardiology appointment and was restarted on the medication (about 1 week ago). Patient has been staying hydrated to help with her symptoms. She believes her lightheadedness is getting better however she is still not at baseline. When she walks she feels like she is a little unstable however doesn't feel like she is going to fall. She denies any falls. No chest pain or shortness of breath. Daughter checks her blood pressures daily and systolic is usually in the 150s. Patient denies any difficulty walking, does not require any assistance. \par \par Patient has T2DM and is on metformin. She checks her blood sugars intermittently. Right after her discharge a few weeks ago, her blood glucose was in the 200s. More recently her blood sugars are in the 140s. Her blood glucose has never been below 100s. Patient has good appetite. No fevers, chills, nausea, vomiting, abdominal pain, or diarrhea. \par

## 2020-12-30 NOTE — ED ADULT NURSE NOTE - NS ED NURSE DC INFO COMPLEXITY
Simple: Patient demonstrates quick and easy understanding/Verbalized Understanding Simple: Patient demonstrates quick and easy understanding/Patient asked questions/Verbalized Understanding

## 2020-12-30 NOTE — ED ADULT TRIAGE NOTE - CHIEF COMPLAINT QUOTE
c/o shortness of breath started tonight denies chest pain states covid + 12/6 denies fever or chills denies cough

## 2020-12-31 ENCOUNTER — APPOINTMENT (OUTPATIENT)
Dept: ENDOCRINOLOGY | Facility: CLINIC | Age: 70
End: 2020-12-31

## 2020-12-31 ENCOUNTER — NON-APPOINTMENT (OUTPATIENT)
Age: 70
End: 2020-12-31

## 2020-12-31 VITALS
HEART RATE: 98 BPM | TEMPERATURE: 99 F | RESPIRATION RATE: 18 BRPM | SYSTOLIC BLOOD PRESSURE: 163 MMHG | OXYGEN SATURATION: 96 % | DIASTOLIC BLOOD PRESSURE: 87 MMHG

## 2020-12-31 DIAGNOSIS — R42 DIZZINESS AND GIDDINESS: ICD-10-CM

## 2020-12-31 DIAGNOSIS — E11.9 TYPE 2 DIABETES MELLITUS WITHOUT COMPLICATIONS: ICD-10-CM

## 2020-12-31 NOTE — DISCUSSION/SUMMARY
[PCP: _____] : PCP: [unfilled] [FreeTextEntry1] : Patient went to the ED last night due to shortness of breath. Daughter checked her vitals and her oxygen level dropped below 92% and her HR was greater than 100. Patient continued to have SOB at rest and was sent to the ED. In the ED, patient had CTA chest which showed that she had new opacities in the lower lobes, most likely from her COVID infection earlier in the month. CTA was negative for PE. Patient's symptoms resolved and patient was sent home. Spoke to daughter today, patient's is doing okay. Daughter will continue to check oxygen levels and if they drop or patient has worsening shortness of breath, she will go to the ED.

## 2021-01-15 ENCOUNTER — APPOINTMENT (OUTPATIENT)
Dept: PULMONOLOGY | Facility: CLINIC | Age: 71
End: 2021-01-15

## 2021-01-22 ENCOUNTER — LABORATORY RESULT (OUTPATIENT)
Age: 71
End: 2021-01-22

## 2021-01-22 ENCOUNTER — APPOINTMENT (OUTPATIENT)
Dept: INTERNAL MEDICINE | Facility: CLINIC | Age: 71
End: 2021-01-22
Payer: MEDICARE

## 2021-01-22 ENCOUNTER — OUTPATIENT (OUTPATIENT)
Dept: OUTPATIENT SERVICES | Facility: HOSPITAL | Age: 71
LOS: 1 days | End: 2021-01-22
Payer: MEDICARE

## 2021-01-22 VITALS
OXYGEN SATURATION: 98 % | DIASTOLIC BLOOD PRESSURE: 96 MMHG | SYSTOLIC BLOOD PRESSURE: 158 MMHG | HEART RATE: 110 BPM | WEIGHT: 176 LBS | BODY MASS INDEX: 31.18 KG/M2

## 2021-01-22 DIAGNOSIS — I10 ESSENTIAL (PRIMARY) HYPERTENSION: ICD-10-CM

## 2021-01-22 DIAGNOSIS — Z90.710 ACQUIRED ABSENCE OF BOTH CERVIX AND UTERUS: Chronic | ICD-10-CM

## 2021-01-22 DIAGNOSIS — Z98.42 CATARACT EXTRACTION STATUS, LEFT EYE: Chronic | ICD-10-CM

## 2021-01-22 DIAGNOSIS — Z90.49 ACQUIRED ABSENCE OF OTHER SPECIFIED PARTS OF DIGESTIVE TRACT: Chronic | ICD-10-CM

## 2021-01-22 DIAGNOSIS — Z90.11 ACQUIRED ABSENCE OF RIGHT BREAST AND NIPPLE: Chronic | ICD-10-CM

## 2021-01-22 PROCEDURE — 99213 OFFICE O/P EST LOW 20 MIN: CPT | Mod: GE

## 2021-01-22 PROCEDURE — G0463: CPT

## 2021-01-22 PROCEDURE — 83036 HEMOGLOBIN GLYCOSYLATED A1C: CPT

## 2021-01-22 PROCEDURE — 36415 COLL VENOUS BLD VENIPUNCTURE: CPT

## 2021-01-22 PROCEDURE — 84443 ASSAY THYROID STIM HORMONE: CPT

## 2021-01-22 NOTE — ASSESSMENT
[FreeTextEntry1] : #Sinus tachycardia and presyncopal symptoms (Dizziness/lightheadedness) on exertion \par - repeat ECHO to assess LV function: concern for cardiac etiology of symptoms\par - TSH w/ reflex Free T4 to r/o hyperthyroidism causing symptoms\par - Pt has cardiology followup scheduled for next week\par - ED visit 12/30 with CTA chest: no PE, +mild bibasilar opacities \par - Pulmonology followup scheduled 3/2\par \par #HTN\par - BP elevated at 158/96 today\par - c/w Verapamil 240 mg ER\par - increase Losartan from 50 to 100 mg daily\par - keep log of home BP readings\par \par #HCM\par - Mammo July 2020: next due summer 2021\par - Pap: Hx of total hysterectomy \par - Flu vaccine up to date\par - Check A1c today\par - Lipids July 2020 WNL\par - CBC and BMP last month in ED WNL\par \par Case discussed with Dr. Mccall

## 2021-01-22 NOTE — HISTORY OF PRESENT ILLNESS
[de-identified] : Ms. PAULINA GAMBOA is a 70 year old female with history of Asthma, Depression, DM2, GERD, and carcinoid tumor s/p RML and RLL wedge resection, and recent COVID19 infection presenting today for followup. \par \par #Dizziness and palpitations\par - Pt reports episodes of dizziness, SOB, lightheadedness and palpitations that have worsened since having COVID\par - Episodes occur with exertion (ex: climbing stairs) and last for ~1 minute\par - Denies chest pain/pressure, syncope, HA or vertigo\par - No recent significantly elevated finger sticks or hyperglycemia symptoms\par - HCTZ stopped at recent visit, but no change in symptoms\par - Prior ECGs with sinus tachycardia. ECHO last year with mild LVH and diastolic dysfunction\par

## 2021-01-22 NOTE — REVIEW OF SYSTEMS
[Palpitations] : palpitations [Dyspnea on Exertion] : dyspnea on exertion [Dizziness] : dizziness [Negative] : Heme/Lymph [Chest Pain] : no chest pain [Leg Claudication] : no leg claudication [Lower Ext Edema] : no lower extremity edema [Orthopnea] : no orthopnea [Shortness Of Breath] : no shortness of breath [Wheezing] : no wheezing [Headache] : no headache [Cough] : no cough [Confusion] : no confusion [Fainting] : no fainting [Memory Loss] : no memory loss [Unsteady Walking] : no ataxia

## 2021-01-22 NOTE — PHYSICAL EXAM
[Normal Sclera/Conjunctiva] : normal sclera/conjunctiva [Normal] : no respiratory distress, lungs were clear to auscultation bilaterally and no accessory muscle use [Regular Rhythm] : with a regular rhythm [Normal S1, S2] : normal S1 and S2 [No Murmur] : no murmur heard [No Edema] : there was no peripheral edema [Coordination Grossly Intact] : coordination grossly intact [No Focal Deficits] : no focal deficits [de-identified] : +tachycardia

## 2021-01-23 LAB
A1C WITH ESTIMATED AVERAGE GLUCOSE RESULT: 6.9 % — HIGH (ref 4–5.6)
ESTIMATED AVERAGE GLUCOSE: 151 MG/DL — HIGH (ref 68–114)
T4 FREE+ TSH PNL SERPL: 0.57 UIU/ML — SIGNIFICANT CHANGE UP (ref 0.27–4.2)

## 2021-01-26 DIAGNOSIS — E11.9 TYPE 2 DIABETES MELLITUS WITHOUT COMPLICATIONS: ICD-10-CM

## 2021-01-26 DIAGNOSIS — R00.0 TACHYCARDIA, UNSPECIFIED: ICD-10-CM

## 2021-01-26 DIAGNOSIS — R42 DIZZINESS AND GIDDINESS: ICD-10-CM

## 2021-01-26 DIAGNOSIS — I51.7 CARDIOMEGALY: ICD-10-CM

## 2021-01-27 ENCOUNTER — APPOINTMENT (OUTPATIENT)
Dept: CARDIOLOGY | Facility: CLINIC | Age: 71
End: 2021-01-27
Payer: MEDICARE

## 2021-01-27 VITALS
SYSTOLIC BLOOD PRESSURE: 147 MMHG | HEIGHT: 63 IN | HEART RATE: 60 BPM | WEIGHT: 176 LBS | DIASTOLIC BLOOD PRESSURE: 95 MMHG | RESPIRATION RATE: 15 BRPM | BODY MASS INDEX: 31.18 KG/M2

## 2021-01-27 PROCEDURE — 99213 OFFICE O/P EST LOW 20 MIN: CPT

## 2021-01-27 PROCEDURE — 99072 ADDL SUPL MATRL&STAF TM PHE: CPT

## 2021-01-27 PROCEDURE — 93306 TTE W/DOPPLER COMPLETE: CPT

## 2021-01-27 RX ORDER — LOSARTAN POTASSIUM 100 MG/1
100 TABLET, FILM COATED ORAL DAILY
Qty: 90 | Refills: 0 | Status: COMPLETED | COMMUNITY
Start: 2020-05-13 | End: 2021-01-27

## 2021-01-28 RX ORDER — BLOOD SUGAR DIAGNOSTIC
STRIP MISCELLANEOUS
Qty: 60 | Refills: 5 | Status: COMPLETED | COMMUNITY
Start: 2020-10-27 | End: 2021-01-28

## 2021-01-28 RX ORDER — MIRTAZAPINE 30 MG/1
30 TABLET, FILM COATED ORAL
Refills: 0 | Status: COMPLETED | COMMUNITY
End: 2021-01-28

## 2021-02-04 ENCOUNTER — EMERGENCY (EMERGENCY)
Facility: HOSPITAL | Age: 71
LOS: 0 days | Discharge: HOME | End: 2021-02-05
Attending: EMERGENCY MEDICINE
Payer: MEDICARE

## 2021-02-04 VITALS
SYSTOLIC BLOOD PRESSURE: 157 MMHG | DIASTOLIC BLOOD PRESSURE: 97 MMHG | HEIGHT: 62 IN | RESPIRATION RATE: 20 BRPM | TEMPERATURE: 100 F | HEART RATE: 113 BPM | OXYGEN SATURATION: 97 %

## 2021-02-04 VITALS
DIASTOLIC BLOOD PRESSURE: 87 MMHG | TEMPERATURE: 99 F | RESPIRATION RATE: 18 BRPM | HEART RATE: 105 BPM | OXYGEN SATURATION: 98 % | SYSTOLIC BLOOD PRESSURE: 134 MMHG

## 2021-02-04 DIAGNOSIS — I49.9 CARDIAC ARRHYTHMIA, UNSPECIFIED: ICD-10-CM

## 2021-02-04 DIAGNOSIS — Z90.11 ACQUIRED ABSENCE OF RIGHT BREAST AND NIPPLE: Chronic | ICD-10-CM

## 2021-02-04 DIAGNOSIS — E66.9 OBESITY, UNSPECIFIED: ICD-10-CM

## 2021-02-04 DIAGNOSIS — R07.89 OTHER CHEST PAIN: ICD-10-CM

## 2021-02-04 DIAGNOSIS — Z79.82 LONG TERM (CURRENT) USE OF ASPIRIN: ICD-10-CM

## 2021-02-04 DIAGNOSIS — I10 ESSENTIAL (PRIMARY) HYPERTENSION: ICD-10-CM

## 2021-02-04 DIAGNOSIS — Z98.42 CATARACT EXTRACTION STATUS, LEFT EYE: Chronic | ICD-10-CM

## 2021-02-04 DIAGNOSIS — E11.9 TYPE 2 DIABETES MELLITUS WITHOUT COMPLICATIONS: ICD-10-CM

## 2021-02-04 DIAGNOSIS — Z79.84 LONG TERM (CURRENT) USE OF ORAL HYPOGLYCEMIC DRUGS: ICD-10-CM

## 2021-02-04 DIAGNOSIS — Z90.49 ACQUIRED ABSENCE OF OTHER SPECIFIED PARTS OF DIGESTIVE TRACT: Chronic | ICD-10-CM

## 2021-02-04 DIAGNOSIS — J45.909 UNSPECIFIED ASTHMA, UNCOMPLICATED: ICD-10-CM

## 2021-02-04 DIAGNOSIS — K21.9 GASTRO-ESOPHAGEAL REFLUX DISEASE WITHOUT ESOPHAGITIS: ICD-10-CM

## 2021-02-04 DIAGNOSIS — F32.9 MAJOR DEPRESSIVE DISORDER, SINGLE EPISODE, UNSPECIFIED: ICD-10-CM

## 2021-02-04 DIAGNOSIS — Z90.710 ACQUIRED ABSENCE OF BOTH CERVIX AND UTERUS: Chronic | ICD-10-CM

## 2021-02-04 DIAGNOSIS — H59.021 CATARACT (LENS) FRAGMENTS IN EYE FOLLOWING CATARACT SURGERY, RIGHT EYE: ICD-10-CM

## 2021-02-04 LAB
ALBUMIN SERPL ELPH-MCNC: 4.4 G/DL — SIGNIFICANT CHANGE UP (ref 3.3–5)
ALP SERPL-CCNC: 69 U/L — SIGNIFICANT CHANGE UP (ref 40–120)
ALT FLD-CCNC: 36 U/L — SIGNIFICANT CHANGE UP (ref 12–78)
ANION GAP SERPL CALC-SCNC: 8 MMOL/L — SIGNIFICANT CHANGE UP (ref 5–17)
ANISOCYTOSIS BLD QL: SLIGHT — SIGNIFICANT CHANGE UP
AST SERPL-CCNC: 21 U/L — SIGNIFICANT CHANGE UP (ref 15–37)
BASOPHILS # BLD AUTO: 0.03 K/UL — SIGNIFICANT CHANGE UP (ref 0–0.2)
BASOPHILS NFR BLD AUTO: 0.6 % — SIGNIFICANT CHANGE UP (ref 0–2)
BILIRUB SERPL-MCNC: 0.4 MG/DL — SIGNIFICANT CHANGE UP (ref 0.2–1.2)
BUN SERPL-MCNC: 15 MG/DL — SIGNIFICANT CHANGE UP (ref 7–23)
CALCIUM SERPL-MCNC: 9.8 MG/DL — SIGNIFICANT CHANGE UP (ref 8.5–10.1)
CHLORIDE SERPL-SCNC: 102 MMOL/L — SIGNIFICANT CHANGE UP (ref 96–108)
CK SERPL-CCNC: 159 U/L — SIGNIFICANT CHANGE UP (ref 26–192)
CO2 SERPL-SCNC: 28 MMOL/L — SIGNIFICANT CHANGE UP (ref 22–31)
CREAT SERPL-MCNC: 0.88 MG/DL — SIGNIFICANT CHANGE UP (ref 0.5–1.3)
EOSINOPHIL # BLD AUTO: 0.07 K/UL — SIGNIFICANT CHANGE UP (ref 0–0.5)
EOSINOPHIL NFR BLD AUTO: 1.3 % — SIGNIFICANT CHANGE UP (ref 0–6)
GLUCOSE SERPL-MCNC: 163 MG/DL — HIGH (ref 70–99)
HCT VFR BLD CALC: 43.7 % — SIGNIFICANT CHANGE UP (ref 34.5–45)
HGB BLD-MCNC: 13.4 G/DL — SIGNIFICANT CHANGE UP (ref 11.5–15.5)
IMM GRANULOCYTES NFR BLD AUTO: 0.2 % — SIGNIFICANT CHANGE UP (ref 0–1.5)
LYMPHOCYTES # BLD AUTO: 2.28 K/UL — SIGNIFICANT CHANGE UP (ref 1–3.3)
LYMPHOCYTES # BLD AUTO: 43.1 % — SIGNIFICANT CHANGE UP (ref 13–44)
MANUAL SMEAR VERIFICATION: SIGNIFICANT CHANGE UP
MCHC RBC-ENTMCNC: 22.1 PG — LOW (ref 27–34)
MCHC RBC-ENTMCNC: 30.7 GM/DL — LOW (ref 32–36)
MCV RBC AUTO: 72.1 FL — LOW (ref 80–100)
MICROCYTES BLD QL: SIGNIFICANT CHANGE UP
MONOCYTES # BLD AUTO: 0.55 K/UL — SIGNIFICANT CHANGE UP (ref 0–0.9)
MONOCYTES NFR BLD AUTO: 10.4 % — SIGNIFICANT CHANGE UP (ref 2–14)
NEUTROPHILS # BLD AUTO: 2.35 K/UL — SIGNIFICANT CHANGE UP (ref 1.8–7.4)
NEUTROPHILS NFR BLD AUTO: 44.4 % — SIGNIFICANT CHANGE UP (ref 43–77)
NRBC # BLD: 0 /100 WBCS — SIGNIFICANT CHANGE UP (ref 0–0)
OVALOCYTES BLD QL SMEAR: SLIGHT — SIGNIFICANT CHANGE UP
PLAT MORPH BLD: NORMAL — SIGNIFICANT CHANGE UP
PLATELET # BLD AUTO: 259 K/UL — SIGNIFICANT CHANGE UP (ref 150–400)
POTASSIUM SERPL-MCNC: 4.1 MMOL/L — SIGNIFICANT CHANGE UP (ref 3.5–5.3)
POTASSIUM SERPL-SCNC: 4.1 MMOL/L — SIGNIFICANT CHANGE UP (ref 3.5–5.3)
PROT SERPL-MCNC: 8.1 GM/DL — SIGNIFICANT CHANGE UP (ref 6–8.3)
RBC # BLD: 6.06 M/UL — HIGH (ref 3.8–5.2)
RBC # FLD: 15.7 % — HIGH (ref 10.3–14.5)
RBC BLD AUTO: ABNORMAL
SODIUM SERPL-SCNC: 138 MMOL/L — SIGNIFICANT CHANGE UP (ref 135–145)
TROPONIN I SERPL-MCNC: <.015 NG/ML — SIGNIFICANT CHANGE UP (ref 0.01–0.04)
TROPONIN I SERPL-MCNC: <.015 NG/ML — SIGNIFICANT CHANGE UP (ref 0.01–0.04)
WBC # BLD: 5.29 K/UL — SIGNIFICANT CHANGE UP (ref 3.8–10.5)
WBC # FLD AUTO: 5.29 K/UL — SIGNIFICANT CHANGE UP (ref 3.8–10.5)

## 2021-02-04 PROCEDURE — 99284 EMERGENCY DEPT VISIT MOD MDM: CPT

## 2021-02-04 PROCEDURE — 93010 ELECTROCARDIOGRAM REPORT: CPT | Mod: 76

## 2021-02-04 PROCEDURE — 71045 X-RAY EXAM CHEST 1 VIEW: CPT | Mod: 26

## 2021-02-04 NOTE — ED PROVIDER NOTE - PATIENT PORTAL LINK FT
You can access the FollowMyHealth Patient Portal offered by [facility] by registering at the following website: [email or web address]. By joining [employer]’s FollowMyHealth portal, you will also be able to view your health information using other applications (apps) compatible with our system.

## 2021-02-04 NOTE — ED PROVIDER NOTE - OBJECTIVE STATEMENT
This patient is a 70 year old woman who presents to the ER c/o intermittent left sided chest pain.  She describes the pain as an ache that lasts for a few seconds and then resolves.  She states that it is not painful.  She denies fever, cough and SOB.  Patient was seen by her cardiologist last week.  Patient reports everything was normal and she does not need to be seen again for a few months.  Last stress test as per patient was one year ago

## 2021-02-04 NOTE — ED PROVIDER NOTE - NSFOLLOWUPINSTRUCTIONS_ED_ALL_ED_FT
Chest Pain    Chest pain can be caused by many different conditions which may or may not be dangerous. Causes include heartburn, lung infections, heart attack, blood clot in lungs, skin infections, strain or damage to muscle, cartilage, or bones, etc. In addition to a history and physical examination, an electrocardiogram (ECG) or other lab tests may have been performed to determine the cause of your chest pain. Follow up with your primary care provider or with a cardiologist as instructed.     SEEK IMMEDIATE MEDICAL CARE IF YOU HAVE ANY OF THE FOLLOWING SYMPTOMS: worsening chest pain, coughing up blood, unexplained back/neck/jaw pain, severe abdominal pain, dizziness or lightheadedness, fainting, shortness of breath, sweaty or clammy skin, vomiting, or racing heart beat. These symptoms may represent a serious problem that is an emergency. Do not wait to see if the symptoms will go away. Get medical help right away. Call 911 and do not drive yourself to the hospital..    Take acetaminophen 650 mg orally every 6-8 hours for pain control as needed. Please do not exceed 4,000 mg of acetaminophen during a 24 hours period. Acetaminophen can be found in many over-the-counter cold medications as well as opioid medications that may be given for pain.    Take ibuprofen (also known as MOTRIN or ADVIL) 400 mg orally every 6-8 hours for pain control as needed with food to avoid an upset stomach. Ibuprofen can be found in many over-the-counter medications. Please do not take ibuprofen if you have a bleeding disorder, stomach or gastrointestinal ulcer, or liver disease.    If needed, you can alternate these medications so that you can take one medication every 3 hours. For example, at noon take ibuprofen, then at 3PM take acetaminophen, then at 6PM take ibuprofen.     Rest, drink plenty of fluids.  Advance activity as tolerated.  Continue all previously prescribed medications as directed.  Follow up with your PMD 2-3 days and bring copies of your results.

## 2021-02-04 NOTE — ED ADULT TRIAGE NOTE - CHIEF COMPLAINT QUOTE
pt complaining of chest pain on and off since this am. history of covid 19 in december. daughter noted pt sweating profusely at night. history of dm and htn.

## 2021-02-04 NOTE — ED ADULT NURSE NOTE - IS THE PATIENT ABLE TO BE SCREENED?
Detail Level: Detailed Quality 226: Preventive Care And Screening: Tobacco Use: Screening And Cessation Intervention: Patient screened for tobacco use and is an ex/non-smoker Yes

## 2021-02-04 NOTE — ED PROVIDER NOTE - CLINICAL SUMMARY MEDICAL DECISION MAKING FREE TEXT BOX
Patient with intermittent chest pain no acute distress, vitals stable EKG no STEMI.  CXR with no acute pathology.  Troponin negative. Last stress test one year ago.  Patient signed out to overnight physician pending 2nd set of cardiac enzymes.

## 2021-02-04 NOTE — ED PROVIDER NOTE - PROGRESS NOTE DETAILS
EMERY ARRINGTON: repeat EKG showing , , QRS 68, , LAD, unchanged from previous. CXR unremarkable delta trop pending, delta trop neg. cxr neg, dc home. I have discussed with the patient about the ED workup, lab results, diagnostics results, plan for discharge home, need for follow-up with primary care physician/specialists, and return precautions. At this time, the patient does not require further workup in the ED. The patient is subjectively feeling better and would like to be discharged home. The patient had the opportunity to ask questions and I have answered all inquiries. The patient verbalizes understanding and agreement with the plan. The patient is hemodynamically stable, clinically well-appearing, ambulatory, mentating well and ready for discharge home.

## 2021-02-04 NOTE — ED PROVIDER NOTE - DATE/TIME 2
CHF (congestive heart failure)    End stage renal failure on dialysis    Heart failure    Hyperlipemia    Hypertension    Type 2 diabetes mellitus 05-Feb-2021 00:23

## 2021-02-05 ENCOUNTER — NON-APPOINTMENT (OUTPATIENT)
Age: 71
End: 2021-02-05

## 2021-02-05 LAB
FLUAV AG NPH QL: SIGNIFICANT CHANGE UP
FLUBV AG NPH QL: SIGNIFICANT CHANGE UP
SARS-COV-2 RNA SPEC QL NAA+PROBE: SIGNIFICANT CHANGE UP

## 2021-02-08 ENCOUNTER — NON-APPOINTMENT (OUTPATIENT)
Age: 71
End: 2021-02-08

## 2021-02-08 NOTE — DISCUSSION/SUMMARY
[FreeTextEntry1] : Patient in the ED yesterday with SOB. Unable to see records,  but spoke to patient on the phone. Says she is feeling a lot better today, denies any SOB or chest pain. Has followup appointments with both pulmonary and cardiology in a few weeks. Counseled patient to return to the ED if SOB or chest pain returns.

## 2021-02-25 ENCOUNTER — NON-APPOINTMENT (OUTPATIENT)
Age: 71
End: 2021-02-25

## 2021-03-02 ENCOUNTER — NON-APPOINTMENT (OUTPATIENT)
Age: 71
End: 2021-03-02

## 2021-03-02 ENCOUNTER — APPOINTMENT (OUTPATIENT)
Dept: PULMONOLOGY | Facility: CLINIC | Age: 71
End: 2021-03-02
Payer: MEDICARE

## 2021-03-02 ENCOUNTER — APPOINTMENT (OUTPATIENT)
Dept: CARDIOLOGY | Facility: CLINIC | Age: 71
End: 2021-03-02
Payer: MEDICARE

## 2021-03-02 VITALS
RESPIRATION RATE: 16 BRPM | HEIGHT: 63 IN | DIASTOLIC BLOOD PRESSURE: 90 MMHG | WEIGHT: 175 LBS | BODY MASS INDEX: 31.01 KG/M2 | HEART RATE: 102 BPM | SYSTOLIC BLOOD PRESSURE: 145 MMHG

## 2021-03-02 VITALS
TEMPERATURE: 97.3 F | HEART RATE: 102 BPM | RESPIRATION RATE: 16 BRPM | WEIGHT: 174 LBS | BODY MASS INDEX: 30.83 KG/M2 | DIASTOLIC BLOOD PRESSURE: 86 MMHG | HEIGHT: 63 IN | SYSTOLIC BLOOD PRESSURE: 140 MMHG | OXYGEN SATURATION: 98 %

## 2021-03-02 PROCEDURE — 99214 OFFICE O/P EST MOD 30 MIN: CPT

## 2021-03-02 PROCEDURE — 99072 ADDL SUPL MATRL&STAF TM PHE: CPT

## 2021-03-02 PROCEDURE — 93000 ELECTROCARDIOGRAM COMPLETE: CPT

## 2021-03-02 RX ORDER — TELMISARTAN AND HYDROCHLOROTHIAZIDE 80; 12.5 MG/1; MG/1
80-12.5 TABLET ORAL
Qty: 90 | Refills: 1 | Status: DISCONTINUED | COMMUNITY
Start: 2021-01-27 | End: 2021-03-02

## 2021-03-02 NOTE — REVIEW OF SYSTEMS
[Negative] : Endocrine [SOB on Exertion] : sob on exertion [Nasal Congestion] : no nasal congestion [Postnasal Drip] : no postnasal drip [Sinus Problems] : no sinus problems [Wheezing] : no wheezing [Chest Discomfort] : no chest discomfort [Edema] : no edema [GERD] : no gerd [Diarrhea] : no diarrhea [Constipation] : no constipation [Dysphagia] : no dysphagia

## 2021-03-02 NOTE — PHYSICAL EXAM
[General Appearance - Well Developed] : well developed [Normal Appearance] : normal appearance [Well Groomed] : well groomed [General Appearance - Well Nourished] : well nourished [No Deformities] : no deformities [General Appearance - In No Acute Distress] : no acute distress [Normal Conjunctiva] : the conjunctiva exhibited no abnormalities [Eyelids - No Xanthelasma] : the eyelids demonstrated no xanthelasmas [Normal Oral Mucosa] : normal oral mucosa [Normal Jugular Venous A Waves Present] : normal jugular venous A waves present [Normal Jugular Venous V Waves Present] : normal jugular venous V waves present [No Jugular Venous Morejon A Waves] : no jugular venous morejon A waves [Respiration, Rhythm And Depth] : normal respiratory rhythm and effort [Exaggerated Use Of Accessory Muscles For Inspiration] : no accessory muscle use [Auscultation Breath Sounds / Voice Sounds] : lungs were clear to auscultation bilaterally [Abdomen Soft] : soft [Abdomen Tenderness] : non-tender [] : no hepato-splenomegaly [Abdomen Mass (___ Cm)] : no abdominal mass palpated [Abnormal Walk] : normal gait [Cyanosis, Localized] : no localized cyanosis [Skin Color & Pigmentation] : normal skin color and pigmentation [Skin Turgor] : normal skin turgor [No Xanthoma] : no  xanthoma was observed [Oriented To Time, Place, And Person] : oriented to person, place, and time [Affect] : the affect was normal [Mood] : the mood was normal [No Anxiety] : not feeling anxious [5th Left ICS - MCL] : palpated at the 5th LICS in the midclavicular line [Normal] : normal [No Precordial Heave] : no precordial heave was noted [Normal Rate] : normal [Rhythm Regular] : regular [Normal S1] : normal S1 [Normal S2] : normal S2 [No Gallop] : no gallop heard [I] : a grade 1 [2+] : left 2+ [No Abnormalities] : the abdominal aorta was not enlarged and no bruit was heard [No Pitting Edema] : no pitting edema present [S3] : no S3 [S4] : no S4 [Right Carotid Bruit] : no bruit heard over the right carotid [Left Carotid Bruit] : no bruit heard over the left carotid

## 2021-03-02 NOTE — ADDENDUM
[FreeTextEntry1] : Documented by Shubham Arias acting as a scribe for Dr. Gustavo Cornelius on 03/02/2021.\par \par All medical record entries made by the Scribe were at my, Dr. Gustavo Cornelius's, direction and personally dictated by me on 03/02/2021. I have reviewed the chart and agree that the record accurately reflects my personal performance of the history, physical exam, assessment and plan. I have also personally directed, reviewed, and agree with the discharge instructions.

## 2021-03-02 NOTE — ASSESSMENT
[FreeTextEntry1] : Ms. 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 She is currently with #1 issue of poor balance. #2 SOB.\par \par Ms. Najera' SOB is multifactorial due to:\par -overweight / out of shape\par -poor mechanics of breathing\par -asthma/restrictive dysfunction (s/p surgery)\par -emphysema \par -?cardiac\par \par Her chronic cough is multifactorial due to: -(active)\par -asthma (eosinophilic)\par -LPR/reflux\par \par problem 1: asthma (improved) - compliant \par -continue Ventolin rescue inhaler 2 inhalations before exercise, Q6H\par -continue Incruse 1 inhalation daily\par -continue Breo Ellipta 200 at 1 inhalation daily \par -continue Singulair 10 mg QHS \par -continue Albuterol via nebulizer PRN q6H\par -Inhaler technique reviewed as well as oral hygiene techniques reviewed with patient. Avoidance of cold air, extremes of temperature, rescue inhaler should be used before exercise. Order of medication reviewed with patient. Recommended use of a cool mist humidifier in the bedroom. \par -Asthma is believed to be caused by inherited (genetic) and environmental factor, but its exact cause is unknown. Asthma may be triggered by allergens, lung infections, or irritants in the air. Asthma triggers are different for each person\par \par problem 2: poor mechanics of breathing\par -Recommended to research the Bhargav Torresf and Navinko breathing techniques \par -Proper breathing techniques were reviewed with an emphasis of exhalation. Patient instructed to breath in for 1 second and out for four seconds. Patient was encouraged to not talk while walking. \par \par problem 3: overweight\par -Weight loss, exercise, and diet control were discussed and are highly encouraged. Treatment options were given such as, aqua therapy, and contacting a nutritionist. Recommended to use the elliptical, stationary bike, less use of treadmill. Mindful eating was explained to the patient Obesity is associated with worsening asthma, shortness of breath, and potential for cardiac disease, diabetes, and other underlying medical conditions. \par \par problem 3A: Poor Balance\par -Given prescription for gait training and balance therapy (STARS)\par \par problem 4: LPR/reflux (controlled) \par -continue Pepcid 40 mg QHS \par -continue Protonix 40 mg QD in the morning \par -Rule of 2s: avoid eating too much, eating too late, eating too spicy, eating two hours before bed\par -Things to avoid including overeating, spicy foods, tight clothing, eating within three hours of bed, this list is not all inclusive. \par -For treatment of reflux, possible options discussed including diet control, H2 blockers, PPIs, as well as coating motility agents discussed as treatment options. Timing of meals and proximity of last meal to sleep were discussed. 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 Recommended weight loss which can reduce AHI especially in weight loss of greater than 5% of BMI. Positional sleep is recommended in those with low AHI, low-moderate MBI, and younger age. For severe sleep apnea, the hypoglossal nerve stimulator was recommended as well.\par \par problem 8: eosinophilia\par -she is a candidate for Nucala injections\par -The safety and efficacy of Nucala was established in three double-blind, randomized, placebo-controlled trials in patients with severe asthma. Compared to a placebo, patients with severe asthma receiving Nucala had fewer exacerbation requiring hospitalization and/or emergency department visits, and a longer time to first exacerbation.  In addition, patients with severe asthma receiving Nucala experienced greater reductions in their daily maintenance oral corticosteroid dose, while maintaining asthma control compared with patients receiving placebo. Treatment with Nucala did not result in a significant improvement in lung function, as measured by the volume of air exhaled by patients in one second. The most common side effects include: headache, injection site reactions, back pain, weakness, and fatigue; hypersensitivity reactions can occur within hours or days including swelling of the face, mouth, and tongue, fainting, dizziness, hives, breathing problems, and rash; herpes zoster infections have occurred. The drug is a monoclonal antibody that inhibits interleukin -5 which helps regular eosinophils, a type of white blood cell that contributes to asthma. The over-production of eosinophils can cause inflammation in the lungs, increasing the frequency of asthma attacks. Patients must also take other medications, including high dose inhaled corticosteroids and at least one additional asthma drug.\par \par problem 9: low vitamin D\par -s/p blood work to include: CBC, vitamin D, LFTs, HbA1c\par -continue supplemental vitamin D 50,000 every 2 weeks (recheck)\par -Has been associated with asthma exacerbations and increased allergic symptoms. The goal based on recent information is maintaining levels between 50-70 and low normal is 30. Recommended 50,000 units every two weeks to once a month depending on the level. \par \par problem 10: anemia\par -referred to have a GI evaluation and was given the name of Dr. Liban Samuel's office  (recheck)\par \par problem 11: Health Maintenance/COVID19 Precautions:\par - Clean your hands often. Wash your hands often with soap and water for at least 20 seconds, especially after blowing your nose, coughing, or sneezing, or having been in a public place.\par - If soap and water are not available, use a hand  that contains at least 60% alcohol.\par - To the extent possible, avoid touching high-touch surfaces in public places - elevator buttons, door handles, handrails, handshaking with people, etc. Use a tissue or your sleeve to cover your hand or finger if you must touch something.\par - Wash your hands after touching surfaces in public places.\par - Avoid touching your face, nose, eyes, etc.\par - Clean and disinfect your home to remove germs: practice routine cleaning of frequently touched surfaces (for example: tables, doorknobs, light switches, handles, desks, toilets, faucets, sinks & cell phones)\par - Avoid crowds, especially in poorly ventilated spaces. Your risk of exposure to respiratory viruses like COVID-19 may increase in crowded, closed-in settings with little air circulation if there are people in the crowd who are sick. All patients are recommended to practice social distancing and stay at least 6 feet away from others.\par - Avoid all non-essential travel including plane trips, and especially avoid embarking on cruise ships.\par -If COVID-19 is spreading in your community, take extra measures to put distance between yourself and other people to further reduce your risk of being exposed to this new virus.\par -Stay home as much as possible.\par - Consider ways of getting food brought to your house through family, social, or commercial networks\par -Be aware that the virus has been known to live in the air up to 3 hours post exposure, cardboard up to 24 hours post exposure, copper up to 4 hours post exposure, steel and plastic up to 2-3 days post exposure. Risk of transmission from these surfaces are affected by many variables.\par Immune Support Recommendations:\par -OTC Vitamin C 500mg BID \par -OTC Quercetin 250-500mg BID \par -OTC Zinc 75-100mg per day \par -OTC Melatonin 1 or 2 mg a night \par -OTC Vitamin D 1-4000mg per day \par -OTC Tonic Water 8oz per day\par Asthma and COVID19:\par You need to make sure your asthma is under control. This often requires the use of inhaled corticosteroids (and sometimes oral corticosteroids). Inhaled corticosteroids do not likely reduce your immune system’s ability to fight infections, but oral corticosteroids may. It is important to use the steps above to protect yourself to limit your exposure to any respiratory virus. \par \par Problem 12: health maintenance\par -s/p influenza vaccine 2020\par -recommended strep pneumonia vaccines after age 65: Prevnar-13 vaccine, followed by Pneumo vaccine 23 one year following (completed) \par -recommended early intervention for URIs\par -recommended regular osteoporosis evaluations\par -recommended early dermatological evaluations\par -recommended after the age of 50 to the age of 70, colonoscopy every 5 years \par \par F/U in 4 months \par She is encouraged to call with any questions, changes, or concerns.

## 2021-03-02 NOTE — REASON FOR VISIT
[Follow-Up - Clinic] : a clinic follow-up of [Dizziness] : dizziness [Hypertension] : hypertension [Palpitations] : palpitations [FreeTextEntry1] : This is a 70 year year old female here today for follow up cardiac evaluation. \par She has a past medical history significant for  hypertension, asthma, history of bronchitis, non-insulin-dependent diabetes mellitus, hyperlipidemia, status post right knee replacement and COVID in December 2020.\par \par Today she is feeling generally well and does not have any complaints at this time. She is here for BP Check.\par \par She denies fever, chills, weight loss, malaise, rash, alteration bowel habits, weakness, abdominal  pain, bloating, changes in urination, visual disturbances, chest pain, headaches, dizziness, heart palpitations, recent episodes of syncope or falls at this time.\par \par

## 2021-03-02 NOTE — DISCUSSION/SUMMARY
[FreeTextEntry1] : Dr. Hensley-(PRIOR VISIT and PMH WITH Dr. Hensley): \par This is a 70-year-old female with past medical history significant for hypertension, asthma, history of bronchitis, non-insulin-dependent diabetes mellitus, hyperlipidemia, status post right knee replacement, who comes in for cardiac follow-up evaluation.  She denies chest pain, shortness of breath, dizziness or syncope.\par \par The patient's blood pressure remains elevated.  She will discontinue her losartan which is listed as 100 mg on the intake sheet, but there is a record that she was on losartan 50/12.5 mg/day.\par Either way losartan in any form is not working for her.  I have written instructions down for her to discontinue her losartan, and start telmisartan hydrochlorothiazide 80/12.5 mg/day.\par \par She understands she must maintain good hydration.  She will follow-up in 1 month to reevaluate her blood pressure.  If her blood pressure remains elevated, consideration can be given to the addition of a medication such as labetalol.\par \par Part of the issue here is the patient is unsure of her list of medications and will confirm that.  She has verapamil  mg on her list as well.\par Blood work will be done today for SMA-20, lipid panel, and TSH and electrolytes.\par Given her diabetic state her LDL cholesterol target should be less than 70 mg/dL.\par She was born in [facility] and has no history of rheumatic fever. \par \par PMH:\par  Her medications they are verapamil 240 mg in the evening Hyzaar 50/12.5 mg in the morning, Protonix 40 mg, metformin 500 mg once per day Pepcid 40 mg, Singulair, in addition to her other pulmonary medications.\par \par An echo Doppler examination done May 14, 2020 by another cardiologist which demonstrated mild left ventricular hypertrophy, normal left ventricular function with an estimated ejection fraction of 55 to 60%, and trivial pericardial effusion.  There was mild tricuspid valve regurgitation, calcified mitral valve annulus and thickened mitral valve leaflets.\par \par The patient's daughter reports that she had a normal stress echocardiogram April 26, 2017.\par Electrocardiogram done June 12, 2020 demonstrated normal sinus rhythm rate of 82 bpm is otherwise remarkable left atrial abnormality, and for poor R wave progression.\par \par She has an appointment with an endocrinologist regarding her diabetic care and will follow-up with Dr. Cornelius of pulmonary medicine.\par \par The patient understands that aerobic exercises must be increased to 40 minutes 4 times per week. A detailed discussion of lifestyle modification was done today. The patient has a good understanding of the diagnosis, and treatment plan. Lifestyle modification was also outlined.\par

## 2021-03-02 NOTE — ASSESSMENT
[FreeTextEntry1] : This is a 70 year year old female here today for follow up cardiac evaluation. \par She has a past medical history significant for  hypertension, asthma, history of bronchitis, non-insulin-dependent diabetes mellitus, hyperlipidemia, status post right knee replacement and COVID in December 2020.\par \par Today she is feeling generally well and does not have any complaints at this time. She is here for BP Check.\par \par -Pt is stable from a cardiac standpoint and does not have any complaints at this time. She states that since her COVID infection she has always had an elevated HR. She is following up with Dr. Cornelius her pulmonologist later on today.\par \par -Cardiac risk factors include HTN, HLD, DM.\par \par BLOOD PRESSURE:\par -BP remains elevated on today's exam. She is on Telmisartan HCTZ 810/12.5mg PO DAILY and on Verapamil 240mg PO DAILY. She states that her insurance does not cover Telmisartan. We will place her now on Olmesartan HCTZ 40/12.5mg PO DAILY and will also place her on Labetalol 100mg PO BID. \par \par -I have discussed the importance of maintaining good BP control and reviewed the newest guidelines with the patient while re-enforcing dietary sodium restrictions to no more than 2-3 g daily, DASH diet, life style modifications as well as the goal of maintaining ideal body weight with the patient today. I have advised the patient to avoid the use of over-the-counter medications/ supplements especially NSAIDS.\par \par I have reviewed with Ms. GAMBOA that serious health consequences can occur when blood pressure is not well controlled and the need for strict compliance with medication and that optimal control can significantly reduce the risk of cardiovascular disease stroke, heart attack and other organ damage. They verbally expressed understanding of the fore mentioned serious health consequences to me today.\par \par BLOOD WORK:\par -New blood work was done Jan 27 2021 and demonstrated normal lipid panel. She is on Atorvastatin 20mg PO DAILY. \par \par CHOLESTEROL CONTROL:\par -Patient will continue the advised  TLC diet and to continue follow-up for treatment of hyperlipidemia and repeat blood testing with diet and exercise. I have discussed different exercises and the importance of maintenance of optimal body weight. The importance of staying within guidelines and recommendations was stressed to the patient today and they acknowledged that they understand this to me verbally.\par \par  -Ms. GAMBOA was educated and advised that failure to follow-up with my medical recommendations to lower cholesterol can result in severe health consequences therefore, they will continuing a low saturated and low fat diet and to avoid excessive carbohydrates to help reduce triglycerides and that lowering LDL levels is associated with a significant decrease in serious cardiac events including but not limited to heart attack stroke and overall death. We will continue lipid lowering agents as advised based on blood test results and the patient understands to call if they develop severe muscle discomfort or if they have a reddish tinted discoloration in their urine.\par \par DIABETIC CONTROL:\par The patient's blood glucose consistent with their diabetes. A1C is reported to be 6.7. She does have an endocrinologist.\par \par I will advise the patient to keep try to stay on a low carbohydrate diet lose weight and exercise to reduce spikes in their blood sugar.  The importance of monitoring blood sugar regularly and HgBA1c level were reviewed. I have also discussed annual eye exams to prevent blindness,  monitoring urine microalbumin regularly to check for kidney damage and the importance of proper foot care and regularly checking feet to prevent sores and possibly loss of limbs was reviewed. \par \par TESTING/REPORTS:\par -EKG done Mar 02, 2021 which demonstrated regular sinus tachycardia rhythm with nonspecific ST-T wave changes, BPM of 102.\par \par -The patient had an echocardiogram done on 1/27/2021 demonstrated mild mitral, aortic, tricuspid and pulmonic regurgitation with normal left ventricular systolic function.\par -EF on echo reported to be 65%.\par \par -An echo Doppler examination done May 14, 2020 by another cardiologist which demonstrated mild left ventricular hypertrophy, normal left ventricular function with an estimated ejection fraction of 55 to 60%, and trivial pericardial effusion.  There was mild tricuspid valve regurgitation, calcified mitral valve annulus and thickened mitral valve leaflets.\par \par -The patient's daughter reports that she had a normal stress echocardiogram April 26, 2017.\par \par PLAN:\par -The patient will follow up with her pulmonologist.\par -She will start Labetalol 100mg PO BID.\par -She will start Olmesartan HCTZ 40/12.5 in place of Telmisartan HCTZ 80/12.5.\par \par I have discussed the plan of care with Ms. PAULINA GAMBOA  and she  will follow up in 1 month. She is compliant with all of her medications.\par \par The patient understands that aerobic exercises must be increased to minutes 4 times/week and a detailed discussion of lifestyle modification was done today. \par The patient has a good understanding of the diagnosis, treatment plan and lifestyle modification. \par She will contact me at the office for any questions with their care or any changes in their health status.\par \par The plan of care was discussed with supervising physician, Dr. Hensley while present in the office at the time of the visit. \par \par Eren ABARCA

## 2021-03-02 NOTE — HISTORY OF PRESENT ILLNESS
[FreeTextEntry1] : Ms. Najera is a 70 year old female with a history of abnormal chest CT, asthma, carcinoid tumor of lung, chronic GERD, eosinophilic asthma, low vitamin D, snoring, OW, and SOB presenting to the office today for a follow up visit. Her chief complaint is \par -she reports feeling generally well\par -she states she is s/p the first Covid vaccine on 2.22.21\par -she reports her energy level is okay, sometimes fluctuates\par -she reports her sleep could be better. She goes to bed early, but has difficulty falling asleep\par -she notes her breathing is affected when she has pain\par -she notes she rarely coughs\par -she states she has some SOB when walking up and down stairs\par -she reports her balance is poor\par -she states her sinuses are clear\par -she notes her weight is stable, but her appetite is poor. She has at least 3 meals per day\par -she notes her SOB remains the same\par -she had to stop her balance therapy due to the pandemic\par -she denies any chest pain, chest pressure, diarrhea, constipation, dysphagia, dizziness, sour taste in the mouth, leg swelling, leg pain, heartburn, reflux, myalgias or arthralgias.

## 2021-03-02 NOTE — REASON FOR VISIT
[Follow-Up] : a follow-up visit [Family Member] : family member [Other: _____] : [unfilled] [FreeTextEntry1] : abnormal chest CT, asthma, carcinoid tumor of lung, chronic GERD, eosinophilic asthma, low vitamin D, snoring, OW, and SOB

## 2021-03-02 NOTE — PHYSICAL EXAM
[General Appearance - Well Developed] : well developed [Normal Appearance] : normal appearance [Well Groomed] : well groomed [General Appearance - Well Nourished] : well nourished [No Deformities] : no deformities [General Appearance - In No Acute Distress] : no acute distress [Normal Conjunctiva] : the conjunctiva exhibited no abnormalities [Eyelids - No Xanthelasma] : the eyelids demonstrated no xanthelasmas [Normal Oropharynx] : normal oropharynx [III] : III [Neck Appearance] : the appearance of the neck was normal [Neck Cervical Mass (___cm)] : no neck mass was observed [Jugular Venous Distention Increased] : there was no jugular-venous distention [Thyroid Diffuse Enlargement] : the thyroid was not enlarged [Thyroid Nodule] : there were no palpable thyroid nodules [Heart Rate And Rhythm] : heart rate and rhythm were normal [Heart Sounds] : normal S1 and S2 [Murmurs] : no murmurs present [Respiration, Rhythm And Depth] : normal respiratory rhythm and effort [Exaggerated Use Of Accessory Muscles For Inspiration] : no accessory muscle use [Auscultation Breath Sounds / Voice Sounds] : lungs were clear to auscultation bilaterally [Abdomen Soft] : soft [Abdomen Tenderness] : non-tender [Abdomen Mass (___ Cm)] : no abdominal mass palpated [Abnormal Walk] : normal gait [Gait - Sufficient For Exercise Testing] : the gait was sufficient for exercise testing [Nail Clubbing] : no clubbing of the fingernails [Cyanosis, Localized] : no localized cyanosis [Petechial Hemorrhages (___cm)] : no petechial hemorrhages [Skin Color & Pigmentation] : normal skin color and pigmentation [] : no rash [No Venous Stasis] : no venous stasis [Skin Lesions] : no skin lesions [No Skin Ulcers] : no skin ulcer [No Xanthoma] : no  xanthoma was observed [Deep Tendon Reflexes (DTR)] : deep tendon reflexes were 2+ and symmetric [Sensation] : the sensory exam was normal to light touch and pinprick [No Focal Deficits] : no focal deficits [Oriented To Time, Place, And Person] : oriented to person, place, and time [Impaired Insight] : insight and judgment were intact [Affect] : the affect was normal [FreeTextEntry1] : I:E ratio 1:3; clear

## 2021-03-03 RX ORDER — LORAZEPAM 0.5 MG/1
0.5 TABLET ORAL
Qty: 60 | Refills: 0 | Status: COMPLETED | COMMUNITY
Start: 2020-06-06 | End: 2021-03-03

## 2021-03-08 ENCOUNTER — APPOINTMENT (OUTPATIENT)
Dept: INTERNAL MEDICINE | Facility: CLINIC | Age: 71
End: 2021-03-08
Payer: OTHER GOVERNMENT

## 2021-03-08 VITALS — WEIGHT: 174 LBS | HEIGHT: 62 IN | BODY MASS INDEX: 32.02 KG/M2

## 2021-03-08 DIAGNOSIS — Z00.00 ENCOUNTER FOR GENERAL ADULT MEDICAL EXAMINATION W/OUT ABNORMAL FINDINGS: ICD-10-CM

## 2021-03-08 PROCEDURE — 99397 PER PM REEVAL EST PAT 65+ YR: CPT

## 2021-03-21 ENCOUNTER — INPATIENT (INPATIENT)
Facility: HOSPITAL | Age: 71
LOS: 1 days | Discharge: HOME | DRG: 204 | End: 2021-03-23
Attending: HOSPITALIST | Admitting: STUDENT IN AN ORGANIZED HEALTH CARE EDUCATION/TRAINING PROGRAM
Payer: MEDICARE

## 2021-03-21 VITALS
WEIGHT: 117.07 LBS | OXYGEN SATURATION: 96 % | SYSTOLIC BLOOD PRESSURE: 165 MMHG | HEIGHT: 62 IN | HEART RATE: 92 BPM | TEMPERATURE: 98 F | DIASTOLIC BLOOD PRESSURE: 93 MMHG | RESPIRATION RATE: 19 BRPM

## 2021-03-21 DIAGNOSIS — Z98.42 CATARACT EXTRACTION STATUS, LEFT EYE: Chronic | ICD-10-CM

## 2021-03-21 DIAGNOSIS — Z90.710 ACQUIRED ABSENCE OF BOTH CERVIX AND UTERUS: Chronic | ICD-10-CM

## 2021-03-21 DIAGNOSIS — Z90.49 ACQUIRED ABSENCE OF OTHER SPECIFIED PARTS OF DIGESTIVE TRACT: Chronic | ICD-10-CM

## 2021-03-21 DIAGNOSIS — Z90.11 ACQUIRED ABSENCE OF RIGHT BREAST AND NIPPLE: Chronic | ICD-10-CM

## 2021-03-21 LAB
GAS PNL BLDV: SIGNIFICANT CHANGE UP
HCT VFR BLD CALC: 39.7 % — SIGNIFICANT CHANGE UP (ref 34.5–45)
HGB BLD-MCNC: 12.2 G/DL — SIGNIFICANT CHANGE UP (ref 11.5–15.5)
MCHC RBC-ENTMCNC: 22.3 PG — LOW (ref 27–34)
MCHC RBC-ENTMCNC: 30.7 GM/DL — LOW (ref 32–36)
MCV RBC AUTO: 72.6 FL — LOW (ref 80–100)
PLATELET # BLD AUTO: 229 K/UL — SIGNIFICANT CHANGE UP (ref 150–400)
RBC # BLD: 5.47 M/UL — HIGH (ref 3.8–5.2)
RBC # FLD: 15 % — HIGH (ref 10.3–14.5)
WBC # BLD: 3.85 K/UL — SIGNIFICANT CHANGE UP (ref 3.8–10.5)
WBC # FLD AUTO: 3.85 K/UL — SIGNIFICANT CHANGE UP (ref 3.8–10.5)

## 2021-03-21 PROCEDURE — 71045 X-RAY EXAM CHEST 1 VIEW: CPT | Mod: 26

## 2021-03-21 NOTE — ED ADULT NURSE NOTE - OBJECTIVE STATEMENT
69y/o F coming to the ED c/o SOB. Pt states that over the past month she has been experiencing SOB worsening on exertion a.w dizziness. Pt denies any CP/SOB/Fever/Chills/N/V/D. Pt states she has COVID a few months ago but since has tested negative. Pt denies any leg swelling. On exam pt is breathing spontaneously, able to speak full sentences w/o difficulty, saturating 100% RA. Heart monitor placed, NSR. EKG completed. Abdomen soft, nontender, nondistended. IV placed, labs collected and sent. Pt given call bell and educated on how to use.

## 2021-03-22 DIAGNOSIS — Z29.9 ENCOUNTER FOR PROPHYLACTIC MEASURES, UNSPECIFIED: ICD-10-CM

## 2021-03-22 DIAGNOSIS — R06.02 SHORTNESS OF BREATH: ICD-10-CM

## 2021-03-22 DIAGNOSIS — I10 ESSENTIAL (PRIMARY) HYPERTENSION: ICD-10-CM

## 2021-03-22 DIAGNOSIS — E11.9 TYPE 2 DIABETES MELLITUS WITHOUT COMPLICATIONS: ICD-10-CM

## 2021-03-22 DIAGNOSIS — J45.20 MILD INTERMITTENT ASTHMA, UNCOMPLICATED: ICD-10-CM

## 2021-03-22 DIAGNOSIS — E78.49 OTHER HYPERLIPIDEMIA: ICD-10-CM

## 2021-03-22 DIAGNOSIS — F32.9 MAJOR DEPRESSIVE DISORDER, SINGLE EPISODE, UNSPECIFIED: ICD-10-CM

## 2021-03-22 DIAGNOSIS — E21.0 PRIMARY HYPERPARATHYROIDISM: ICD-10-CM

## 2021-03-22 LAB
A1C WITH ESTIMATED AVERAGE GLUCOSE RESULT: 7.1 % — HIGH (ref 4–5.6)
ALBUMIN SERPL ELPH-MCNC: 4.4 G/DL — SIGNIFICANT CHANGE UP (ref 3.3–5)
ALP SERPL-CCNC: 67 U/L — SIGNIFICANT CHANGE UP (ref 40–120)
ALT FLD-CCNC: 28 U/L — SIGNIFICANT CHANGE UP (ref 10–45)
ANION GAP SERPL CALC-SCNC: 13 MMOL/L — SIGNIFICANT CHANGE UP (ref 5–17)
AST SERPL-CCNC: 33 U/L — SIGNIFICANT CHANGE UP (ref 10–40)
BASOPHILS # BLD AUTO: 0.02 K/UL — SIGNIFICANT CHANGE UP (ref 0–0.2)
BASOPHILS NFR BLD AUTO: 0.5 % — SIGNIFICANT CHANGE UP (ref 0–2)
BILIRUB SERPL-MCNC: 0.4 MG/DL — SIGNIFICANT CHANGE UP (ref 0.2–1.2)
BUN SERPL-MCNC: 10 MG/DL — SIGNIFICANT CHANGE UP (ref 7–23)
CALCIUM SERPL-MCNC: 9.6 MG/DL — SIGNIFICANT CHANGE UP (ref 8.4–10.5)
CHLORIDE SERPL-SCNC: 103 MMOL/L — SIGNIFICANT CHANGE UP (ref 96–108)
CHOLEST SERPL-MCNC: 116 MG/DL — SIGNIFICANT CHANGE UP
CO2 SERPL-SCNC: 24 MMOL/L — SIGNIFICANT CHANGE UP (ref 22–31)
CREAT SERPL-MCNC: 0.71 MG/DL — SIGNIFICANT CHANGE UP (ref 0.5–1.3)
EOSINOPHIL # BLD AUTO: 0.07 K/UL — SIGNIFICANT CHANGE UP (ref 0–0.5)
EOSINOPHIL NFR BLD AUTO: 1.8 % — SIGNIFICANT CHANGE UP (ref 0–6)
ESTIMATED AVERAGE GLUCOSE: 157 MG/DL — HIGH (ref 68–114)
GLUCOSE BLDC GLUCOMTR-MCNC: 108 MG/DL — HIGH (ref 70–99)
GLUCOSE BLDC GLUCOMTR-MCNC: 128 MG/DL — HIGH (ref 70–99)
GLUCOSE SERPL-MCNC: 154 MG/DL — HIGH (ref 70–99)
HDLC SERPL-MCNC: 52 MG/DL — SIGNIFICANT CHANGE UP
LIPID PNL WITH DIRECT LDL SERPL: 55 MG/DL — SIGNIFICANT CHANGE UP
LYMPHOCYTES # BLD AUTO: 1.73 K/UL — SIGNIFICANT CHANGE UP (ref 1–3.3)
LYMPHOCYTES # BLD AUTO: 44.9 % — HIGH (ref 13–44)
MONOCYTES # BLD AUTO: 0.33 K/UL — SIGNIFICANT CHANGE UP (ref 0–0.9)
MONOCYTES NFR BLD AUTO: 8.6 % — SIGNIFICANT CHANGE UP (ref 2–14)
NEUTROPHILS # BLD AUTO: 1.7 K/UL — LOW (ref 1.8–7.4)
NEUTROPHILS NFR BLD AUTO: 44.2 % — SIGNIFICANT CHANGE UP (ref 43–77)
NON HDL CHOLESTEROL: 64 MG/DL — SIGNIFICANT CHANGE UP
NRBC # BLD: 0 /100 WBCS — SIGNIFICANT CHANGE UP (ref 0–0)
NT-PROBNP SERPL-SCNC: 57 PG/ML — SIGNIFICANT CHANGE UP (ref 0–300)
POTASSIUM SERPL-MCNC: 4.4 MMOL/L — SIGNIFICANT CHANGE UP (ref 3.5–5.3)
POTASSIUM SERPL-SCNC: 4.4 MMOL/L — SIGNIFICANT CHANGE UP (ref 3.5–5.3)
PROT SERPL-MCNC: 7.3 G/DL — SIGNIFICANT CHANGE UP (ref 6–8.3)
SARS-COV-2 RNA SPEC QL NAA+PROBE: SIGNIFICANT CHANGE UP
SODIUM SERPL-SCNC: 140 MMOL/L — SIGNIFICANT CHANGE UP (ref 135–145)
TRIGL SERPL-MCNC: 44 MG/DL — SIGNIFICANT CHANGE UP
TROPONIN T, HIGH SENSITIVITY RESULT: 11 NG/L — SIGNIFICANT CHANGE UP (ref 0–51)
TROPONIN T, HIGH SENSITIVITY RESULT: 13 NG/L — SIGNIFICANT CHANGE UP (ref 0–51)

## 2021-03-22 PROCEDURE — G1004: CPT

## 2021-03-22 PROCEDURE — 93306 TTE W/DOPPLER COMPLETE: CPT | Mod: 26

## 2021-03-22 PROCEDURE — 99223 1ST HOSP IP/OBS HIGH 75: CPT

## 2021-03-22 PROCEDURE — 71275 CT ANGIOGRAPHY CHEST: CPT | Mod: 26,ME

## 2021-03-22 RX ORDER — ALBUTEROL 90 UG/1
2 AEROSOL, METERED ORAL EVERY 6 HOURS
Refills: 0 | Status: DISCONTINUED | OUTPATIENT
Start: 2021-03-22 | End: 2021-03-23

## 2021-03-22 RX ORDER — DEXTROSE 50 % IN WATER 50 %
25 SYRINGE (ML) INTRAVENOUS ONCE
Refills: 0 | Status: DISCONTINUED | OUTPATIENT
Start: 2021-03-22 | End: 2021-03-23

## 2021-03-22 RX ORDER — ASPIRIN/CALCIUM CARB/MAGNESIUM 324 MG
81 TABLET ORAL DAILY
Refills: 0 | Status: DISCONTINUED | OUTPATIENT
Start: 2021-03-22 | End: 2021-03-23

## 2021-03-22 RX ORDER — DEXTROSE 50 % IN WATER 50 %
15 SYRINGE (ML) INTRAVENOUS ONCE
Refills: 0 | Status: DISCONTINUED | OUTPATIENT
Start: 2021-03-22 | End: 2021-03-23

## 2021-03-22 RX ORDER — INSULIN LISPRO 100/ML
VIAL (ML) SUBCUTANEOUS AT BEDTIME
Refills: 0 | Status: DISCONTINUED | OUTPATIENT
Start: 2021-03-22 | End: 2021-03-23

## 2021-03-22 RX ORDER — CINACALCET 30 MG/1
30 TABLET, FILM COATED ORAL DAILY
Refills: 0 | Status: DISCONTINUED | OUTPATIENT
Start: 2021-03-22 | End: 2021-03-23

## 2021-03-22 RX ORDER — GLUCAGON INJECTION, SOLUTION 0.5 MG/.1ML
1 INJECTION, SOLUTION SUBCUTANEOUS ONCE
Refills: 0 | Status: DISCONTINUED | OUTPATIENT
Start: 2021-03-22 | End: 2021-03-23

## 2021-03-22 RX ORDER — PANTOPRAZOLE SODIUM 20 MG/1
40 TABLET, DELAYED RELEASE ORAL
Refills: 0 | Status: DISCONTINUED | OUTPATIENT
Start: 2021-03-22 | End: 2021-03-23

## 2021-03-22 RX ORDER — PANTOPRAZOLE SODIUM 20 MG/1
1 TABLET, DELAYED RELEASE ORAL
Qty: 0 | Refills: 0 | DISCHARGE

## 2021-03-22 RX ORDER — DEXTROSE 50 % IN WATER 50 %
12.5 SYRINGE (ML) INTRAVENOUS ONCE
Refills: 0 | Status: DISCONTINUED | OUTPATIENT
Start: 2021-03-22 | End: 2021-03-23

## 2021-03-22 RX ORDER — SODIUM CHLORIDE 9 MG/ML
1000 INJECTION, SOLUTION INTRAVENOUS
Refills: 0 | Status: DISCONTINUED | OUTPATIENT
Start: 2021-03-22 | End: 2021-03-23

## 2021-03-22 RX ORDER — ATORVASTATIN CALCIUM 80 MG/1
20 TABLET, FILM COATED ORAL AT BEDTIME
Refills: 0 | Status: DISCONTINUED | OUTPATIENT
Start: 2021-03-22 | End: 2021-03-23

## 2021-03-22 RX ORDER — BUDESONIDE AND FORMOTEROL FUMARATE DIHYDRATE 160; 4.5 UG/1; UG/1
2 AEROSOL RESPIRATORY (INHALATION)
Refills: 0 | Status: DISCONTINUED | OUTPATIENT
Start: 2021-03-22 | End: 2021-03-23

## 2021-03-22 RX ORDER — MONTELUKAST 4 MG/1
10 TABLET, CHEWABLE ORAL DAILY
Refills: 0 | Status: DISCONTINUED | OUTPATIENT
Start: 2021-03-22 | End: 2021-03-23

## 2021-03-22 RX ORDER — MIRTAZAPINE 45 MG/1
1 TABLET, ORALLY DISINTEGRATING ORAL
Qty: 0 | Refills: 0 | DISCHARGE

## 2021-03-22 RX ORDER — INSULIN LISPRO 100/ML
VIAL (ML) SUBCUTANEOUS
Refills: 0 | Status: DISCONTINUED | OUTPATIENT
Start: 2021-03-22 | End: 2021-03-23

## 2021-03-22 RX ORDER — LOSARTAN POTASSIUM 100 MG/1
25 TABLET, FILM COATED ORAL DAILY
Refills: 0 | Status: DISCONTINUED | OUTPATIENT
Start: 2021-03-22 | End: 2021-03-23

## 2021-03-22 RX ORDER — ACETAMINOPHEN 500 MG
650 TABLET ORAL EVERY 4 HOURS
Refills: 0 | Status: DISCONTINUED | OUTPATIENT
Start: 2021-03-22 | End: 2021-03-23

## 2021-03-22 RX ORDER — ARIPIPRAZOLE 15 MG/1
1 TABLET ORAL
Qty: 0 | Refills: 0 | DISCHARGE

## 2021-03-22 RX ADMIN — MONTELUKAST 10 MILLIGRAM(S): 4 TABLET, CHEWABLE ORAL at 18:01

## 2021-03-22 RX ADMIN — Medication 10 MILLIGRAM(S): at 18:01

## 2021-03-22 RX ADMIN — LOSARTAN POTASSIUM 25 MILLIGRAM(S): 100 TABLET, FILM COATED ORAL at 17:59

## 2021-03-22 RX ADMIN — Medication 81 MILLIGRAM(S): at 17:59

## 2021-03-22 RX ADMIN — CINACALCET 30 MILLIGRAM(S): 30 TABLET, FILM COATED ORAL at 18:00

## 2021-03-22 RX ADMIN — BUDESONIDE AND FORMOTEROL FUMARATE DIHYDRATE 2 PUFF(S): 160; 4.5 AEROSOL RESPIRATORY (INHALATION) at 18:01

## 2021-03-22 RX ADMIN — ATORVASTATIN CALCIUM 20 MILLIGRAM(S): 80 TABLET, FILM COATED ORAL at 21:48

## 2021-03-22 RX ADMIN — PANTOPRAZOLE SODIUM 40 MILLIGRAM(S): 20 TABLET, DELAYED RELEASE ORAL at 18:00

## 2021-03-22 NOTE — ED CDU PROVIDER INITIAL DAY NOTE - PHYSICAL EXAMINATION
Const: Well-nourished, Well-developed, appearing stated age  Head: atraumatic, normocephalic  Eyes: no conjunctival injection and no scleral icterus  ENMT: Atraumatic external nose and ears, Moist mucus membranes  Neck: Symmetric, trachea midline,  CVS: +S1/S2, dorsalis pedis/radial pulse 2+ bilaterally  RESP: Unlabored respiratory effort, Clear to auscultation bilaterally  GI: Nontender/Nondistended, soft abdomen  MSK: Extremities w/o deformity or ttp   Skin: Warm, Dry w/ no rashes  Neuro: GCS=15, CNs II-XII grossly intact, motor in all 4 extremities equal, Sensation grossly intact

## 2021-03-22 NOTE — H&P ADULT - ASSESSMENT
69 yo female w/pmh asthma, HTN, HLD, diabetes, primary hyperparathyroidism, presents to [facility] for cc worsening dyspnea on exertion for the last week. CT angio of chest was clear. TTE shows normal EF, small pericardial effusion. Admitted for cardiac stress test.

## 2021-03-22 NOTE — ED CDU PROVIDER DISPOSITION NOTE - CLINICAL COURSE
70 F w/ PMH HTN, asthma, bronchitis, noninsulin dependent DM, HLD, s/p R knee replacement, prior covid infection in dec 2020, presents to the ER w/ sob for 1 week, symptoms worse w/ ambulation. pt lives w/ her daughter, reports no family members have seen her having a sob episode.  no cp, no lightheadedness no dizziness, no nausea no vomiting. no PND, no edema in the lower extremities. Pt w/ no fevers, no chills.  In ED, patient had ekg no signs of acute ischemia, troponin 11, chest x ray no signs of acute pathology, negative COVID. CT angio chest was negative for pulmonary embolus. Pt sent to CDU for frequent reeval, vitals q 4hrs, telemetry monitoring and TTE in am. 70 F w/ PMH HTN, asthma, bronchitis, noninsulin dependent DM, HLD, s/p R knee replacement, prior covid infection in dec 2020, presents to the ER w/ sob for 1 week, symptoms worse w/ ambulation. pt lives w/ her daughter, reports no family members have seen her having a sob episode.  no cp, no lightheadedness no dizziness, no nausea no vomiting. no PND, no edema in the lower extremities. Pt w/ no fevers, no chills.  In ED, patient had ekg no signs of acute ischemia, troponin 11, chest x ray no signs of acute pathology, negative COVID. CT angio chest was negative for pulmonary embolus. Pt sent to CDU for frequent reeval, vitals q 4hrs, telemetry monitoring and TTE in am. TTE returned with "Normal left ventricular systolic function. No segmental wall motion abnormalities.  Small pericardial effusion adjacent to the right atrium; in other areas effusion is minimal." Patient seen by Dr. Nolasco NP this am as cards covering for the day (cdu did not call) and recommended admission for further work-up. After conversation w/ pt daughter informed pt sees Stewart Hensley of cards. Dr. Diallo made aware and states he will write initial day note and recs and will speak to house cards about transferring care. Pt to be admitted to hospitalist serve. Discussed w/ Dr. Martel who agrees w/ plan

## 2021-03-22 NOTE — H&P ADULT - NSHPREVIEWOFSYSTEMS_GEN_ALL_CORE
REVIEW OF SYSTEMS:    CONSTITUTIONAL: No weakness, weight loss, fevers or chills  EYES/ENT: No visual changes;  No vertigo or throat pain   NECK: No pain or stiffness  RESPIRATORY: No cough, wheezing, hemoptysis; +No shortness of breath  CARDIOVASCULAR: No chest pain or palpitations, +HOWELL  GASTROINTESTINAL: No abdominal or epigastric pain. No nausea, vomiting, or hematemesis; No diarrhea or constipation. No melena or hematochezia.  GENITOURINARY: No dysuria, frequency or hematuria  NEUROLOGICAL: No numbness or weakness, AAOX3  SKIN: No itching, rashes  MUSCULOSKELETAL: no joint erythema, no joint swelling  PSYCHIATRIC: no depression, no anxiety

## 2021-03-22 NOTE — ED ADULT NURSE REASSESSMENT NOTE - GENERAL PATIENT STATE
comfortable appearance/cooperative/smiling/interactive
comfortable appearance/cooperative/smiling/interactive
comfortable appearance

## 2021-03-22 NOTE — ED CDU PROVIDER INITIAL DAY NOTE - OBJECTIVE STATEMENT
70 F w/ PMH HTN, asthma, bronchitis, noninsulin dependent DM, HLD, s/p R knee replacement, prior covid infection in dec 2020, presents to the ER w/ sob for 1 week, symptoms worse w/ ambulation. pt lives w/ her daughter, reports no family members have seen her having a sob episode.  no cp, no lightheadedness no dizziness, no nausea no vomiting. no PND, no edema in the lower extremities. Pt w/ no fevers, no chills.  In ED, patient had ekg no signs of acute ischemia, troponin 11, chest x ray no signs of acute pathology, negative COVID. CT angio chest was negative for pulmonary embolus. Pt sent to CDU for frequent reeval, vitals q 4hrs, telemetry monitoring and TTE in am.

## 2021-03-22 NOTE — ED PROVIDER NOTE - OBJECTIVE STATEMENT
70 F w/ PMH HTN, asthma, bronchitis, noninsulin dependent DM, HLD, s/p R knee replacement, prior covid infection in dec 2020, presents to the ER w/ sob for 1 week, symptoms worse w/ ambulation. pt lives w/ her daughter, reports no family members have seen her having a sob episode.  no cp, no lightheadedness no dizziness, no nausea no vomiting. no PND, no edema in the lower extremities. Pt w/ no fevers, no chills.

## 2021-03-22 NOTE — ED ADULT NURSE REASSESSMENT NOTE - NS ED NURSE REASSESS COMMENT FT1
15.00 Pt has abnormal  ECHO results . pt is admitted fr further cardiac work up
Pt received from RENUKA Melara. Pt oriented to CDU & plan of care was discussed. Pt denies any SOB when at rest but states she has SOB on exertion. Pt denies any at the moment. Pt denies any chest pain, dizziness or palpitations. Safety & comfort measures maintained. Call bell in reach. Will continue to monitor.
Pt received from RENUKA Zee. Pt oriented to CDU & plan of care was discussed. Pt denies any chest pain, SOB, dizziness or palpitations. Pt states she feels well and symptoms have been unchanged. Pt states she has SOB on ambulation and exertion but when stationary SOB resolves. Safety & comfort measures maintained. Call bell in reach. Will continue to monitor.
07.00 Am Received the Pt from  RENUKA Nina . Pt is Observed for SOB for Echo . Received the Pt A&OX 4 obeys commands Yohana N/V/D fever chills cp SOB   Comfort care & safety measures continued  IV site looks clean & dry no signs of infiltration noted pt denies  pain IV site .  Pt is advised to call for help  call bell with in the reach pt verbalized the understanding . pending CDU  MD hardwick . GCS 15/15 A&OX 4 PERRLA  size 3 Strong upper & lower extremities steady gait   No facial droop  No Hand Leg drop denies numbness tingling Continue to monitor.

## 2021-03-22 NOTE — H&P ADULT - PROBLEM SELECTOR PLAN 1
- seen by cardiologist, recs appreciated  - TTE normal EF with small pericardial effusion  - had covid in 12/2020  - stress test in AM  - cont aspirin 81mg

## 2021-03-22 NOTE — ED PROVIDER NOTE - PHYSICAL EXAMINATION
I have reviewed the triage vital signs.  Const: Well-nourished, Well-developed, appearing stated age  Head: atraumatic, normocephalic  Eyes: no conjunctival injection and no scleral icterus  ENMT: Atraumatic external nose and ears, Moist mucus membranes  Neck: Symmetric, trachea midline,  CVS: +S1/S2, dorsalis pedis/radial pulse 2+ bilaterally  RESP: Unlabored respiratory effort, Clear to auscultation bilaterally  GI: Nontender/Nondistended, soft abdomen  MSK: Extremities w/o deformity or ttp   Skin: Warm, Dry w/ no rashes  Neuro: GCS=15, CNs II-XII grossly intact, motor in all 4 extremities equal, Sensation grossly intact   Psych: Awake, Alert, & Orientedx3;  Appropriate mood and affect, cooperative

## 2021-03-22 NOTE — ED CDU PROVIDER INITIAL DAY NOTE - PROGRESS NOTE DETAILS
CDU NOTE DAREN Kiser: VSS NAD. Patient is resting comfortably and is without any complaints. NSR on tele. Pending TTE today CDU NOTE DAREN Kiser: VSS. Patient has been resting comfortably and is without any complaints.  Currently at TTE. Seen by Cards TEVIN Mast from Dr. Diallo service who states Dr. Diallo would like to admit pt for Stress after TTE. Will wait to place admissions after pt returns so she is aware of plan CDU NOTE DAREN Kiser: VSS NAD. Patient is resting comfortably and is without any complaints. TTE returned with "Normal left ventricular systolic function. No segmental wall motion abnormalities.  Small pericardial effusion adjacent to the right atrium; in other areas effusion is minimal." Patient seen by Dr. Nolasco NP this am as cards covering for the day (cdu did not call) and recommended admission for further work-up. After conversation w/ pt daughter informed pt sees Stewart Hensley of cards. Dr. Diallo made aware and states he will write initial day note and recs and will speak to house cards about transferring care. Pt to be admitted to hospitalist serve. Discussed w/ Dr. Martel who agrees w/ plan

## 2021-03-22 NOTE — ED CDU PROVIDER INITIAL DAY NOTE - MEDICAL DECISION MAKING DETAILS
MD Flores:  patient seen and evaluated personally.   I agree with the History & Physical,  Impression & Plan other than what was detailed in my note.  MD Flores  70 F w/ PMH HTN, asthma, bronchitis, noninsulin dependent DM, HLD, s/p R knee replacement, prior covid infection in dec 2020, presents to the ER w/ sob for over 1 week. Pt has remained HD stable while in the ED. S/p ekg, trop, and CTA which were neg for PE. Placed in CDU for further monitoring and TTE in AM.

## 2021-03-22 NOTE — ED PROVIDER NOTE - NSFOLLOWUPINSTRUCTIONS_ED_ALL_ED_FT
Follow up with your cardiologist in the next 3-5 days.     Dyspnea  WHAT YOU NEED TO KNOW:  Dyspnea is breathing difficulty or discomfort. You may have labored, painful, or shallow breathing. You may feel breathless or short of breath. Dyspnea can occur during rest or with activity. You may have dyspnea for a short time, or it might become chronic. Dyspnea is often a symptom of a disease or condition.    DISCHARGE INSTRUCTIONS:    Return to the emergency department if:   •Your signs and symptoms are the same or worse within 24 hours of treatment.   •You have shaking chills or a fever over 102°F.   •You have new pain, pressure, or tightness in your chest.   •You have a new or worse cough or wheezing, or you cough up blood.  •You feel like you cannot get enough air.  •The skin over your ribs or on your neck sinks in when you breathe.   •You have a severe headache with vomiting and abdominal pain.   •You feel confused or dizzy.      Contact your healthcare provider or specialist if:   •You have questions or concerns about your condition or care.    Medicines:    •Medicines may be used to treat the cause of your dyspnea. Medicines may reduce swelling in your airway or decrease extra fluid from around your heart or lungs. Other medicines may be used to decrease anxiety and help you feel calm and relaxed.  •Take your medicine as directed. Contact your healthcare provider if you think your medicine is not helping or if you have side effects. Tell him or her if you are allergic to any medicine. Keep a list of the medicines, vitamins, and herbs you take. Include the amounts, and when and why you take them. Bring the list or the pill bottles to follow-up visits. Carry your medicine list with you in case of an emergency.  Manage long-term dyspnea:   •Create an action plan. You and your healthcare provider can work together to create a plan for how to handle episodes of dyspnea. The plan can include daily activities, treatment changes, and what to do if you have severe breathing problems.  •Lean forward on your elbows when you sit. This helps your lungs expand and may make it easier to breathe.  •Use pursed-lip breathing any time you feel short of breath. Breathe in through your nose and then slowly breathe out through your mouth with your lips slightly puckered. It should take you twice as long to breathe out as it did to breathe in.    •Do not smoke. Nicotine and other chemicals in cigarettes and cigars can cause lung damage and make it harder to breathe. Ask your healthcare provider for information if you currently smoke and need help to quit. E-cigarettes or smokeless tobacco still contain nicotine. Talk to your healthcare provider before you use these products.   •Reach or maintain a healthy weight. Your healthcare provider can help you create a safe weight loss plan if you are overweight.  •Exercise as directed. Exercise can help your lungs work more easily. Exercise can also help you lose weight if needed. Try to get at least 30 minutes of exercise most days of the week. Your healthcare provider can help you create an exercise plan that is safe for you.    Follow up with your healthcare provider or specialist as directed: Write down your questions so you remember to ask them during your visits.

## 2021-03-22 NOTE — ED CDU PROVIDER DISPOSITION NOTE - NSFOLLOWUPINSTRUCTIONS_ED_ALL_ED_FT
1. Follow up with your PMD within 48-72 hours.   You may schedule appointment with Cardiology clinic this week by calling [phone]  2. Show copies of your reports given to you. Take all of your other medications as previously prescribed.   3. Worsening or continued chest pain, shortness of breath, weakness, return to ED.

## 2021-03-22 NOTE — H&P ADULT - HISTORY OF PRESENT ILLNESS
71 yo female w/pmh asthma, HTN, HLD, diabetes, primary hyperparathyroidism, presents to [facility] for cc worsening dyspnea on exertion for the last week. She denies chest pain during these episodes. Says she started having trouble walking up stairs, and had to stop at around 3-4 steps to catch her breath. However, she says she can probably walk about 3 blocks without any issues. Sleeps on 2 pillows at baseline for a long time. Has never been diagnosed with heart failure or coronary artery disease. Denies hearing herself wheeze and does not think her SOB is related to the weather. She has experienced these symptoms in the past but can't quite remember when they first started. Saw her cardiologist, Dr. Stewart Hensley, last month.    Patient was first admitted to CDU for monitoring. She was hemodynamically stable, afebrile. Labs with trop 11 --> 13. Rest of labs unremarkable. She had a CT angio of her chest which was negative for PE and lung pathology. She was seen by cardiology, Dr. Diallo, who recommended inpatient stay for stress test. She had a TTE done which was normal, except for a small pericardial effusion.

## 2021-03-22 NOTE — ED PROVIDER NOTE - SHIFT CHANGE DETAILS
pending cta to eval for pe, usconcerning for possible pericardial effusion as etiology of pts dyspnea, cdu for formal echo

## 2021-03-22 NOTE — ED ADULT NURSE REASSESSMENT NOTE - NSFALLRSKASSESSDT_ED_ALL_ED
Quality 110: Preventive Care And Screening: Influenza Immunization: Influenza Immunization Administered during Influenza season Detail Level: Detailed Quality 474: Zoster Vaccination Status: Shingrix Vaccination Administered or Previously Received Quality 130: Documentation Of Current Medications In The Medical Record: Current Medications Documented Quality 111:Pneumonia Vaccination Status For Older Adults: Pneumococcal Vaccination Previously Received 22-Mar-2021 08:09

## 2021-03-22 NOTE — ED PROVIDER NOTE - NS ED ROS FT
Constitutional: no fevers no chills.   CV: no chest pain, no palpitations   Respiratory: + shortness of breath, no cough   GI: no abdominal pain, no nausea no vomiting   Neuro: no headache, no weakness, no numbness  all other ROS is negative except as documented as HPI.

## 2021-03-22 NOTE — H&P ADULT - PROBLEM SELECTOR PLAN 3
and bipolar disorder  - at home takes vraylar but she does not have it with her and it is not on formulary  - cont buspirone

## 2021-03-22 NOTE — H&P ADULT - NSICDXPASTMEDICALHX_GEN_ALL_CORE_FT
PAST MEDICAL HISTORY:  Arrhythmia     Asthma     Cataract of right eye     Depression     DM (diabetes mellitus)     GERD (gastroesophageal reflux disease)     HTN (hypertension)     Obese     Seasonal allergies

## 2021-03-22 NOTE — H&P ADULT - PROBLEM SELECTOR PLAN 2
- at home takes albuterol and breo-ellipta  - here give albuterol and symbicort  - uncontrolled asthma may be contributing to her symptoms if no cardiac issues are found

## 2021-03-22 NOTE — H&P ADULT - NSHPPHYSICALEXAM_GEN_ALL_CORE
Vital Signs Last 24 Hrs  T(C): 37 (22 Mar 2021 16:17), Max: 37 (22 Mar 2021 02:15)  T(F): 98.6 (22 Mar 2021 16:17), Max: 98.6 (22 Mar 2021 02:15)  HR: 77 (22 Mar 2021 16:17) (73 - 92)  BP: 169/90 (22 Mar 2021 16:17) (138/78 - 169/90)  BP(mean): --  RR: 18 (22 Mar 2021 16:17) (16 - 19)  SpO2: 97% (22 Mar 2021 16:17) (96% - 100%)    PHYSICAL EXAM:  GENERAL:  Well appearing, in NAD  HEAD:  NCAT  EYES: PERRLA, conjunctiva clear  NECK: Supple, No JVD  CHEST/LUNG: CTA B/L. No w/r/r.  HEART: Reg rate. Normal S1, S2. No m/r/g.   ABDOMEN: SNTND. Bowel sounds present  EXTREMITIES:  2+ Peripheral Pulses, No clubbing, cyanosis, edema.  PSYCH: AAOx3, appropriate affect  NEUROLOGY: grossly non-focal  SKIN: No rashes or lesions

## 2021-03-22 NOTE — H&P ADULT - PROBLEM SELECTOR PLAN 4
- cont verapamil 240mg qd, labetalol 100mg bid, and losartan 25 (at home takes olmesartan-HCTZ 40-12.5)

## 2021-03-22 NOTE — ED ADULT NURSE REASSESSMENT NOTE - COMFORT CARE
plan of care explained/po fluids offered
meal provided/plan of care explained/po fluids offered
ambulated to bathroom/plan of care explained/po fluids offered

## 2021-03-22 NOTE — CONSULT NOTE ADULT - SUBJECTIVE AND OBJECTIVE BOX
CHIEF COMPLAINT:Patient is a 70y old  Female who presents with a chief complaint of shorntes of breath    HISTORY OF PRESENT ILLNESS:HPI:  70 F w/ PMH HTN, asthma, bronchitis, noninsulin dependent DM, HLD, s/p R knee replacement, prior covid infection in dec 2020, presents to the ER w/ sob for 1 week, symptoms worse w/ ambulation. pt lives w/ her daughter, reports no family members have seen her having a sob episode.  no cp, no lightheadedness no dizziness, no nausea no vomiting. no PND, no edema in the lower extremities. Pt w/ no fevers, no chills.    PAST MEDICAL & SURGICAL HISTORY:  Obese  Cataract of right eye  Palpitations  Seasonal allergies  Asthma   GERD (gastroesophageal reflux disease)  Depression  Arrhythmia  HTN (hypertension)  DM (diabetes mellitus)  History of lumpectomy of right breast  1984  History of left cataract surgery  1990&#x27;s  History of cholecystectomy  1980&#x27;s  S/P FERNANDO-BSO (total abdominal hysterectomy and bilateral salpingo-oophorectomy)  1980&#x27;s        MEDICATIONS:    dextrose 40% Gel 15 Gram(s) Oral once  dextrose 50% Injectable 25 Gram(s) IV Push once  dextrose 50% Injectable 12.5 Gram(s) IV Push once  dextrose 50% Injectable 25 Gram(s) IV Push once  glucagon  Injectable 1 milliGRAM(s) IntraMuscular once  insulin lispro (ADMELOG) corrective regimen sliding scale   SubCutaneous three times a day before meals  insulin lispro (ADMELOG) corrective regimen sliding scale   SubCutaneous at bedtime  dextrose 5%. 1000 milliLiter(s) IV Continuous <Continuous>  dextrose 5%. 1000 milliLiter(s) IV Continuous <Continuous>      FAMILY HISTORY:  No pertinent family history in first degree relatives    Non-contributory    SOCIAL HISTORY:    [ ] Tobacco  [ ] Drugs  [ ] Alcohol    Allergies    No Known Allergies    Intolerances    	    REVIEW OF SYSTEMS:  CONSTITUTIONAL: No fever  EYES: No eye pain, visual disturbances, or discharge  ENMT:  No difficulty hearing, tinnitus  NECK: No pain or stiffness  RESPIRATORY: No cough, wheezing, + SOB  CARDIOVASCULAR: No chest pain, palpitations, passing out, dizziness, or leg swelling  GASTROINTESTINAL:  No nausea, vomiting, diarrhea or constipation. No melena.  GENITOURINARY: No dysuria, hematuria  NEUROLOGICAL: No stroke like symptoms  SKIN: No burning or lesions   ENDOCRINE: No heat or cold intolerance  MUSCULOSKELETAL: No joint pain or swelling  PSYCHIATRIC: No  anxiety, mood swings  HEME/LYMPH: No bleeding gums  ALLERGY AND IMMUNOLOGIC: No hives or eczema	    All other ROS negative    PHYSICAL EXAM:  T(C): 37 (03-22-21 @ 11:12), Max: 37 (03-22-21 @ 02:15)  HR: 81 (03-22-21 @ 11:12) (73 - 92)  BP: 138/82 (03-22-21 @ 11:12) (138/78 - 165/93)  RR: 18 (03-22-21 @ 11:12) (16 - 19)  SpO2: 97% (03-22-21 @ 11:12) (96% - 100%)  Wt(kg): --  I&O's Summary    Appearance: Normal	  HEENT:   Normal oral mucosa, EOMI	  Cardiovascular:  S1 S2, No JVD,    Respiratory: Lungs clear to auscultation	  Psychiatry: Alert  Gastrointestinal:  Soft, Non-tender, + BS	  Skin: No rashes   Neurologic: Non-focal  Extremities:  No edema  Vascular: Peripheral pulses palpable    	    	  CARDIAC MARKERS:  Labs personally reviewed by me                            12.2   3.85  )-----------( 229      ( 21 Mar 2021 23:41 )             39.7     03-21    140  |  103  |  10  ----------------------------<  154<H>  4.4   |  24  |  0.71    Ca    9.6      21 Mar 2021 23:41    TPro  7.3  /  Alb  4.4  /  TBili  0.4  /  DBili  x   /  AST  33  /  ALT  28  /  AlkPhos  67  03-21    EKG: Personally reviewed by me - NSR 83 bpm    Radiology: Personally reviewed by me -   < from: CT Angio Chest w/ IV Cont (03.22.21 @ 01:58) >  IMPRESSION:  No pulmonary embolus.    < end of copied text >  < from: Xray Chest 1 View AP/PA (03.21.21 @ 23:48) >  IMPRESSION:  Clear lungs.      < end of copied text >    Assessment /Plan:     1. Dyspnea on exertion worsening x1 week. Denies hx of CHF, cardiac stents, proBNP wnl,   CTA negative for PE  TTE to eval heart function   NM stress test to r/o ischemia given her acute onset of SOB      Kezia CHANDRA-C  Janes Diallo DO [facility]  Cardiovascular Medicine  [address] [place] [address] Dr, [address]  Office [phone]  Cell [phone] CHIEF COMPLAINT:Patient is a 70y old  Female who presents with a chief complaint of shorntes of breath    HISTORY OF PRESENT ILLNESS:HPI:  70 F w/ PMH HTN, asthma, bronchitis, noninsulin dependent DM, HLD, s/p R knee replacement, prior covid infection in dec 2020, presents to the ER w/ sob for 1 week, symptoms worse w/ ambulation. pt lives w/ her daughter, reports no family members have seen her having a sob episode.  no cp, no lightheadedness no dizziness, no nausea no vomiting. no PND, no edema in the lower extremities. Pt w/ no fevers, no chills.    PAST MEDICAL & SURGICAL HISTORY:  Obese  Cataract of right eye  Palpitations  Seasonal allergies  Asthma   GERD (gastroesophageal reflux disease)  Depression  Arrhythmia  HTN (hypertension)  DM (diabetes mellitus)  History of lumpectomy of right breast  1984  History of left cataract surgery  1990&#x27;s  History of cholecystectomy  1980&#x27;s  S/P FERNANDO-BSO (total abdominal hysterectomy and bilateral salpingo-oophorectomy)  1980&#x27;s        MEDICATIONS:    dextrose 40% Gel 15 Gram(s) Oral once  dextrose 50% Injectable 25 Gram(s) IV Push once  dextrose 50% Injectable 12.5 Gram(s) IV Push once  dextrose 50% Injectable 25 Gram(s) IV Push once  glucagon  Injectable 1 milliGRAM(s) IntraMuscular once  insulin lispro (ADMELOG) corrective regimen sliding scale   SubCutaneous three times a day before meals  insulin lispro (ADMELOG) corrective regimen sliding scale   SubCutaneous at bedtime  dextrose 5%. 1000 milliLiter(s) IV Continuous <Continuous>  dextrose 5%. 1000 milliLiter(s) IV Continuous <Continuous>      FAMILY HISTORY:  No pertinent family history in first degree relatives    Non-contributory    SOCIAL HISTORY:    [ ] Tobacco  [ ] Drugs  [ ] Alcohol    Allergies    No Known Allergies    Intolerances    	    REVIEW OF SYSTEMS:  CONSTITUTIONAL: No fever  EYES: No eye pain, visual disturbances, or discharge  ENMT:  No difficulty hearing, tinnitus  NECK: No pain or stiffness  RESPIRATORY: No cough, wheezing, + SOB  CARDIOVASCULAR: No chest pain, palpitations, passing out, dizziness, or leg swelling  GASTROINTESTINAL:  No nausea, vomiting, diarrhea or constipation. No melena.  GENITOURINARY: No dysuria, hematuria  NEUROLOGICAL: No stroke like symptoms  SKIN: No burning or lesions   ENDOCRINE: No heat or cold intolerance  MUSCULOSKELETAL: No joint pain or swelling  PSYCHIATRIC: No  anxiety, mood swings  HEME/LYMPH: No bleeding gums  ALLERGY AND IMMUNOLOGIC: No hives or eczema	    All other ROS negative    PHYSICAL EXAM:  T(C): 37 (03-22-21 @ 11:12), Max: 37 (03-22-21 @ 02:15)  HR: 81 (03-22-21 @ 11:12) (73 - 92)  BP: 138/82 (03-22-21 @ 11:12) (138/78 - 165/93)  RR: 18 (03-22-21 @ 11:12) (16 - 19)  SpO2: 97% (03-22-21 @ 11:12) (96% - 100%)  Wt(kg): --  I&O's Summary    Appearance: Normal	  HEENT:   Normal oral mucosa, EOMI	  Cardiovascular:  S1 S2, No JVD,    Respiratory: Lungs clear to auscultation	  Psychiatry: Alert  Gastrointestinal:  Soft, Non-tender, + BS	  Skin: No rashes   Neurologic: Non-focal  Extremities:  No edema  Vascular: Peripheral pulses palpable    	    	  CARDIAC MARKERS:  Labs personally reviewed by me                            12.2   3.85  )-----------( 229      ( 21 Mar 2021 23:41 )             39.7     03-21    140  |  103  |  10  ----------------------------<  154<H>  4.4   |  24  |  0.71    Ca    9.6      21 Mar 2021 23:41    TPro  7.3  /  Alb  4.4  /  TBili  0.4  /  DBili  x   /  AST  33  /  ALT  28  /  AlkPhos  67  03-21    EKG: Personally reviewed by me - NSR 83 bpm    Radiology: Personally reviewed by me -   < from: CT Angio Chest w/ IV Cont (03.22.21 @ 01:58) >  IMPRESSION:  No pulmonary embolus.    < end of copied text >  < from: Xray Chest 1 View AP/PA (03.21.21 @ 23:48) >  IMPRESSION:  Clear lungs.           Assessment /Plan:     1. Dyspnea on exertion worsening x1 week. Denies hx of CHF, cardiac stents, proBNP wnl,   CTA negative for PE  TTE to eval heart function   NM stress test to r/o ischemia given her acute onset of SOB      Advanced care planning/advanced directives discussed with patient/family. DNR status including forceful chest compressions to attempt to restart the heart, ventilator support/artificial breathing, electric shock, artificial nutrition, health care proxy, Molst form all discussed with pt. Pt wishes to consider.  More than fifteen minutes spent on discussing advanced directives.       Kezia BALTAZARC  Janes Diallo DO [facility]  Cardiovascular Medicine  [address] [place] [address], [address]  Office [phone]  Cell [phone]

## 2021-03-22 NOTE — ED PROVIDER NOTE - PROGRESS NOTE DETAILS
labs unremarkable ambulatory sat is 96 percent labs unremarkable ambulatory sat is 96 percent; concern for small pericardiral effusion as etiology of pts sx ; discussion w/ cdu pa, recommending eval for PE- will obtain cta and then if neg ok for cdu for echo

## 2021-03-22 NOTE — ED CDU PROVIDER INITIAL DAY NOTE - DETAILS
70 F w/ PMH HTN, asthma, bronchitis, noninsulin dependent DM, HLD, s/p R knee replacement, prior covid infection in dec 2020 p/w sob   Plan: 70 F w/ PMH HTN, asthma, bronchitis, noninsulin dependent DM, HLD, s/p R knee replacement, prior covid infection in dec 2020 p/w sob   Plan: frequent reeval, vitals q 4hrs, telemetry monitoring and TTE in am.

## 2021-03-22 NOTE — ED CDU PROVIDER DISPOSITION NOTE - ATTENDING CONTRIBUTION TO CARE
Patient seen and examined at bedside. States she feels much better, symptoms resolved. Denies any chest pain. Results reviewed with patient, pending ECHO. Continue to monitor on telemetry.

## 2021-03-22 NOTE — ED PROVIDER NOTE - CLINICAL SUMMARY MEDICAL DECISION MAKING FREE TEXT BOX
1 week to 1 month of worsening sob w/ ambulation current RA sat is 96 percent, prior covid infection in 2020 dec, pt w/ no f/c/n/v. trop negative pt w/ outpt cardiology follow up, awaiting labs, and xray, likely dc home w/ outpt follow up.

## 2021-03-23 ENCOUNTER — TRANSCRIPTION ENCOUNTER (OUTPATIENT)
Age: 71
End: 2021-03-23

## 2021-03-23 VITALS
TEMPERATURE: 98 F | HEART RATE: 98 BPM | RESPIRATION RATE: 18 BRPM | OXYGEN SATURATION: 98 % | DIASTOLIC BLOOD PRESSURE: 89 MMHG | SYSTOLIC BLOOD PRESSURE: 147 MMHG

## 2021-03-23 LAB
ANION GAP SERPL CALC-SCNC: 9 MMOL/L — SIGNIFICANT CHANGE UP (ref 5–17)
BASOPHILS # BLD AUTO: 0.03 K/UL — SIGNIFICANT CHANGE UP (ref 0–0.2)
BASOPHILS NFR BLD AUTO: 0.7 % — SIGNIFICANT CHANGE UP (ref 0–2)
BUN SERPL-MCNC: 15 MG/DL — SIGNIFICANT CHANGE UP (ref 7–23)
CALCIUM SERPL-MCNC: 9.7 MG/DL — SIGNIFICANT CHANGE UP (ref 8.4–10.5)
CHLORIDE SERPL-SCNC: 105 MMOL/L — SIGNIFICANT CHANGE UP (ref 96–108)
CO2 SERPL-SCNC: 27 MMOL/L — SIGNIFICANT CHANGE UP (ref 22–31)
COVID-19 SPIKE DOMAIN AB INTERP: POSITIVE
COVID-19 SPIKE DOMAIN ANTIBODY RESULT: >250 U/ML — HIGH
CREAT SERPL-MCNC: 0.85 MG/DL — SIGNIFICANT CHANGE UP (ref 0.5–1.3)
EOSINOPHIL # BLD AUTO: 0.09 K/UL — SIGNIFICANT CHANGE UP (ref 0–0.5)
EOSINOPHIL NFR BLD AUTO: 2.1 % — SIGNIFICANT CHANGE UP (ref 0–6)
GLUCOSE BLDC GLUCOMTR-MCNC: 162 MG/DL — HIGH (ref 70–99)
GLUCOSE BLDC GLUCOMTR-MCNC: 179 MG/DL — HIGH (ref 70–99)
GLUCOSE BLDC GLUCOMTR-MCNC: 183 MG/DL — HIGH (ref 70–99)
GLUCOSE SERPL-MCNC: 152 MG/DL — HIGH (ref 70–99)
HCT VFR BLD CALC: 38.8 % — SIGNIFICANT CHANGE UP (ref 34.5–45)
HGB BLD-MCNC: 11.9 G/DL — SIGNIFICANT CHANGE UP (ref 11.5–15.5)
LYMPHOCYTES # BLD AUTO: 2.03 K/UL — SIGNIFICANT CHANGE UP (ref 1–3.3)
LYMPHOCYTES # BLD AUTO: 46.9 % — HIGH (ref 13–44)
MAGNESIUM SERPL-MCNC: 1.8 MG/DL — SIGNIFICANT CHANGE UP (ref 1.6–2.6)
MCHC RBC-ENTMCNC: 22.2 PG — LOW (ref 27–34)
MCHC RBC-ENTMCNC: 30.7 GM/DL — LOW (ref 32–36)
MCV RBC AUTO: 72.4 FL — LOW (ref 80–100)
MONOCYTES # BLD AUTO: 0.41 K/UL — SIGNIFICANT CHANGE UP (ref 0–0.9)
MONOCYTES NFR BLD AUTO: 9.5 % — SIGNIFICANT CHANGE UP (ref 2–14)
NEUTROPHILS # BLD AUTO: 1.77 K/UL — LOW (ref 1.8–7.4)
NEUTROPHILS NFR BLD AUTO: 40.8 % — LOW (ref 43–77)
NRBC # BLD: 0 /100 WBCS — SIGNIFICANT CHANGE UP (ref 0–0)
PHOSPHATE SERPL-MCNC: 4.5 MG/DL — SIGNIFICANT CHANGE UP (ref 2.5–4.5)
PLATELET # BLD AUTO: 220 K/UL — SIGNIFICANT CHANGE UP (ref 150–400)
POTASSIUM SERPL-MCNC: 4.2 MMOL/L — SIGNIFICANT CHANGE UP (ref 3.5–5.3)
POTASSIUM SERPL-SCNC: 4.2 MMOL/L — SIGNIFICANT CHANGE UP (ref 3.5–5.3)
RBC # BLD: 5.36 M/UL — HIGH (ref 3.8–5.2)
RBC # FLD: 15.2 % — HIGH (ref 10.3–14.5)
SARS-COV-2 IGG+IGM SERPL QL IA: >250 U/ML — HIGH
SARS-COV-2 IGG+IGM SERPL QL IA: POSITIVE
SODIUM SERPL-SCNC: 141 MMOL/L — SIGNIFICANT CHANGE UP (ref 135–145)
WBC # BLD: 4.33 K/UL — SIGNIFICANT CHANGE UP (ref 3.8–10.5)
WBC # FLD AUTO: 4.33 K/UL — SIGNIFICANT CHANGE UP (ref 3.8–10.5)

## 2021-03-23 PROCEDURE — 82947 ASSAY GLUCOSE BLOOD QUANT: CPT

## 2021-03-23 PROCEDURE — 80061 LIPID PANEL: CPT

## 2021-03-23 PROCEDURE — 82803 BLOOD GASES ANY COMBINATION: CPT

## 2021-03-23 PROCEDURE — 85018 HEMOGLOBIN: CPT

## 2021-03-23 PROCEDURE — 71045 X-RAY EXAM CHEST 1 VIEW: CPT

## 2021-03-23 PROCEDURE — 36000 PLACE NEEDLE IN VEIN: CPT

## 2021-03-23 PROCEDURE — 84295 ASSAY OF SERUM SODIUM: CPT

## 2021-03-23 PROCEDURE — A9500: CPT

## 2021-03-23 PROCEDURE — 83880 ASSAY OF NATRIURETIC PEPTIDE: CPT

## 2021-03-23 PROCEDURE — 82962 GLUCOSE BLOOD TEST: CPT

## 2021-03-23 PROCEDURE — 82330 ASSAY OF CALCIUM: CPT

## 2021-03-23 PROCEDURE — 82565 ASSAY OF CREATININE: CPT

## 2021-03-23 PROCEDURE — 71275 CT ANGIOGRAPHY CHEST: CPT

## 2021-03-23 PROCEDURE — 93017 CV STRESS TEST TRACING ONLY: CPT

## 2021-03-23 PROCEDURE — 83735 ASSAY OF MAGNESIUM: CPT

## 2021-03-23 PROCEDURE — 99285 EMERGENCY DEPT VISIT HI MDM: CPT | Mod: 25

## 2021-03-23 PROCEDURE — G0378: CPT

## 2021-03-23 PROCEDURE — 80048 BASIC METABOLIC PNL TOTAL CA: CPT

## 2021-03-23 PROCEDURE — 84484 ASSAY OF TROPONIN QUANT: CPT

## 2021-03-23 PROCEDURE — 78452 HT MUSCLE IMAGE SPECT MULT: CPT | Mod: 26

## 2021-03-23 PROCEDURE — 99232 SBSQ HOSP IP/OBS MODERATE 35: CPT

## 2021-03-23 PROCEDURE — 82435 ASSAY OF BLOOD CHLORIDE: CPT

## 2021-03-23 PROCEDURE — 80053 COMPREHEN METABOLIC PANEL: CPT

## 2021-03-23 PROCEDURE — 93005 ELECTROCARDIOGRAM TRACING: CPT

## 2021-03-23 PROCEDURE — 78452 HT MUSCLE IMAGE SPECT MULT: CPT

## 2021-03-23 PROCEDURE — 85025 COMPLETE CBC W/AUTO DIFF WBC: CPT

## 2021-03-23 PROCEDURE — 94640 AIRWAY INHALATION TREATMENT: CPT

## 2021-03-23 PROCEDURE — 83036 HEMOGLOBIN GLYCOSYLATED A1C: CPT

## 2021-03-23 PROCEDURE — 86769 SARS-COV-2 COVID-19 ANTIBODY: CPT

## 2021-03-23 PROCEDURE — 93306 TTE W/DOPPLER COMPLETE: CPT

## 2021-03-23 PROCEDURE — 93016 CV STRESS TEST SUPVJ ONLY: CPT

## 2021-03-23 PROCEDURE — 85014 HEMATOCRIT: CPT

## 2021-03-23 PROCEDURE — 93018 CV STRESS TEST I&R ONLY: CPT

## 2021-03-23 PROCEDURE — 84100 ASSAY OF PHOSPHORUS: CPT

## 2021-03-23 PROCEDURE — 84132 ASSAY OF SERUM POTASSIUM: CPT

## 2021-03-23 PROCEDURE — 87635 SARS-COV-2 COVID-19 AMP PRB: CPT

## 2021-03-23 PROCEDURE — 83605 ASSAY OF LACTIC ACID: CPT

## 2021-03-23 RX ORDER — ASPIRIN/CALCIUM CARB/MAGNESIUM 324 MG
1 TABLET ORAL
Qty: 0 | Refills: 0 | DISCHARGE

## 2021-03-23 RX ORDER — OLMESARTAN MEDOXOMIL-HYDROCHLOROTHIAZIDE 25; 40 MG/1; MG/1
1 TABLET, FILM COATED ORAL
Qty: 0 | Refills: 0 | DISCHARGE

## 2021-03-23 RX ADMIN — Medication 1: at 11:59

## 2021-03-23 RX ADMIN — Medication 10 MILLIGRAM(S): at 16:30

## 2021-03-23 RX ADMIN — Medication 10 MILLIGRAM(S): at 06:12

## 2021-03-23 RX ADMIN — LOSARTAN POTASSIUM 25 MILLIGRAM(S): 100 TABLET, FILM COATED ORAL at 06:12

## 2021-03-23 RX ADMIN — CINACALCET 30 MILLIGRAM(S): 30 TABLET, FILM COATED ORAL at 11:59

## 2021-03-23 RX ADMIN — Medication 81 MILLIGRAM(S): at 06:12

## 2021-03-23 RX ADMIN — MONTELUKAST 10 MILLIGRAM(S): 4 TABLET, CHEWABLE ORAL at 11:59

## 2021-03-23 RX ADMIN — BUDESONIDE AND FORMOTEROL FUMARATE DIHYDRATE 2 PUFF(S): 160; 4.5 AEROSOL RESPIRATORY (INHALATION) at 06:12

## 2021-03-23 RX ADMIN — Medication 1: at 16:28

## 2021-03-23 RX ADMIN — PANTOPRAZOLE SODIUM 40 MILLIGRAM(S): 20 TABLET, DELAYED RELEASE ORAL at 06:12

## 2021-03-23 RX ADMIN — Medication 1: at 07:26

## 2021-03-23 NOTE — DISCHARGE NOTE PROVIDER - NSDCMRMEDTOKEN_GEN_ALL_CORE_FT
albuterol 90 mcg/inh inhalation aerosol: 2 puff(s) inhaled every 6 hours  as needed for SOB/wheezing  aspirin 81 mg oral tablet, chewable: 1 tab(s) orally once a day  atorvastatin 20 mg oral tablet: 1 tab(s) orally once a day  Benicar HCT 40 mg-12.5 mg oral tablet: 1 tab(s) orally once a day  Breo Ellipta 100 mcg-25 mcg/inh inhalation powder: 1 puff(s) inhaled once a day  busPIRone 10 mg oral tablet: 1 tab(s) orally 2 times a day  cefpodoxime 200 mg oral tablet: 1 tab(s) orally every 12 hours  cinacalcet 30 mg oral tablet: 1 tab(s) orally once a day  fluticasone 50 mcg/inh inhalation powder: 1 puff(s) inhaled 2 times a day  labetalol 100 mg oral tablet: 1 tab(s) orally 2 times a day  LORazepam 0.5 mg oral tablet: 1 tab(s) orally 2 times a day, As Needed  metformin 500 mg oral tablet: 1 tab(s) orally 2 times a day  mirtazapine 45 mg oral tablet: 1 tab(s) orally once a day (at bedtime)  montelukast 10 mg oral tablet: 1 tab(s) orally once a day  verapamil 24 hour extended release 240 mg/24 hours oral capsule, extended release: 1 cap(s) orally once a day  Vraylar 1.5 mg oral capsule: 1 cap(s) orally once a day

## 2021-03-23 NOTE — DISCHARGE NOTE PROVIDER - CARE PROVIDER_API CALL
Stewart Hensley (MD)  Cardiology; Cardiovascular Disease; Internal Medicine  [address]  [place] [address]  Phone: [phone]  Fax: [phone]  Follow Up Time:

## 2021-03-23 NOTE — PROGRESS NOTE ADULT - SUBJECTIVE AND OBJECTIVE BOX
[facility] Division of Hospital Medicine  Anjel Byrd MD, LUIS  Pager (M-F, 8A-5P): [phone]  Other Times:  [phone]    Patient is a 70y old  Female who presents with a chief complaint of dyspnea on exertion (23 Mar 2021 14:18)      SUBJECTIVE / OVERNIGHT EVENTS:  No events overnight. Awaiting stress test this morning.     MEDICATIONS  (STANDING):  aspirin  chewable 81 milliGRAM(s) Oral daily  atorvastatin 20 milliGRAM(s) Oral at bedtime  budesonide  80 MICROgram(s)/formoterol 4.5 MICROgram(s) Inhaler 2 Puff(s) Inhalation two times a day  busPIRone 10 milliGRAM(s) Oral two times a day  cinacalcet 30 milliGRAM(s) Oral daily  glucagon  Injectable 1 milliGRAM(s) IntraMuscular once  insulin lispro (ADMELOG) corrective regimen sliding scale   SubCutaneous three times a day before meals  insulin lispro (ADMELOG) corrective regimen sliding scale   SubCutaneous at bedtime  losartan 25 milliGRAM(s) Oral daily  montelukast 10 milliGRAM(s) Oral daily  pantoprazole    Tablet 40 milliGRAM(s) Oral before breakfast    MEDICATIONS  (PRN):  acetaminophen   Tablet .. 650 milliGRAM(s) Oral every 4 hours PRN Mild Pain (1 - 3)  ALBUTerol    90 MICROgram(s) HFA Inhaler 2 Puff(s) Inhalation every 6 hours PRN Shortness of Breath and/or Wheezing    CAPILLARY BLOOD GLUCOSE  POCT Blood Glucose.: 162 mg/dL (23 Mar 2021 11:01)  POCT Blood Glucose.: 183 mg/dL (23 Mar 2021 07:00)  POCT Blood Glucose.: 128 mg/dL (22 Mar 2021 21:18)  POCT Blood Glucose.: 108 mg/dL (22 Mar 2021 17:06)    I&O's Summary    23 Mar 2021 07:01  -  23 Mar 2021 15:03  --------------------------------------------------------  IN: 120 mL / OUT: 0 mL / NET: 120 mL        PHYSICAL EXAM:  Vital Signs Last 24 Hrs  T(C): 37 (23 Mar 2021 09:00), Max: 37 (22 Mar 2021 16:17)  T(F): 98.6 (23 Mar 2021 09:00), Max: 98.6 (22 Mar 2021 16:17)  HR: 88 (23 Mar 2021 09:00) (73 - 92)  BP: 137/72 (23 Mar 2021 09:00) (137/72 - 169/90)  BP(mean): --  RR: 17 (23 Mar 2021 09:00) (15 - 18)  SpO2: 99% (23 Mar 2021 09:00) (97% - 99%)    GENERAL:  Well appearing, in NAD  HEAD:  NCAT  EYES: PERRLA, conjunctiva clear  NECK: Supple, No JVD  CHEST/LUNG: CTA B/L. No w/r/r.  HEART: Reg rate. Normal S1, S2. No m/r/g.   ABDOMEN: SNTND. Bowel sounds present  EXTREMITIES:  2+ Peripheral Pulses, No clubbing, cyanosis, edema.  PSYCH: AAOx3, appropriate affect  NEUROLOGY: grossly non-focal  SKIN: No rashes or lesions      LABS:                        11.9   4.33  )-----------( 220      ( 23 Mar 2021 05:34 )             38.8     03-23    141  |  105  |  15  ----------------------------<  152<H>  4.2   |  27  |  0.85    Ca    9.7      23 Mar 2021 05:34  Phos  4.5     03-23  Mg     1.8     03-23    TPro  7.3  /  Alb  4.4  /  TBili  0.4  /  DBili  x   /  AST  33  /  ALT  28  /  AlkPhos  67  03-21        RADIOLOGY & ADDITIONAL TESTS:    Lab Results Reviewed

## 2021-03-23 NOTE — PROGRESS NOTE ADULT - SUBJECTIVE AND OBJECTIVE BOX
Patient is a 70y old  Female who presents with a chief complaint of dyspnea on exertion (22 Mar 2021 16:32)      INTERVAL HISTORY: denies chest pain,shortness of breath- pending nm stress test today     REVIEW OF SYSTEMS:   CONSTITUTIONAL: No weakness  EYES/ENT: No visual changes; No throat pain  Neck: No pain or stiffness  Respiratory: No cough, wheezing, No shortness of breath  CARDIOVASCULAR: no chest pain or palpitations  GASTROINTESTINAL: No abdominal pain, no nausea, vomiting or hematemesis  GENITOURINARY: No dysuria, frequency or hematuria  NEUROLOGICAL: No stroke like symptoms  SKIN: No rashes    	  MEDICATIONS:  losartan 25 milliGRAM(s) Oral daily        PHYSICAL EXAM:  T(C): 37 (03-23-21 @ 09:00), Max: 37 (03-22-21 @ 16:17)  HR: 88 (03-23-21 @ 09:00) (73 - 92)  BP: 137/72 (03-23-21 @ 09:00) (137/72 - 169/90)  RR: 17 (03-23-21 @ 09:00) (15 - 18)  SpO2: 99% (03-23-21 @ 09:00) (97% - 99%)  Wt(kg): --  I&O's Summary    23 Mar 2021 07:01  -  23 Mar 2021 14:18  --------------------------------------------------------  IN: 120 mL / OUT: 0 mL / NET: 120 mL      Appearance: In no distress	  HEENT:    PERRL, EOMI	  Cardiovascular:  S1 S2, No JVD  Respiratory: Lungs clear to auscultation	  Gastrointestinal:  Soft, Non-tender, + BS	  Vascularature:  No edema of LE  Psychiatric: Appropriate affect   Neuro: no acute focal deficits                         11.9   4.33  )-----------( 220      ( 23 Mar 2021 05:34 )             38.8     03-23    141  |  105  |  15  ----------------------------<  152<H>  4.2   |  27  |  0.85    Ca    9.7      23 Mar 2021 05:34  Phos  4.5     03-23  Mg     1.8     03-23    TPro  7.3  /  Alb  4.4  /  TBili  0.4  /  DBili  x   /  AST  33  /  ALT  28  /  AlkPhos  67  03-21  Labs personally reviewed    ASSESSMENT/PLAN: 	  1. Dyspnea on exertion worsening x1 week. Denies hx of CHF, cardiac stents, proBNP wnl,   CTA negative for PE  TTE EF 70%, no wma   NM stress test today  r/o ischemia given her acute onset of SOB          Kezia Diallo DO [facility]  Cardiovascular Medicine  [address] [place] [address], [address]  Office: [phone]  Cell: [phone] Patient is a 70y old  Female who presents with a chief complaint of dyspnea on exertion (22 Mar 2021 16:32)      INTERVAL HISTORY: denies chest pain,shortness of breath- pending nm stress test today     REVIEW OF SYSTEMS:   CONSTITUTIONAL: No weakness  EYES/ENT: No visual changes; No throat pain  Neck: No pain or stiffness  Respiratory: No cough, wheezing, No shortness of breath  CARDIOVASCULAR: no chest pain or palpitations  GASTROINTESTINAL: No abdominal pain, no nausea, vomiting or hematemesis  GENITOURINARY: No dysuria, frequency or hematuria  NEUROLOGICAL: No stroke like symptoms  SKIN: No rashes    	  MEDICATIONS:  losartan 25 milliGRAM(s) Oral daily        PHYSICAL EXAM:  T(C): 37 (03-23-21 @ 09:00), Max: 37 (03-22-21 @ 16:17)  HR: 88 (03-23-21 @ 09:00) (73 - 92)  BP: 137/72 (03-23-21 @ 09:00) (137/72 - 169/90)  RR: 17 (03-23-21 @ 09:00) (15 - 18)  SpO2: 99% (03-23-21 @ 09:00) (97% - 99%)  Wt(kg): --  I&O's Summary    23 Mar 2021 07:01  -  23 Mar 2021 14:18  --------------------------------------------------------  IN: 120 mL / OUT: 0 mL / NET: 120 mL      Appearance: In no distress	  HEENT:    PERRL, EOMI	  Cardiovascular:  S1 S2, No JVD  Respiratory: Lungs clear to auscultation	  Gastrointestinal:  Soft, Non-tender, + BS	  Vascularature:  No edema of LE  Psychiatric: Appropriate affect   Neuro: no acute focal deficits                         11.9   4.33  )-----------( 220      ( 23 Mar 2021 05:34 )             38.8     03-23    141  |  105  |  15  ----------------------------<  152<H>  4.2   |  27  |  0.85    Ca    9.7      23 Mar 2021 05:34  Phos  4.5     03-23  Mg     1.8     03-23    TPro  7.3  /  Alb  4.4  /  TBili  0.4  /  DBili  x   /  AST  33  /  ALT  28  /  AlkPhos  67  03-21  Labs personally reviewed  < from: Transthoracic Echocardiogram (03.22.21 @ 12:34) >  EF (Visual Estimate): 70 %    < end of copied text >  < from: Transthoracic Echocardiogram (03.22.21 @ 12:34) >  Conclusions:  Normal left ventricular systolic function. No segmental  wall motion abnormalities.  Small pericardial effusion adjacent to the right atrium; in  other areas effusion is minimal.    < end of copied text >    ASSESSMENT/PLAN: 	  1. Dyspnea on exertion worsening x1 week. Denies hx of CHF, cardiac stents, proBNP wnl,   CTA negative for PE  TTE EF 70%, no wma   NM stress test today  r/o ischemia given her acute onset of SOB      Kezia Diallo DO [facility]  Cardiovascular Medicine  [address], [address]  Office: [phone]  Cell: [phone]

## 2021-03-23 NOTE — DISCHARGE NOTE NURSING/CASE MANAGEMENT/SOCIAL WORK - PATIENT PORTAL LINK FT
You can access the FollowMyHealth Patient Portal offered by [facility] by registering at the following website: [email or web address]. By joining [employer]’s FollowMyHealth portal, you will also be able to view your health information using other applications (apps) compatible with our system.

## 2021-03-23 NOTE — PROGRESS NOTE ADULT - PROBLEM SELECTOR PLAN 1
- seen by cardiologist, recs appreciated  - TTE normal EF with small pericardial effusion  - had covid in 12/2020  - awaiting stress test today   - cont aspirin 81mg  - If stress test is negative, will discharge patient home with close follow up with PMD.

## 2021-03-23 NOTE — DISCHARGE NOTE PROVIDER - HOSPITAL COURSE
HPI:  69 yo female w/pmh asthma, HTN, HLD, diabetes, primary hyperparathyroidism, presents to [facility] for cc worsening dyspnea on exertion for the last week. She denies chest pain during these episodes. Says she started having trouble walking up stairs, and had to stop at around 3-4 steps to catch her breath. However, she says she can probably walk about 3 blocks without any issues. Sleeps on 2 pillows at baseline for a long time. Has never been diagnosed with heart failure or coronary artery disease. Denies hearing herself wheeze and does not think her SOB is related to the weather. She has experienced these symptoms in the past but can't quite remember when they first started. Saw her cardiologist, Dr. Stewart Hensley, last month.    Patient was first admitted to CDU for monitoring. She was hemodynamically stable, afebrile. Labs with trop 11 --> 13. Rest of labs unremarkable. She had a CT angio of her chest which was negative for PE and lung pathology. She was seen by cardiology, Dr. Diallo, who recommended inpatient stay for stress test. She had a TTE done which was normal, except for a small pericardial effusion. (22 Mar 2021 16:32)   HPI:  69 yo female w/pmh asthma, HTN, HLD, diabetes, primary hyperparathyroidism, presents to [facility] for cc worsening dyspnea on exertion for the last week. She denies chest pain during these episodes. Says she started having trouble walking up stairs, and had to stop at around 3-4 steps to catch her breath. However, she says she can probably walk about 3 blocks without any issues. Sleeps on 2 pillows at baseline for a long time. Has never been diagnosed with heart failure or coronary artery disease. Denies hearing herself wheeze and does not think her SOB is related to the weather. She has experienced these symptoms in the past but can't quite remember when they first started. Saw her cardiologist, Dr. Stewart Hensley, last month.    Patient was first admitted to CDU for monitoring. She was hemodynamically stable, afebrile. Labs with trop 11 --> 13. Rest of labs unremarkable. She had a CT angio of her chest which was negative for PE and lung pathology. She was seen by cardiology, Dr. Diallo, who recommended inpatient stay for stress test. She had a TTE done which was normal, except for a small pericardial effusion. (22 Mar 2021 16:32)    Stress test performed - no reversible ischemia found.   Etiology of the dyspnea on exertion is not clear. Follow up with outpatient cardiologist.

## 2021-03-23 NOTE — DISCHARGE NOTE PROVIDER - NSDCCPCAREPLAN_GEN_ALL_CORE_FT
PRINCIPAL DISCHARGE DIAGNOSIS  Diagnosis: Shortness of breath  Assessment and Plan of Treatment: c/w current meds   follow iwalesia montilla

## 2021-03-23 NOTE — DISCHARGE NOTE PROVIDER - NSDCFUSCHEDAPPT_GEN_ALL_CORE_FT
PAULINA GAMBOA ; 04/01/2021 ; [facility] [address]  PAULINA GAMBOA ; 04/10/2021 ; [facility] [address]  PAULINA GAMBOA ; 04/14/2021 ; [facility] [address]  PAULINA GAMBOA ; 04/19/2021 ; [facility] [address]

## 2021-03-24 ENCOUNTER — NON-APPOINTMENT (OUTPATIENT)
Age: 71
End: 2021-03-24

## 2021-03-25 ENCOUNTER — FORM ENCOUNTER (OUTPATIENT)
Age: 71
End: 2021-03-25

## 2021-03-29 ENCOUNTER — NON-APPOINTMENT (OUTPATIENT)
Age: 71
End: 2021-03-29

## 2021-03-29 ENCOUNTER — APPOINTMENT (OUTPATIENT)
Dept: CARDIOLOGY | Facility: CLINIC | Age: 71
End: 2021-03-29
Payer: MEDICARE

## 2021-03-29 VITALS
OXYGEN SATURATION: 98 % | HEART RATE: 96 BPM | SYSTOLIC BLOOD PRESSURE: 150 MMHG | RESPIRATION RATE: 18 BRPM | TEMPERATURE: 98.3 F | BODY MASS INDEX: 31.28 KG/M2 | DIASTOLIC BLOOD PRESSURE: 90 MMHG | HEIGHT: 62 IN | WEIGHT: 170 LBS

## 2021-03-29 PROCEDURE — 93000 ELECTROCARDIOGRAM COMPLETE: CPT

## 2021-03-29 PROCEDURE — 99072 ADDL SUPL MATRL&STAF TM PHE: CPT

## 2021-03-29 PROCEDURE — 99214 OFFICE O/P EST MOD 30 MIN: CPT

## 2021-03-29 RX ORDER — LOSARTAN POTASSIUM 50 MG/1
50 TABLET, FILM COATED ORAL
Qty: 30 | Refills: 0 | Status: DISCONTINUED | COMMUNITY
Start: 2020-12-28

## 2021-03-29 NOTE — REASON FOR VISIT
[Follow-Up - Clinic] : a clinic follow-up of [Dizziness] : dizziness [Hypertension] : hypertension [Palpitations] : palpitations [FreeTextEntry1] : This is a 70 year year old female here today for follow up cardiac evaluation. \par She has a past medical history significant for  hypertension, asthma, history of bronchitis, non-insulin-dependent diabetes mellitus, hyperlipidemia, status post right knee replacement and COVID in December 2020.\par \par \par The patient had a recent hospitalization:\par \par Hospital Course: \par Discharge Date	23-Mar-2021 \par Admission Date	22-Mar-2021 15:55 \par Reason for Admission	dyspnea on exertion \par Hospital Course	 \par HPI: \par 69 yo female w/pmh asthma, HTN, HLD, diabetes, primary hyperparathyroidism, \par presents to [facility] for cc worsening dyspnea on exertion for the last week. She \par denies chest pain during these episodes. Says she started having trouble \par walking up stairs, and had to stop at around 3-4 steps to catch her breath. \par However, she says she can probably walk about 3 blocks without any issues. \par Sleeps on 2 pillows at baseline for a long time. Has never been diagnosed with \par heart failure or coronary artery disease. Denies hearing herself wheeze and \par does not think her SOB is related to the weather. She has experienced these \par symptoms in the past but can't quite remember when they first started. Saw her \par cardiologist, Dr. Stewart Hensley, last month. \par \par Patient was first admitted to CDU for monitoring. She was hemodynamically \par stable, afebrile. Labs with trop 11 --> 13. Rest of labs unremarkable. She had \par a CT angio of her chest which was negative for PE and lung pathology. She was \par seen by cardiology, Dr. Diallo, who recommended inpatient stay for stress test. \par She had a TTE done which was normal, except for a small pericardial effusion. \par (22 Mar 2021 16:32) \par \par Stress test performed - no reversible ischemia found. \par Etiology of the dyspnea on exertion is not clear. Follow up with outpatient \par cardiologist.\par \par Today she is feeling well but still does have HOWELL although not as severe as it was during her hospitalization. She is following up with her pulmonologist Dr. Cornelius as well. \par \par

## 2021-03-29 NOTE — DISCUSSION/SUMMARY
[FreeTextEntry1] : Dr. Hensley-(PRIOR VISIT and PMH WITH Dr. Hensley):  This is a 70-year-old female with past medical history significant for hypertension, asthma, history of bronchitis, non-insulin-dependent diabetes mellitus, hyperlipidemia, status post right knee replacement, who comes in for cardiac follow-up evaluation.  She denies chest pain, shortness of breath, dizziness or syncope.    She understands she must maintain good hydration.  She will follow-up in 1 month to reevaluate her blood pressure.  If her blood pressure remains elevated, consideration can be given to the addition of a medication such as labetalol.  Part of the issue here is the patient is unsure of her list of medications and will confirm that.  She has verapamil  mg on her list as well. Blood work will be done today for SMA-20, lipid panel, and TSH and electrolytes. Given her diabetic state her LDL cholesterol target should be less than 70 mg/dL. She was born in [facility] and has no history of rheumatic fever.   PMH:  Her medications they are verapamil 240 mg in the evening Hyzaar 50/12.5 mg in the morning, Protonix 40 mg, metformin 500 mg once per day Pepcid 40 mg, Singulair, in addition to her other pulmonary medications.  An echo Doppler examination done May 14, 2020 by another cardiologist which demonstrated mild left ventricular hypertrophy, normal left ventricular function with an estimated ejection fraction of 55 to 60%, and trivial pericardial effusion.  There was mild tricuspid valve regurgitation, calcified mitral valve annulus and thickened mitral valve leaflets.  The patient's daughter reports that she had a normal stress echocardiogram April 26, 2017. Electrocardiogram done June 12, 2020 demonstrated normal sinus rhythm rate of 82 bpm is otherwise remarkable left atrial abnormality, and for poor R wave progression.  She has an appointment with an endocrinologist regarding her diabetic care and will follow-up with Dr. Cornelius of pulmonary medicine.  The patient understands that aerobic exercises must be increased to 40 minutes 4 times per week. A detailed discussion of lifestyle modification was done today. The patient has a good understanding of the diagnosis, and treatment plan. Lifestyle modification was also outlined.

## 2021-03-29 NOTE — ASSESSMENT
[FreeTextEntry1] : This is a 70 year year old female here today for follow up cardiac evaluation. \par She has a past medical history significant for  hypertension, asthma, history of bronchitis, non-insulin-dependent diabetes mellitus, hyperlipidemia, status post right knee replacement and COVID in December 2020.\par \par \par The patient had a recent hospitalization:\par \par Hospital Course: \par Discharge Date	23-Mar-2021 \par Admission Date	22-Mar-2021 15:55 \par Reason for Admission	dyspnea on exertion \par Hospital Course	 \par HPI: \par 71 yo female w/pmh asthma, HTN, HLD, diabetes, primary hyperparathyroidism, \par presents to [facility] for cc worsening dyspnea on exertion for the last week. She \par denies chest pain during these episodes. Says she started having trouble \par walking up stairs, and had to stop at around 3-4 steps to catch her breath. \par However, she says she can probably walk about 3 blocks without any issues. \par Sleeps on 2 pillows at baseline for a long time. Has never been diagnosed with \par heart failure or coronary artery disease. Denies hearing herself wheeze and \par does not think her SOB is related to the weather. She has experienced these \par symptoms in the past but can't quite remember when they first started. Saw her \par cardiologist, Dr. Stewart Hensley, last month. \par \par Patient was first admitted to CDU for monitoring. She was hemodynamically \par stable, afebrile. Labs with trop 11 --> 13. Rest of labs unremarkable. She had \par a CT angio of her chest which was negative for PE and lung pathology. She was \par seen by cardiology, Dr. Diallo, who recommended inpatient stay for stress test. \par She had a TTE done which was normal, except for a small pericardial effusion. \par (22 Mar 2021 16:32) \par \par Stress test performed - no reversible ischemia found. \par Etiology of the dyspnea on exertion is not clear. Follow up with outpatient \par cardiologist.\par \par Today she is feeling well but still does have HOWELL although not as severe as it was during her hospitalization. She is following up with her pulmonologist Dr. Cornelius as well. \par \par -Cardiac risk factors include HTN, HLD, DM.\par \par BLOOD PRESSURE:\par -BP remains elevated on today's exam. She states that she is on Labetalol 100mg PO BID and on Verapamil 240 mg PO DAILY. She states that she never started Olmesartan HCTZ 40/12.5 because she didn’t know she had to take this. \par -We will re order this medication for her to start.\par \par -I have discussed the importance of maintaining good BP control and reviewed the newest guidelines with the patient while re-enforcing dietary sodium restrictions to no more than 2-3 g daily, DASH diet, life style modifications as well as the goal of maintaining ideal body weight with the patient today. I have advised the patient to avoid the use of over-the-counter medications/ supplements especially NSAIDS.\par \par I have reviewed with Ms. GAMBOA that serious health consequences can occur when blood pressure is not well controlled and the need for strict compliance with medication and that optimal control can significantly reduce the risk of cardiovascular disease stroke, heart attack and other organ damage. They verbally expressed understanding of the fore mentioned serious health consequences to me today.\par \par BLOOD WORK:\par -New blood work was done Jan 27 2021 and demonstrated normal lipid panel. She is on Atorvastatin 20mg PO DAILY. \par \par CHOLESTEROL CONTROL:\par -Patient will continue the advised  TLC diet and to continue follow-up for treatment of hyperlipidemia and repeat blood testing with diet and exercise. I have discussed different exercises and the importance of maintenance of optimal body weight. The importance of staying within guidelines and recommendations was stressed to the patient today and they acknowledged that they understand this to me verbally.\par \par DIABETIC CONTROL:\par The patient's blood glucose consistent with their diabetes. A1C is reported to be 6.7. She does have an endocrinologist.\par \par I will advise the patient to keep try to stay on a low carbohydrate diet lose weight and exercise to reduce spikes in their blood sugar.  The importance of monitoring blood sugar regularly and HgBA1c level were reviewed. I have also discussed annual eye exams to prevent blindness,  monitoring urine microalbumin regularly to check for kidney damage and the importance of proper foot care and regularly checking feet to prevent sores and possibly loss of limbs was reviewed. \par \par TESTING/REPORTS:\par -EKG done Mar 29, 2021 which demonstrated regular sinus tachycardia rhythm with nonspecific ST-T wave changes, BPM of 96. She states that since her COVID infection she still does experience elevated HR/palpitations.\par \par -The patient had an echocardiogram done on 1/27/2021 demonstrated mild mitral, aortic, tricuspid and pulmonic regurgitation with normal left ventricular systolic function.\par -EF on echo reported to be 65%.\par \par -An echo Doppler examination done May 14, 2020 by another cardiologist which demonstrated mild left ventricular hypertrophy, normal left ventricular function with an estimated ejection fraction of 55 to 60%, and trivial pericardial effusion.  There was mild tricuspid valve regurgitation, calcified mitral valve annulus and thickened mitral valve leaflets.\par \par -The patient's daughter reports that she had a normal stress echocardiogram April 26, 2017.\par \par -She had an echocardiogram and a nuclear stress test done during her hospitalization (see HPI).\par \par PLAN:\par -She will schedule a right and left heart cardiac catheterization to evaluate her pulmonary pressures and her coronary arteries for potential blockage/HOWELL.\par -The patient will follow up with her pulmonologist.\par -She will start Olmesartan HCTZ 40/12.5mg PO DAILY. \par \par I have discussed the plan of care with Ms. PAULINA GAMBOA  and she  will follow up in 2-3 weeks. She is compliant with all of her medications.\par \par The patient understands that aerobic exercises must be increased to minutes 4 times/week and a detailed discussion of lifestyle modification was done today. \par The patient has a good understanding of the diagnosis, treatment plan and lifestyle modification. \par She will contact me at the office for any questions with their care or any changes in their health status.\par \par The plan of care was discussed with supervising physician, Dr. Hensley while present in the office at the time of the visit. \par \par Eren ABARCA

## 2021-03-31 ENCOUNTER — APPOINTMENT (OUTPATIENT)
Dept: HOME HEALTH SERVICES | Facility: HOME HEALTH | Age: 71
End: 2021-03-31
Payer: MEDICARE

## 2021-03-31 ENCOUNTER — NON-APPOINTMENT (OUTPATIENT)
Age: 71
End: 2021-03-31

## 2021-03-31 VITALS
DIASTOLIC BLOOD PRESSURE: 82 MMHG | BODY MASS INDEX: 31.28 KG/M2 | SYSTOLIC BLOOD PRESSURE: 168 MMHG | OXYGEN SATURATION: 98 % | TEMPERATURE: 98 F | HEART RATE: 81 BPM | RESPIRATION RATE: 16 BRPM | HEIGHT: 62 IN | WEIGHT: 170 LBS

## 2021-03-31 DIAGNOSIS — R22.32 LOCALIZED SWELLING, MASS AND LUMP, LEFT UPPER LIMB: ICD-10-CM

## 2021-03-31 DIAGNOSIS — M84.451D PATHOLOGICAL FRACTURE, RIGHT FEMUR, SUBSEQUENT ENCOUNTER FOR FRACTURE WITH ROUTINE HEALING: ICD-10-CM

## 2021-03-31 DIAGNOSIS — R22.9 LOCALIZED SWELLING, MASS AND LUMP, UNSPECIFIED: ICD-10-CM

## 2021-03-31 DIAGNOSIS — E66.3 OVERWEIGHT: ICD-10-CM

## 2021-03-31 DIAGNOSIS — Z01.812 ENCOUNTER FOR PREPROCEDURAL LABORATORY EXAMINATION: ICD-10-CM

## 2021-03-31 DIAGNOSIS — R42 DIZZINESS AND GIDDINESS: ICD-10-CM

## 2021-03-31 DIAGNOSIS — Z87.42 PERSONAL HISTORY OF OTHER DISEASES OF THE FEMALE GENITAL TRACT: ICD-10-CM

## 2021-03-31 DIAGNOSIS — R94.39 ABNORMAL RESULT OF OTHER CARDIOVASCULAR FUNCTION STUDY: ICD-10-CM

## 2021-03-31 DIAGNOSIS — R91.8 OTHER NONSPECIFIC ABNORMAL FINDING OF LUNG FIELD: ICD-10-CM

## 2021-03-31 DIAGNOSIS — Z87.898 PERSONAL HISTORY OF OTHER SPECIFIED CONDITIONS: ICD-10-CM

## 2021-03-31 DIAGNOSIS — Z71.89 OTHER SPECIFIED COUNSELING: ICD-10-CM

## 2021-03-31 DIAGNOSIS — Z85.118 PERSONAL HISTORY OF OTHER MALIGNANT NEOPLASM OF BRONCHUS AND LUNG: ICD-10-CM

## 2021-03-31 DIAGNOSIS — Z86.012 PERSONAL HISTORY OF BENIGN CARCINOID TUMOR: ICD-10-CM

## 2021-03-31 DIAGNOSIS — M23.203 DERANGEMENT OF UNSPECIFIED MEDIAL MENISCUS DUE TO OLD TEAR OR INJURY, RIGHT KNEE: ICD-10-CM

## 2021-03-31 DIAGNOSIS — Z87.09 PERSONAL HISTORY OF OTHER DISEASES OF THE RESPIRATORY SYSTEM: ICD-10-CM

## 2021-03-31 DIAGNOSIS — R06.00 DYSPNEA, UNSPECIFIED: ICD-10-CM

## 2021-03-31 DIAGNOSIS — Z86.018 PERSONAL HISTORY OF OTHER BENIGN NEOPLASM: ICD-10-CM

## 2021-03-31 DIAGNOSIS — R10.2 PELVIC AND PERINEAL PAIN: ICD-10-CM

## 2021-03-31 DIAGNOSIS — Z01.818 ENCOUNTER FOR OTHER PREPROCEDURAL EXAMINATION: ICD-10-CM

## 2021-03-31 PROCEDURE — 99344 HOME/RES VST NEW MOD MDM 60: CPT | Mod: 25

## 2021-03-31 PROCEDURE — G0506: CPT

## 2021-03-31 RX ORDER — MIRTAZAPINE 45 MG/1
45 TABLET, FILM COATED ORAL
Qty: 30 | Refills: 0 | Status: DISCONTINUED | COMMUNITY
Start: 2021-01-19

## 2021-03-31 RX ORDER — BENZONATATE 100 MG/1
100 CAPSULE ORAL
Qty: 30 | Refills: 0 | Status: DISCONTINUED | COMMUNITY
Start: 2020-12-09 | End: 2021-03-31

## 2021-03-31 RX ORDER — PSYLLIUM HUSK 0.4 G
1000-800 CAPSULE ORAL
Qty: 60 | Refills: 2 | Status: DISCONTINUED | COMMUNITY
Start: 2020-08-28 | End: 2021-03-31

## 2021-03-31 RX ORDER — CINACALCET 30 MG/1
30 TABLET ORAL
Qty: 30 | Refills: 0 | Status: DISCONTINUED | COMMUNITY
Start: 2020-12-09 | End: 2021-03-31

## 2021-03-31 RX ORDER — CLOTRIMAZOLE AND BETAMETHASONE DIPROPIONATE 10; .5 MG/G; MG/G
1-0.05 CREAM TOPICAL TWICE DAILY
Qty: 1 | Refills: 0 | Status: DISCONTINUED | COMMUNITY
Start: 2020-08-17 | End: 2021-03-31

## 2021-03-31 RX ORDER — BUSPIRONE HYDROCHLORIDE 5 MG/1
5 TABLET ORAL
Qty: 90 | Refills: 0 | Status: DISCONTINUED | COMMUNITY
Start: 2021-03-07

## 2021-03-31 RX ORDER — CEFPODOXIME PROXETIL 200 MG/1
200 TABLET, FILM COATED ORAL
Qty: 10 | Refills: 0 | Status: DISCONTINUED | COMMUNITY
Start: 2021-02-08

## 2021-03-31 RX ORDER — BLOOD SUGAR DIAGNOSTIC
STRIP MISCELLANEOUS
Qty: 50 | Refills: 0 | Status: DISCONTINUED | COMMUNITY
Start: 2020-12-28

## 2021-04-01 ENCOUNTER — APPOINTMENT (OUTPATIENT)
Dept: CARDIOLOGY | Facility: CLINIC | Age: 71
End: 2021-04-01

## 2021-04-03 ENCOUNTER — APPOINTMENT (OUTPATIENT)
Dept: DISASTER EMERGENCY | Facility: CLINIC | Age: 71
End: 2021-04-03

## 2021-04-04 ENCOUNTER — FORM ENCOUNTER (OUTPATIENT)
Age: 71
End: 2021-04-04

## 2021-04-04 LAB — SARS-COV-2 N GENE NPH QL NAA+PROBE: NOT DETECTED

## 2021-04-06 ENCOUNTER — OUTPATIENT (OUTPATIENT)
Dept: OUTPATIENT SERVICES | Facility: HOSPITAL | Age: 71
LOS: 1 days | End: 2021-04-06
Payer: MEDICARE

## 2021-04-06 VITALS
HEIGHT: 65 IN | WEIGHT: 175.05 LBS | HEART RATE: 96 BPM | SYSTOLIC BLOOD PRESSURE: 162 MMHG | DIASTOLIC BLOOD PRESSURE: 79 MMHG | TEMPERATURE: 99 F

## 2021-04-06 VITALS
RESPIRATION RATE: 18 BRPM | HEART RATE: 68 BPM | OXYGEN SATURATION: 96 % | SYSTOLIC BLOOD PRESSURE: 138 MMHG | DIASTOLIC BLOOD PRESSURE: 72 MMHG

## 2021-04-06 DIAGNOSIS — Z98.42 CATARACT EXTRACTION STATUS, LEFT EYE: Chronic | ICD-10-CM

## 2021-04-06 DIAGNOSIS — Z90.11 ACQUIRED ABSENCE OF RIGHT BREAST AND NIPPLE: Chronic | ICD-10-CM

## 2021-04-06 DIAGNOSIS — Z90.49 ACQUIRED ABSENCE OF OTHER SPECIFIED PARTS OF DIGESTIVE TRACT: Chronic | ICD-10-CM

## 2021-04-06 DIAGNOSIS — Z90.710 ACQUIRED ABSENCE OF BOTH CERVIX AND UTERUS: Chronic | ICD-10-CM

## 2021-04-06 DIAGNOSIS — R94.39 ABNORMAL RESULT OF OTHER CARDIOVASCULAR FUNCTION STUDY: ICD-10-CM

## 2021-04-06 DIAGNOSIS — Z96.651 PRESENCE OF RIGHT ARTIFICIAL KNEE JOINT: Chronic | ICD-10-CM

## 2021-04-06 LAB
ANION GAP SERPL CALC-SCNC: 14 MMOL/L — SIGNIFICANT CHANGE UP (ref 5–17)
BUN SERPL-MCNC: 12 MG/DL — SIGNIFICANT CHANGE UP (ref 7–23)
CALCIUM SERPL-MCNC: 10.6 MG/DL — HIGH (ref 8.4–10.5)
CHLORIDE SERPL-SCNC: 104 MMOL/L — SIGNIFICANT CHANGE UP (ref 96–108)
CO2 SERPL-SCNC: 24 MMOL/L — SIGNIFICANT CHANGE UP (ref 22–31)
CREAT SERPL-MCNC: 0.62 MG/DL — SIGNIFICANT CHANGE UP (ref 0.5–1.3)
GLUCOSE BLDC GLUCOMTR-MCNC: 154 MG/DL — HIGH (ref 70–99)
GLUCOSE SERPL-MCNC: 154 MG/DL — HIGH (ref 70–99)
HCT VFR BLD CALC: 40.4 % — SIGNIFICANT CHANGE UP (ref 34.5–45)
HGB BLD-MCNC: 12.2 G/DL — SIGNIFICANT CHANGE UP (ref 11.5–15.5)
MCHC RBC-ENTMCNC: 22 PG — LOW (ref 27–34)
MCHC RBC-ENTMCNC: 30.2 GM/DL — LOW (ref 32–36)
MCV RBC AUTO: 72.8 FL — LOW (ref 80–100)
NRBC # BLD: 0 /100 WBCS — SIGNIFICANT CHANGE UP (ref 0–0)
PLATELET # BLD AUTO: 243 K/UL — SIGNIFICANT CHANGE UP (ref 150–400)
POTASSIUM SERPL-MCNC: 4.1 MMOL/L — SIGNIFICANT CHANGE UP (ref 3.5–5.3)
POTASSIUM SERPL-SCNC: 4.1 MMOL/L — SIGNIFICANT CHANGE UP (ref 3.5–5.3)
RBC # BLD: 5.55 M/UL — HIGH (ref 3.8–5.2)
RBC # FLD: 15.2 % — HIGH (ref 10.3–14.5)
SODIUM SERPL-SCNC: 142 MMOL/L — SIGNIFICANT CHANGE UP (ref 135–145)
WBC # BLD: 4.8 K/UL — SIGNIFICANT CHANGE UP (ref 3.8–10.5)
WBC # FLD AUTO: 4.8 K/UL — SIGNIFICANT CHANGE UP (ref 3.8–10.5)

## 2021-04-06 PROCEDURE — 93460 R&L HRT ART/VENTRICLE ANGIO: CPT | Mod: 26

## 2021-04-06 PROCEDURE — C1894: CPT

## 2021-04-06 PROCEDURE — 80048 BASIC METABOLIC PNL TOTAL CA: CPT

## 2021-04-06 PROCEDURE — C1769: CPT

## 2021-04-06 PROCEDURE — 99152 MOD SED SAME PHYS/QHP 5/>YRS: CPT

## 2021-04-06 PROCEDURE — 93010 ELECTROCARDIOGRAM REPORT: CPT

## 2021-04-06 PROCEDURE — 85027 COMPLETE CBC AUTOMATED: CPT

## 2021-04-06 PROCEDURE — 82962 GLUCOSE BLOOD TEST: CPT

## 2021-04-06 PROCEDURE — 99152 MOD SED SAME PHYS/QHP 5/>YRS: CPT | Mod: 59

## 2021-04-06 PROCEDURE — 93005 ELECTROCARDIOGRAM TRACING: CPT

## 2021-04-06 PROCEDURE — 93460 R&L HRT ART/VENTRICLE ANGIO: CPT

## 2021-04-06 PROCEDURE — C1887: CPT

## 2021-04-06 RX ORDER — ATORVASTATIN CALCIUM 80 MG/1
1 TABLET, FILM COATED ORAL
Qty: 0 | Refills: 0 | DISCHARGE

## 2021-04-06 RX ORDER — MIRTAZAPINE 45 MG/1
1 TABLET, ORALLY DISINTEGRATING ORAL
Qty: 0 | Refills: 0 | DISCHARGE

## 2021-04-06 RX ORDER — OLMESARTAN MEDOXOMIL-HYDROCHLOROTHIAZIDE 25; 40 MG/1; MG/1
1 TABLET, FILM COATED ORAL
Qty: 0 | Refills: 0 | DISCHARGE

## 2021-04-06 RX ORDER — LABETALOL HCL 100 MG
1 TABLET ORAL
Qty: 0 | Refills: 0 | DISCHARGE

## 2021-04-06 RX ORDER — PANTOPRAZOLE SODIUM 20 MG/1
1 TABLET, DELAYED RELEASE ORAL
Qty: 0 | Refills: 0 | DISCHARGE

## 2021-04-06 RX ORDER — MONTELUKAST 4 MG/1
1 TABLET, CHEWABLE ORAL
Qty: 0 | Refills: 0 | DISCHARGE

## 2021-04-06 RX ORDER — FLUTICASONE PROPIONATE 220 MCG
1 AEROSOL WITH ADAPTER (GRAM) INHALATION
Qty: 0 | Refills: 0 | DISCHARGE

## 2021-04-06 RX ORDER — METFORMIN HYDROCHLORIDE 850 MG/1
1 TABLET ORAL
Qty: 0 | Refills: 0 | DISCHARGE

## 2021-04-06 RX ORDER — CEFPODOXIME PROXETIL 100 MG
1 TABLET ORAL
Qty: 0 | Refills: 0 | DISCHARGE

## 2021-04-06 RX ORDER — CARIPRAZINE 1.5 MG/1
1 CAPSULE, GELATIN COATED ORAL
Qty: 0 | Refills: 0 | DISCHARGE

## 2021-04-06 RX ORDER — FLUTICASONE FUROATE AND VILANTEROL TRIFENATATE 100; 25 UG/1; UG/1
1 POWDER RESPIRATORY (INHALATION)
Qty: 0 | Refills: 0 | DISCHARGE

## 2021-04-06 RX ORDER — ASPIRIN/CALCIUM CARB/MAGNESIUM 324 MG
1 TABLET ORAL
Qty: 0 | Refills: 0 | DISCHARGE

## 2021-04-06 NOTE — ASU DISCHARGE PLAN (ADULT/PEDIATRIC) - ASU DC SPECIAL INSTRUCTIONSFT
No heavy lifting, strenuous activity, bending, straining, or unnecessary stair climbing for 2 weeks. No driving for 2 days. You may shower 24 hours following the procedure but avoid baths/swimming for 1 week. Check your groin site for bleeding and/or swelling daily following procedure and call your doctor immediately if it occurs or if you experience increased pain at the site. Follow up with your cardiologist in 1-2 weeks. You may call [facility] if you have any questions/concerns regarding your procedure [phone].

## 2021-04-06 NOTE — H&P CARDIOLOGY - PSH
H/O total knee replacement, right    History of cholecystectomy  1980's  History of left cataract surgery  1990's  S/P FERNANDO-BSO (total abdominal hysterectomy and bilateral salpingo-oophorectomy)  1980's

## 2021-04-06 NOTE — H&P CARDIOLOGY - HISTORY OF PRESENT ILLNESS
This is a 69 yo female with history of HTN, Asthma, Type 2DM, GERD presents as outpatient for cardiac cath to evaluate intermittent chest pressure, shortness of breath and palpitations.  Pt was recently admitted to [facility] and had nuclear stress test. Results below. Pt followed up with private cardiologist who recommended R/Lheart cath. Covid in 2020  Dr. Hensley referring cardiologist   Denies implanted cardiac monitors   Nuclear stress test: 3/23/2021  IMPRESSIONS:Abnormal Study     * Chest Pain: No chest pain with administration of  Regadenoson.  * Symptom: Shortness of breath.  * HR Response: Appropriate.  * BP Response: Appropriate.  * Heart Rhythm: Sinus Rhythm - 97 BPM.  * Baseline ECG: Nonspecific ST-T wave abnormality.  Poor R  wave progression.  * ECG Changes: No significant ischemic ST segment changes  beyond baseline abnormalities.  * Arrhythmia: None.  * The left ventricle was normal in size.  * There is a small, mild to moderate defect in the basal  septal wall that is mostly fixed suggestive of scar with  minimal ischemia.  * There are also mild defects in the distal anterior,  distal anteroseptal, apical, and inferolateral walls that  are mostly fixed, partially correct with prone imaging,  and demonstrate preserved wall motion suggestive of  attenuation artifact.  * Post-stress gated wall motion analysis was performed  (LVEF > 70%;LVEDV = 59 ml.) revealing hypokinesis of the  basal septum with normal overall left ventricular ejection  fraction.  *** No previous Nuclear/Stress exam.  ------------------------------------------------------------------------  Confirmed on  3/23/2021 - 16:43:03 by Kiet Hewitt M.D.  ------------------------------------------------------------------------

## 2021-04-06 NOTE — H&P CARDIOLOGY - PMH
Arrhythmia    Asthma    Cataract of right eye    COVID-19  dec 2020  Depression    DM (diabetes mellitus)    GERD (gastroesophageal reflux disease)    HTN (hypertension)    Obese    Seasonal allergies

## 2021-04-06 NOTE — ASU DISCHARGE PLAN (ADULT/PEDIATRIC) - CARE PROVIDER_API CALL
Stewart Hensley (MD)  Cardiology; Cardiovascular Disease; Internal Medicine  [address]  [place] [address]  Phone: [phone]  Fax: [phone]  Follow Up Time:

## 2021-04-08 PROBLEM — R22.9 SUBCUTANEOUS NODULE: Status: RESOLVED | Noted: 2020-08-19 | Resolved: 2021-04-08

## 2021-04-08 PROBLEM — Z86.012 HISTORY OF CARCINOID TUMOR OF LUNG: Status: RESOLVED | Noted: 2017-09-01 | Resolved: 2021-04-08

## 2021-04-08 PROBLEM — M23.203 DEGENERATIVE TEAR OF MEDIAL MENISCUS, RIGHT: Status: RESOLVED | Noted: 2017-10-27 | Resolved: 2021-04-08

## 2021-04-08 PROBLEM — M84.451D SUBCHONDRAL INSUFFICIENCY FRACTURE OF CONDYLE OF RIGHT FEMUR WITH ROUTINE HEALING, SUBSEQUENT ENCOUNTER: Status: RESOLVED | Noted: 2017-10-13 | Resolved: 2021-04-08

## 2021-04-08 PROBLEM — Z86.018 HISTORY OF LIPOMA OF LEFT UPPER EXTREMITY: Status: RESOLVED | Noted: 2020-11-12 | Resolved: 2021-04-08

## 2021-04-08 PROBLEM — R06.00 EXERTIONAL DYSPNEA: Status: ACTIVE | Noted: 2017-04-21

## 2021-04-08 PROBLEM — Z87.09 HISTORY OF ASTHMA: Status: RESOLVED | Noted: 2017-04-21 | Resolved: 2021-04-08

## 2021-04-08 PROBLEM — Z01.818 PREOP TESTING: Status: RESOLVED | Noted: 2021-04-02 | Resolved: 2021-04-08

## 2021-04-08 PROBLEM — R94.39 ABNORMAL NUCLEAR STRESS TEST: Status: RESOLVED | Noted: 2021-03-29 | Resolved: 2021-04-08

## 2021-04-08 NOTE — COUNSELING
[Normal Weight - ( BMI  <25 )] : normal weight - ( BMI  <25 ) [Continue diet as tolerated] : continue diet as tolerated based on goals of care [Non - Smoker] : non-smoker [] : foot exam [Improve exercise tolerance] : improve exercise tolerance [Discussed disease trajectory with patient/caregiver] : discussed disease trajectory with patient/caregiver [Likely to achieve goals/desired outcomes] : likely to achieve goals/desired outcomes [Patient/Caregiver has ___ understanding of disease process] : patient/caregiver has [unfilled] understanding of disease process [Patient/Caregiver not ready to engage in discussion] : patient/caregiver not ready to engage in discussion [ - New patient with 2 or more chronic conditions; CCM discussed and patient-centered care plan established] : New patient with 2 or more chronic conditions; CCM discussed and patient-centered care plan established

## 2021-04-09 ENCOUNTER — APPOINTMENT (OUTPATIENT)
Dept: INTERNAL MEDICINE | Facility: CLINIC | Age: 71
End: 2021-04-09

## 2021-04-10 ENCOUNTER — APPOINTMENT (OUTPATIENT)
Dept: CT IMAGING | Facility: IMAGING CENTER | Age: 71
End: 2021-04-10

## 2021-04-12 ENCOUNTER — RX RENEWAL (OUTPATIENT)
Age: 71
End: 2021-04-12

## 2021-04-19 ENCOUNTER — APPOINTMENT (OUTPATIENT)
Dept: ENDOCRINOLOGY | Facility: CLINIC | Age: 71
End: 2021-04-19
Payer: MEDICARE

## 2021-04-19 VITALS
BODY MASS INDEX: 32.2 KG/M2 | OXYGEN SATURATION: 98 % | TEMPERATURE: 98.3 F | DIASTOLIC BLOOD PRESSURE: 70 MMHG | HEIGHT: 62 IN | RESPIRATION RATE: 17 BRPM | SYSTOLIC BLOOD PRESSURE: 140 MMHG | HEART RATE: 93 BPM | WEIGHT: 175 LBS

## 2021-04-19 PROBLEM — U07.1 COVID-19: Chronic | Status: ACTIVE | Noted: 2021-04-06

## 2021-04-19 LAB — GLUCOSE BLDC GLUCOMTR-MCNC: 236

## 2021-04-19 PROCEDURE — 99072 ADDL SUPL MATRL&STAF TM PHE: CPT

## 2021-04-19 PROCEDURE — 95250 CONT GLUC MNTR PHYS/QHP EQP: CPT

## 2021-04-19 PROCEDURE — 95251 CONT GLUC MNTR ANALYSIS I&R: CPT

## 2021-04-19 PROCEDURE — 99205 OFFICE O/P NEW HI 60 MIN: CPT | Mod: 25

## 2021-04-19 RX ORDER — OLMESARTAN MEDOXOMIL AND HYDROCHLOROTHIAZIDE 40; 12.5 MG/1; MG/1
40-12.5 TABLET ORAL DAILY
Qty: 90 | Refills: 1 | Status: DISCONTINUED | COMMUNITY
Start: 2021-03-02 | End: 2021-04-19

## 2021-04-19 NOTE — HISTORY OF PRESENT ILLNESS
[FreeTextEntry1] : HISTORY OF PRESENT ILLNESS. \par \par Ms. GAMBOA was diagnosed with Diabetes Mellitus Type 2 more than 10 years ago. She reports history HTN, dyslipidemia, carcinoid tumor of the lung, and denies CAD,  known complications of retinopathy, nephropathy, or neuropathy. She is presently on metformin 500 mg bid, Lipitor 20 mg HS. As per daughter, she was diagnosed with a hypercalcemia sometime in 05/20, calcium rising to 10.5-11.1, and as per daughter was even higher  in December 2020 when was admitted to the hospital with Covid, and was placed on cinacalcet. However, she developed tremors and stopped taking it about 2 months ago. She's taking some MVI once a day\par Blood sugars are checked 3-4 times a day. She noticed that her blood sugars are higher since she had Covid in 12/20\par Did not bring log book, but reported are typically as following: FBS- 150's- 160's, ppg- low 200's\par Hypoglycemia frequency: none \par Fingerstick glucose in the office today is 236 mg/dL 2 hours after eating. \par Diet: not following ADA\par Exercise: none\par \par Lab review (1/21) : a1c- 6.7%, calcium - 11.1, LDL- 50, TSH- 1.05\par \par DXA (8/19/20)- LS (-2.4) with OA changes  at L1-L2, L3-L4 (-2.8), FN (-1.0)\par \par Last dilated eye - 2/21\par Last podiatry visit  -  3/21\par Last cardiology evaluation - 4/21\par Last stress test - 3/21\par Last 2-D Echo - 3/21, EF- 70%\par cath- 4/21\par Last nephrology evaluation - none\par Last neurology evaluation-none\par

## 2021-04-19 NOTE — REASON FOR VISIT
[Consultation] : a consultation visit [DM Type 2] : DM Type 2 [Hypercalcemia] : hypercalcemia [Family Member] : family member

## 2021-04-19 NOTE — ASSESSMENT
[Diabetes Foot Care] : diabetes foot care [Long Term Vascular Complications] : long term vascular complications of diabetes [Carbohydrate Consistent Diet] : carbohydrate consistent diet [Importance of Diet and Exercise] : importance of diet and exercise to improve glycemic control, achieve weight loss and improve cardiovascular health [Exercise/Effect on Glucose] : exercise/effect on glucose [Hypoglycemia Management] : hypoglycemia management [Self Monitoring of Blood Glucose] : self monitoring of blood glucose [Retinopathy Screening] : Patient was referred to ophthalmology for retinopathy screening [Diabetic Medications] : Risks and benefits of diabetic medications were discussed [FreeTextEntry1] : 1. DM2, glycemia appears to be worse controlled recently\par - labs today\par - d/c metformin \par - Janumet 50/1000 mg bid\par - continue lipitor\par - d/c olmesartan-HCT and start olmesartan 40 mg qd. Continue Labetalol. Might add norvasc. Advised to see PCP/cardio for blood pressure monitor\par - Saúl PRO\par \par 2. Hypercalcemia\par - observe off HCTZ\par - check calcium/PTH, vitamin D level\par - once D-repleted and off HCTZ, will do a 24h Uca collection\par - sestamibi scan\par - thyroid/parathyroid US\par \par 3. Osteoporosis\par I advised to the patient on antiresorptive therapy, and reviewed potential options for osteoporosis treatment, including oral and IV bisphosphonates, Prolia, Evenity. R+B of each options were discussed in details\par - OTC vitamin D3 2,000 IU/day\par - continue weight-bearing exercises; advised avoiding high risk sports\par - prior dental evaluation advised.\par - Fosamax 70 mg qw\par RTC 2- 3 weeks for sensor download and CDE evaluation\par

## 2021-04-20 ENCOUNTER — NON-APPOINTMENT (OUTPATIENT)
Age: 71
End: 2021-04-20

## 2021-04-20 ENCOUNTER — APPOINTMENT (OUTPATIENT)
Dept: CHRONIC DISEASE MANAGEMENT | Facility: CLINIC | Age: 71
End: 2021-04-20

## 2021-04-21 ENCOUNTER — APPOINTMENT (OUTPATIENT)
Dept: CARDIOLOGY | Facility: CLINIC | Age: 71
End: 2021-04-21

## 2021-04-23 LAB
24R-OH-CALCIDIOL SERPL-MCNC: 47.6 PG/ML
25(OH)D3 SERPL-MCNC: 32.2 NG/ML
ACE BLD-CCNC: 17 U/L
ALBUMIN MFR SERPL ELPH: 60.2 %
ALBUMIN SERPL ELPH-MCNC: 4.3 G/DL
ALBUMIN SERPL-MCNC: 4.3 G/DL
ALBUMIN/GLOB SERPL: 1.5 RATIO
ALBUPE: 19.6 %
ALP BLD-CCNC: 88 U/L
ALPHA1 GLOB MFR SERPL ELPH: 4.1 %
ALPHA1 GLOB SERPL ELPH-MCNC: 0.3 G/DL
ALPHA1UPE: 29.4 %
ALPHA2 GLOB MFR SERPL ELPH: 7.5 %
ALPHA2 GLOB SERPL ELPH-MCNC: 0.5 G/DL
ALPHA2UPE: 20.8 %
ALT SERPL-CCNC: 32 U/L
ANION GAP SERPL CALC-SCNC: 11 MMOL/L
AST SERPL-CCNC: 26 U/L
B-GLOBULIN MFR SERPL ELPH: 12.8 %
B-GLOBULIN SERPL ELPH-MCNC: 0.9 G/DL
BETAUPE: 17.9 %
BILIRUB SERPL-MCNC: 0.2 MG/DL
BUN SERPL-MCNC: 9 MG/DL
C PEPTIDE SERPL-MCNC: 6.5 NG/ML
CA-I SERPL-SCNC: 1.38 MMOL/L
CALCIUM SERPL-MCNC: 11 MG/DL
CALCIUM SERPL-MCNC: 11 MG/DL
CHLORIDE SERPL-SCNC: 100 MMOL/L
CHOLEST SERPL-MCNC: 106 MG/DL
CO2 SERPL-SCNC: 27 MMOL/L
CREAT SERPL-MCNC: 0.73 MG/DL
CREAT SPEC-SCNC: 97 MG/DL
DEPRECATED KAPPA LC FREE/LAMBDA SER: 1.46 RATIO
ESTIMATED AVERAGE GLUCOSE: 163 MG/DL
FOLATE SERPL-MCNC: >20 NG/ML
FRUCTOSAMINE SERPL-MCNC: 313 UMOL/L
GAMMA GLOB FLD ELPH-MCNC: 1.1 G/DL
GAMMA GLOB MFR SERPL ELPH: 15.4 %
GAMMAUPE: 12.3 %
GLUCOSE SERPL-MCNC: 251 MG/DL
HBA1C MFR BLD HPLC: 7.3 %
HDLC SERPL-MCNC: 58 MG/DL
IGA 24H UR QL IFE: NORMAL
INTERPRETATION SERPL IEP-IMP: NORMAL
KAPPA LC 24H UR QL: NORMAL
KAPPA LC CSF-MCNC: 1.51 MG/DL
KAPPA LC SERPL-MCNC: 2.21 MG/DL
LDLC SERPL CALC-MCNC: 39 MG/DL
M PROTEIN SPEC IFE-MCNC: NORMAL
MICROALBUMIN 24H UR DL<=1MG/L-MCNC: 1.8 MG/DL
MICROALBUMIN/CREAT 24H UR-RTO: 18 MG/G
NONHDLC SERPL-MCNC: 48 MG/DL
PARATHYROID HORMONE INTACT: 62 PG/ML
PHOSPHATE SERPL-MCNC: 2.3 MG/DL
POTASSIUM SERPL-SCNC: 4.2 MMOL/L
PROT PATTERN 24H UR ELPH-IMP: NORMAL
PROT SERPL-MCNC: 7.1 G/DL
PROT UR-MCNC: 13 MG/DL
PROT UR-MCNC: 13 MG/DL
SODIUM SERPL-SCNC: 138 MMOL/L
T4 FREE SERPL-MCNC: 1.5 NG/DL
TRIGL SERPL-MCNC: 47 MG/DL
TSH SERPL-ACNC: 0.73 UIU/ML
VIT B12 SERPL-MCNC: 619 PG/ML

## 2021-04-28 ENCOUNTER — RX RENEWAL (OUTPATIENT)
Age: 71
End: 2021-04-28

## 2021-04-28 LAB — PTH RELATED PROT SERPL-MCNC: <2 PMOL/L

## 2021-04-29 ENCOUNTER — NON-APPOINTMENT (OUTPATIENT)
Age: 71
End: 2021-04-29

## 2021-04-29 NOTE — HISTORY OF PRESENT ILLNESS
[Patient] : patient [Family Member] : family member [FreeTextEntry2] : Patient denies fever, cough, trouble breathing, rash, vomiting and diarrhea. Patient has not been in close contact with someone covid positive. \par N95 mask, gloves, eye wear used during visit: [Y]. 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,\par \par Pt A&Ox3, mainly Creole speaking - report some SOB\par Appetite good some days bad other days\par BM ok\par Urine - no incontinence\par \par Admitted to [facility] from 3/22- 3/23 for SOB, had elevated troponins, Echo with small pericardial effusion, stress test normal- SOB continues scheduled for cardiac cath on 4/6 for HOWELL\par \par \par Hypreparathryoid- diagnosed in 12/20- started on cinalcalcet- which is now on hold for Endo 4/16- Jean-Paul- reports she feel better off medication \par \par Depression/Anxiety - started in [facility] by psych a few months ago- not covered by insurance - pt and daughter do not know if medication has had any effect\par \par Asthma- taking Breo PRN & albuterol daily \par \par DM- uses contour Next one- on metformin - daughter reports has been continually checking BS- most <200 but pt does not eat if FS elevated- A1c 7.1 in 2/20

## 2021-04-29 NOTE — PHYSICAL EXAM
[No Acute Distress] : no acute distress [Well Nourished] : well nourished [Well Developed] : well developed [Normal Sclera/Conjunctiva] : normal sclera/conjunctiva [EOMI] : extra ocular movement intact [Normal Outer Ear/Nose] : the ears and nose were normal in appearance [Normal Oropharynx] : the oropharynx was normal [No JVD] : no jugular venous distention [Normal TMs] : both tympanic membranes were normal [No Respiratory Distress] : no respiratory distress [Clear to Auscultation] : lungs were clear to auscultation bilaterally [No Accessory Muscle Use] : no accessory muscle use [Normal Rate] : heart rate was normal  [Regular Rhythm] : with a regular rhythm [Normal S1, S2] : normal S1 and S2 [No Murmurs] : no murmurs heard [No Edema] : there was no peripheral edema [Normal Bowel Sounds] : normal bowel sounds [Non Tender] : non-tender [Soft] : abdomen soft [Not Distended] : not distended [Normal Post Cervical Nodes] : no posterior cervical lymphadenopathy [Normal Anterior Cervical Nodes] : no anterior cervical lymphadenopathy [No CVA Tenderness] : no ~M costovertebral angle tenderness [No Spinal Tenderness] : no spinal tenderness [Normal Gait] : normal gait [No Joint Swelling] : no joint swelling seen [No Clubbing, Cyanosis] : no clubbing  or cyanosis of the fingernails [Normal Strength/Tone] : muscle strength and tone were normal [No Rash] : no rash [No Skin Lesions] : no skin lesions [Oriented x3] : oriented to person, place, and time [Normal Affect] : the affect was normal [Kyphosis] : no kyphosis present [Scoliosis] : scoliosis not present

## 2021-04-29 NOTE — HEALTH RISK ASSESSMENT
[HRA Reviewed] : Health risk assessment reviewed [Independent] : using telephone [Some assistance needed] : managing finances

## 2021-04-29 NOTE — DATA REVIEWED
[FreeTextEntry1] : Patient PAULINA GAMBOA Invision MRN [id number] Hospital Visit [id number] [facility] Hospital - Attending Physician Anjel Byrd \par Status Complete \par  \par \par Hospital Course: \par Discharge Date	23-Mar-2021 \par Admission Date	22-Mar-2021 15:55 \par Reason for Admission	dyspnea on exertion \par Hospital Course	 \par HPI: \par 71 yo female w/pmh asthma, HTN, HLD, diabetes, primary hyperparathyroidism, \par presents to [facility] for cc worsening dyspnea on exertion for the last week. She \par denies chest pain during these episodes. Says she started having trouble \par walking up stairs, and had to stop at around 3-4 steps to catch her breath. \par However, she says she can probably walk about 3 blocks without any issues. \par Sleeps on 2 pillows at baseline for a long time. Has never been diagnosed with \par heart failure or coronary artery disease. Denies hearing herself wheeze and \par does not think her SOB is related to the weather. She has experienced these \par symptoms in the past but can't quite remember when they first started. Saw her \par cardiologist, Dr. Stewart Montilla, last month. \par \par Patient was first admitted to CDU for monitoring. She was hemodynamically \par stable, afebrile. Labs with trop 11 --> 13. Rest of labs unremarkable. She had \par a CT angio of her chest which was negative for PE and lung pathology. She was \par seen by cardiology, Dr. Diallo, who recommended inpatient stay for stress test. \par She had a TTE done which was normal, except for a small pericardial effusion. \par (22 Mar 2021 16:32) \par \par Stress test performed - no reversible ischemia found. \par Etiology of the dyspnea on exertion is not clear. Follow up with outpatient \par cardiologist. \par \par Med Reconciliation: \par Override IMPROVE-DD recommendations due to:	IMPROVE-DD Application Not \par Available \par Recommended Post-Discharge VTE Prophylaxis	IMPROVE-DD Application Not Available \par Medication Reconciliation Status	Admission Reconciliation is Not Complete \par Discharge Reconciliation is Completed \par Discharge Medications	albuterol 90 mcg/inh inhalation aerosol: 2 puff(s) \par inhaled every 6 hours \par as needed for SOB/wheezing \par aspirin 81 mg oral tablet, chewable: 1 tab(s) orally once a day \par atorvastatin 20 mg oral tablet: 1 tab(s) orally once a day \par Benicar HCT 40 mg-12.5 mg oral tablet: 1 tab(s) orally once a day \par Breo Ellipta 100 mcg-25 mcg/inh inhalation powder: 1 puff(s) inhaled once a day \par busPIRone 10 mg oral tablet: 1 tab(s) orally 2 times a day \par cefpodoxime 200 mg oral tablet: 1 tab(s) orally every 12 hours \par cinacalcet 30 mg oral tablet: 1 tab(s) orally once a day \par fluticasone 50 mcg/inh inhalation powder: 1 puff(s) inhaled 2 times a day \par labetalol 100 mg oral tablet: 1 tab(s) orally 2 times a day \par LORazepam 0.5 mg oral tablet: 1 tab(s) orally 2 times a day, As Needed \par metformin 500 mg oral tablet: 1 tab(s) orally 2 times a day \par mirtazapine 45 mg oral tablet: 1 tab(s) orally once a day (at bedtime) \par montelukast 10 mg oral tablet: 1 tab(s) orally once a day \par verapamil 24 hour extended release 240 mg/24 hours oral capsule, extended \par release: 1 cap(s) orally once a day \par Vraylar 1.5 mg oral capsule: 1 cap(s) orally once a day \par , \par , \par \par Care Plan/Procedures: \par Goal(s)	To get better and follow your care plan as instructed. \par Discharge Diagnoses, Assessment and Plan of Treatment	PRINCIPAL DISCHARGE \par DIAGNOSIS \par Diagnosis: Shortness of breath \par Assessment and Plan of Treatment: c/w current meds \par follow with Dr Stewart montilla \par \par Follow Up: \par Care Providers for Follow up (PCP/Outpatient Provider)	Stewart Montilla) \par Cardiology; Cardiovascular Disease; Internal Medicine \par [address] \[place] [address] \par Phone: [phone] \par Fax: [phone] \par Follow Up Time: \par Patient's Scheduled Appointments	PAULINA GAMBOA ; 04/01/2021 ; [facility] Card[facility] [address] \par PAULINA GAMBOA ; 04/10/2021 ; [facility] Rad [address] \par PAULINA GAMBOA ; 04/14/2021 ; [facility] Cardio [address] \par PAULINA GAMBOA ; 04/19/2021 ; [facility] Endocrin [address] \par Diet Instructions	Low Fat Low salt Low cholesterol diet \par Activity	No restrictions \par \par Quality Measures: \par Does the patient have difficulty running errands alone like visiting a doctors \par office or shopping?	No \par Does the patient have difficulty climbing stairs?	No \par Patient Condition	Stable \par Hospice Patient	No \par Cognition: The patient has	No difficulties \par Does the patient have a principal diagnosis of ischemic stroke, hemorrhagic \par stroke, or TIA?	No \par Does the patient have a principal diagnosis of Acute Myocardial Infarction?	No \par Has the patient had a Percutaneous Coronary Intervention?	No \par \par Document Complete: \par Care Provider Seen in Hospital	Anjel Byrd \par Physician Section Complete	This document is complete and the patient is ready \par for discharge. \par For questions about your prescriptions, please call:	[phone] \par Is this contact telephone number correct?	Yes \par \par \par \par Electronic Signatures: \par Anjel Byrd)  (Signed 24-Mar-2021 08:21) \par 	Authored: Hospital Course \par 	Co-Signer: Hospital Course, Med Reconciliation, Care Plan/Procedures, Follow \par Up, Quality Measures, Document Complete \par Liana Sprague (NP)  (Signed 23-Mar-2021 18:03) \par 	Authored: Hospital Course, Med Reconciliation, Care Plan/Procedures, Follow \par Up, Quality Measures, Document Complete \par \par \par Last Updated: 24-Mar-2021 08:21 by Anjel Byrd) \par

## 2021-04-30 ENCOUNTER — NON-APPOINTMENT (OUTPATIENT)
Age: 71
End: 2021-04-30

## 2021-05-03 ENCOUNTER — APPOINTMENT (OUTPATIENT)
Dept: ENDOCRINOLOGY | Facility: CLINIC | Age: 71
End: 2021-05-03
Payer: MEDICARE

## 2021-05-03 VITALS — HEIGHT: 62 IN | BODY MASS INDEX: 32.2 KG/M2 | WEIGHT: 175 LBS

## 2021-05-03 PROCEDURE — 99072 ADDL SUPL MATRL&STAF TM PHE: CPT

## 2021-05-03 PROCEDURE — G0108 DIAB MANAGE TRN  PER INDIV: CPT

## 2021-05-04 ENCOUNTER — APPOINTMENT (OUTPATIENT)
Dept: CARDIOLOGY | Facility: CLINIC | Age: 71
End: 2021-05-04
Payer: MEDICARE

## 2021-05-04 VITALS
HEART RATE: 102 BPM | TEMPERATURE: 98.3 F | DIASTOLIC BLOOD PRESSURE: 92 MMHG | OXYGEN SATURATION: 96 % | BODY MASS INDEX: 32.02 KG/M2 | HEIGHT: 62 IN | WEIGHT: 174 LBS | RESPIRATION RATE: 16 BRPM | SYSTOLIC BLOOD PRESSURE: 155 MMHG

## 2021-05-04 PROCEDURE — 99072 ADDL SUPL MATRL&STAF TM PHE: CPT

## 2021-05-04 PROCEDURE — 99214 OFFICE O/P EST MOD 30 MIN: CPT

## 2021-05-08 ENCOUNTER — OUTPATIENT (OUTPATIENT)
Dept: OUTPATIENT SERVICES | Facility: HOSPITAL | Age: 71
LOS: 1 days | End: 2021-05-08
Payer: MEDICARE

## 2021-05-08 ENCOUNTER — APPOINTMENT (OUTPATIENT)
Dept: ULTRASOUND IMAGING | Facility: IMAGING CENTER | Age: 71
End: 2021-05-08
Payer: MEDICARE

## 2021-05-08 DIAGNOSIS — Z90.710 ACQUIRED ABSENCE OF BOTH CERVIX AND UTERUS: Chronic | ICD-10-CM

## 2021-05-08 DIAGNOSIS — I10 ESSENTIAL (PRIMARY) HYPERTENSION: ICD-10-CM

## 2021-05-08 DIAGNOSIS — E11.65 TYPE 2 DIABETES MELLITUS WITH HYPERGLYCEMIA: ICD-10-CM

## 2021-05-08 DIAGNOSIS — R79.89 OTHER SPECIFIED ABNORMAL FINDINGS OF BLOOD CHEMISTRY: ICD-10-CM

## 2021-05-08 DIAGNOSIS — E83.52 HYPERCALCEMIA: ICD-10-CM

## 2021-05-08 DIAGNOSIS — Z90.49 ACQUIRED ABSENCE OF OTHER SPECIFIED PARTS OF DIGESTIVE TRACT: Chronic | ICD-10-CM

## 2021-05-08 DIAGNOSIS — E78.00 PURE HYPERCHOLESTEROLEMIA, UNSPECIFIED: ICD-10-CM

## 2021-05-08 DIAGNOSIS — Z98.42 CATARACT EXTRACTION STATUS, LEFT EYE: Chronic | ICD-10-CM

## 2021-05-08 DIAGNOSIS — Z96.651 PRESENCE OF RIGHT ARTIFICIAL KNEE JOINT: Chronic | ICD-10-CM

## 2021-05-08 PROCEDURE — 76536 US EXAM OF HEAD AND NECK: CPT | Mod: 26

## 2021-05-08 PROCEDURE — 76536 US EXAM OF HEAD AND NECK: CPT

## 2021-05-21 ENCOUNTER — OUTPATIENT (OUTPATIENT)
Dept: OUTPATIENT SERVICES | Facility: HOSPITAL | Age: 71
LOS: 1 days | End: 2021-05-21
Payer: MEDICARE

## 2021-05-21 ENCOUNTER — LABORATORY RESULT (OUTPATIENT)
Age: 71
End: 2021-05-21

## 2021-05-21 ENCOUNTER — APPOINTMENT (OUTPATIENT)
Dept: ENDOCRINOLOGY | Facility: CLINIC | Age: 71
End: 2021-05-21
Payer: MEDICARE

## 2021-05-21 ENCOUNTER — APPOINTMENT (OUTPATIENT)
Dept: NUCLEAR MEDICINE | Facility: IMAGING CENTER | Age: 71
End: 2021-05-21
Payer: MEDICARE

## 2021-05-21 VITALS
OXYGEN SATURATION: 100 % | HEIGHT: 62 IN | TEMPERATURE: 98 F | SYSTOLIC BLOOD PRESSURE: 160 MMHG | BODY MASS INDEX: 31.47 KG/M2 | HEART RATE: 86 BPM | WEIGHT: 171 LBS | DIASTOLIC BLOOD PRESSURE: 90 MMHG

## 2021-05-21 DIAGNOSIS — I10 ESSENTIAL (PRIMARY) HYPERTENSION: ICD-10-CM

## 2021-05-21 DIAGNOSIS — Z98.42 CATARACT EXTRACTION STATUS, LEFT EYE: Chronic | ICD-10-CM

## 2021-05-21 DIAGNOSIS — E11.65 TYPE 2 DIABETES MELLITUS WITH HYPERGLYCEMIA: ICD-10-CM

## 2021-05-21 DIAGNOSIS — Z90.710 ACQUIRED ABSENCE OF BOTH CERVIX AND UTERUS: Chronic | ICD-10-CM

## 2021-05-21 DIAGNOSIS — Z96.651 PRESENCE OF RIGHT ARTIFICIAL KNEE JOINT: Chronic | ICD-10-CM

## 2021-05-21 DIAGNOSIS — E83.52 HYPERCALCEMIA: ICD-10-CM

## 2021-05-21 DIAGNOSIS — E78.00 PURE HYPERCHOLESTEROLEMIA, UNSPECIFIED: ICD-10-CM

## 2021-05-21 DIAGNOSIS — Z90.49 ACQUIRED ABSENCE OF OTHER SPECIFIED PARTS OF DIGESTIVE TRACT: Chronic | ICD-10-CM

## 2021-05-21 DIAGNOSIS — R79.89 OTHER SPECIFIED ABNORMAL FINDINGS OF BLOOD CHEMISTRY: ICD-10-CM

## 2021-05-21 PROCEDURE — A9512: CPT

## 2021-05-21 PROCEDURE — A9500: CPT

## 2021-05-21 PROCEDURE — 10005 FNA BX W/US GDN 1ST LES: CPT

## 2021-05-21 PROCEDURE — 78071 PARATHYRD PLANAR W/WO SUBTRJ: CPT

## 2021-05-21 PROCEDURE — 99214 OFFICE O/P EST MOD 30 MIN: CPT | Mod: 25

## 2021-05-21 PROCEDURE — 78071 PARATHYRD PLANAR W/WO SUBTRJ: CPT | Mod: 26

## 2021-05-21 PROCEDURE — 10006 FNA BX W/US GDN EA ADDL: CPT

## 2021-05-21 NOTE — REASON FOR VISIT
[Follow - Up] : a follow-up visit [Hypercalcemia] : hypercalcemia [DM Type 2] : DM Type 2 [Osteoporosis] : osteoporosis [Thyroid nodule/ MNG] : thyroid nodule/ MNG [Procedure: _________] : a [unfilled] procedure visit

## 2021-05-21 NOTE — QUALITY MEASURES
[Visual inspection, sensory exam] : Foot exam, including visual inspection, sensory exam with mono filament, and pulse exam, was performed within the last 12 months Never

## 2021-05-21 NOTE — ASSESSMENT
[Diabetes Foot Care] : diabetes foot care [Long Term Vascular Complications] : long term vascular complications of diabetes [Carbohydrate Consistent Diet] : carbohydrate consistent diet [Importance of Diet and Exercise] : importance of diet and exercise to improve glycemic control, achieve weight loss and improve cardiovascular health [Exercise/Effect on Glucose] : exercise/effect on glucose [Hypoglycemia Management] : hypoglycemia management [Self Monitoring of Blood Glucose] : self monitoring of blood glucose [Retinopathy Screening] : Patient was referred to ophthalmology for retinopathy screening [Diabetic Medications] : Risks and benefits of diabetic medications were discussed [FreeTextEntry1] : 1. DM2, uncontrolled\par - Janumet 50/1000 mg bid\par - continue lipitor\par - keep off HCTZ.  olmesartan 40 mg qd, Labetalol. add norvasc 5 mg. Advised to see PCP/cardio for blood pressure monitor\par \par \par 2. Hypercalcemia\par - observe off HCTZ\par - repeat calcium/PTH, vitamin D level\par - 24h Uca collection\par - sestamibi scan\par \par 3. MNG\par - will FNA b/l nodules today (see procedure note)\par \par 3. Osteoporosis\par I advised to the patient on antiresorptive therapy, and reviewed potential options for osteoporosis treatment, including oral and IV bisphosphonates, Prolia, Evenity. R+B of each options were discussed in details\par - OTC vitamin D3 2,000 IU/day\par - continue weight-bearing exercises; advised avoiding high risk sports\par - prior dental evaluation advised.\par - Fosamax 70 mg qw\par RTC as scheduled\par

## 2021-05-21 NOTE — PROCEDURE
[Fine Needle Aspiration] : Fine needle aspiration ~T ~C was performed. [Area of Mass: ______] : mass identified in the [unfilled] [Risks] : risks [Guardian] : the guardian [Benefits] : benefits [Alternatives] : alternatives [Consent Obtained] : Written consent was obtained prior to the procedure and is detailed in the patient's record [Patient] : the patient [Ethyl Chloride] : ethyl chloride [EMLA Cream] : EMLA cream [Supine] : The patient was placed in the supine position with the neck extended as tolerated. [Betadine] : with betadine solution [Alcohol] : with alcohol [25 gauge 1.5 inch] : A 25 gauge 1.5 inch needle was used [2 Passes] : 2 passes were made through the mass [Ultrasonic Guidance] : ultrasound guidance was employed [Sent to Histology] : The specimens were prepared in the usual manner and sent to histology. [Thyroseq] : Thyroseq [Tolerated Well] : the patient tolerated the procedure well [Vital Signs Stable] : the vital signs were stable [Hemostasis] : hemostasis was assured and the patient was discharged in satisfactory condition [No Complications] : There were no complications [Instructions Given] : Handouts/patient instructions were given to patient [___ Week(s)] : in [unfilled] week(s).

## 2021-05-21 NOTE — HISTORY OF PRESENT ILLNESS
[FreeTextEntry1] : F/u for multiple medical issues\par \par *** May 21, 2021 ***\par \par on Janumet 50/1000 mg bid,  olmesartan 40 mg qd, labetalol\par off HCTZ. BP is high since stopping it\par reports fbs- 130's, ppg- 150's\par \par Thyroid/parathyroid US (5/14/21)- RMP iso 1.4x1.2x0.8; LMP iso 1.7x1.1x1.6. no definitive parathyroid nodules\par \par on preFNA US- RMP 2.1 cm, LUP 1.1 (round hypo), LLP 2.2 spongi. Patient declined biopsy of all 3 nodules, so only RMP and LUP nodules were biopsied\par \par HISTORY OF PRESENT ILLNESS. \par \par Ms. GAMBOA was diagnosed with Diabetes Mellitus Type 2 more than 10 years ago. She reports history HTN, dyslipidemia, carcinoid tumor of the lung, and denies CAD,  known complications of retinopathy, nephropathy, or neuropathy. She is presently on metformin 500 mg bid, Lipitor 20 mg HS. As per daughter, she was diagnosed with a hypercalcemia sometime in 05/20, calcium rising to 10.5-11.1, and as per daughter was even higher  in December 2020 when was admitted to the hospital with Covid, and was placed on cinacalcet. However, she developed tremors and stopped taking it about 2 months ago. She's taking some MVI once a day\par Blood sugars are checked 3-4 times a day. She noticed that her blood sugars are higher since she had Covid in 12/20\par Did not bring log book, but reported are typically as following: FBS- 150's- 160's, ppg- low 200's\par Hypoglycemia frequency: none \par Fingerstick glucose in the office today is 236 mg/dL 2 hours after eating. \par Diet: not following ADA\par Exercise: none\par \par Lab review (1/21) : a1c- 6.7%, calcium - 11.1, LDL- 50, TSH- 1.05\par \par DXA (8/19/20)- LS (-2.4) with OA changes  at L1-L2, L3-L4 (-2.8), FN (-1.0)\par \par Last dilated eye - 2/21\par Last podiatry visit  -  3/21\par Last cardiology evaluation - 4/21\par Last stress test - 3/21\par Last 2-D Echo - 3/21, EF- 70%\par cath- 4/21\par Last nephrology evaluation - none\par Last neurology evaluation-none\par

## 2021-05-23 LAB
CA-I SERPL-SCNC: 1.33 MMOL/L
CALCIUM SERPL-MCNC: 10.5 MG/DL
CALCIUM SERPL-MCNC: 10.5 MG/DL
CREAT SERPL-MCNC: 0.76 MG/DL
PARATHYROID HORMONE INTACT: 69 PG/ML

## 2021-05-26 ENCOUNTER — APPOINTMENT (OUTPATIENT)
Dept: HOME HEALTH SERVICES | Facility: HOME HEALTH | Age: 71
End: 2021-05-26

## 2021-05-27 ENCOUNTER — APPOINTMENT (OUTPATIENT)
Dept: HOME HEALTH SERVICES | Facility: HOME HEALTH | Age: 71
End: 2021-05-27

## 2021-05-31 ENCOUNTER — TRANSCRIPTION ENCOUNTER (OUTPATIENT)
Age: 71
End: 2021-05-31

## 2021-05-31 ENCOUNTER — EMERGENCY (EMERGENCY)
Facility: HOSPITAL | Age: 71
LOS: 1 days | Discharge: HOME | End: 2021-05-31
Attending: STUDENT IN AN ORGANIZED HEALTH CARE EDUCATION/TRAINING PROGRAM
Payer: MEDICARE

## 2021-05-31 VITALS
WEIGHT: 175.93 LBS | HEART RATE: 101 BPM | OXYGEN SATURATION: 98 % | RESPIRATION RATE: 20 BRPM | TEMPERATURE: 98 F | SYSTOLIC BLOOD PRESSURE: 174 MMHG | HEIGHT: 64 IN | DIASTOLIC BLOOD PRESSURE: 93 MMHG

## 2021-05-31 VITALS
RESPIRATION RATE: 15 BRPM | DIASTOLIC BLOOD PRESSURE: 78 MMHG | HEART RATE: 91 BPM | SYSTOLIC BLOOD PRESSURE: 153 MMHG | OXYGEN SATURATION: 98 %

## 2021-05-31 DIAGNOSIS — Z90.49 ACQUIRED ABSENCE OF OTHER SPECIFIED PARTS OF DIGESTIVE TRACT: Chronic | ICD-10-CM

## 2021-05-31 DIAGNOSIS — Z96.651 PRESENCE OF RIGHT ARTIFICIAL KNEE JOINT: Chronic | ICD-10-CM

## 2021-05-31 DIAGNOSIS — Z98.42 CATARACT EXTRACTION STATUS, LEFT EYE: Chronic | ICD-10-CM

## 2021-05-31 DIAGNOSIS — Z90.710 ACQUIRED ABSENCE OF BOTH CERVIX AND UTERUS: Chronic | ICD-10-CM

## 2021-05-31 LAB
ALBUMIN SERPL ELPH-MCNC: 4.6 G/DL — SIGNIFICANT CHANGE UP (ref 3.3–5)
ALP SERPL-CCNC: 65 U/L — SIGNIFICANT CHANGE UP (ref 40–120)
ALT FLD-CCNC: 26 U/L — SIGNIFICANT CHANGE UP (ref 10–45)
ANION GAP SERPL CALC-SCNC: 14 MMOL/L — SIGNIFICANT CHANGE UP (ref 5–17)
ANISOCYTOSIS BLD QL: SLIGHT — SIGNIFICANT CHANGE UP
APTT BLD: 28.9 SEC — SIGNIFICANT CHANGE UP (ref 27.5–35.5)
AST SERPL-CCNC: 22 U/L — SIGNIFICANT CHANGE UP (ref 10–40)
BASOPHILS # BLD AUTO: 0 K/UL — SIGNIFICANT CHANGE UP (ref 0–0.2)
BASOPHILS NFR BLD AUTO: 0 % — SIGNIFICANT CHANGE UP (ref 0–2)
BILIRUB SERPL-MCNC: 0.4 MG/DL — SIGNIFICANT CHANGE UP (ref 0.2–1.2)
BUN SERPL-MCNC: 12 MG/DL — SIGNIFICANT CHANGE UP (ref 7–23)
BURR CELLS BLD QL SMEAR: PRESENT — SIGNIFICANT CHANGE UP
CALCIUM SERPL-MCNC: 10.9 MG/DL — HIGH (ref 8.4–10.5)
CHLORIDE SERPL-SCNC: 106 MMOL/L — SIGNIFICANT CHANGE UP (ref 96–108)
CO2 SERPL-SCNC: 23 MMOL/L — SIGNIFICANT CHANGE UP (ref 22–31)
CREAT SERPL-MCNC: 0.66 MG/DL — SIGNIFICANT CHANGE UP (ref 0.5–1.3)
ELLIPTOCYTES BLD QL SMEAR: SLIGHT — SIGNIFICANT CHANGE UP
EOSINOPHIL # BLD AUTO: 0.16 K/UL — SIGNIFICANT CHANGE UP (ref 0–0.5)
EOSINOPHIL NFR BLD AUTO: 4.5 % — SIGNIFICANT CHANGE UP (ref 0–6)
GIANT PLATELETS BLD QL SMEAR: PRESENT — SIGNIFICANT CHANGE UP
GLUCOSE SERPL-MCNC: 133 MG/DL — HIGH (ref 70–99)
HCT VFR BLD CALC: 42.6 % — SIGNIFICANT CHANGE UP (ref 34.5–45)
HGB BLD-MCNC: 12.7 G/DL — SIGNIFICANT CHANGE UP (ref 11.5–15.5)
INR BLD: 0.96 RATIO — SIGNIFICANT CHANGE UP (ref 0.88–1.16)
LYMPHOCYTES # BLD AUTO: 1.08 K/UL — SIGNIFICANT CHANGE UP (ref 1–3.3)
LYMPHOCYTES # BLD AUTO: 30.3 % — SIGNIFICANT CHANGE UP (ref 13–44)
MACROCYTES BLD QL: SLIGHT — SIGNIFICANT CHANGE UP
MANUAL SMEAR VERIFICATION: SIGNIFICANT CHANGE UP
MCHC RBC-ENTMCNC: 21.9 PG — LOW (ref 27–34)
MCHC RBC-ENTMCNC: 29.8 GM/DL — LOW (ref 32–36)
MCV RBC AUTO: 73.6 FL — LOW (ref 80–100)
MICROCYTES BLD QL: SLIGHT — SIGNIFICANT CHANGE UP
MONOCYTES # BLD AUTO: 0.38 K/UL — SIGNIFICANT CHANGE UP (ref 0–0.9)
MONOCYTES NFR BLD AUTO: 10.7 % — SIGNIFICANT CHANGE UP (ref 2–14)
NEUTROPHILS # BLD AUTO: 1.88 K/UL — SIGNIFICANT CHANGE UP (ref 1.8–7.4)
NEUTROPHILS NFR BLD AUTO: 52.7 % — SIGNIFICANT CHANGE UP (ref 43–77)
NT-PROBNP SERPL-SCNC: 66 PG/ML — SIGNIFICANT CHANGE UP (ref 0–300)
OVALOCYTES BLD QL SMEAR: SLIGHT — SIGNIFICANT CHANGE UP
PLAT MORPH BLD: NORMAL — SIGNIFICANT CHANGE UP
PLATELET # BLD AUTO: 240 K/UL — SIGNIFICANT CHANGE UP (ref 150–400)
POIKILOCYTOSIS BLD QL AUTO: SIGNIFICANT CHANGE UP
POLYCHROMASIA BLD QL SMEAR: SLIGHT — SIGNIFICANT CHANGE UP
POTASSIUM SERPL-MCNC: 4.5 MMOL/L — SIGNIFICANT CHANGE UP (ref 3.5–5.3)
POTASSIUM SERPL-SCNC: 4.5 MMOL/L — SIGNIFICANT CHANGE UP (ref 3.5–5.3)
PROT SERPL-MCNC: 7.8 G/DL — SIGNIFICANT CHANGE UP (ref 6–8.3)
PROTHROM AB SERPL-ACNC: 11.5 SEC — SIGNIFICANT CHANGE UP (ref 10.6–13.6)
RBC # BLD: 5.79 M/UL — HIGH (ref 3.8–5.2)
RBC # FLD: 16 % — HIGH (ref 10.3–14.5)
RBC BLD AUTO: ABNORMAL
SCHISTOCYTES BLD QL AUTO: SLIGHT — SIGNIFICANT CHANGE UP
SODIUM SERPL-SCNC: 143 MMOL/L — SIGNIFICANT CHANGE UP (ref 135–145)
TROPONIN T, HIGH SENSITIVITY RESULT: 12 NG/L — SIGNIFICANT CHANGE UP (ref 0–51)
VARIANT LYMPHS # BLD: 1.8 % — SIGNIFICANT CHANGE UP (ref 0–6)
WBC # BLD: 3.57 K/UL — LOW (ref 3.8–10.5)
WBC # FLD AUTO: 3.57 K/UL — LOW (ref 3.8–10.5)

## 2021-05-31 PROCEDURE — 85610 PROTHROMBIN TIME: CPT

## 2021-05-31 PROCEDURE — 83880 ASSAY OF NATRIURETIC PEPTIDE: CPT

## 2021-05-31 PROCEDURE — 93010 ELECTROCARDIOGRAM REPORT: CPT

## 2021-05-31 PROCEDURE — 99285 EMERGENCY DEPT VISIT HI MDM: CPT

## 2021-05-31 PROCEDURE — 85025 COMPLETE CBC W/AUTO DIFF WBC: CPT

## 2021-05-31 PROCEDURE — 71045 X-RAY EXAM CHEST 1 VIEW: CPT | Mod: 26

## 2021-05-31 PROCEDURE — 84443 ASSAY THYROID STIM HORMONE: CPT

## 2021-05-31 PROCEDURE — 85730 THROMBOPLASTIN TIME PARTIAL: CPT

## 2021-05-31 PROCEDURE — 99284 EMERGENCY DEPT VISIT MOD MDM: CPT | Mod: 25

## 2021-05-31 PROCEDURE — 80053 COMPREHEN METABOLIC PANEL: CPT

## 2021-05-31 PROCEDURE — 84484 ASSAY OF TROPONIN QUANT: CPT

## 2021-05-31 PROCEDURE — 71045 X-RAY EXAM CHEST 1 VIEW: CPT

## 2021-05-31 NOTE — ED PROVIDER NOTE - CARE PROVIDERS DIRECT ADDRESSES
[email or web address]

## 2021-05-31 NOTE — ED ADULT NURSE NOTE - OBJECTIVE STATEMENT
Pt is a 71 yo F who came to the ED amb c/o chest pain and palpitations since yesterday. Chest pain is midsternal, intermittent, no radiating, 3/10. Denies sob, no dizziness/lightheadedness, no abd pain n/v/d, no diaphoresis. A/O x 3.

## 2021-05-31 NOTE — ED PROVIDER NOTE - NSFOLLOWUPINSTRUCTIONS_ED_ALL_ED_FT
Follow up with your cardiologist  Bring a copy of your test results with you to your appointment  Continue your current medication regimen  Return to the Emergency Room if you experience new or worsening symptoms     Palpitations    A palpitation is the feeling that your heartbeat is irregular or is faster than normal. It may feel like your heart is fluttering or skipping a beat. They may be caused by many things, including smoking, caffeine, alcohol, stress, and certain medicines. Although most causes of palpitations are not serious, palpitations can be a sign of a serious medical problem. Avoid caffeine, alcohol, and tobacco products at home. Try to reduce stress and anxiety and make sure to get plenty of rest.     SEEK IMMEDIATE MEDICAL CARE IF YOU HAVE ANY OF THE FOLLOWING SYMPTOMS: chest pain, shortness of breath, severe headache, dizziness/lightheadedness, or fainting.

## 2021-05-31 NOTE — ED PROVIDER NOTE - RAPID ASSESSMENT
70y F PMHx (per [facility]) Asthma, HTN, HLD, DM, Primary hyperparathyroidism p/w intermittent palpitations x yesterday. Endorses CP, SOB, leg swelling which she has been seen for multiple times over the past year. Per [place], negative cardiac cath 4/6/21 and ECHO late March showing normal LV function and small pericardial effusion    Tim ROSA (Scribe), have documented this rapid assessment note under the dictation of Loan Barnett (ARAM), which has been reviewed and affirmed to be accurate.  Patient was seen in the waiting room as a QPA patient. The patient will be seen and further worked up in the main emergency department and their care will be completed by the main emergency department team along with a thorough physical exam. Receiving team will follow up on labs, analgesia, any clinical imaging, reassess and disposition as clinically indicated, all decisions regarding the progression of care will be made at their discretion.    Scribe Statement: ITim, attest that this documentation has been prepared under the direction and in the presence of Laon Barnett (ARAM) 70y F PMHx (per [facility]) Asthma, HTN, HLD, DM, Primary hyperparathyroidism p/w intermittent palpitations x yesterday. no obvious aggrivating or alleviating factors. Endorses CP, SOB, leg swelling which she has been seen for multiple times over the past year- states these symptoms are not worse today than they have been. Per [place], negative cardiac cath 4/6/21 and ECHO late March showing normal LV function and small pericardial effusion    ITim (Scribe), have documented this rapid assessment note under the dictation of Loan Barnett (ARAM), which has been reviewed and affirmed to be accurate.  Patient was seen in the waiting room as a QPA patient. The patient will be seen and further worked up in the main emergency department and their care will be completed by the main emergency department team along with a thorough physical exam. Receiving team will follow up on labs, analgesia, any clinical imaging, reassess and disposition as clinically indicated, all decisions regarding the progression of care will be made at their discretion.    Scribe Statement: I, Tim Bucio, attest that this documentation has been prepared under the direction and in the presence of Loan Barnett (ARAM)    Loan Barnett PA-C: The scribe's documentation has been prepared under my direction and personally reviewed by me in its entirety. I confirm that the note above accurately reflects history obtained.   Patient was rapidly assessed via telemedicine encounter; a limited history was obtained. The patient will be seen and further examined and worked up in the main ED and their care will be completed by the main ED team. Receiving team will follow up on labs, analgesia, any clinical imaging, and perform reassessment and disposition of the patient as clinically indicated. All decisions regarding the progression of care will be made at their discretion.

## 2021-05-31 NOTE — ED PROVIDER NOTE - PATIENT PORTAL LINK FT
You can access the FollowMyHealth Patient Portal offered by [facility] by registering at the following website: [email or web address]. By joining [facility]’s FollowMyHealth portal, you will also be able to view your health information using other applications (apps) compatible with our system.

## 2021-05-31 NOTE — ED PROVIDER NOTE - CARE PROVIDER_API CALL
MARYBEL GHOTRA is a 55 year old female who presents to the office today for follow-up of pain and swelling in the left knee which has been present for 3 years.    She underwent aspiration of the left knee in the previous office visit and is here to discuss the results.  She would like to proceed with revision left knee replacement and is here for preoperative discussion.  She had a L TKA done by Dr. Link on 3/16/2012 at  in [place].  The patient denies an injury specifically to the left knee.  The patient is not on pain meds and only takes cymbalta and antiinflammatories for the knee pain.  The patient states that she is having a sharp and dull pain which she rates at a 5 on a scale from 1-10.  The pain occurs intermittently and at nights which is causing her not to be able to lay on the left side.  The pain is located on the side of the knee both inside and outside.  The pain occurs on the left knee only.  The pain is not radiating.  The patient states that immobilization makes it somewhat better, while bending, standing and walking makes it worse.  She has not had conservative management for her left knee pain. She denies any immediate post-operative issues after her initial TKA in 2012. She states her surgeon in [place] is now retired.  She denies instability at the left knee but that she has intermittent swelling.  Denies redness or warmth of her knee.  Denies VTE history  Denies MRSA infections  Has a history of right TKA by Dr. Cuellar and bilateral KELLEE's by Dr. Be  Review of Systems:  Positive for: Decreased Motion, Joint Pain, Poor Balance and Weakness.    Patient denies: Abdominal Pain, Bleeding, Chest Pain, Convulsions/Seizure, Depression, Difficulty Swallowing, Easy Bruisability, Emotional Disturbances, Eyes or Vision Problems, Fecal Incontinence, Fever/Chills, Headaches, Increased Thirst, Increased Hunger, Insomnia, Nausea/Vomiting, Night Sweats, Persistent Cough, Rash, Shortness of Breath,  Shortness of Breath While Lying down, Skin Problems and Urinary Retention.  Allergies:  * penicillins (critical)  * cephro (critical)  Medications:  bisacodyl ec 5 mg oral tablet delayed release (bisacodyl) 2 tab daily prn constipation; route: oral  zofran 4 mg oral tablet (ondansetron hcl) 1 po q 8hrs prn nausea; route: oral  colace 100 mg oral capsule (docusate sodium) 1 po q 12hrs; route: oral  aspir-low 81 mg oral tablet delayed release (aspirin) 1 tab bid x 30 days; route: oral  mupirocin 2 % external ointment (mupirocin) apply 1/2 tube in each nare bid the day before surgery and the repeat the morning of surgery; route: external  tramadol hcl 50 mg oral tablet (tramadol hcl) 1-2 po bid prn; route: oral  mupirocin 2 % external ointment (mupirocin) apply small amount to each nare day before surgery and a.m. of surgery; route: external  celebrex capsule (celecoxib caps)   acyclovir capsule (acyclovir caps)   Patient History of:  OSTEOARTHRITIS  BLOOD CLOTS/EMBOLISM - NEGATIVE  MENOPAUSE  Surgical History:  left Total Knee Arthroplasty-[CPT-64105]   right Total Hip Arthroplasty-[CPT-94430]   Gallbladder/Cholecystectomy-[CPT-19901]   Known Family History of:  heart disease-father  diabetes-father  Past medical, social, family histories and ROS reviewed today with the patient and changes documented in the chart (02/23/2021).  PCP Dr. PO TORO, CANDIE    Physical Exam  Height:  67 in.    Weight:  250 lbs.     BMI:  39.30      Gait: normal               Ambulation: patient ambulates without assistive devices      Mental/HEENT/Cardio/Skin  The patient's general appearance is well developed, well nourished, no acute distress.  Orientation is alert and oriented x 3.  The patient's mood is normal.  A head exam reveals normocephalic/atraumatic.  An eye exam reveals pupils equal.  Pulmonary exam shows normal air exchange, no labored breathing, or shortness of breath.  A skin exam shows normal temperature and color in  the area of examination.      Right Knee      Left Knee  There is a mature midline anterior scar. incision: Clean, dry, and intact.  No signs of infection.  Neutral alignment.  There is no atrophy.  Effusion is 1+.  No warmth.  No erythema.  Patient shows no signs of DVT.  The DP Pulse is 2+.  Range of motion of the knee is 0 to 120 degrees of flexion.  There is no tenderness in the knee.  Patellar tracking is normal.  Patellar crepitation is not present.  Crepitus is not present.  Anterior drawer grade 1.  Posterior drawer grade 1.  Valgus grade 1.  Varus grade 1.        Imaging/Diagnostic Studies    X-rays of the left kneeX-rays show an uncemented carito nexgen total knee arthroplasty prosthesis that appears well fixed and in satisfactory alignment, There is no evidence of loosening, The total knee arthroplasty shows no wear and The total knee arthoplasty shows no osteolysis.    Impression  Left knee pain (CBB75-S06.562)  Left knee chronic instability (ZJP86-Y41.52)    Plan  She demonstrates global instability of the left knee with flexion at 90 degrees.  Her left knee fluid analysis was negative for crystals, no growth, total cell count was less than 300 with more lymphocytes.  Options and alternatives were discussed in detail with the patient.   The patient has reached a point of disability and has failed nonoperative management. The patient is indicated for a revision left knee arthroplasty.     Likely, Risks and benefits of the procedure including but not limited to infection, DVT, pulmonary embolism, stiffness, future loosening of the implants, possibility of injury to nerves vessels and tendons, periprosthetic fractures have been discussed in detail. Despite the risks involved, The patient would like to proceed.   The patient is being scheduled for a revision left knee arthroplasty at [facility] on March 16 , 2021.   Postoperative DVT prophylaxis - Patient has no high risk factors Plan for ASPIRIN.    Preoperative antibiotic prophylaxis - Plan for SCIP protocol with CEFAZOLIN weight based. Will give VANCOMYCIN in addition due to increased BMI and revision surgery.   Surgery will be scheduled   for inpatient status due to medical comorbidities.    Patient does not use tobacco.        We discussed the benefits of surgical intervention, as well as alternative treatments.  Potential surgical risks and complications include but are not limited to DVT, infection, neurovascular injury, fracture, implant wear, failure, possible need for revision surgery, loss of motion, dislocation, limb length changes.  Sufficient opportunity was given to discuss the condition and treatment plan with the doctor, and all questions were answered for the patient.  Nonsurgical measures such as injections, medications, or physical therapy may not offer significant relief to this patient.  The discussion lasted 30 minutes.  GRACE agreed to proceed with the surgery.      Grace should follow up with MARY ABRAMS MD post op.       Stewart Hensley (MD)  Cardiology; Cardiovascular Disease; Internal Medicine  [address]  [place] [address]  Phone: [phone]  Fax: [phone]  Follow Up Time:

## 2021-05-31 NOTE — ED PROVIDER NOTE - PROGRESS NOTE DETAILS
discussed results with patient and family member at bedside, advised will need to follow with cards to determine need for holter. - Loan Barnett PA-C

## 2021-05-31 NOTE — ED PROVIDER NOTE - OBJECTIVE STATEMENT
71 y/o male hx HTN, HLD, DM, hyperparathyroid coming in from  for palpitations that occur with standing since yesterday. symptoms resolve spontaneously after a few moments. no chest pain no sob currently. she was sent in because her BP was 180 systolic. patient on multiple medications and follows with a cardiologist. patient with very recent cardiac cath and echo. patient also reporting that she went to her PMD and had thyroid checked 2 weeks ago and was told it was normal.

## 2021-05-31 NOTE — ED ADULT TRIAGE NOTE - CHIEF COMPLAINT QUOTE
2 days of palpitations  HOWELL  no fever chills or cough 2 days of palpitations  HOWELL  no fever chills or cough NO chest pain heart rate 101

## 2021-05-31 NOTE — ED PROVIDER NOTE - ATTENDING CONTRIBUTION TO CARE
[place] DO: 69 y/o male hx HTN, HLD, DM, hyperparathyroid coming in from  for palpitations that occur with standing since yesterday. symptoms resolve spontaneously after a few moments. She was seen at urgent care and referred to ED. Denies assoc cp/ fevers/ chills/ nausea/ dizziness/ weakness/ sensation of near syncope. No diarrhea/ hematochezia/ melena. States she has been eating regularly. Denies new sob. Pt was admitted March 2021 for symptoms of intermittent chest pressure, shortness of breath and palpitations. She had a CTA that ruled out PE at the time, and then stayed for cardiac monitoring and a stress test which did not reveal any reversible ischemia. She followed outpatient with cardiology and had a cardiac cath 4/6/2021 that demonstrated normal coronaries. On eval, aox3, nad, heart s1s2 no murmurs, lungs cta b/l, abd soft ntnd, no peripheral edema, extremities warm and well perfused, distal pulses intact. plan: rule out metabolic derangments as possible cause of sensation of palpitations, ekg here sinus and comparable to old ekg on file- no signs of ischemia. screening trop though with recent extensive cardiac w/u, unlikely acute acs- trop 13 comparable to prior hstroponins on file. cxr rule out new pulmonary pathology. plan for close outpt monitoring. as palpitations not persistent, pts vitals stable without significant arrhythmia noted on monitor and normal oxygenation, stable for close outpt f/u and discussed strict return precautions with pt and pts son. Pt noted to have hypertension but is on multiple medications for htn management- instructed pt to continue outpt f/u for htn.

## 2021-05-31 NOTE — ED CLERICAL - NS ED CLERK NOTE PRE-ARRIVAL INFORMATION; ADDITIONAL PRE-ARRIVAL INFORMATION

## 2021-06-01 ENCOUNTER — LABORATORY RESULT (OUTPATIENT)
Age: 71
End: 2021-06-01

## 2021-06-01 ENCOUNTER — APPOINTMENT (OUTPATIENT)
Dept: HOME HEALTH SERVICES | Facility: HOME HEALTH | Age: 71
End: 2021-06-01

## 2021-06-01 ENCOUNTER — NON-APPOINTMENT (OUTPATIENT)
Age: 71
End: 2021-06-01

## 2021-06-01 LAB — TSH SERPL-MCNC: 1.07 UIU/ML — SIGNIFICANT CHANGE UP (ref 0.27–4.2)

## 2021-06-03 RX ORDER — OLMESARTAN MEDOXOMIL 40 MG/1
40 TABLET, FILM COATED ORAL DAILY
Qty: 90 | Refills: 3 | Status: DISCONTINUED | COMMUNITY
Start: 2021-04-19 | End: 2021-06-03

## 2021-06-21 RX ORDER — METFORMIN HYDROCHLORIDE 500 MG/1
500 TABLET, COATED ORAL
Qty: 180 | Refills: 2 | Status: DISCONTINUED | COMMUNITY
Start: 2020-07-24 | End: 2021-06-21

## 2021-06-22 ENCOUNTER — APPOINTMENT (OUTPATIENT)
Dept: CARDIOLOGY | Facility: CLINIC | Age: 71
End: 2021-06-22

## 2021-06-22 LAB
ALBUMIN SERPL ELPH-MCNC: 4.5 G/DL
ALP BLD-CCNC: 62 U/L
ALT SERPL-CCNC: 24 U/L
ANION GAP SERPL CALC-SCNC: 9 MMOL/L
AST SERPL-CCNC: 21 U/L
BASOPHILS # BLD AUTO: 0.02 K/UL
BASOPHILS NFR BLD AUTO: 0.5 %
BILIRUB SERPL-MCNC: 0.3 MG/DL
BUN SERPL-MCNC: 11 MG/DL
CALCIUM SERPL-MCNC: 10.7 MG/DL
CHLORIDE SERPL-SCNC: 104 MMOL/L
CO2 SERPL-SCNC: 27 MMOL/L
CREAT SERPL-MCNC: 0.81 MG/DL
EOSINOPHIL # BLD AUTO: 0.08 K/UL
EOSINOPHIL NFR BLD AUTO: 2.1 %
GLUCOSE SERPL-MCNC: 107 MG/DL
HCT VFR BLD CALC: 38.7 %
HGB BLD-MCNC: 11.8 G/DL
IMM GRANULOCYTES NFR BLD AUTO: 0.3 %
LYMPHOCYTES # BLD AUTO: 1.38 K/UL
LYMPHOCYTES NFR BLD AUTO: 36 %
MAN DIFF?: NORMAL
MCHC RBC-ENTMCNC: 22.1 PG
MCHC RBC-ENTMCNC: 30.5 GM/DL
MCV RBC AUTO: 72.5 FL
MONOCYTES # BLD AUTO: 0.44 K/UL
MONOCYTES NFR BLD AUTO: 11.5 %
NEUTROPHILS # BLD AUTO: 1.9 K/UL
NEUTROPHILS NFR BLD AUTO: 49.6 %
PLATELET # BLD AUTO: 218 K/UL
POTASSIUM SERPL-SCNC: 4.7 MMOL/L
PROT SERPL-MCNC: 7.3 G/DL
RBC # BLD: 5.34 M/UL
RBC # FLD: 15.9 %
SODIUM SERPL-SCNC: 140 MMOL/L
TSH SERPL-ACNC: 1.24 UIU/ML
WBC # FLD AUTO: 3.83 K/UL

## 2021-06-23 ENCOUNTER — NON-APPOINTMENT (OUTPATIENT)
Age: 71
End: 2021-06-23

## 2021-06-25 ENCOUNTER — NON-APPOINTMENT (OUTPATIENT)
Age: 71
End: 2021-06-25

## 2021-06-28 ENCOUNTER — NON-APPOINTMENT (OUTPATIENT)
Age: 71
End: 2021-06-28

## 2021-06-30 ENCOUNTER — APPOINTMENT (OUTPATIENT)
Dept: HOME HEALTH SERVICES | Facility: HOME HEALTH | Age: 71
End: 2021-06-30

## 2021-07-07 ENCOUNTER — NON-APPOINTMENT (OUTPATIENT)
Age: 71
End: 2021-07-07

## 2021-07-07 ENCOUNTER — APPOINTMENT (OUTPATIENT)
Dept: CARDIOLOGY | Facility: CLINIC | Age: 71
End: 2021-07-07
Payer: MEDICARE

## 2021-07-07 VITALS
HEART RATE: 88 BPM | BODY MASS INDEX: 29.63 KG/M2 | SYSTOLIC BLOOD PRESSURE: 138 MMHG | WEIGHT: 161 LBS | OXYGEN SATURATION: 98 % | RESPIRATION RATE: 16 BRPM | DIASTOLIC BLOOD PRESSURE: 72 MMHG | TEMPERATURE: 97.3 F | HEIGHT: 62 IN

## 2021-07-07 DIAGNOSIS — R60.9 EDEMA, UNSPECIFIED: ICD-10-CM

## 2021-07-07 PROCEDURE — 99214 OFFICE O/P EST MOD 30 MIN: CPT

## 2021-07-07 RX ORDER — AMLODIPINE BESYLATE 5 MG/1
5 TABLET ORAL DAILY
Qty: 90 | Refills: 1 | Status: DISCONTINUED | COMMUNITY
Start: 2021-05-21 | End: 2021-07-07

## 2021-07-07 RX ORDER — MULTIVITAMIN/IRON/FOLIC ACID 18MG-0.4MG
TABLET ORAL DAILY
Refills: 0 | Status: DISCONTINUED | COMMUNITY
Start: 2021-03-31 | End: 2021-07-07

## 2021-07-07 NOTE — REASON FOR VISIT
[CV Risk Factors and Non-Cardiac Disease] : CV risk factors and non-cardiac disease [Follow-Up - Clinic] : a clinic follow-up of [Hypertension] : hypertension [Palpitations] : palpitations [FreeTextEntry1] : murmur

## 2021-07-07 NOTE — PHYSICAL EXAM
[Well Developed] : well developed [Well Nourished] : well nourished [No Acute Distress] : no acute distress [Normal Venous Pressure] : normal venous pressure [No Carotid Bruit] : no carotid bruit [Normal S1, S2] : normal S1, S2 [No Murmur] : no murmur [No Rub] : no rub [No Gallop] : no gallop [Murmur] : murmur [Clear Lung Fields] : clear lung fields [Good Air Entry] : good air entry [No Respiratory Distress] : no respiratory distress  [Soft] : abdomen soft [Non Tender] : non-tender [No Masses/organomegaly] : no masses/organomegaly [Normal Bowel Sounds] : normal bowel sounds [Normal Gait] : normal gait [No Edema] : no edema [No Cyanosis] : no cyanosis [No Clubbing] : no clubbing [No Varicosities] : no varicosities [No Rash] : no rash [No Skin Lesions] : no skin lesions [Moves all extremities] : moves all extremities [No Focal Deficits] : no focal deficits [Normal Speech] : normal speech [Alert and Oriented] : alert and oriented [Normal memory] : normal memory [General Appearance - Well Developed] : well developed [Normal Appearance] : normal appearance [Well Groomed] : well groomed [General Appearance - Well Nourished] : well nourished [No Deformities] : no deformities [General Appearance - In No Acute Distress] : no acute distress [Normal Conjunctiva] : the conjunctiva exhibited no abnormalities [Eyelids - No Xanthelasma] : the eyelids demonstrated no xanthelasmas [Normal Oral Mucosa] : normal oral mucosa [Normal Jugular Venous A Waves Present] : normal jugular venous A waves present [Normal Jugular Venous V Waves Present] : normal jugular venous V waves present [No Jugular Venous Morejon A Waves] : no jugular venous morejon A waves [Respiration, Rhythm And Depth] : normal respiratory rhythm and effort [Exaggerated Use Of Accessory Muscles For Inspiration] : no accessory muscle use [Auscultation Breath Sounds / Voice Sounds] : lungs were clear to auscultation bilaterally [Abdomen Soft] : soft [Abdomen Tenderness] : non-tender [] : no hepato-splenomegaly [Abdomen Mass (___ Cm)] : no abdominal mass palpated [Abnormal Walk] : normal gait [Cyanosis, Localized] : no localized cyanosis [Skin Color & Pigmentation] : normal skin color and pigmentation [Skin Turgor] : normal skin turgor [No Xanthoma] : no  xanthoma was observed [Oriented To Time, Place, And Person] : oriented to person, place, and time [Affect] : the affect was normal [Mood] : the mood was normal [No Anxiety] : not feeling anxious [5th Left ICS - MCL] : palpated at the 5th LICS in the midclavicular line [Normal] : normal [No Precordial Heave] : no precordial heave was noted [Normal Rate] : normal [Rhythm Regular] : regular [Normal S1] : normal S1 [Normal S2] : normal S2 [2+] : left 2+ [No Abnormalities] : the abdominal aorta was not enlarged and no bruit was heard [___ +] : bilateral [unfilled]U+ pretibial pitting edema [S3] : no S3 [S4] : no S4 [II] : a grade 2 [Right Carotid Bruit] : no bruit heard over the right carotid [Left Carotid Bruit] : no bruit heard over the left carotid

## 2021-07-07 NOTE — DISCUSSION/SUMMARY
[FreeTextEntry1] : This is a 70-year-old female with past medical history significant for hypertension, asthma, history of bronchitis, non-insulin-dependent diabetes mellitus, hyperlipidemia, status post right knee replacement, who comes in for cardiac follow-up evaluation.  She denies chest pain, shortness of breath, dizziness or syncope.\par The patient is complaining of bilateral pedal edema.  The edema is localized to the malleolus area and is consistent with calcium channel blocker induced edema.  Apparently the patient is taking Norvasc 5 mg daily and verapamil 240 mg/day.\par She will discontinue her Norvasc therapy and continue verapamil for now.  She understands she must elevate her feet and limit her salt intake.  She will return in 3 weeks to recheck her swelling and her blood pressure.\par If her blood pressure remains elevated I would consider increasing her labetalol or adding diuretics to her current medical regimen.\par At one point she was prescribed Micardis hydrochlorothiazide.  She is now on olmesartan 40 mg without diuretic therapy.\par \par The patient had a cardiac catheterization done April 7, 2021 at [facility].  This demonstrated a normal right heart catheterization and normal coronary arteries.  The patient tolerated the procedure well.\par Given her diabetic state her LDL cholesterol target should be less than 70 mg/dL.\par She was born in [facility] and has no history of rheumatic fever. \par Echo Doppler examination done May 14, 2020 by another cardiologist which demonstrated mild left ventricular hypertrophy, normal left ventricular function with an estimated ejection fraction of 55 to 60%, and trivial pericardial effusion.  There was mild tricuspid valve regurgitation, calcified mitral valve annulus and thickened mitral valve leaflets.\par The patient's daughter reports that she had a normal stress echocardiogram April 26, 2017.\par Electrocardiogram done June 12, 2020 demonstrated normal sinus rhythm rate of 82 bpm is otherwise remarkable left atrial abnormality, and for poor R wave progression.\par \par She has an appointment with an endocrinologist regarding her diabetic care and will follow-up with Dr. Cornelius of pulmonary medicine.\par The patient understands that aerobic exercises must be increased to 40 minutes 4 times per week. A detailed discussion of lifestyle modification was done today. The patient has a good understanding of the diagnosis, and treatment plan. Lifestyle modification was also outlined.\par

## 2021-07-09 ENCOUNTER — APPOINTMENT (OUTPATIENT)
Dept: HOME HEALTH SERVICES | Facility: HOME HEALTH | Age: 71
End: 2021-07-09

## 2021-07-12 RX ORDER — BLOOD-GLUCOSE METER
70 EACH MISCELLANEOUS
Qty: 1 | Refills: 5 | Status: COMPLETED | COMMUNITY
Start: 2021-05-27 | End: 2021-07-12

## 2021-07-17 ENCOUNTER — TRANSCRIPTION ENCOUNTER (OUTPATIENT)
Age: 71
End: 2021-07-17

## 2021-07-27 ENCOUNTER — NON-APPOINTMENT (OUTPATIENT)
Age: 71
End: 2021-07-27

## 2021-07-28 ENCOUNTER — APPOINTMENT (OUTPATIENT)
Dept: HOME HEALTH SERVICES | Facility: HOME HEALTH | Age: 71
End: 2021-07-28

## 2021-08-11 ENCOUNTER — APPOINTMENT (OUTPATIENT)
Dept: PULMONOLOGY | Facility: CLINIC | Age: 71
End: 2021-08-11
Payer: MEDICARE

## 2021-08-11 VITALS
SYSTOLIC BLOOD PRESSURE: 130 MMHG | WEIGHT: 176 LBS | BODY MASS INDEX: 31.18 KG/M2 | HEART RATE: 86 BPM | TEMPERATURE: 97.2 F | HEIGHT: 63 IN | RESPIRATION RATE: 16 BRPM | OXYGEN SATURATION: 98 % | DIASTOLIC BLOOD PRESSURE: 83 MMHG

## 2021-08-11 DIAGNOSIS — F41.9 ANXIETY DISORDER, UNSPECIFIED: ICD-10-CM

## 2021-08-11 PROCEDURE — 94729 DIFFUSING CAPACITY: CPT

## 2021-08-11 PROCEDURE — 99214 OFFICE O/P EST MOD 30 MIN: CPT | Mod: 25

## 2021-08-11 PROCEDURE — 94010 BREATHING CAPACITY TEST: CPT

## 2021-08-11 PROCEDURE — 94799 UNLISTED PULMONARY SVC/PX: CPT

## 2021-08-11 PROCEDURE — 95012 NITRIC OXIDE EXP GAS DETER: CPT

## 2021-08-11 PROCEDURE — 94727 GAS DIL/WSHOT DETER LNG VOL: CPT

## 2021-08-11 PROCEDURE — 94618 PULMONARY STRESS TESTING: CPT

## 2021-08-11 PROCEDURE — ZZZZZ: CPT

## 2021-08-11 NOTE — ADDENDUM
[FreeTextEntry1] : Documented by Kt Gary acting as a scribe for Dr. Gustavo Cornelius on (08/11/2021).\par \par All medical record entries made by the Scribe were at my, Dr. Gustavo Cornelius's, direction and personally dictated by me on (08/11/2021). I have reviewed the chart and agree that the record accurately reflects my personal performance of the history, physical exam, assessment and plan. I have also personally directed, reviewed, and agree with the discharge instructions.\par

## 2021-08-11 NOTE — ASSESSMENT
[FreeTextEntry1] : Ms. 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 She is currently with #1 issue of poor balance. #2 SOB. #3  GERD \par \par Ms. Najera' SOB is multifactorial due to:\par -overweight / out of shape\par -poor mechanics of breathing\par -asthma/restrictive dysfunction (s/p surgery)\par -emphysema \par -?cardiac\par \par Her chronic cough is multifactorial due to: -(active)\par -asthma (eosinophilic)\par -LPR/reflux\par \par problem 1: asthma (improved) - compliant \par -continue Ventolin rescue inhaler 2 inhalations before exercise, Q6H\par -continue Incruse 1 inhalation daily\par -continue Breo Ellipta 200 at 1 inhalation daily \par -continue Singulair 10 mg QHS \par -continue Albuterol via nebulizer PRN q6H\par -Inhaler technique reviewed as well as oral hygiene techniques reviewed with patient. Avoidance of cold air, extremes of temperature, rescue inhaler should be used before exercise. Order of medication reviewed with patient. Recommended use of a cool mist humidifier in the bedroom. \par -Asthma is believed to be caused by inherited (genetic) and environmental factor, but its exact cause is unknown. Asthma may be triggered by allergens, lung infections, or irritants in the air. Asthma triggers are different for each person\par \par problem 2: poor mechanics of breathing\par -Recommended to research the Wibridget Hof and [place] breathing techniques \par -Proper breathing techniques were reviewed with an emphasis of exhalation. Patient instructed to breath in for 1 second and out for four seconds. Patient was encouraged to not talk while walking. \par \par problem 3: overweight\par -Weight loss, exercise, and diet control were discussed and are highly encouraged. Treatment options were given such as, aqua therapy, and contacting a nutritionist. Recommended to use the elliptical, stationary bike, less use of treadmill. Mindful eating was explained to the patient Obesity is associated with worsening asthma, shortness of breath, and potential for cardiac disease, diabetes, and other underlying medical conditions. \par \par problem 3A: Poor Balance\par -Given prescription for gait training and balance therapy (STARS)\par \par problem 4: LPR/reflux (controlled) \par -continue Pepcid 40 mg QHS \par -continue Protonix 40 mg QD in the morning \par -Rule of 2s: avoid eating too much, eating too late, eating too spicy, eating two hours before bed\par -Things to avoid including overeating, spicy foods, tight clothing, eating within three hours of bed, this list is not all inclusive. \par -For treatment of reflux, possible options discussed including diet control, H2 blockers, PPIs, as well as coating motility agents discussed as treatment options. Timing of meals and proximity of last meal to sleep were discussed. 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 Recommended weight loss which can reduce AHI especially in weight loss of greater than 5% of BMI. Positional sleep is recommended in those with low AHI, low-moderate MBI, and younger age. For severe sleep apnea, the hypoglossal nerve stimulator was recommended as well.\par \par problem 8: eosinophilia\par -she is a candidate for Nucala injections\par -The safety and efficacy of Nucala was established in three double-blind, randomized, placebo-controlled trials in patients with severe asthma. Compared to a placebo, patients with severe asthma receiving Nucala had fewer exacerbation requiring hospitalization and/or emergency department visits, and a longer time to first exacerbation.  In addition, patients with severe asthma receiving Nucala experienced greater reductions in their daily maintenance oral corticosteroid dose, while maintaining asthma control compared with patients receiving placebo. Treatment with Nucala did not result in a significant improvement in lung function, as measured by the volume of air exhaled by patients in one second. The most common side effects include: headache, injection site reactions, back pain, weakness, and fatigue; hypersensitivity reactions can occur within hours or days including swelling of the face, mouth, and tongue, fainting, dizziness, hives, breathing problems, and rash; herpes zoster infections have occurred. The drug is a monoclonal antibody that inhibits interleukin -5 which helps regular eosinophils, a type of white blood cell that contributes to asthma. The over-production of eosinophils can cause inflammation in the lungs, increasing the frequency of asthma attacks. Patients must also take other medications, including high dose inhaled corticosteroids and at least one additional asthma drug.\par \par problem 9: low vitamin D\par -s/p blood work to include: CBC, vitamin D, LFTs, HbA1c\par -continue supplemental vitamin D 50,000 every 2 weeks (recheck)\par -Has been associated with asthma exacerbations and increased allergic symptoms. The goal based on recent information is maintaining levels between 50-70 and low normal is 30. Recommended 50,000 units every two weeks to once a month depending on the level. \par \par problem 10: anemia\par -referred to have a GI evaluation and was given the name of Dr. Liban Samuel's office  (recheck)\par \par problem 11: Health Maintenance/COVID19 Precautions:\par - Clean your hands often. Wash your hands often with soap and water for at least 20 seconds, especially after blowing your nose, coughing, or sneezing, or having been in a public place.\par - If soap and water are not available, use a hand  that contains at least 60% alcohol.\par - To the extent possible, avoid touching high-touch surfaces in public places - elevator buttons, door handles, handrails, handshaking with people, etc. Use a tissue or your sleeve to cover your hand or finger if you must touch something.\par - Wash your hands after touching surfaces in public places.\par - Avoid touching your face, nose, eyes, etc.\par - Clean and disinfect your home to remove germs: practice routine cleaning of frequently touched surfaces (for example: tables, doorknobs, light switches, handles, desks, toilets, faucets, sinks & cell phones)\par - Avoid crowds, especially in poorly ventilated spaces. Your risk of exposure to respiratory viruses like COVID-19 may increase in crowded, closed-in settings with little air circulation if there are people in the crowd who are sick. All patients are recommended to practice social distancing and stay at least 6 feet away from others.\par - Avoid all non-essential travel including plane trips, and especially avoid embarking on cruise ships.\par -If COVID-19 is spreading in your community, take extra measures to put distance between yourself and other people to further reduce your risk of being exposed to this new virus.\par -Stay home as much as possible.\par - Consider ways of getting food brought to your house through family, social, or commercial networks\par -Be aware that the virus has been known to live in the air up to 3 hours post exposure, cardboard up to 24 hours post exposure, copper up to 4 hours post exposure, steel and plastic up to 2-3 days post exposure. Risk of transmission from these surfaces are affected by many variables.\par Immune Support Recommendations:\par -OTC Vitamin C 500mg BID \par -OTC Quercetin 250-500mg BID \par -OTC Zinc 75-100mg per day \par -OTC Melatonin 1 or 2 mg a night \par -OTC Vitamin D 1-4000mg per day \par -OTC Tonic Water 8oz per day\par Asthma and COVID19:\par You need to make sure your asthma is under control. This often requires the use of inhaled corticosteroids (and sometimes oral corticosteroids). Inhaled corticosteroids do not likely reduce your immune system’s ability to fight infections, but oral corticosteroids may. It is important to use the steps above to protect yourself to limit your exposure to any respiratory virus. \par \par Problem 12: health maintenance\par - S/p Covid 19 vaccine (Pfizer) x2 \par -s/p influenza vaccine 2020\par -recommended strep pneumonia vaccines after age 65: Prevnar-13 vaccine, followed by Pneumo vaccine 23 one year following (completed) \par -recommended early intervention for URIs\par -recommended regular osteoporosis evaluations\par -recommended early dermatological evaluations\par -recommended after the age of 50 to the age of 70, colonoscopy every 5 years \par \par F/U in 4 months \par She is encouraged to call with any questions, changes, or concerns.

## 2021-08-11 NOTE — PHYSICAL EXAM
[No Acute Distress] : no acute distress [Normal Oropharynx] : normal oropharynx [Normal Appearance] : normal appearance [No Neck Mass] : no neck mass [Normal Rate/Rhythm] : normal rate/rhythm [Normal S1, S2] : normal s1, s2 [No Murmurs] : no murmurs [No Resp Distress] : no resp distress [Clear to Auscultation Bilaterally] : clear to auscultation bilaterally [No Abnormalities] : no abnormalities [Benign] : benign [Normal Gait] : normal gait [No Clubbing] : no clubbing [No Cyanosis] : no cyanosis [No Edema] : no edema [FROM] : FROM [Normal Color/ Pigmentation] : normal color/ pigmentation [No Focal Deficits] : no focal deficits [Oriented x3] : oriented x3 [Normal Affect] : normal affect [III] : Mallampati Class: III [TextBox_2] : ow [TextBox_68] : I:E 1:3; Clear  [TextBox_105] : 1+ lower extremity edema

## 2021-08-11 NOTE — HISTORY OF PRESENT ILLNESS
[FreeTextEntry1] : Ms. Najera is a 71 year old female with a history of abnormal chest CT, asthma, carcinoid tumor of lung, chronic GERD, eosinophilic asthma, low vitamin D, snoring, OW, and SOB presenting to the office today for a follow up visit. Her chief complaint is \par -she notes humidity bothers her \par -she notes some chest pain \par -she notes constipation \par -she notes some days she sleeps well and other nights she doesn’t sleep all that good \par -she notes she could improve on her energy levels \par -she notes drinking about 4 bottles of water\par -she notes coughing sometime\par -she denies wheezing \par -she notes diabetes fluctuates \par -she notes taking Sentrum silver \par -she notes heartburn \par -she notes her balance isnt the greatest some days \par \par patient denies any headaches, nausea, vomiting, fever, chills, sweats, chest pressure, palpitations, coughing, wheezing, fatigue, diarrhea, dysphagia, myalgias, dizziness, leg swelling, leg pain, itchy eyes, itchy ears, heartburn, reflux or sour taste in the mouth

## 2021-08-11 NOTE — PROCEDURE
[FreeTextEntry1] : Full PFT revealed mild restrictive and moderate obstructive flows, with a FEV1 of 1.07L, which is 53% of predicted, low normal lung volumes, and a diffusion of 13.2, which is 70% of predicted, with a normal flow volume loop COURT is moderately reduced \par \par Feno was 8; a normal value being less than 25. Fractional exhaled nitric oxide (FENO) is regarded as a simple, noninvasive method for assessing eosinophilic airway inflammation. Produced by a variety of cells within the lung, nitric oxide (NO) concentrations are generally low in healthy individuals. However, high concentrations of NO appear to be involved in nonspecific host defense mechanisms and chronic inflammatory  diseases such as asthma. The American Thoracic Society (ATS) therefore recommended using FENO to aid in the diagnosis and monitoring of eosinophilic airway inflammation and asthma, and for identifying steroid responsive individuals whose chronic respiratory symptoms may be caused by airway inflammation \par \par 6 minute walk test reveals a low saturation of 97% with moderate evidence of dyspnea or fatigue; walked 260.8 meters\par  \par

## 2021-08-11 NOTE — REVIEW OF SYSTEMS
[SOB on Exertion] : sob on exertion [Negative] : Endocrine [Nasal Congestion] : no nasal congestion [Postnasal Drip] : no postnasal drip [Sinus Problems] : no sinus problems [Wheezing] : no wheezing [Chest Discomfort] : no chest discomfort [Edema] : no edema [GERD] : no gerd [Diarrhea] : no diarrhea [Constipation] : no constipation [Dysphagia] : no dysphagia

## 2021-08-12 ENCOUNTER — RX RENEWAL (OUTPATIENT)
Age: 71
End: 2021-08-12

## 2021-08-17 ENCOUNTER — APPOINTMENT (OUTPATIENT)
Dept: OBGYN | Facility: CLINIC | Age: 71
End: 2021-08-17

## 2021-08-17 PROBLEM — R92.2 DENSE BREASTS: Status: ACTIVE | Noted: 2021-08-17

## 2021-08-20 ENCOUNTER — APPOINTMENT (OUTPATIENT)
Dept: CARDIOLOGY | Facility: CLINIC | Age: 71
End: 2021-08-20
Payer: MEDICARE

## 2021-08-20 ENCOUNTER — NON-APPOINTMENT (OUTPATIENT)
Age: 71
End: 2021-08-20

## 2021-08-20 VITALS
BODY MASS INDEX: 30.83 KG/M2 | TEMPERATURE: 97.2 F | DIASTOLIC BLOOD PRESSURE: 84 MMHG | RESPIRATION RATE: 17 BRPM | WEIGHT: 174 LBS | OXYGEN SATURATION: 99 % | SYSTOLIC BLOOD PRESSURE: 140 MMHG | HEIGHT: 63 IN | HEART RATE: 95 BPM

## 2021-08-20 PROCEDURE — 99214 OFFICE O/P EST MOD 30 MIN: CPT | Mod: 25

## 2021-08-20 PROCEDURE — 93000 ELECTROCARDIOGRAM COMPLETE: CPT

## 2021-08-20 RX ORDER — OLMESARTAN MEDOXOMIL 40 MG/1
40 TABLET, FILM COATED ORAL DAILY
Qty: 90 | Refills: 1 | Status: DISCONTINUED | COMMUNITY
Start: 2021-06-21 | End: 2021-08-20

## 2021-08-20 NOTE — DISCUSSION/SUMMARY
[FreeTextEntry1] : Dr. Hensley-(PRIOR VISIT and PMH WITH Dr. Hensley 7/7/2021): \par This is a 70-year-old female with past medical history significant for hypertension, asthma, history of bronchitis, non-insulin-dependent diabetes mellitus, hyperlipidemia, status post right knee replacement, who comes in for cardiac follow-up evaluation.  She denies chest pain, shortness of breath, dizziness or syncope.\par \par The patient is complaining of bilateral pedal edema.  The edema is localized to the malleolus area and is consistent with calcium channel blocker induced edema.  Apparently the patient is taking Norvasc 5 mg daily and verapamil 240 mg/day.\par \par She will discontinue her Norvasc therapy and continue verapamil for now.  She understands she must elevate her feet and limit her salt intake.  She will return in 3 weeks to recheck her swelling and her blood pressure.\par \par If her blood pressure remains elevated I would consider increasing her labetalol or adding diuretics to her current medical regimen.\par \par At one point she was prescribed Micardis hydrochlorothiazide.  She is now on olmesartan 40 mg without diuretic therapy.\par \par The patient had a cardiac catheterization done April 7, 2021 at [facility].  This demonstrated a normal right heart catheterization and normal coronary arteries.  The patient tolerated the procedure well.\par \par Given her diabetic state her LDL cholesterol target should be less than 70 mg/dL.\par \par She was born in [facility] and has no history of rheumatic fever. \par \par Echo Doppler examination done May 14, 2020 by another cardiologist which demonstrated mild left ventricular hypertrophy, normal left ventricular function with an estimated ejection fraction of 55 to 60%, and trivial pericardial effusion.  There was mild tricuspid valve regurgitation, calcified mitral valve annulus and thickened mitral valve leaflets.\par \par The patient's daughter reports that she had a normal stress echocardiogram April 26, 2017.\par \par Electrocardiogram done June 12, 2020 demonstrated normal sinus rhythm rate of 82 bpm is otherwise remarkable left atrial abnormality, and for poor R wave progression.\par \par She has an appointment with an endocrinologist regarding her diabetic care and will follow-up with Dr. Cornelius of pulmonary medicine.\par \par The patient understands that aerobic exercises must be increased to 40 minutes 4 times per week. A detailed discussion of lifestyle modification was done today. The patient has a good understanding of the diagnosis, and treatment plan. Lifestyle modification was also outlined.\par

## 2021-08-20 NOTE — REVIEW OF SYSTEMS
[Negative] : Heme/Lymph [Chest Discomfort] : chest discomfort [Lower Ext Edema] : lower extremity edema [Palpitations] : palpitations [Dizziness] : dizziness

## 2021-08-20 NOTE — ASSESSMENT
[FreeTextEntry1] : This is a 71-year-old female with past medical history significant for hypertension, asthma, history of bronchitis, non-insulin-dependent diabetes mellitus, hyperlipidemia, status post right knee replacement, who comes in for cardiac follow-up evaluation.  She denies chest pain, shortness of breath, dizziness or syncope.\par \par She is feeling well today but states she still does experience ankle swelling in her legs and does have occasional dizziness with palpitations. She explained that the palpitations occur at random times and that she has had dizziness for a while. She does not have a neurologist or ENT. She tries to stay well hydrated but would like to make a note that for her ankle swelling she went to urgent care who placed her on Furosemide 20mg PO DAILY and KCL 10MEQ PO DAILY for 30 days. \par \par She also is complaining of a chest pain/discomfort that is localized in the middle of her chest/epigastric area that occurs mostly at night and it does not radiate. She describes it as a "dull" feeling in her chest.\par \par BLOOD PRESSURE:\par -BP is borderline elevated in today's visit and patient is on Labetalol 100mg PO BID, Olmesartan 40mg PO DAILY and Verapamil 240mg PO DAILY. \par \par We will d/c Olmesartan and will start her on Olmesartan HCTZ 40/12.5mg PO DAILY and she will d/c Olmesartan 40mg PO DAILY. She states she was on this before but thought that her HCTZ was causing her to be dizzy. The patient has been off of this medication and remains dizzy. I ask that she try to  rechallenge herself and that she should follow up with a neurologist for the dizziness. \par \par -I have discussed the importance of maintaining good BP control and reviewed the newest guidelines with the patient while re-enforcing dietary sodium restrictions to no more than 2-3 g daily, DASH diet, life style modifications as well as the goal of maintaining ideal body weight with the patient today. I have advised the patient to avoid the use of over-the-counter medications/ supplements especially NSAIDS.\par \par I have reviewed with Ms. GAMBOA that serious health consequences can occur when blood pressure is not well controlled and the need for strict compliance with medication and that optimal control can significantly reduce the risk of cardiovascular disease stroke, heart attack and other organ damage. They verbally expressed understanding of the fore mentioned serious health consequences to me today.\par \par BLOOD WORK:\par -New blood work was done by her PCP on 6/21/2021 which demonstrated normal electrolytes.\par -We will do repeat blood work in 1 month to asses her lipid profile and K level since she was recently on Furosemide but had stopped. She is now on KCL 10 MEQ PO DAILY. \par \par CHOLESTEROL CONTROL:\par -Patient will continue the advised  TLC diet and to continue follow-up for treatment of hyperlipidemia and repeat blood testing with diet and exercise. I have discussed different exercises and the importance of maintenance of optimal body weight. The importance of staying within guidelines and recommendations was stressed to the patient today and they acknowledged that they understand this to me verbally.\par \par  -Ms. GAMBOA was educated and advised that failure to follow-up with my medical recommendations to lower cholesterol can result in severe health consequences therefore, they will continuing a low saturated and low fat diet and to avoid excessive carbohydrates to help reduce triglycerides and that lowering LDL levels is associated with a significant decrease in serious cardiac events including but not limited to heart attack stroke and overall death. We will continue lipid lowering agents as advised based on blood test results and the patient understands to call if they develop severe muscle discomfort or if they have a reddish tinted discoloration in their urine.\par \par DIABETIC CONTROL:\par The patient's blood glucose consistent with their diabetes. A1C is reported to be 7.3%\par I will advise the patient to keep try to stay on a low carbohydrate diet lose weight and exercise to reduce spikes in their blood sugar.  The importance of monitoring blood sugar regularly and HgBA1c level were reviewed. I have also discussed annual eye exams to prevent blindness,  monitoring urine microalbumin regularly to check for kidney damage and the importance of proper foot care and regularly checking feet to prevent sores and possibly loss of limbs was reviewed. \par \par TESTING/REPORTS:\par -EKG done Aug 20, 2021 which demonstrated regular sinus rhythm with nonspecific ST-T wave changes, BPM of 80.\par \par -The patient had an echocardiogram done on 1/27/2021 demonstrated mild mitral, aortic, tricuspid and pulmonic regurgitation with normal left ventricular systolic function.\par -EF on echo reported to be 65%.\par \par -An echo Doppler examination done May 14, 2020 by another cardiologist which demonstrated mild left ventricular hypertrophy, normal left ventricular function with an estimated ejection fraction of 55 to 60%, and trivial pericardial effusion. There was mild tricuspid valve regurgitation, calcified mitral valve annulus and thickened mitral valve leaflets.\par \par -The patient's daughter reports that she had a normal stress echocardiogram April 26, 2017.\par \par -The patient had a cardiac catheterization done April 7, 2021 at [facility].  This demonstrated a normal right heart catheterization and normal coronary arteries.  The patient tolerated the procedure well.\par \par PLAN:\par -24 hour holter monitor.\par -start Olmesartan HCTZ 40/12.5mg PO DAILY to help with BP and edema.\par -Stop Furosemide for now.\par -Follow up with GI, Neurology and her Pulmonologist (I provided her with Dr. Steele's group and Dr. Gillette's group).\par -Follow up with our office in 1 month. \par \par I have discussed the plan of care with Ms. PAULINA GAMBOA. She is compliant with all of her medications.\par \par The patient understands that aerobic exercises must be increased to minutes 4 times/week and a detailed discussion of lifestyle modification was done today. \par The patient has a good understanding of the diagnosis, treatment plan and lifestyle modification. \par She will contact me at the office for any questions with their care or any changes in their health status.\par \par Eren ABARCA

## 2021-08-20 NOTE — CARDIOLOGY SUMMARY
[___] : [unfilled] [de-identified] : 7/7/2021 [de-identified] : NUC: 3/23/2021 [de-identified] : CATH: 4/6/2021

## 2021-08-20 NOTE — PHYSICAL EXAM
[Well Developed] : well developed [Well Nourished] : well nourished [No Acute Distress] : no acute distress [Normal Venous Pressure] : normal venous pressure [No Carotid Bruit] : no carotid bruit [Normal S1, S2] : normal S1, S2 [No Murmur] : no murmur [No Rub] : no rub [No Gallop] : no gallop [Murmur] : murmur [Clear Lung Fields] : clear lung fields [Good Air Entry] : good air entry [No Respiratory Distress] : no respiratory distress  [Soft] : abdomen soft [Non Tender] : non-tender [No Masses/organomegaly] : no masses/organomegaly [Normal Bowel Sounds] : normal bowel sounds [Normal Gait] : normal gait [No Edema] : no edema [No Cyanosis] : no cyanosis [No Clubbing] : no clubbing [No Varicosities] : no varicosities [No Rash] : no rash [No Skin Lesions] : no skin lesions [Moves all extremities] : moves all extremities [No Focal Deficits] : no focal deficits [Normal Speech] : normal speech [Alert and Oriented] : alert and oriented [Normal memory] : normal memory [General Appearance - Well Developed] : well developed [Normal Appearance] : normal appearance [Well Groomed] : well groomed [General Appearance - Well Nourished] : well nourished [No Deformities] : no deformities [General Appearance - In No Acute Distress] : no acute distress [Normal Conjunctiva] : the conjunctiva exhibited no abnormalities [Eyelids - No Xanthelasma] : the eyelids demonstrated no xanthelasmas [Normal Oral Mucosa] : normal oral mucosa [Normal Jugular Venous A Waves Present] : normal jugular venous A waves present [Normal Jugular Venous V Waves Present] : normal jugular venous V waves present [No Jugular Venous Morejon A Waves] : no jugular venous morejon A waves [Respiration, Rhythm And Depth] : normal respiratory rhythm and effort [Exaggerated Use Of Accessory Muscles For Inspiration] : no accessory muscle use [Auscultation Breath Sounds / Voice Sounds] : lungs were clear to auscultation bilaterally [Abdomen Soft] : soft [Abdomen Tenderness] : non-tender [] : no hepato-splenomegaly [Abdomen Mass (___ Cm)] : no abdominal mass palpated [Abnormal Walk] : normal gait [Cyanosis, Localized] : no localized cyanosis [Skin Color & Pigmentation] : normal skin color and pigmentation [Skin Turgor] : normal skin turgor [No Xanthoma] : no  xanthoma was observed [Oriented To Time, Place, And Person] : oriented to person, place, and time [Affect] : the affect was normal [Mood] : the mood was normal [No Anxiety] : not feeling anxious [5th Left ICS - MCL] : palpated at the 5th LICS in the midclavicular line [Normal] : normal [No Precordial Heave] : no precordial heave was noted [Normal Rate] : normal [Rhythm Regular] : regular [Normal S1] : normal S1 [Normal S2] : normal S2 [II] : a grade 2 [2+] : left 2+ [No Abnormalities] : the abdominal aorta was not enlarged and no bruit was heard [___ +] : bilateral [unfilled]U+ pretibial pitting edema [S3] : no S3 [S4] : no S4 [Right Carotid Bruit] : no bruit heard over the right carotid [Left Carotid Bruit] : no bruit heard over the left carotid

## 2021-08-31 RX ORDER — OLMESARTAN MEDOXOMIL AND HYDROCHLOROTHIAZIDE 40; 12.5 MG/1; MG/1
40-12.5 TABLET ORAL DAILY
Qty: 90 | Refills: 1 | Status: DISCONTINUED | COMMUNITY
Start: 2021-08-20 | End: 2021-08-31

## 2021-08-31 RX ORDER — AMLODIPINE BESYLATE 5 MG/1
5 TABLET ORAL
Qty: 90 | Refills: 1 | Status: DISCONTINUED | COMMUNITY
Start: 2021-08-31 | End: 2021-08-31

## 2021-09-04 ENCOUNTER — RESULT REVIEW (OUTPATIENT)
Age: 71
End: 2021-09-04

## 2021-09-04 ENCOUNTER — APPOINTMENT (OUTPATIENT)
Dept: CT IMAGING | Facility: IMAGING CENTER | Age: 71
End: 2021-09-04

## 2021-09-04 ENCOUNTER — OUTPATIENT (OUTPATIENT)
Dept: OUTPATIENT SERVICES | Facility: HOSPITAL | Age: 71
LOS: 1 days | End: 2021-09-04
Payer: MEDICARE

## 2021-09-04 ENCOUNTER — APPOINTMENT (OUTPATIENT)
Dept: MAMMOGRAPHY | Facility: IMAGING CENTER | Age: 71
End: 2021-09-04
Payer: MEDICARE

## 2021-09-04 DIAGNOSIS — Z96.651 PRESENCE OF RIGHT ARTIFICIAL KNEE JOINT: Chronic | ICD-10-CM

## 2021-09-04 DIAGNOSIS — Z90.710 ACQUIRED ABSENCE OF BOTH CERVIX AND UTERUS: Chronic | ICD-10-CM

## 2021-09-04 DIAGNOSIS — Z98.42 CATARACT EXTRACTION STATUS, LEFT EYE: Chronic | ICD-10-CM

## 2021-09-04 DIAGNOSIS — R93.89 ABNORMAL FINDINGS ON DIAGNOSTIC IMAGING OF OTHER SPECIFIED BODY STRUCTURES: ICD-10-CM

## 2021-09-04 DIAGNOSIS — Z90.49 ACQUIRED ABSENCE OF OTHER SPECIFIED PARTS OF DIGESTIVE TRACT: Chronic | ICD-10-CM

## 2021-09-04 PROCEDURE — 71250 CT THORAX DX C-: CPT | Mod: 26

## 2021-09-04 PROCEDURE — 71250 CT THORAX DX C-: CPT

## 2021-09-04 PROCEDURE — 77063 BREAST TOMOSYNTHESIS BI: CPT | Mod: 26

## 2021-09-04 PROCEDURE — 77067 SCR MAMMO BI INCL CAD: CPT

## 2021-09-04 PROCEDURE — 77063 BREAST TOMOSYNTHESIS BI: CPT

## 2021-09-04 PROCEDURE — 77067 SCR MAMMO BI INCL CAD: CPT | Mod: 26

## 2021-09-07 DIAGNOSIS — R92.2 INCONCLUSIVE MAMMOGRAM: ICD-10-CM

## 2021-09-11 ENCOUNTER — APPOINTMENT (OUTPATIENT)
Dept: MAMMOGRAPHY | Facility: IMAGING CENTER | Age: 71
End: 2021-09-11
Payer: MEDICARE

## 2021-09-11 ENCOUNTER — RESULT REVIEW (OUTPATIENT)
Age: 71
End: 2021-09-11

## 2021-09-11 ENCOUNTER — OUTPATIENT (OUTPATIENT)
Dept: OUTPATIENT SERVICES | Facility: HOSPITAL | Age: 71
LOS: 1 days | End: 2021-09-11
Payer: MEDICARE

## 2021-09-11 DIAGNOSIS — Z96.651 PRESENCE OF RIGHT ARTIFICIAL KNEE JOINT: Chronic | ICD-10-CM

## 2021-09-11 DIAGNOSIS — Z98.42 CATARACT EXTRACTION STATUS, LEFT EYE: Chronic | ICD-10-CM

## 2021-09-11 DIAGNOSIS — Z90.710 ACQUIRED ABSENCE OF BOTH CERVIX AND UTERUS: Chronic | ICD-10-CM

## 2021-09-11 DIAGNOSIS — Z90.49 ACQUIRED ABSENCE OF OTHER SPECIFIED PARTS OF DIGESTIVE TRACT: Chronic | ICD-10-CM

## 2021-09-11 DIAGNOSIS — Z00.8 ENCOUNTER FOR OTHER GENERAL EXAMINATION: ICD-10-CM

## 2021-09-11 PROCEDURE — 77065 DX MAMMO INCL CAD UNI: CPT | Mod: 26,RT

## 2021-09-11 PROCEDURE — G0279: CPT

## 2021-09-11 PROCEDURE — 77065 DX MAMMO INCL CAD UNI: CPT

## 2021-09-11 PROCEDURE — G0279: CPT | Mod: 26

## 2021-09-13 ENCOUNTER — NON-APPOINTMENT (OUTPATIENT)
Age: 71
End: 2021-09-13

## 2021-09-13 ENCOUNTER — APPOINTMENT (OUTPATIENT)
Dept: ENDOCRINOLOGY | Facility: CLINIC | Age: 71
End: 2021-09-13
Payer: MEDICARE

## 2021-09-13 VITALS
TEMPERATURE: 98 F | RESPIRATION RATE: 17 BRPM | HEIGHT: 63 IN | HEART RATE: 81 BPM | WEIGHT: 180 LBS | DIASTOLIC BLOOD PRESSURE: 80 MMHG | BODY MASS INDEX: 31.89 KG/M2 | SYSTOLIC BLOOD PRESSURE: 150 MMHG | OXYGEN SATURATION: 98 %

## 2021-09-13 LAB — GLUCOSE BLDC GLUCOMTR-MCNC: 198

## 2021-09-13 PROCEDURE — 82962 GLUCOSE BLOOD TEST: CPT

## 2021-09-13 PROCEDURE — 99214 OFFICE O/P EST MOD 30 MIN: CPT | Mod: 25

## 2021-09-13 NOTE — ASSESSMENT
[Diabetes Foot Care] : diabetes foot care [Long Term Vascular Complications] : long term vascular complications of diabetes [Carbohydrate Consistent Diet] : carbohydrate consistent diet [Importance of Diet and Exercise] : importance of diet and exercise to improve glycemic control, achieve weight loss and improve cardiovascular health [Exercise/Effect on Glucose] : exercise/effect on glucose [Hypoglycemia Management] : hypoglycemia management [Self Monitoring of Blood Glucose] : self monitoring of blood glucose [Retinopathy Screening] : Patient was referred to ophthalmology for retinopathy screening [Diabetic Medications] : Risks and benefits of diabetic medications were discussed [FreeTextEntry1] : 1. DM2, reasonably controlled\par - Janumet 50/1000 mg bid\par - continue lipitor\par - keep off HCTZ.  olmesartan 40 mg qd, Labetalol, verapamil\par \par 2. Uncontrolled HTN with hypokalemia on multiple meds\par Suspicious for PA\par - am paired PAC/PRA and consider CT abd\par - might proceed with  adding aldactone\par \par 2. Hypercalcemia\par - observe off HCTZ\par - repeat calcium/PTH, vitamin D level\par - 24h Uca collection\par - sestamibi scan\par \par 3. MNG\par - benign FNA b/l nodules today\par - repeat sono in 11/21\par \par 3. Osteoporosis\par I advised to the patient on antiresorptive therapy, and reviewed potential options for osteoporosis treatment, including oral and IV bisphosphonates, Prolia, Evenity. R+B of each options were discussed in details\par - OTC vitamin D3 2,000 IU/day\par - continue weight-bearing exercises; advised avoiding high risk sports\par - prior dental evaluation advised.\par - Fosamax 70 mg qw\par RTC 3 mos\par \par *** daughter Ismael [phone]\par

## 2021-09-13 NOTE — HISTORY OF PRESENT ILLNESS
[FreeTextEntry1] : F/u for multiple medical issues\par \par *** Sep 13, 2021 ***\par \par on Janumet 50/1000 mg bid,  olmesartan 40 mg qd, labetalol\par off HCTZ\par with leg swelling since adding norvasc- switched to verapamil er 240 mg qd, also on doxazosin and K-dur\par reports fbs- 120's\par FNA (5/21/21)  of RMP 2.1 - nodular goiter, LUP 1.1 - colloid nodule\par labs (8/20/21)- LDL- 43, ca- 10.2, K- 4.0, a1c- 6.6, TSH- 1..01\par \par *** May 21, 2021 ***\par \par on Janumet 50/1000 mg bid,  olmesartan 40 mg qd, labetalol\par off HCTZ. BP is high since stopping it\par reports fbs- 130's, ppg- 150's\par \par Thyroid/parathyroid US (5/14/21)- RMP iso 1.4x1.2x0.8; LMP iso 1.7x1.1x1.6. no definitive parathyroid nodules\par \par on preFNA US- RMP 2.1 cm, LUP 1.1 (round hypo), LLP 2.2 spongi. Patient declined biopsy of all 3 nodules, so only RMP and LUP nodules were biopsied\par \par HISTORY OF PRESENT ILLNESS. \par \par Ms. GAMBOA was diagnosed with Diabetes Mellitus Type 2 more than 10 years ago. She reports history HTN, dyslipidemia, carcinoid tumor of the lung, and denies CAD,  known complications of retinopathy, nephropathy, or neuropathy. She is presently on metformin 500 mg bid, Lipitor 20 mg HS. As per daughter, she was diagnosed with a hypercalcemia sometime in 05/20, calcium rising to 10.5-11.1, and as per daughter was even higher  in December 2020 when was admitted to the hospital with Covid, and was placed on cinacalcet. However, she developed tremors and stopped taking it about 2 months ago. She's taking some MVI once a day\par Blood sugars are checked 3-4 times a day. She noticed that her blood sugars are higher since she had Covid in 12/20\par Did not bring log book, but reported are typically as following: FBS- 150's- 160's, ppg- low 200's\par Hypoglycemia frequency: none \par Fingerstick glucose in the office today is 236 mg/dL 2 hours after eating. \par Diet: not following ADA\par Exercise: none\par \par Lab review (1/21) : a1c- 6.7%, calcium - 11.1, LDL- 50, TSH- 1.05\par \par DXA (8/19/20)- LS (-2.4) with OA changes  at L1-L2, L3-L4 (-2.8), FN (-1.0)\par \par Last dilated eye - 2/21\par Last podiatry visit  -  3/21\par Last cardiology evaluation - 4/21\par Last stress test - 3/21\par Last 2-D Echo - 3/21, EF- 70%\par cath- 4/21\par Last nephrology evaluation - none\par Last neurology evaluation-none\par

## 2021-09-13 NOTE — REASON FOR VISIT
[Follow - Up] : a follow-up visit [Hypercalcemia] : hypercalcemia [DM Type 2] : DM Type 2 [Osteoporosis] : osteoporosis [Thyroid nodule/ MNG] : thyroid nodule/ MNG

## 2021-09-15 ENCOUNTER — NON-APPOINTMENT (OUTPATIENT)
Age: 71
End: 2021-09-15

## 2021-09-17 LAB — ALDOSTERONE SERUM: 5.7 NG/DL

## 2021-09-23 LAB — RENIN ACTIVITY, PLASMA: 0.28 NG/ML/HR

## 2021-09-24 LAB
METANEPHRINE, PL: 81.5 PG/ML
NORMETANEPHRINE, PL: 143.3 PG/ML

## 2021-09-28 ENCOUNTER — NON-APPOINTMENT (OUTPATIENT)
Age: 71
End: 2021-09-28

## 2021-09-29 ENCOUNTER — APPOINTMENT (OUTPATIENT)
Dept: CARDIOLOGY | Facility: CLINIC | Age: 71
End: 2021-09-29

## 2021-09-29 ENCOUNTER — APPOINTMENT (OUTPATIENT)
Dept: HOME HEALTH SERVICES | Facility: HOME HEALTH | Age: 71
End: 2021-09-29
Payer: MEDICARE

## 2021-09-29 ENCOUNTER — NON-APPOINTMENT (OUTPATIENT)
Age: 71
End: 2021-09-29

## 2021-09-29 VITALS
DIASTOLIC BLOOD PRESSURE: 86 MMHG | TEMPERATURE: 98.6 F | OXYGEN SATURATION: 98 % | HEART RATE: 72 BPM | RESPIRATION RATE: 16 BRPM | SYSTOLIC BLOOD PRESSURE: 160 MMHG

## 2021-09-29 PROCEDURE — G0008: CPT

## 2021-09-29 PROCEDURE — 90662 IIV NO PRSV INCREASED AG IM: CPT

## 2021-09-29 PROCEDURE — 99349 HOME/RES VST EST MOD MDM 40: CPT | Mod: 25

## 2021-09-29 RX ORDER — POTASSIUM CHLORIDE 750 MG/1
10 TABLET, FILM COATED, EXTENDED RELEASE ORAL
Qty: 90 | Refills: 1 | Status: DISCONTINUED | COMMUNITY
Start: 2021-08-20 | End: 2021-09-29

## 2021-10-02 ENCOUNTER — RESULT REVIEW (OUTPATIENT)
Age: 71
End: 2021-10-02

## 2021-10-02 ENCOUNTER — APPOINTMENT (OUTPATIENT)
Dept: MAMMOGRAPHY | Facility: IMAGING CENTER | Age: 71
End: 2021-10-02
Payer: MEDICARE

## 2021-10-02 ENCOUNTER — OUTPATIENT (OUTPATIENT)
Dept: OUTPATIENT SERVICES | Facility: HOSPITAL | Age: 71
LOS: 1 days | End: 2021-10-02
Payer: MEDICARE

## 2021-10-02 ENCOUNTER — APPOINTMENT (OUTPATIENT)
Dept: ULTRASOUND IMAGING | Facility: IMAGING CENTER | Age: 71
End: 2021-10-02
Payer: MEDICARE

## 2021-10-02 DIAGNOSIS — Z98.42 CATARACT EXTRACTION STATUS, LEFT EYE: Chronic | ICD-10-CM

## 2021-10-02 DIAGNOSIS — Z96.651 PRESENCE OF RIGHT ARTIFICIAL KNEE JOINT: Chronic | ICD-10-CM

## 2021-10-02 DIAGNOSIS — M81.0 AGE-RELATED OSTEOPOROSIS WITHOUT CURRENT PATHOLOGICAL FRACTURE: ICD-10-CM

## 2021-10-02 DIAGNOSIS — Z90.710 ACQUIRED ABSENCE OF BOTH CERVIX AND UTERUS: Chronic | ICD-10-CM

## 2021-10-02 DIAGNOSIS — I10 ESSENTIAL (PRIMARY) HYPERTENSION: ICD-10-CM

## 2021-10-02 DIAGNOSIS — E04.2 NONTOXIC MULTINODULAR GOITER: ICD-10-CM

## 2021-10-02 DIAGNOSIS — Z90.49 ACQUIRED ABSENCE OF OTHER SPECIFIED PARTS OF DIGESTIVE TRACT: Chronic | ICD-10-CM

## 2021-10-02 DIAGNOSIS — E11.9 TYPE 2 DIABETES MELLITUS WITHOUT COMPLICATIONS: ICD-10-CM

## 2021-10-02 PROCEDURE — 88360 TUMOR IMMUNOHISTOCHEM/MANUAL: CPT | Mod: 26,59

## 2021-10-02 PROCEDURE — 77065 DX MAMMO INCL CAD UNI: CPT | Mod: 26,RT

## 2021-10-02 PROCEDURE — 76536 US EXAM OF HEAD AND NECK: CPT

## 2021-10-02 PROCEDURE — 88342 IMHCHEM/IMCYTCHM 1ST ANTB: CPT | Mod: 26

## 2021-10-02 PROCEDURE — 76536 US EXAM OF HEAD AND NECK: CPT | Mod: 26

## 2021-10-02 PROCEDURE — 88305 TISSUE EXAM BY PATHOLOGIST: CPT | Mod: 26

## 2021-10-02 PROCEDURE — A4648: CPT

## 2021-10-02 PROCEDURE — 88341 IMHCHEM/IMCYTCHM EA ADD ANTB: CPT | Mod: 26

## 2021-10-02 PROCEDURE — 88341 IMHCHEM/IMCYTCHM EA ADD ANTB: CPT

## 2021-10-02 PROCEDURE — 88360 TUMOR IMMUNOHISTOCHEM/MANUAL: CPT

## 2021-10-02 PROCEDURE — 19081 BX BREAST 1ST LESION STRTCTC: CPT

## 2021-10-02 PROCEDURE — 19081 BX BREAST 1ST LESION STRTCTC: CPT | Mod: RT

## 2021-10-02 PROCEDURE — 88305 TISSUE EXAM BY PATHOLOGIST: CPT

## 2021-10-02 PROCEDURE — 77065 DX MAMMO INCL CAD UNI: CPT

## 2021-10-06 NOTE — HISTORY OF PRESENT ILLNESS
[Patient] : patient [Family Member] : family member [FreeTextEntry2] : Patient denies fever, cough, trouble breathing, rash, vomiting and diarrhea. Patient has not been in close contact with someone covid positive. \par N95 mask, gloves, eye wear used during visit: [Y]. 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\par \par Pt A&Ox3, mainly Creole speaking; reports LE edema; daughter via phone reports pt feet swollen since she was on amlodipine - which has now been D/C'd- pt follows w/ cardiology; found to have calcification on mammogram scheduled for breast biopsy on Saturday \par Still working daily as a  \par Appetite good some days bad other days\par Moves bowels well \par Urine - no incontinence\par \par HTN/HLD: on atorvastatin, doxazosin, labetolol, olmesartan, verapamil- follows w/ cardiology\par \par Hypercalcemia- started on cinalcalcet in 12/20 which she stopped; now off  HCTZ- follows w/ endo\par \par Depression/Anxiety - doing well on buspirone, mirtazapine, Vrylar- \par \par Asthma- taking Breo PRN & albuterol daily - follows w/ pulmonary \par \par DM- uses contour Next one- on Janumet -  followed by endo

## 2021-10-06 NOTE — PHYSICAL EXAM
[No Acute Distress] : no acute distress [Well Nourished] : well nourished [Well Developed] : well developed [Normal Sclera/Conjunctiva] : normal sclera/conjunctiva [EOMI] : extra ocular movement intact [Normal Outer Ear/Nose] : the ears and nose were normal in appearance [Normal Oropharynx] : the oropharynx was normal [Normal TMs] : both tympanic membranes were normal [No JVD] : no jugular venous distention [No Respiratory Distress] : no respiratory distress [Clear to Auscultation] : lungs were clear to auscultation bilaterally [No Accessory Muscle Use] : no accessory muscle use [Normal Rate] : heart rate was normal  [Regular Rhythm] : with a regular rhythm [Normal S1, S2] : normal S1 and S2 [No Murmurs] : no murmurs heard [Normal Bowel Sounds] : normal bowel sounds [Non Tender] : non-tender [Soft] : abdomen soft [Normal Post Cervical Nodes] : no posterior cervical lymphadenopathy [Not Distended] : not distended [Normal Anterior Cervical Nodes] : no anterior cervical lymphadenopathy [No Spinal Tenderness] : no spinal tenderness [No CVA Tenderness] : no ~M costovertebral angle tenderness [Normal Gait] : normal gait [No Joint Swelling] : no joint swelling seen [No Clubbing, Cyanosis] : no clubbing  or cyanosis of the fingernails [Normal Strength/Tone] : muscle strength and tone were normal [No Rash] : no rash [No Skin Lesions] : no skin lesions [Oriented x3] : oriented to person, place, and time [Normal Affect] : the affect was normal [Kyphosis] : no kyphosis present [Scoliosis] : scoliosis not present [de-identified] : scant pedal edema

## 2021-10-06 NOTE — REASON FOR VISIT
[Pre-Visit Preparation] : pre-visit preparation was done [Intercurrent Specialty/Sub-specialty Visits] : the patient has intercurrent specialty/sub-specialty visits [Follow-Up] : a follow-up visit [Family Member] : family member [FreeTextEntry1] : for chronic conditions [FreeTextEntry3] : PCP, Pulmonary, Endo

## 2021-10-06 NOTE — DATA REVIEWED
[FreeTextEntry1] : Patient PAULINA GAMBOA Invision MRN [id number] Hospital Visit [id number] [facility] - Attending Physician Anjel Byrd \par Status Complete \par  \par \par Hospital Course: \par Discharge Date	23-Mar-2021 \par Admission Date	22-Mar-2021 15:55 \par Reason for Admission	dyspnea on exertion \par Hospital Course	 \par HPI: \par 69 yo female w/pmh asthma, HTN, HLD, diabetes, primary hyperparathyroidism, \par presents to [facility] for cc worsening dyspnea on exertion for the last week. She \par denies chest pain during these episodes. Says she started having trouble \par walking up stairs, and had to stop at around 3-4 steps to catch her breath. \par However, she says she can probably walk about 3 blocks without any issues. \par Sleeps on 2 pillows at baseline for a long time. Has never been diagnosed with \par heart failure or coronary artery disease. Denies hearing herself wheeze and \par does not think her SOB is related to the weather. She has experienced these \par symptoms in the past but can't quite remember when they first started. Saw her \par cardiologist, Dr. Stewart Montilla, last month. \par \par Patient was first admitted to CDU for monitoring. She was hemodynamically \par stable, afebrile. Labs with trop 11 --> 13. Rest of labs unremarkable. She had \par a CT angio of her chest which was negative for PE and lung pathology. She was \par seen by cardiology, Dr. Diallo, who recommended inpatient stay for stress test. \par She had a TTE done which was normal, except for a small pericardial effusion. \par (22 Mar 2021 16:32) \par \par Stress test performed - no reversible ischemia found. \par Etiology of the dyspnea on exertion is not clear. Follow up with outpatient \par cardiologist. \par \par Med Reconciliation: \par Override IMPROVE-DD recommendations due to:	IMPROVE-DD Application Not \par Available \par Recommended Post-Discharge VTE Prophylaxis	IMPROVE-DD Application Not Available \par Medication Reconciliation Status	Admission Reconciliation is Not Complete \par Discharge Reconciliation is Completed \par Discharge Medications	albuterol 90 mcg/inh inhalation aerosol: 2 puff(s) \par inhaled every 6 hours \par as needed for SOB/wheezing \par aspirin 81 mg oral tablet, chewable: 1 tab(s) orally once a day \par atorvastatin 20 mg oral tablet: 1 tab(s) orally once a day \par Benicar HCT 40 mg-12.5 mg oral tablet: 1 tab(s) orally once a day \par Breo Ellipta 100 mcg-25 mcg/inh inhalation powder: 1 puff(s) inhaled once a day \par busPIRone 10 mg oral tablet: 1 tab(s) orally 2 times a day \par cefpodoxime 200 mg oral tablet: 1 tab(s) orally every 12 hours \par cinacalcet 30 mg oral tablet: 1 tab(s) orally once a day \par fluticasone 50 mcg/inh inhalation powder: 1 puff(s) inhaled 2 times a day \par labetalol 100 mg oral tablet: 1 tab(s) orally 2 times a day \par LORazepam 0.5 mg oral tablet: 1 tab(s) orally 2 times a day, As Needed \par metformin 500 mg oral tablet: 1 tab(s) orally 2 times a day \par mirtazapine 45 mg oral tablet: 1 tab(s) orally once a day (at bedtime) \par montelukast 10 mg oral tablet: 1 tab(s) orally once a day \par verapamil 24 hour extended release 240 mg/24 hours oral capsule, extended \par release: 1 cap(s) orally once a day \par Vraylar 1.5 mg oral capsule: 1 cap(s) orally once a day \par , \par , \par \par Care Plan/Procedures: \par Goal(s)	To get better and follow your care plan as instructed. \par Discharge Diagnoses, Assessment and Plan of Treatment	PRINCIPAL DISCHARGE \par DIAGNOSIS \par Diagnosis: Shortness of breath \par Assessment and Plan of Treatment: c/w current meds \par follow with Dr Stewart montilla \par \par Follow Up: \par Care Providers for Follow up (PCP/Outpatient Provider)	Stewart Montilla) \par Cardiology; Cardiovascular Disease; Internal Medicine \par [address] [facility] [address] \[place] [address] \par Phone: [phone] \par Fax: [phone] \par Follow Up Time: \par Patient's Scheduled Appointments	PAULINA GAMBOA ; 04/01/2021 ; [facility] Card[facility] [address] \par PAULINA GAMBOA ; 04/10/2021 ; [facility] Rad [facility] [address] \par PAULINA GAMBOA ; 04/14/2021 ; [facility] Cardio [address] \par PAULINA GAMBOA ; 04/19/2021 ; [facility] Endocrin [address] \par Diet Instructions	Low Fat Low salt Low cholesterol diet \par Activity	No restrictions \par \par Quality Measures: \par Does the patient have difficulty running errands alone like visiting a doctors \par office or shopping?	No \par Does the patient have difficulty climbing stairs?	No \par Patient Condition	Stable \par Hospice Patient	No \par Cognition: The patient has	No difficulties \par Does the patient have a principal diagnosis of ischemic stroke, hemorrhagic \par stroke, or TIA?	No \par Does the patient have a principal diagnosis of Acute Myocardial Infarction?	No \par Has the patient had a Percutaneous Coronary Intervention?	No \par \par Document Complete: \par Care Provider Seen in Hospital	Anjel Byrd \par Physician Section Complete	This document is complete and the patient is ready \par for discharge. \par For questions about your prescriptions, please call:	[phone] \par Is this contact telephone number correct?	Yes \par \par \par \par Electronic Signatures: \par Anjel Byrd)  (Signed 24-Mar-2021 08:21) \par 	Authored: Hospital Course \par 	Co-Signer: Hospital Course, Med Reconciliation, Care Plan/Procedures, Follow \par Up, Quality Measures, Document Complete \par Liana Sprague (NP)  (Signed 23-Mar-2021 18:03) \par 	Authored: Hospital Course, Med Reconciliation, Care Plan/Procedures, Follow \par Up, Quality Measures, Document Complete \par \par \par Last Updated: 24-Mar-2021 08:21 by Anjel Byrd) \par

## 2021-10-06 NOTE — COUNSELING
[Normal Weight - ( BMI  <25 )] : normal weight - ( BMI  <25 ) [Continue diet as tolerated] : continue diet as tolerated based on goals of care [Non - Smoker] : non-smoker [] : foot exam [Improve exercise tolerance] : improve exercise tolerance [Discussed disease trajectory with patient/caregiver] : discussed disease trajectory with patient/caregiver [Likely to achieve goals/desired outcomes] : likely to achieve goals/desired outcomes [Patient/Caregiver has ___ understanding of disease process] : patient/caregiver has [unfilled] understanding of disease process [Patient/Caregiver not ready to engage in discussion] : patient/caregiver not ready to engage in discussion

## 2021-10-06 NOTE — REVIEW OF SYSTEMS
[Lower Ext Edema] : lower extremity edema [Negative] : Heme/Lymph [Shortness Of Breath] : no shortness of breath

## 2021-10-08 ENCOUNTER — NON-APPOINTMENT (OUTPATIENT)
Age: 71
End: 2021-10-08

## 2021-10-08 ENCOUNTER — TRANSCRIPTION ENCOUNTER (OUTPATIENT)
Age: 71
End: 2021-10-08

## 2021-10-11 ENCOUNTER — APPOINTMENT (OUTPATIENT)
Dept: OBGYN | Facility: CLINIC | Age: 71
End: 2021-10-11
Payer: MEDICARE

## 2021-10-11 VITALS — BODY MASS INDEX: 31.18 KG/M2 | SYSTOLIC BLOOD PRESSURE: 142 MMHG | DIASTOLIC BLOOD PRESSURE: 84 MMHG | WEIGHT: 176 LBS

## 2021-10-11 LAB
BILIRUB UR QL STRIP: NORMAL
CLARITY UR: NORMAL
COLLECTION METHOD: NORMAL
GLUCOSE UR-MCNC: NORMAL
HCG UR QL: 0.2 EU/DL
HGB UR QL STRIP.AUTO: NORMAL
KETONES UR-MCNC: NORMAL
LEUKOCYTE ESTERASE UR QL STRIP: NORMAL
NITRITE UR QL STRIP: NORMAL
PH UR STRIP: 7
PROT UR STRIP-MCNC: NORMAL
SP GR UR STRIP: 1.01

## 2021-10-11 PROCEDURE — 81003 URINALYSIS AUTO W/O SCOPE: CPT | Mod: QW

## 2021-10-11 PROCEDURE — 99214 OFFICE O/P EST MOD 30 MIN: CPT | Mod: 24

## 2021-10-14 LAB — BACTERIA UR CULT: ABNORMAL

## 2021-10-18 ENCOUNTER — NON-APPOINTMENT (OUTPATIENT)
Age: 71
End: 2021-10-18

## 2021-10-19 ENCOUNTER — APPOINTMENT (OUTPATIENT)
Dept: OBGYN | Facility: CLINIC | Age: 71
End: 2021-10-19
Payer: MEDICARE

## 2021-10-19 VITALS
SYSTOLIC BLOOD PRESSURE: 170 MMHG | HEIGHT: 63 IN | DIASTOLIC BLOOD PRESSURE: 97 MMHG | WEIGHT: 179 LBS | BODY MASS INDEX: 31.71 KG/M2

## 2021-10-19 PROCEDURE — G0101: CPT

## 2021-10-19 NOTE — PHYSICAL EXAM
[Appropriately responsive] : appropriately responsive [Alert] : alert [No Acute Distress] : no acute distress [Soft] : soft [Non-tender] : non-tender [Non-distended] : non-distended [No HSM] : No HSM [No Lesions] : no lesions [No Mass] : no mass [Oriented x3] : oriented x3 [Examination Of The Breasts] : a normal appearance [No Masses] : no breast masses were palpable [Labia Majora] : normal [Labia Minora] : normal [Normal] : normal [Atrophy] : atrophy [Absent] : absent [Uterine Adnexae] : normal

## 2021-10-29 ENCOUNTER — NON-APPOINTMENT (OUTPATIENT)
Age: 71
End: 2021-10-29

## 2021-10-29 ENCOUNTER — APPOINTMENT (OUTPATIENT)
Dept: CARDIOLOGY | Facility: CLINIC | Age: 71
End: 2021-10-29
Payer: MEDICARE

## 2021-10-29 VITALS
DIASTOLIC BLOOD PRESSURE: 88 MMHG | WEIGHT: 178 LBS | BODY MASS INDEX: 31.54 KG/M2 | RESPIRATION RATE: 15 BRPM | HEIGHT: 63 IN | HEART RATE: 88 BPM | SYSTOLIC BLOOD PRESSURE: 144 MMHG | OXYGEN SATURATION: 99 % | TEMPERATURE: 98 F

## 2021-10-29 PROCEDURE — 93224 XTRNL ECG REC UP TO 48 HRS: CPT

## 2021-10-29 PROCEDURE — 99214 OFFICE O/P EST MOD 30 MIN: CPT

## 2021-10-29 RX ORDER — VERAPAMIL HYDROCHLORIDE 240 MG/1
240 CAPSULE, DELAYED RELEASE PELLETS ORAL DAILY
Qty: 90 | Refills: 0 | Status: COMPLETED | COMMUNITY
Start: 2020-05-12 | End: 2021-10-29

## 2021-10-29 NOTE — PHYSICAL EXAM
[Well Developed] : well developed [Well Nourished] : well nourished [No Acute Distress] : no acute distress [Normal Venous Pressure] : normal venous pressure [No Carotid Bruit] : no carotid bruit [Normal S1, S2] : normal S1, S2 [No Rub] : no rub [No Gallop] : no gallop [Murmur] : murmur [Clear Lung Fields] : clear lung fields [Good Air Entry] : good air entry [No Respiratory Distress] : no respiratory distress  [Soft] : abdomen soft [Non Tender] : non-tender [No Masses/organomegaly] : no masses/organomegaly [Normal Bowel Sounds] : normal bowel sounds [Normal Gait] : normal gait [No Cyanosis] : no cyanosis [No Clubbing] : no clubbing [No Varicosities] : no varicosities [No Rash] : no rash [No Skin Lesions] : no skin lesions [Moves all extremities] : moves all extremities [No Focal Deficits] : no focal deficits [Normal Speech] : normal speech [Alert and Oriented] : alert and oriented [Normal memory] : normal memory [General Appearance - Well Developed] : well developed [Normal Appearance] : normal appearance [Well Groomed] : well groomed [General Appearance - Well Nourished] : well nourished [No Deformities] : no deformities [General Appearance - In No Acute Distress] : no acute distress [Normal Conjunctiva] : the conjunctiva exhibited no abnormalities [Eyelids - No Xanthelasma] : the eyelids demonstrated no xanthelasmas [Normal Oral Mucosa] : normal oral mucosa [Normal Jugular Venous A Waves Present] : normal jugular venous A waves present [Normal Jugular Venous V Waves Present] : normal jugular venous V waves present [No Jugular Venous Morejon A Waves] : no jugular venous morejon A waves [Respiration, Rhythm And Depth] : normal respiratory rhythm and effort [Exaggerated Use Of Accessory Muscles For Inspiration] : no accessory muscle use [Auscultation Breath Sounds / Voice Sounds] : lungs were clear to auscultation bilaterally [Abdomen Soft] : soft [Abdomen Tenderness] : non-tender [] : no hepato-splenomegaly [Abdomen Mass (___ Cm)] : no abdominal mass palpated [Abnormal Walk] : normal gait [Cyanosis, Localized] : no localized cyanosis [Skin Color & Pigmentation] : normal skin color and pigmentation [Skin Turgor] : normal skin turgor [No Xanthoma] : no  xanthoma was observed [Oriented To Time, Place, And Person] : oriented to person, place, and time [Affect] : the affect was normal [Mood] : the mood was normal [No Anxiety] : not feeling anxious [5th Left ICS - MCL] : palpated at the 5th LICS in the midclavicular line [Normal] : normal [No Precordial Heave] : no precordial heave was noted [Normal Rate] : normal [Rhythm Regular] : regular [Normal S1] : normal S1 [Normal S2] : normal S2 [II] : a grade 2 [2+] : left 2+ [No Abnormalities] : the abdominal aorta was not enlarged and no bruit was heard [___ +] : bilateral [unfilled]U+ pretibial pitting edema [de-identified] : +2 B/L LE pitting edema/ no calf tenderness [S3] : no S3 [S4] : no S4 [Right Carotid Bruit] : no bruit heard over the right carotid [Left Carotid Bruit] : no bruit heard over the left carotid

## 2021-10-29 NOTE — REASON FOR VISIT
[CV Risk Factors and Non-Cardiac Disease] : CV risk factors and non-cardiac disease [Follow-Up - Clinic] : a clinic follow-up of [Hypertension] : hypertension [Palpitations] : palpitations [Hyperlipidemia] : hyperlipidemia [FreeTextEntry1] : murmur

## 2021-10-29 NOTE — DISCUSSION/SUMMARY
[FreeTextEntry1] : This is a 71-year-old female with past medical history significant for hypertension, asthma, history of bronchitis, non-insulin-dependent diabetes mellitus, hyperlipidemia, status post right knee replacement, who comes in for cardiac follow-up evaluation.  She denies chest pain, shortness of breath, dizziness or syncope.\par The patient is complaining of bilateral pedal edema.  The edema is localized to the malleolus area and is consistent with calcium channel blocker induced edema.  The patient had been on 2 calcium channel blockers, and Norvasc was discontinued, and the patient remained on verapamil 240 mg/day.\par She still has bilateral 1+/2 pedal edema at the area of the malleolus.  She will discontinue her verapamil and start on hydrochlorothiazide 25 mg/day and potassium chloride 10 mEq only 1 tablet on Saturday and Sunday.  She will follow-up in 4 to 6 weeks to recheck her blood pressure and electrolytes on her new therapy.\par She will have a Holter monitor placed today to evaluate her palpitations.  She understands she must increase her hydration.\par If her blood pressure remains elevated I would consider increasing her labetalol.\par A lipid panel done August 20, 2021 demonstrated a direct LDL 48 mg/dL, cholesterol 115, HDL 65, triglycerides of 33, and non-HDL cholesterol 50 mg/dL.  She will continue on her current medical therapy.  Her LDL is at target level.\par Cardiac catheterization done April 7, 2021 at [facility].  This demonstrated a normal right heart catheterization and normal coronary arteries.  The patient tolerated the procedure well.\par Given her diabetic state her LDL cholesterol target should be less than 70 mg/dL.\par She was born in [facility] and has no history of rheumatic fever. \par Echo Doppler examination done May 14, 2020 by another cardiologist which demonstrated mild left ventricular hypertrophy, normal left ventricular function with an estimated ejection fraction of 55 to 60%, and trivial pericardial effusion.  There was mild tricuspid valve regurgitation, calcified mitral valve annulus and thickened mitral valve leaflets.\par The patient's daughter reports that she had a normal stress echocardiogram April 26, 2017.\par Electrocardiogram done June 12, 2020 demonstrated normal sinus rhythm rate of 82 bpm is otherwise remarkable left atrial abnormality, and for poor R wave progression.\par \par The patient understands that aerobic exercises must be increased to 40 minutes 4 times per week. A detailed discussion of lifestyle modification was done today. The patient has a good understanding of the diagnosis, and treatment plan. Lifestyle modification was also outlined.\par

## 2021-11-03 ENCOUNTER — APPOINTMENT (OUTPATIENT)
Dept: ENDOCRINOLOGY | Facility: CLINIC | Age: 71
End: 2021-11-03
Payer: MEDICARE

## 2021-11-03 VITALS
RESPIRATION RATE: 16 BRPM | BODY MASS INDEX: 31.71 KG/M2 | HEIGHT: 63 IN | OXYGEN SATURATION: 98 % | TEMPERATURE: 98 F | HEART RATE: 82 BPM | SYSTOLIC BLOOD PRESSURE: 125 MMHG | WEIGHT: 179 LBS | DIASTOLIC BLOOD PRESSURE: 80 MMHG

## 2021-11-03 DIAGNOSIS — M85.80 OTHER SPECIFIED DISORDERS OF BONE DENSITY AND STRUCTURE, UNSPECIFIED SITE: ICD-10-CM

## 2021-11-03 LAB — GLUCOSE BLDC GLUCOMTR-MCNC: 92

## 2021-11-03 PROCEDURE — 99214 OFFICE O/P EST MOD 30 MIN: CPT | Mod: 25

## 2021-11-03 PROCEDURE — 82962 GLUCOSE BLOOD TEST: CPT

## 2021-11-03 NOTE — HISTORY OF PRESENT ILLNESS
[FreeTextEntry1] : F/u for multiple medical issues\par \par *** Nov 03, 2021 ***\par \par on Janumet 50/1000 mg bid,  olmesartan 40 mg qd, labetalol\par reports fbs- 90's- 120's, ppg- 110's\par PAC/PRA- 5.7/0.2\par normal plasma free meta\par \par Thyr US (10/2/21)- stable RMP iso 1.6cm, LMP 1.1 cm hypo and LLP 2.2 spongiform nodules\par Parathyroid sestamibi scan (5/27/21)- parathyroid lesion posterior to the LUP and ? another lesion posterior to the RLP\par \par *** Sep 13, 2021 ***\par \par on Janumet 50/1000 mg bid,  olmesartan 40 mg qd, labetalol\par off HCTZ\par with leg swelling since adding norvasc- switched to verapamil er 240 mg qd, also on doxazosin and K-dur\par reports fbs- 120's\par FNA (5/21/21)  of RMP 2.1 - nodular goiter, LUP 1.1 - colloid nodule\par labs (8/20/21)- LDL- 43, ca- 10.2, K- 4.0, a1c- 6.6, TSH- 1.01\par \par *** May 21, 2021 ***\par \par on Janumet 50/1000 mg bid,  olmesartan 40 mg qd, labetalol\par off HCTZ. BP is high since stopping it\par reports fbs- 130's, ppg- 150's\par \par Thyroid/parathyroid US (5/14/21)- RMP iso 1.4x1.2x0.8; LMP iso 1.7x1.1x1.6. no definitive parathyroid nodules\par \par on preFNA US- RMP 2.1 cm, LUP 1.1 (round hypo), LLP 2.2 spongi. Patient declined biopsy of all 3 nodules, so only RMP and LUP nodules were biopsied\par \par HISTORY OF PRESENT ILLNESS. \par \par Ms. GAMBOA was diagnosed with Diabetes Mellitus Type 2 more than 10 years ago. She reports history HTN, dyslipidemia, carcinoid tumor of the lung, and denies CAD,  known complications of retinopathy, nephropathy, or neuropathy. She is presently on metformin 500 mg bid, Lipitor 20 mg HS. As per daughter, she was diagnosed with a hypercalcemia sometime in 05/20, calcium rising to 10.5-11.1, and as per daughter was even higher  in December 2020 when was admitted to the hospital with Covid, and was placed on cinacalcet. However, she developed tremors and stopped taking it about 2 months ago. She's taking some MVI once a day\par Blood sugars are checked 3-4 times a day. She noticed that her blood sugars are higher since she had Covid in 12/20\par Did not bring log book, but reported are typically as following: FBS- 150's- 160's, ppg- low 200's\par Hypoglycemia frequency: none \par Fingerstick glucose in the office today is 236 mg/dL 2 hours after eating. \par Diet: not following ADA\par Exercise: none\par \par Lab review (1/21) : a1c- 6.7%, calcium - 11.1, LDL- 50, TSH- 1.05\par \par DXA (8/19/20)- LS (-2.4) with OA changes  at L1-L2, L3-L4 (-2.8), FN (-1.0)\par \par Last dilated eye - 2/21\par Last podiatry visit  -  3/21\par Last cardiology evaluation - 10/21\par Last stress test - 3/21\par Last 2-D Echo - 3/21, EF- 70%\par cath- 4/21\par Last nephrology evaluation - none\par Last neurology evaluation-none\par

## 2021-11-03 NOTE — ASSESSMENT
[Diabetes Foot Care] : diabetes foot care [Long Term Vascular Complications] : long term vascular complications of diabetes [Carbohydrate Consistent Diet] : carbohydrate consistent diet [Importance of Diet and Exercise] : importance of diet and exercise to improve glycemic control, achieve weight loss and improve cardiovascular health [Exercise/Effect on Glucose] : exercise/effect on glucose [Hypoglycemia Management] : hypoglycemia management [Self Monitoring of Blood Glucose] : self monitoring of blood glucose [Retinopathy Screening] : Patient was referred to ophthalmology for retinopathy screening [Diabetic Medications] : Risks and benefits of diabetic medications were discussed [FreeTextEntry1] : 1. DM2, reasonably controlled\par - Janumet 50/1000 mg bid\par - continue lipitor\par - keep off HCTZ.  olmesartan 40 mg qd, Labetalol, verapamil\par \par 2. Uncontrolled HTN with hypokalemia on multiple meds\par Suspicious for PA\par - am paired PAC/PRA and consider CT abd\par - might proceed with  adding aldactone\par \par 3. Hypercalcemia\par - observe off HCTZ\par - monitor calcium/PTH, vitamin D level\par - might need another  24h Uca collection and potential CASR testing\par \par 4. MNG\par - benign FNA b/l nodules\par - essentially stable sono, can repeat in 10/22\par \par 5. Osteoporosis\par I advised to the patient on antiresorptive therapy, and reviewed potential options for osteoporosis treatment, including oral and IV bisphosphonates, Prolia, Evenity. R+B of each options were discussed in details\par - OTC vitamin D3 2,000 IU/day\par - continue weight-bearing exercises; advised avoiding high risk sports\par - prior dental evaluation advised.\par - Fosamax 70 mg qw\par - DXA 3 sites in 8/22\par RTC 4-5 mos\par \par *** daughter Ismael [phone]\par

## 2021-11-04 ENCOUNTER — NON-APPOINTMENT (OUTPATIENT)
Age: 71
End: 2021-11-04

## 2021-11-04 LAB
25(OH)D3 SERPL-MCNC: 31.1 NG/ML
ALBUMIN SERPL ELPH-MCNC: 4.5 G/DL
ALP BLD-CCNC: 60 U/L
ALT SERPL-CCNC: 27 U/L
ANION GAP SERPL CALC-SCNC: 14 MMOL/L
AST SERPL-CCNC: 21 U/L
BILIRUB SERPL-MCNC: 0.3 MG/DL
BUN SERPL-MCNC: 10 MG/DL
CA-I SERPL-SCNC: 1.31 MMOL/L
CALCIUM SERPL-MCNC: 10.1 MG/DL
CALCIUM SERPL-MCNC: 10.1 MG/DL
CHLORIDE SERPL-SCNC: 102 MMOL/L
CHOLEST SERPL-MCNC: 118 MG/DL
CO2 SERPL-SCNC: 25 MMOL/L
CREAT SERPL-MCNC: 0.75 MG/DL
CREAT SPEC-SCNC: 101 MG/DL
ESTIMATED AVERAGE GLUCOSE: 131 MG/DL
FOLATE SERPL-MCNC: >20 NG/ML
FRUCTOSAMINE SERPL-MCNC: 265 UMOL/L
GLUCOSE SERPL-MCNC: 101 MG/DL
HBA1C MFR BLD HPLC: 6.2 %
HDLC SERPL-MCNC: 60 MG/DL
LDLC SERPL CALC-MCNC: 52 MG/DL
MICROALBUMIN 24H UR DL<=1MG/L-MCNC: 1.6 MG/DL
MICROALBUMIN/CREAT 24H UR-RTO: 16 MG/G
NONHDLC SERPL-MCNC: 59 MG/DL
PARATHYROID HORMONE INTACT: 65 PG/ML
POTASSIUM SERPL-SCNC: 4.5 MMOL/L
PROT SERPL-MCNC: 7 G/DL
SODIUM SERPL-SCNC: 141 MMOL/L
T4 FREE SERPL-MCNC: 1.5 NG/DL
TRIGL SERPL-MCNC: 32 MG/DL
TSH SERPL-ACNC: 0.93 UIU/ML
VIT B12 SERPL-MCNC: 425 PG/ML

## 2021-11-09 ENCOUNTER — RX RENEWAL (OUTPATIENT)
Age: 71
End: 2021-11-09

## 2021-11-10 ENCOUNTER — TRANSCRIPTION ENCOUNTER (OUTPATIENT)
Age: 71
End: 2021-11-10

## 2021-11-16 ENCOUNTER — NON-APPOINTMENT (OUTPATIENT)
Age: 71
End: 2021-11-16

## 2021-11-23 ENCOUNTER — APPOINTMENT (OUTPATIENT)
Dept: HOME HEALTH SERVICES | Facility: HOME HEALTH | Age: 71
End: 2021-11-23

## 2021-11-26 ENCOUNTER — APPOINTMENT (OUTPATIENT)
Dept: PULMONOLOGY | Facility: CLINIC | Age: 71
End: 2021-11-26

## 2021-11-26 ENCOUNTER — TRANSCRIPTION ENCOUNTER (OUTPATIENT)
Age: 71
End: 2021-11-26

## 2021-11-26 ENCOUNTER — NON-APPOINTMENT (OUTPATIENT)
Age: 71
End: 2021-11-26

## 2021-11-29 ENCOUNTER — NON-APPOINTMENT (OUTPATIENT)
Age: 71
End: 2021-11-29

## 2021-12-08 ENCOUNTER — APPOINTMENT (OUTPATIENT)
Dept: MRI IMAGING | Facility: CLINIC | Age: 71
End: 2021-12-08

## 2021-12-13 LAB
MISCELLANEOUS TEST: NORMAL
PROC NAME: NORMAL

## 2021-12-19 ENCOUNTER — TRANSCRIPTION ENCOUNTER (OUTPATIENT)
Age: 71
End: 2021-12-19

## 2021-12-23 ENCOUNTER — APPOINTMENT (OUTPATIENT)
Dept: PULMONOLOGY | Facility: CLINIC | Age: 71
End: 2021-12-23
Payer: MEDICARE

## 2021-12-23 VITALS
RESPIRATION RATE: 16 BRPM | OXYGEN SATURATION: 98 % | HEART RATE: 86 BPM | WEIGHT: 170 LBS | TEMPERATURE: 97.3 F | BODY MASS INDEX: 30.12 KG/M2 | SYSTOLIC BLOOD PRESSURE: 140 MMHG | DIASTOLIC BLOOD PRESSURE: 82 MMHG | HEIGHT: 63 IN

## 2021-12-23 DIAGNOSIS — Z01.811 ENCOUNTER FOR PREPROCEDURAL RESPIRATORY EXAMINATION: ICD-10-CM

## 2021-12-23 PROCEDURE — 94727 GAS DIL/WSHOT DETER LNG VOL: CPT

## 2021-12-23 PROCEDURE — ZZZZZ: CPT

## 2021-12-23 PROCEDURE — 94729 DIFFUSING CAPACITY: CPT

## 2021-12-23 PROCEDURE — 94010 BREATHING CAPACITY TEST: CPT

## 2021-12-23 PROCEDURE — 99214 OFFICE O/P EST MOD 30 MIN: CPT | Mod: 25

## 2021-12-23 PROCEDURE — 95012 NITRIC OXIDE EXP GAS DETER: CPT

## 2021-12-23 NOTE — ASSESSMENT
[FreeTextEntry1] : Ms. 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 She is currently with #1 issue of poor balance. #2 SOB. #3  GERD - pre-op clearance for Breast Surgery (Mastectomy) - Active asthma \par \par                       ***************PRE-OP CLEARANCE FOR BREAST SURGERY*********\par -at this point in time there are no absolute pulmonary contraindications to go forward with the planned procedure \par -at the time of surgery s/he should have optimal pain control, incentive spirometry, early ambulation, DVT and GI prophylaxis. \par \par Ms. Najera' SOB is multifactorial due to:\par -overweight / out of shape\par -poor mechanics of breathing\par -asthma/restrictive dysfunction (s/p surgery)\par -emphysema \par -?cardiac\par \par Her chronic cough is multifactorial due to: -(active)\par -asthma (eosinophilic)\par -LPR/reflux\par \par problem 1: asthma (severe persistent)- compliant \par -continue Ventolin rescue inhaler 2 inhalations before exercise, Q6H\par -continue Incruse 1 inhalation daily\par -continue Breo Ellipta 200 at 1 inhalation daily \par -continue Singulair 10 mg QHS \par -continue Albuterol via nebulizer PRN q6H\par -Inhaler technique reviewed as well as oral hygiene techniques reviewed with patient. Avoidance of cold air, extremes of temperature, rescue inhaler should be used before exercise. Order of medication reviewed with patient. Recommended use of a cool mist humidifier in the bedroom. \par -Asthma is believed to be caused by inherited (genetic) and environmental factor, but its exact cause is unknown. Asthma may be triggered by allergens, lung infections, or irritants in the air. Asthma triggers are different for each person\par \par Problem 1A: Chronic cough \par -Add Amitriptyline 10 mg Q8H; to take QHS for the first week, then increase to Q8H \par Any cough greater than three weeks duration-differential diagnosis includes-asthma, upper airway cough syndrome, post nasal drip syndrome, gastroesophageal reflux, laryngopharyngeal reflux, cardiac disease (congestive heart failure, medicines, effects, etc), medication effects (b-blockers, ace inhibitors, ARBs, glaucoma meds, etc.), smoking, infectious, multifactorial, etc.\par \par \par problem 2: poor mechanics of breathing\par -Recommended to research the Wim Hof and [place] breathing techniques \par -Proper breathing techniques were reviewed with an emphasis of exhalation. Patient instructed to breath in for 1 second and out for four seconds. Patient was encouraged to not talk while walking. \par \par problem 3: overweight\par -Weight loss, exercise, and diet control were discussed and are highly encouraged. Treatment options were given such as, aqua therapy, and contacting a nutritionist. Recommended to use the elliptical, stationary bike, less use of treadmill. Mindful eating was explained to the patient Obesity is associated with worsening asthma, shortness of breath, and potential for cardiac disease, diabetes, and other underlying medical conditions. \par \par problem 3A: Poor Balance\par -Given prescription for gait training and balance therapy (STARS)\par \par problem 4: LPR/reflux (controlled) \par -continue Pepcid 40 mg QHS \par -continue Protonix 40 mg QD in the morning \par -Rule of 2s: avoid eating too much, eating too late, eating too spicy, eating two hours before bed\par -Things to avoid including overeating, spicy foods, tight clothing, eating within three hours of bed, this list is not all inclusive. \par -For treatment of reflux, possible options discussed including diet control, H2 blockers, PPIs, as well as coating motility agents discussed as treatment options. Timing of meals and proximity of last meal to sleep were discussed. If symptoms persist, a formal gastrointestinal evaluation is needed.\par \par problem 5: allergy/postnasal drip\par -s/p blood work including: allergen panel food (-), IgE panel (-), asthma panel(-), eosinophil level (-), vitamin D level (low)\par -recommended to use Xlear nasal saline spray \par -continue Clarinex 5 mg QAM \par \par problem 6: carcinoid tumorlets (stable 7/2020) \par -follow up CT scan (last 7/2021) next 7/2022 \par \par problem 7: r/o YRIS\par -recommend Home Sleep Study (virtuox)\par -recommended Silenor 3 mg qHS\par Treatment options discussed including CPAP/BiPAP machine, oral appliance, ProVent therapy, Oxy-Aid by Respitec, new technologies, or positional sleep.Recommended use of the CPAP machine for moderate (AHI >15), moderate to severe (AHI 15-30) and severe patients (AHI > 30). Recommended weight loss which can reduce AHI especially in weight loss of greater than 5% of BMI. Positional sleep is recommended in those with low AHI, low-moderate MBI, and younger age. For severe sleep apnea, the hypoglossal nerve stimulator was recommended as well.\par \par problem 8: eosinophilia- asthma \par -she is a candidate for Nucala injections\par -The safety and efficacy of Nucala was established in three double-blind, randomized, placebo-controlled trials in patients with severe asthma. Compared to a placebo, patients with severe asthma receiving Nucala had fewer exacerbation requiring hospitalization and/or emergency department visits, and a longer time to first exacerbation.  In addition, patients with severe asthma receiving Nucala experienced greater reductions in their daily maintenance oral corticosteroid dose, while maintaining asthma control compared with patients receiving placebo. Treatment with Nucala did not result in a significant improvement in lung function, as measured by the volume of air exhaled by patients in one second. The most common side effects include: headache, injection site reactions, back pain, weakness, and fatigue; hypersensitivity reactions can occur within hours or days including swelling of the face, mouth, and tongue, fainting, dizziness, hives, breathing problems, and rash; herpes zoster infections have occurred. The drug is a monoclonal antibody that inhibits interleukin -5 which helps regular eosinophils, a type of white blood cell that contributes to asthma. The over-production of eosinophils can cause inflammation in the lungs, increasing the frequency of asthma attacks. Patients must also take other medications, including high dose inhaled corticosteroids and at least one additional asthma drug.\par \par problem 9: low vitamin D\par -s/p blood work to include: CBC, vitamin D, LFTs, HbA1c\par -continue supplemental vitamin D 50,000 every 2 weeks (recheck)\par -Has been associated with asthma exacerbations and increased allergic symptoms. The goal based on recent information is maintaining levels between 50-70 and low normal is 30. Recommended 50,000 units every two weeks to once a month depending on the level. \par \par problem 10: anemia\par -referred to have a GI evaluation and was given the name of Dr. Liban Samuel's office  (recheck)\par \par problem 11: Health Maintenance/COVID19 Precautions: covid vaccine x2 \par - Clean your hands often. Wash your hands often with soap and water for at least 20 seconds, especially after blowing your nose, coughing, or sneezing, or having been in a public place.\par - If soap and water are not available, use a hand  that contains at least 60% alcohol.\par - To the extent possible, avoid touching high-touch surfaces in public places - elevator buttons, door handles, handrails, handshaking with people, etc. Use a tissue or your sleeve to cover your hand or finger if you must touch something.\par - Wash your hands after touching surfaces in public places.\par - Avoid touching your face, nose, eyes, etc.\par - Clean and disinfect your home to remove germs: practice routine cleaning of frequently touched surfaces (for example: tables, doorknobs, light switches, handles, desks, toilets, faucets, sinks & cell phones)\par - Avoid crowds, especially in poorly ventilated spaces. Your risk of exposure to respiratory viruses like COVID-19 may increase in crowded, closed-in settings with little air circulation if there are people in the crowd who are sick. All patients are recommended to practice social distancing and stay at least 6 feet away from others.\par - Avoid all non-essential travel including plane trips, and especially avoid embarking on cruise ships.\par -If COVID-19 is spreading in your community, take extra measures to put distance between yourself and other people to further reduce your risk of being exposed to this new virus.\par -Stay home as much as possible.\par - Consider ways of getting food brought to your house through family, social, or commercial networks\par -Be aware that the virus has been known to live in the air up to 3 hours post exposure, cardboard up to 24 hours post exposure, copper up to 4 hours post exposure, steel and plastic up to 2-3 days post exposure. Risk of transmission from these surfaces are affected by many variables.\par Immune Support Recommendations:\par -OTC Vitamin C 500mg BID \par -OTC Quercetin 250-500mg BID \par -OTC Zinc 75-100mg per day \par -OTC Melatonin 1 or 2 mg a night \par -OTC Vitamin D 1-4000mg per day \par -OTC Tonic Water 8oz per day\par Asthma and COVID19:\par You need to make sure your asthma is under control. This often requires the use of inhaled corticosteroids (and sometimes oral corticosteroids). Inhaled corticosteroids do not likely reduce your immune system’s ability to fight infections, but oral corticosteroids may. It is important to use the steps above to protect yourself to limit your exposure to any respiratory virus. \par \par Problem 12: health maintenance\par - S/p Covid 19 vaccine (Pfizer) x2 \par -s/p influenza vaccine 2020\par -recommended strep pneumonia vaccines after age 65: Prevnar-13 vaccine, followed by Pneumo vaccine 23 one year following (completed) \par -recommended early intervention for URIs\par -recommended regular osteoporosis evaluations\par -recommended early dermatological evaluations\par -recommended after the age of 50 to the age of 70, colonoscopy every 5 years \par \par F/U in 4 months \par She is encouraged to call with any questions, changes, or concerns.\par \par                              ***************PRE-OP CLEARANCE FOR BREAST SURGERY*********\par -at this point in time there are no absolute pulmonary contraindications to go forward with the planned procedure \par -at the time of surgery s/he should have optimal pain control, incentive spirometry, early ambulation, DVT and GI prophylaxis.

## 2021-12-23 NOTE — PROCEDURE
[FreeTextEntry1] : FULL PFTs reveals moderate restrictive, moderate obstructive flows; FEV1 was 1.01L which is  49% of predicted; normal lung volumes;low normal diffusion of 14.3, which is 71% of predicted; normal flow volume loop\par \par \par FENO was 21 ; normal value being less than 25\par Fractional exhaled nitric oxide (FENO) is regarded as a simple, noninvasive method for assessing eosinophilic airway inflammation. Produced by a variety of cells within the lung, nitric oxide (NO) concentrations are generally low in healthy individuals. However, high concentrations of NO appear to be involved in nonspecific host defense mechanisms and chronic inflammatory diseases such as asthma. The American Thoracic Society (ATS) therefore has recommended using FENO to aid in the diagnosis and monitoring of eosinophilic airway inflammation and asthma, and for identifying steroid responsive individuals whose chronic respiratory symptoms may be airway inflammation. \par \par \par CT (SEP.15.2021) IMPRESSION: Status post right lower lobe/right middle lobe wedge resection with stable postsurgical changes. No suspicious lung nodule.\par

## 2021-12-23 NOTE — ADDENDUM
[FreeTextEntry1] : Documented by Obdulia Griffiths acting as a scribe for Dr. Gustavo Conrelius on 12/23/2021 \par \par All medical record entries made by the Scribe were at my, Dr. Gustavo Cornelius's, direction and personally dictated by me on 12/23/2021 . I have reviewed the chart and agree that the record accurately reflects my personal performance of the history, physical exam, assessment and plan. I have also personally directed, reviewed, and agree with the discharge instructions.

## 2021-12-23 NOTE — HISTORY OF PRESENT ILLNESS
[FreeTextEntry1] : Ms. Najera is a 71 year old female with a history of abnormal chest CT, asthma, carcinoid tumor of lung, chronic GERD, eosinophilic asthma, low vitamin D, snoring, OW, and SOB presenting to the office today for a follow up visit. Her chief complaint is \par -  She notes in general her cough has been bad \par - she notes when shes coughing she \par - no chest pains / pressure \par - has bowel issues sometimes\par - how swallowing issues sometimes \par - she notes her bowels are regular right now \par - she notes shes eating okay\par - she notes sometimes her feet swell \par - she notes she uses the inhaler which helps her sometimes and doesn’t other times \par - she notes she sleeps okay sometimes \par - she notes she will be having surgery soon for her breast - mastectomy on the 6th of January \par - She  denies any visual issues, headaches, nausea, vomiting, fever, chills, sweats, chest pains, chest pressure, diarrhea, constipation, dysphagia, myalgia, dizziness, leg swelling, leg pain, itchy eyes, itchy ears.

## 2021-12-23 NOTE — PHYSICAL EXAM
[No Acute Distress] : no acute distress [Normal Oropharynx] : normal oropharynx [III] : Mallampati Class: III [Normal Appearance] : normal appearance [No Neck Mass] : no neck mass [Normal Rate/Rhythm] : normal rate/rhythm [Normal S1, S2] : normal s1, s2 [No Murmurs] : no murmurs [No Resp Distress] : no resp distress [Clear to Auscultation Bilaterally] : clear to auscultation bilaterally [No Abnormalities] : no abnormalities [Benign] : benign [Normal Gait] : normal gait [No Clubbing] : no clubbing [No Cyanosis] : no cyanosis [No Edema] : no edema [FROM] : FROM [Normal Color/ Pigmentation] : normal color/ pigmentation [No Focal Deficits] : no focal deficits [Oriented x3] : oriented x3 [Normal Affect] : normal affect [TextBox_2] : ow [TextBox_68] : I:E 1:3; mild inspiratory ronchi  [TextBox_105] : 1+ lower extremity edema

## 2021-12-24 ENCOUNTER — RX RENEWAL (OUTPATIENT)
Age: 71
End: 2021-12-24

## 2022-01-03 ENCOUNTER — APPOINTMENT (OUTPATIENT)
Dept: PULMONOLOGY | Facility: CLINIC | Age: 72
End: 2022-01-03
Payer: MEDICARE

## 2022-01-03 VITALS
DIASTOLIC BLOOD PRESSURE: 80 MMHG | OXYGEN SATURATION: 98 % | HEIGHT: 63 IN | RESPIRATION RATE: 16 BRPM | BODY MASS INDEX: 30.83 KG/M2 | SYSTOLIC BLOOD PRESSURE: 110 MMHG | TEMPERATURE: 97.5 F | WEIGHT: 174 LBS | HEART RATE: 84 BPM

## 2022-01-03 PROCEDURE — 99214 OFFICE O/P EST MOD 30 MIN: CPT | Mod: 25

## 2022-01-03 PROCEDURE — 95012 NITRIC OXIDE EXP GAS DETER: CPT

## 2022-01-03 NOTE — ASSESSMENT
[FreeTextEntry1] : Ms. Najera is a 72 y/o female with a history of HTN, DM, GERD, eosinophilic asthma, allergies and overweight presenting for pulmonary evaluation following lung surgery which revealed benign carcinoid tumorlets. She is currently with #1 issue of poor balance. #2 SOB. #3  GERD - pre-op clearance for Breast Surgery (Mastectomy) - Active asthma \par \par                       ***************PRE-OP CLEARANCE FOR BREAST SURGERY*********\par -at this point in time there are no absolute pulmonary contraindications to go forward with the planned procedure \par -at the time of surgery s/he should have optimal pain control, incentive spirometry, early ambulation, DVT and GI prophylaxis. \par \par Ms. Najera' SOB is multifactorial due to:\par -overweight / out of shape\par -poor mechanics of breathing\par -asthma/restrictive dysfunction (s/p surgery)\par -emphysema \par -?cardiac\par \par Her chronic cough is multifactorial due to: -(active)\par -asthma (eosinophilic)\par -LPR/reflux\par \par problem 1: asthma (severe persistent)- compliant - (Active) \par -continue Ventolin rescue inhaler 2 inhalations before exercise, Q6H\par -continue Incruse 1 inhalation daily\par -continue Breo Ellipta 200 at 1 inhalation daily \par -continue Singulair 10 mg QHS \par -continue Albuterol via nebulizer PRN q6H\par -Inhaler technique reviewed as well as oral hygiene techniques reviewed with patient. Avoidance of cold air, extremes of temperature, rescue inhaler should be used before exercise. Order of medication reviewed with patient. Recommended use of a cool mist humidifier in the bedroom. \par -Asthma is believed to be caused by inherited (genetic) and environmental factor, but its exact cause is unknown. Asthma may be triggered by allergens, lung infections, or irritants in the air. Asthma triggers are different for each person\par \par Problem 1A: Chronic cough \par -Amitriptyline 10 mg 3 x qd \par Any cough greater than three weeks duration-differential diagnosis includes-asthma, upper airway cough syndrome, post nasal drip syndrome, gastroesophageal reflux, laryngopharyngeal reflux, cardiac disease (congestive heart failure, medicines, effects, etc), medication effects (b-blockers, ace inhibitors, ARBs, glaucoma meds, etc.), smoking, infectious, multifactorial, etc.\par \par \par problem 2: poor mechanics of breathing\par -Recommended to research the Wim Hof and Navinko breathing techniques \par -Proper breathing techniques were reviewed with an emphasis of exhalation. Patient instructed to breath in for 1 second and out for four seconds. Patient was encouraged to not talk while walking. \par \par problem 3: overweight\par -Weight loss, exercise, and diet control were discussed and are highly encouraged. Treatment options were given such as, aqua therapy, and contacting a nutritionist. Recommended to use the elliptical, stationary bike, less use of treadmill. Mindful eating was explained to the patient Obesity is associated with worsening asthma, shortness of breath, and potential for cardiac disease, diabetes, and other underlying medical conditions. \par \par problem 3A: Poor Balance\par -Given prescription for gait training and balance therapy (STARS)\par \par problem 4: LPR/reflux (controlled) \par -continue Pepcid 40 mg QHS \par -continue Protonix 40 mg QD in the morning \par -Rule of 2s: avoid eating too much, eating too late, eating too spicy, eating two hours before bed\par -Things to avoid including overeating, spicy foods, tight clothing, eating within three hours of bed, this list is not all inclusive. \par -For treatment of reflux, possible options discussed including diet control, H2 blockers, PPIs, as well as coating motility agents discussed as treatment options. Timing of meals and proximity of last meal to sleep were discussed. If symptoms persist, a formal gastrointestinal evaluation is needed.\par \par problem 5: allergy/postnasal drip\par -s/p blood work including: allergen panel food (-), IgE panel (-), asthma panel(-), eosinophil level (-), vitamin D level (low)\par -recommended to use Xlear nasal saline spray \par -continue Clarinex 5 mg QAM \par \par problem 6: carcinoid tumorlets (stable 7/2020) \par -follow up CT scan (last 7/2021) next 7/2022 \par \par problem 7: r/o YRIS\par -recommend Home Sleep Study (virtuox)\par -recommended Silenor 3 mg qHS\par Treatment options discussed including CPAP/BiPAP machine, oral appliance, ProVent therapy, Oxy-Aid by Respitec, new technologies, or positional sleep.Recommended use of the CPAP machine for moderate (AHI >15), moderate to severe (AHI 15-30) and severe patients (AHI > 30). Recommended weight loss which can reduce AHI especially in weight loss of greater than 5% of BMI. Positional sleep is recommended in those with low AHI, low-moderate MBI, and younger age. For severe sleep apnea, the hypoglossal nerve stimulator was recommended as well.\par \par problem 8: eosinophilia- asthma \par -she is a candidate for Nucala injections- Set Up Now \par -The safety and efficacy of Nucala was established in three double-blind, randomized, placebo-controlled trials in patients with severe asthma. Compared to a placebo, patients with severe asthma receiving Nucala had fewer exacerbation requiring hospitalization and/or emergency department visits, and a longer time to first exacerbation.  In addition, patients with severe asthma receiving Nucala experienced greater reductions in their daily maintenance oral corticosteroid dose, while maintaining asthma control compared with patients receiving placebo. Treatment with Nucala did not result in a significant improvement in lung function, as measured by the volume of air exhaled by patients in one second. The most common side effects include: headache, injection site reactions, back pain, weakness, and fatigue; hypersensitivity reactions can occur within hours or days including swelling of the face, mouth, and tongue, fainting, dizziness, hives, breathing problems, and rash; herpes zoster infections have occurred. The drug is a monoclonal antibody that inhibits interleukin -5 which helps regular eosinophils, a type of white blood cell that contributes to asthma. The over-production of eosinophils can cause inflammation in the lungs, increasing the frequency of asthma attacks. Patients must also take other medications, including high dose inhaled corticosteroids and at least one additional asthma drug.\par \par problem 9: low vitamin D\par -s/p blood work to include: CBC, vitamin D, LFTs, HbA1c\par -continue supplemental vitamin D 50,000 every 2 weeks (recheck)\par -Has been associated with asthma exacerbations and increased allergic symptoms. The goal based on recent information is maintaining levels between 50-70 and low normal is 30. Recommended 50,000 units every two weeks to once a month depending on the level. \par \par problem 10: anemia\par -referred to have a GI evaluation and was given the name of Dr. Liban Samuel's office  (recheck)\par \par problem 11: Health Maintenance/COVID19 Precautions: covid vaccine x2 \par - Clean your hands often. Wash your hands often with soap and water for at least 20 seconds, especially after blowing your nose, coughing, or sneezing, or having been in a public place.\par - If soap and water are not available, use a hand  that contains at least 60% alcohol.\par - To the extent possible, avoid touching high-touch surfaces in public places - elevator buttons, door handles, handrails, handshaking with people, etc. Use a tissue or your sleeve to cover your hand or finger if you must touch something.\par - Wash your hands after touching surfaces in public places.\par - Avoid touching your face, nose, eyes, etc.\par - Clean and disinfect your home to remove germs: practice routine cleaning of frequently touched surfaces (for example: tables, doorknobs, light switches, handles, desks, toilets, faucets, sinks & cell phones)\par - Avoid crowds, especially in poorly ventilated spaces. Your risk of exposure to respiratory viruses like COVID-19 may increase in crowded, closed-in settings with little air circulation if there are people in the crowd who are sick. All patients are recommended to practice social distancing and stay at least 6 feet away from others.\par - Avoid all non-essential travel including plane trips, and especially avoid embarking on cruise ships.\par -If COVID-19 is spreading in your community, take extra measures to put distance between yourself and other people to further reduce your risk of being exposed to this new virus.\par -Stay home as much as possible.\par - Consider ways of getting food brought to your house through family, social, or commercial networks\par -Be aware that the virus has been known to live in the air up to 3 hours post exposure, cardboard up to 24 hours post exposure, copper up to 4 hours post exposure, steel and plastic up to 2-3 days post exposure. Risk of transmission from these surfaces are affected by many variables.\par Immune Support Recommendations:\par -OTC Vitamin C 500mg BID \par -OTC Quercetin 250-500mg BID \par -OTC Zinc 75-100mg per day \par -OTC Melatonin 1 or 2 mg a night \par -OTC Vitamin D 1-4000mg per day \par -OTC Tonic Water 8oz per day\par Asthma and COVID19:\par You need to make sure your asthma is under control. This often requires the use of inhaled corticosteroids (and sometimes oral corticosteroids). Inhaled corticosteroids do not likely reduce your immune system’s ability to fight infections, but oral corticosteroids may. It is important to use the steps above to protect yourself to limit your exposure to any respiratory virus. \par \par Problem 12: health maintenance\par - S/p Covid 19 vaccine (Pfizer) x2 \par -s/p influenza vaccine 2021 \par -recommended strep pneumonia vaccines after age 65: Prevnar-13 vaccine, followed by Pneumo vaccine 23 one year following (completed) \par -recommended early intervention for URIs\par -recommended regular osteoporosis evaluations\par -recommended early dermatological evaluations\par -recommended after the age of 50 to the age of 70, colonoscopy every 5 years \par \par F/U in 4 months \par She is encouraged to call with any questions, changes, or concerns.\par \par                              ***************PRE-OP CLEARANCE FOR BREAST SURGERY*********\par -at this point in time there are no absolute pulmonary contraindications to go forward with the planned procedure \par -at the time of surgery s/he should have optimal pain control, incentive spirometry, early ambulation, DVT and GI prophylaxis.

## 2022-01-03 NOTE — ADDENDUM
[FreeTextEntry1] : Documented by Obdulia Griffiths acting as a scribe for Dr. Gustavo Cornelius on 01/03/2022 \par \par All medical record entries made by the Scribe were at my, Dr. Gustavo Cornelius's, direction and personally dictated by me on 01/03/2022 . I have reviewed the chart and agree that the record accurately reflects my personal performance of the history, physical exam, assessment and plan. I have also personally directed, reviewed, and agree with the discharge instructions.

## 2022-01-03 NOTE — HISTORY OF PRESENT ILLNESS
[FreeTextEntry1] : Ms. Najera is a 71 year old female with a history of abnormal chest CT, asthma, carcinoid tumor of lung, chronic GERD, eosinophilic asthma, low vitamin D, snoring, OW, and SOB presenting to the office today for a follow up visit. Her chief complaint is \par - she notes she has been feeling better but her cough is still here\par - she notes she has a dry cough, not much comes up\par - she coughs when she takes a deep breath and when she is laughing\par - she also coughs when she sleeps\par - she notes wheezing\par - she has been having SOB, on her back, and middle of the night but not too much\par - sometimes has difficulty swallowing\par - sometimes has hoarseness \par - she has been having leg swelling\par - blood sugar has been good \par - has been experiencing heart burn depending on what she eats\par - she notes she will be having her breast surgery this week \par - she takes her amitriptyline three times a day \par - She  denies any visual issues, headaches, nausea, vomiting, fever, chills, sweats, chest pains, chest pressure, diarrhea, constipation, dysphagia, myalgia, dizziness, leg pain, itchy eyes, itchy ears, or sour taste in the mouth.\par

## 2022-01-03 NOTE — PHYSICAL EXAM
[No Acute Distress] : no acute distress [Normal Oropharynx] : normal oropharynx [III] : Mallampati Class: III [Normal Appearance] : normal appearance [No Neck Mass] : no neck mass [Normal Rate/Rhythm] : normal rate/rhythm [Normal S1, S2] : normal s1, s2 [No Murmurs] : no murmurs [No Resp Distress] : no resp distress [Clear to Auscultation Bilaterally] : clear to auscultation bilaterally [No Abnormalities] : no abnormalities [Benign] : benign [Normal Gait] : normal gait [No Clubbing] : no clubbing [No Cyanosis] : no cyanosis [No Edema] : no edema [FROM] : FROM [Normal Color/ Pigmentation] : normal color/ pigmentation [No Focal Deficits] : no focal deficits [Oriented x3] : oriented x3 [Normal Affect] : normal affect [TextBox_2] : ow [TextBox_68] : I:E 1:3; mild expiratory wheezes  [TextBox_105] : 1+ lower extremity edema

## 2022-01-05 ENCOUNTER — RX RENEWAL (OUTPATIENT)
Age: 72
End: 2022-01-05

## 2022-01-13 LAB
BASOPHILS # BLD AUTO: 0.03 K/UL
BASOPHILS NFR BLD AUTO: 0.6 %
EOSINOPHIL # BLD AUTO: 0.2 K/UL
EOSINOPHIL NFR BLD AUTO: 4.3 %
HCT VFR BLD CALC: 37 %
HGB BLD-MCNC: 11.2 G/DL
IMM GRANULOCYTES NFR BLD AUTO: 0 %
LYMPHOCYTES # BLD AUTO: 1.17 K/UL
LYMPHOCYTES NFR BLD AUTO: 25.3 %
MAN DIFF?: NORMAL
MCHC RBC-ENTMCNC: 22 PG
MCHC RBC-ENTMCNC: 30.3 GM/DL
MCV RBC AUTO: 72.8 FL
MONOCYTES # BLD AUTO: 0.45 K/UL
MONOCYTES NFR BLD AUTO: 9.7 %
NEUTROPHILS # BLD AUTO: 2.77 K/UL
NEUTROPHILS NFR BLD AUTO: 60.1 %
PLATELET # BLD AUTO: 207 K/UL
RBC # BLD: 5.08 M/UL
RBC # FLD: 16.5 %
TOTAL IGE SMQN RAST: 9 KU/L
WBC # FLD AUTO: 4.62 K/UL

## 2022-01-21 ENCOUNTER — LABORATORY RESULT (OUTPATIENT)
Age: 72
End: 2022-01-21

## 2022-01-21 ENCOUNTER — APPOINTMENT (OUTPATIENT)
Dept: CARDIOLOGY | Facility: CLINIC | Age: 72
End: 2022-01-21
Payer: MEDICARE

## 2022-01-21 ENCOUNTER — NON-APPOINTMENT (OUTPATIENT)
Age: 72
End: 2022-01-21

## 2022-01-21 VITALS
DIASTOLIC BLOOD PRESSURE: 84 MMHG | OXYGEN SATURATION: 99 % | HEIGHT: 63 IN | RESPIRATION RATE: 16 BRPM | TEMPERATURE: 97.9 F | HEART RATE: 78 BPM | WEIGHT: 168 LBS | SYSTOLIC BLOOD PRESSURE: 140 MMHG | BODY MASS INDEX: 29.77 KG/M2

## 2022-01-21 PROCEDURE — 93000 ELECTROCARDIOGRAM COMPLETE: CPT

## 2022-01-21 PROCEDURE — 99214 OFFICE O/P EST MOD 30 MIN: CPT

## 2022-01-21 NOTE — PROGRESS NOTE ADULT - ASSESSMENT
69 yo female w/pmh asthma, HTN, HLD, diabetes, primary hyperparathyroidism, presents to [facility] for cc worsening dyspnea on exertion for the last week. CT angio of chest was clear. TTE shows normal EF, small pericardial effusion. Admitted for cardiac stress test. Called with mom to discuss the lead and hemoglobin results. Lead normal and hemoglobin slightly low however pt on iron supplementation.  Explained to mom that we will recheck the level in 3 months (around 3/10) to see if level normalized. At that point we will stop iron supplementation. Order placed for CBC. All questions and concerns addressed.

## 2022-01-21 NOTE — REVIEW OF SYSTEMS
[Palpitations] : palpitations [Negative] : Heme/Lymph [Chest Discomfort] : chest discomfort [Lower Ext Edema] : lower extremity edema [FreeTextEntry9] : LE swelling

## 2022-01-21 NOTE — DISCUSSION/SUMMARY
[FreeTextEntry1] : This is a 71-year-old female with past medical history significant for hypertension, asthma, history of bronchitis, non-insulin-dependent diabetes mellitus, hyperlipidemia, status post right knee replacement, who comes in for cardiac follow-up evaluation.  She denies chest pain, shortness of breath, dizziness or syncope.\par The patient is complaining of intermittent palpitations, not associated to any particular activity.\par Electrocardiogram done January 21, 2022 demonstrated normal sinus rhythm rate of 78 bpm is otherwise remarkable for poor R wave progression and left atrial abnormality.\par The patient reports increased heart rate throughout the day, not associated with any particular activity or any time during the day.\par She will have a Zio patch placed today, 2-week event recorder to rule out significant arrhythmia or heart block.\par I have also recommended she make an appointment with Dr. Gillespie of electrophysiology for consideration of a loop recorder to correlate symptoms with her rhythms.\par She will continue her labetalol 200 mg twice a day for now.  Her blood pressure is under adequate control.\par She had a normal cardiac catheterization done April 6, 2021\par Echo Doppler examination done March 22, 2021 demonstrated mild tricuspid valve regurgitation, small pericardial effusion adjacent to the right atrium.\par Holter monitor done October 29, 2021 demonstrated no significant arrhythmia or heart block.\par \par PMH:\par The edema is localized to the malleolus area and is consistent with calcium channel blocker induced edema.  The patient had been on 2 calcium channel blockers, and Norvasc was discontinued, and the patient remained on verapamil 240 mg/day.\par She still has bilateral 1+/2 pedal edema at the area of the malleolus.  She will discontinue her verapamil and start on hydrochlorothiazide 25 mg/day and potassium chloride 10 mEq only 1 tablet on Saturday and Sunday.  \par  lipid panel done August 20, 2021 demonstrated a direct LDL 48 mg/dL, cholesterol 115, HDL 65, triglycerides of 33, and non-HDL cholesterol 50 mg/dL.  She will continue on her current medical therapy.  Her LDL is at target level.\par \par She was born in [facility] and has no history of rheumatic fever. \par Echo Doppler examination done May 14, 2020 by another cardiologist which demonstrated mild left ventricular hypertrophy, normal left ventricular function with an estimated ejection fraction of 55 to 60%, and trivial pericardial effusion.  There was mild tricuspid valve regurgitation, calcified mitral valve annulus and thickened mitral valve leaflets.\par The patient's daughter reports that she had a normal stress echocardiogram April 26, 2017.\par Electrocardiogram done June 12, 2020 demonstrated normal sinus rhythm rate of 82 bpm is otherwise remarkable left atrial abnormality, and for poor R wave progression.\par \par The patient understands that aerobic exercises must be increased to 40 minutes 4 times per week. A detailed discussion of lifestyle modification was done today. The patient has a good understanding of the diagnosis, and treatment plan. Lifestyle modification was also outlined.\par

## 2022-01-21 NOTE — PHYSICAL EXAM
[Well Developed] : well developed [Well Nourished] : well nourished [No Acute Distress] : no acute distress [Normal Venous Pressure] : normal venous pressure [No Carotid Bruit] : no carotid bruit [Normal S1, S2] : normal S1, S2 [No Rub] : no rub [No Gallop] : no gallop [Murmur] : murmur [Clear Lung Fields] : clear lung fields [Good Air Entry] : good air entry [No Respiratory Distress] : no respiratory distress  [Soft] : abdomen soft [Non Tender] : non-tender [No Masses/organomegaly] : no masses/organomegaly [Normal Bowel Sounds] : normal bowel sounds [Normal Gait] : normal gait [No Cyanosis] : no cyanosis [No Clubbing] : no clubbing [No Varicosities] : no varicosities [No Rash] : no rash [No Skin Lesions] : no skin lesions [Moves all extremities] : moves all extremities [No Focal Deficits] : no focal deficits [Normal Speech] : normal speech [Alert and Oriented] : alert and oriented [Normal memory] : normal memory [General Appearance - Well Developed] : well developed [Normal Appearance] : normal appearance [Well Groomed] : well groomed [General Appearance - Well Nourished] : well nourished [No Deformities] : no deformities [General Appearance - In No Acute Distress] : no acute distress [Normal Conjunctiva] : the conjunctiva exhibited no abnormalities [Eyelids - No Xanthelasma] : the eyelids demonstrated no xanthelasmas [Normal Oral Mucosa] : normal oral mucosa [Normal Jugular Venous A Waves Present] : normal jugular venous A waves present [Normal Jugular Venous V Waves Present] : normal jugular venous V waves present [No Jugular Venous Morejon A Waves] : no jugular venous morejon A waves [Respiration, Rhythm And Depth] : normal respiratory rhythm and effort [Exaggerated Use Of Accessory Muscles For Inspiration] : no accessory muscle use [Auscultation Breath Sounds / Voice Sounds] : lungs were clear to auscultation bilaterally [Abdomen Soft] : soft [Abdomen Tenderness] : non-tender [] : no hepato-splenomegaly [Abdomen Mass (___ Cm)] : no abdominal mass palpated [Abnormal Walk] : normal gait [Cyanosis, Localized] : no localized cyanosis [Skin Color & Pigmentation] : normal skin color and pigmentation [Skin Turgor] : normal skin turgor [No Xanthoma] : no  xanthoma was observed [Oriented To Time, Place, And Person] : oriented to person, place, and time [Affect] : the affect was normal [Mood] : the mood was normal [No Anxiety] : not feeling anxious [5th Left ICS - MCL] : palpated at the 5th LICS in the midclavicular line [Normal] : normal [No Precordial Heave] : no precordial heave was noted [Normal Rate] : normal [Rhythm Regular] : regular [Normal S1] : normal S1 [Normal S2] : normal S2 [II] : a grade 2 [2+] : left 2+ [No Abnormalities] : the abdominal aorta was not enlarged and no bruit was heard [___ +] : bilateral [unfilled]U+ pretibial pitting edema [de-identified] : +2 B/L LE pitting edema/ no calf tenderness [S3] : no S3 [S4] : no S4 [Right Carotid Bruit] : no bruit heard over the right carotid [Left Carotid Bruit] : no bruit heard over the left carotid

## 2022-01-21 NOTE — REASON FOR VISIT
[CV Risk Factors and Non-Cardiac Disease] : CV risk factors and non-cardiac disease [Hyperlipidemia] : hyperlipidemia [Follow-Up - Clinic] : a clinic follow-up of [Hypertension] : hypertension [Palpitations] : palpitations [FreeTextEntry1] : murmur

## 2022-01-24 RX ORDER — NITROFURANTOIN MACROCRYSTALS 100 MG/1
100 CAPSULE ORAL
Qty: 14 | Refills: 0 | Status: DISCONTINUED | COMMUNITY
Start: 2021-10-14 | End: 2022-01-24

## 2022-01-24 RX ORDER — CARIPRAZINE 1.5 MG/1
1.5 CAPSULE, GELATIN COATED ORAL
Refills: 0 | Status: DISCONTINUED | COMMUNITY
Start: 2020-12-17 | End: 2022-01-24

## 2022-01-25 ENCOUNTER — NON-APPOINTMENT (OUTPATIENT)
Age: 72
End: 2022-01-25

## 2022-01-26 ENCOUNTER — NON-APPOINTMENT (OUTPATIENT)
Age: 72
End: 2022-01-26

## 2022-02-01 ENCOUNTER — APPOINTMENT (OUTPATIENT)
Dept: HOME HEALTH SERVICES | Facility: HOME HEALTH | Age: 72
End: 2022-02-01
Payer: MEDICARE

## 2022-02-01 VITALS
RESPIRATION RATE: 16 BRPM | TEMPERATURE: 96.7 F | DIASTOLIC BLOOD PRESSURE: 80 MMHG | SYSTOLIC BLOOD PRESSURE: 160 MMHG | HEART RATE: 93 BPM | OXYGEN SATURATION: 99 %

## 2022-02-01 DIAGNOSIS — R39.9 UNSPECIFIED SYMPTOMS AND SIGNS INVOLVING THE GENITOURINARY SYSTEM: ICD-10-CM

## 2022-02-01 DIAGNOSIS — R60.0 LOCALIZED EDEMA: ICD-10-CM

## 2022-02-01 DIAGNOSIS — R92.1 MAMMOGRAPHIC CALCIFICATION FOUND ON DIAGNOSTIC IMAGING OF BREAST: ICD-10-CM

## 2022-02-01 DIAGNOSIS — R07.89 OTHER CHEST PAIN: ICD-10-CM

## 2022-02-01 DIAGNOSIS — Z92.29 PERSONAL HISTORY OF OTHER DRUG THERAPY: ICD-10-CM

## 2022-02-01 PROCEDURE — 99349 HOME/RES VST EST MOD MDM 40: CPT

## 2022-02-02 PROBLEM — R07.89 CHEST DISCOMFORT: Status: RESOLVED | Noted: 2021-08-20 | Resolved: 2022-02-02

## 2022-02-02 PROBLEM — Z92.29 HISTORY OF INFLUENZA VACCINATION: Status: RESOLVED | Noted: 2021-09-29 | Resolved: 2022-02-02

## 2022-02-02 PROBLEM — R60.0 BILATERAL LOWER EXTREMITY EDEMA: Status: RESOLVED | Noted: 2021-10-29 | Resolved: 2022-02-02

## 2022-02-02 PROBLEM — R92.1 BREAST CALCIFICATIONS: Status: RESOLVED | Noted: 2021-09-13 | Resolved: 2022-02-02

## 2022-02-02 PROBLEM — R39.9 UTI SYMPTOMS: Status: RESOLVED | Noted: 2021-10-14 | Resolved: 2022-02-02

## 2022-02-02 RX ORDER — PREDNISONE 20 MG/1
20 TABLET ORAL
Qty: 8 | Refills: 0 | Status: DISCONTINUED | COMMUNITY
Start: 2021-11-10

## 2022-02-02 RX ORDER — ACETAMINOPHEN AND CODEINE 300; 30 MG/1; MG/1
300-30 TABLET ORAL
Qty: 10 | Refills: 0 | Status: DISCONTINUED | COMMUNITY
Start: 2022-01-07

## 2022-02-02 RX ORDER — HYDROXYZINE HYDROCHLORIDE 50 MG/1
50 TABLET ORAL
Qty: 30 | Refills: 0 | Status: DISCONTINUED | COMMUNITY
Start: 2021-08-02

## 2022-02-02 RX ORDER — FAMOTIDINE 20 MG/1
20 TABLET, FILM COATED ORAL
Qty: 30 | Refills: 0 | Status: DISCONTINUED | COMMUNITY
Start: 2021-12-27

## 2022-02-02 RX ORDER — SULFAMETHOXAZOLE AND TRIMETHOPRIM 800; 160 MG/1; MG/1
800-160 TABLET ORAL
Qty: 20 | Refills: 0 | Status: DISCONTINUED | COMMUNITY
Start: 2022-01-05

## 2022-02-02 RX ORDER — LABETALOL HYDROCHLORIDE 100 MG/1
100 TABLET, FILM COATED ORAL
Qty: 180 | Refills: 0 | Status: DISCONTINUED | COMMUNITY
Start: 2021-08-20 | End: 1900-01-01

## 2022-02-02 RX ORDER — FUROSEMIDE 20 MG/1
20 TABLET ORAL
Qty: 30 | Refills: 0 | Status: DISCONTINUED | COMMUNITY
Start: 2021-09-18

## 2022-02-02 RX ORDER — AMOXICILLIN AND CLAVULANATE POTASSIUM 875; 125 MG/1; MG/1
875-125 TABLET, COATED ORAL
Qty: 14 | Refills: 0 | Status: DISCONTINUED | COMMUNITY
Start: 2022-01-07

## 2022-02-02 RX ORDER — METHYLPREDNISOLONE 4 MG/1
4 TABLET ORAL
Qty: 21 | Refills: 0 | Status: DISCONTINUED | COMMUNITY
Start: 2021-11-26

## 2022-02-02 NOTE — REVIEW OF SYSTEMS
[Negative] : Heme/Lymph [Palpitations] : palpitations [Shortness Of Breath] : shortness of breath [Lower Ext Edema] : no lower extremity edema

## 2022-02-02 NOTE — PHYSICAL EXAM
[No Acute Distress] : no acute distress [Well Nourished] : well nourished [Well Developed] : well developed [Normal Sclera/Conjunctiva] : normal sclera/conjunctiva [EOMI] : extra ocular movement intact [Normal Outer Ear/Nose] : the ears and nose were normal in appearance [Normal Oropharynx] : the oropharynx was normal [Normal TMs] : both tympanic membranes were normal [No JVD] : no jugular venous distention [No Respiratory Distress] : no respiratory distress [Clear to Auscultation] : lungs were clear to auscultation bilaterally [No Accessory Muscle Use] : no accessory muscle use [Normal Rate] : heart rate was normal  [Regular Rhythm] : with a regular rhythm [Normal S1, S2] : normal S1 and S2 [No Murmurs] : no murmurs heard [Normal Bowel Sounds] : normal bowel sounds [Non Tender] : non-tender [Soft] : abdomen soft [Not Distended] : not distended [Normal Post Cervical Nodes] : no posterior cervical lymphadenopathy [Normal Anterior Cervical Nodes] : no anterior cervical lymphadenopathy [No CVA Tenderness] : no ~M costovertebral angle tenderness [No Spinal Tenderness] : no spinal tenderness [Normal Gait] : normal gait [No Joint Swelling] : no joint swelling seen [No Clubbing, Cyanosis] : no clubbing  or cyanosis of the fingernails [Normal Strength/Tone] : muscle strength and tone were normal [No Rash] : no rash [No Skin Lesions] : no skin lesions [Oriented x3] : oriented to person, place, and time [Normal Affect] : the affect was normal [Normal Voice/Communication] : normal voice communication [PERRL] : pupils equal, round and reactive to light [No Edema] : there was no peripheral edema [Kyphosis] : no kyphosis present [Scoliosis] : scoliosis not present [de-identified] :  Zio patch in place [de-identified] : steri-strips in place (R) chest

## 2022-02-02 NOTE — DATA REVIEWED
[FreeTextEntry1] : Patient PAULINA GAMBOA Invision MRN [id number] Hospital Visit [id number] [facility] - Attending Physician Anjel Byrd \par Status Complete \par  \par \par Hospital Course: \par Discharge Date	23-Mar-2021 \par Admission Date	22-Mar-2021 15:55 \par Reason for Admission	dyspnea on exertion \par Hospital Course	 \par HPI: \par 69 yo female w/pmh asthma, HTN, HLD, diabetes, primary hyperparathyroidism, \par presents to [facility] for cc worsening dyspnea on exertion for the last week. She \par denies chest pain during these episodes. Says she started having trouble \par walking up stairs, and had to stop at around 3-4 steps to catch her breath. \par However, she says she can probably walk about 3 blocks without any issues. \par Sleeps on 2 pillows at baseline for a long time. Has never been diagnosed with \par heart failure or coronary artery disease. Denies hearing herself wheeze and \par does not think her SOB is related to the weather. She has experienced these \par symptoms in the past but can't quite remember when they first started. Saw her \par cardiologist, Dr. Stewart Montilla, last month. \par \par Patient was first admitted to CDU for monitoring. She was hemodynamically \par stable, afebrile. Labs with trop 11 --> 13. Rest of labs unremarkable. She had \par a CT angio of her chest which was negative for PE and lung pathology. She was \par seen by cardiology, Dr. Diallo, who recommended inpatient stay for stress test. \par She had a TTE done which was normal, except for a small pericardial effusion. \par (22 Mar 2021 16:32) \par \par Stress test performed - no reversible ischemia found. \par Etiology of the dyspnea on exertion is not clear. Follow up with outpatient \par cardiologist. \par \par Med Reconciliation: \par Override IMPROVE-DD recommendations due to:	IMPROVE-DD Application Not \par Available \par Recommended Post-Discharge VTE Prophylaxis	IMPROVE-DD Application Not Available \par Medication Reconciliation Status	Admission Reconciliation is Not Complete \par Discharge Reconciliation is Completed \par Discharge Medications	albuterol 90 mcg/inh inhalation aerosol: 2 puff(s) \par inhaled every 6 hours \par as needed for SOB/wheezing \par aspirin 81 mg oral tablet, chewable: 1 tab(s) orally once a day \par atorvastatin 20 mg oral tablet: 1 tab(s) orally once a day \par Benicar HCT 40 mg-12.5 mg oral tablet: 1 tab(s) orally once a day \par Breo Ellipta 100 mcg-25 mcg/inh inhalation powder: 1 puff(s) inhaled once a day \par busPIRone 10 mg oral tablet: 1 tab(s) orally 2 times a day \par cefpodoxime 200 mg oral tablet: 1 tab(s) orally every 12 hours \par cinacalcet 30 mg oral tablet: 1 tab(s) orally once a day \par fluticasone 50 mcg/inh inhalation powder: 1 puff(s) inhaled 2 times a day \par labetalol 100 mg oral tablet: 1 tab(s) orally 2 times a day \par LORazepam 0.5 mg oral tablet: 1 tab(s) orally 2 times a day, As Needed \par metformin 500 mg oral tablet: 1 tab(s) orally 2 times a day \par mirtazapine 45 mg oral tablet: 1 tab(s) orally once a day (at bedtime) \par montelukast 10 mg oral tablet: 1 tab(s) orally once a day \par verapamil 24 hour extended release 240 mg/24 hours oral capsule, extended \par release: 1 cap(s) orally once a day \par Vraylar 1.5 mg oral capsule: 1 cap(s) orally once a day \par , \par , \par \par Care Plan/Procedures: \par Goal(s)	To get better and follow your care plan as instructed. \par Discharge Diagnoses, Assessment and Plan of Treatment	PRINCIPAL DISCHARGE \par DIAGNOSIS \par Diagnosis: Shortness of breath \par Assessment and Plan of Treatment: c/w current meds \par follow with Dr Stewart montilla \par \par Follow Up: \par Care Providers for Follow up (PCP/Outpatient Provider)	Stewart Montilla) \par Cardiology; Cardiovascular Disease; Internal Medicine \par [address] \[place] [address] \par Phone: [phone] \par Fax: [phone] \par Follow Up Time: \par Patient's Scheduled Appointments	PAULINA GAMBOA ; 04/01/2021 ; [facility] [address] \par PAULINA GAMBOA ; 04/10/2021 ; [facility] Rad [facility] [address] \par PAULINA GAMBOA ; 04/14/2021 ; [facility] Cardio [address] \par PAULINA GAMBOA ; 04/19/2021 ; [facility] Endocrin [address] \par Diet Instructions	Low Fat Low salt Low cholesterol diet \par Activity	No restrictions \par \par Quality Measures: \par Does the patient have difficulty running errands alone like visiting a doctors \par office or shopping?	No \par Does the patient have difficulty climbing stairs?	No \par Patient Condition	Stable \par Hospice Patient	No \par Cognition: The patient has	No difficulties \par Does the patient have a principal diagnosis of ischemic stroke, hemorrhagic \par stroke, or TIA?	No \par Does the patient have a principal diagnosis of Acute Myocardial Infarction?	No \par Has the patient had a Percutaneous Coronary Intervention?	No \par \par Document Complete: \par Care Provider Seen in Hospital	Anjel Byrd \par Physician Section Complete	This document is complete and the patient is ready \par for discharge. \par For questions about your prescriptions, please call:	[phone] \par Is this contact telephone number correct?	Yes \par \par \par \par Electronic Signatures: \par Anjel Byrd)  (Signed 24-Mar-2021 08:21) \par 	Authored: Hospital Course \par 	Co-Signer: Hospital Course, Med Reconciliation, Care Plan/Procedures, Follow \par Up, Quality Measures, Document Complete \par Liana Sprague (NP)  (Signed 23-Mar-2021 18:03) \par 	Authored: Hospital Course, Med Reconciliation, Care Plan/Procedures, Follow \par Up, Quality Measures, Document Complete \par \par \par Last Updated: 24-Mar-2021 08:21 by Anjel Byrd) \par

## 2022-02-02 NOTE — HISTORY OF PRESENT ILLNESS
[Patient] : patient [Family Member] : family member [FreeTextEntry2] : Patient denies fever, cough, trouble breathing, rash, vomiting and diarrhea. Patient has not been in close contact with someone covid positive. \par N95 mask, gloves, eye wear used during visit: [Y]. 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, (R) mastectomy\par \par Interval events: had (R) mastectomy on 1/6; has cardiac monitor in place for palpitations, SOB on ambulation- EKG was NSR on 1/21 w/ cardiology\par \par Pt A&Ox3, mainly Creole speaking; reports LE edema decreased since verapamil stopped by cardiology; otherwise with c/o as above\par Appetite "ok"- has had no weight loss\par Moves bowels well \par Urine - no incontinence\par \par HTN/HLD: on atorvastatin, labetolol, doxazosin, olmesartan, verapamil- follows w/ cardiology\par \par Hypercalcemia- started on cinalcalcet in 12/20 which she stopped; now off  HCTZ- follows w/ endo\par \par Depression/Anxiety - doing well on buspirone, mirtazapine, Vrylar- \par \par Asthma- taking Breo PRN & albuterol daily - follows w/ pulmonary \par \par DM- A1c 6.9 on 1/21; uses contour Next one- on Janumet -  followed by endo

## 2022-02-04 ENCOUNTER — NON-APPOINTMENT (OUTPATIENT)
Age: 72
End: 2022-02-04

## 2022-02-05 ENCOUNTER — NON-APPOINTMENT (OUTPATIENT)
Age: 72
End: 2022-02-05

## 2022-02-08 ENCOUNTER — NON-APPOINTMENT (OUTPATIENT)
Age: 72
End: 2022-02-08

## 2022-02-11 NOTE — ED ADULT NURSE NOTE - NS ED NURSE DISCH DISPOSITION
[facility]  Department of Family Medicine  Family Medicine Residency Program    Date of Service: 22    Patient: Ashwini Ogden  YOB: 2022    Chief complaint:   Chief Complaint   Patient presents with    Well Child     HISTORY OF PRESENTING ILLNESS     History is provided by the mother and grandmother. Ashwini Ogden is a 5 m.o. male who was brought in for a well child visit. Patient's mom reports that he has 2 bottles, 1 in the morning and 1 at night. Each bottle has about 8 ounces of formula. She also states that she feeds him with solids 3 times a day with some snacks in between. She denies any feeding issues. Current Issues:  None    Birth History:   Birth History    Birth     Length: 19.69\" (50 cm)     Weight: 7 lb 9 oz (3.43 kg)     HC 33.5 cm (13.19\")    Apgar     One: 8     Five: 9    Delivery Method: Vaginal, Spontaneous    Gestation Age: 44 wks    Duration of Labor: 1st: 12h 45m / 2nd: 1h 29m     Past Medical History:  Past Medical History:   Diagnosis Date     acne      Problems:  Patient Active Problem List    Diagnosis Date Noted    Positional plagiocephaly 2021    Routine checkup for  weight, under 6days old 2021    Normal  (single liveborn) 2021     Past Surgical History:No past surgical history on file. Immunization History:  Immunization History   Administered Date(s) Administered    DTaP/Hib/IPV (Pentacel) 2021, 2021, 2021    Hepatitis B Ped/Adol (Engerix-B, Recombivax HB) 2021, 2021, 2021    Influenza, Quadv, 6-35 months, IM, PF (Fluzone, Afluria) 2021    Pneumococcal Conjugate 13-valent (Xavi Sober) 2021, 2021, 2021    Rotavirus Pentavalent (RotaTeq) 2021, 2021, 2021     Allergies:No Known Allergies    Current Medications:  No current outpatient medications on file.      No current facility-administered medications for this visit. Developmental:  Screening Results     Questions Responses     metabolic Normal    Comment: Available in media     Hearing Pass    Comment: Failed , passed on recheck at 2 month          Well Child Assessment:    Nutrition  Types of milk consumed include formula. Additional intake includes cereal and solids. Formula - 8 ounces of formula are consumed per feeding. 16 ounces are consumed every 24 hours. Feedings occur 1-4 times per 24 hours. Cereal - Types of cereal consumed include rice. Solid Foods - Types of intake include fruits and vegetables. The patient can consume pureed foods. Elimination  Urination occurs more than 6 times per 24 hours. Bowel movements occur once per 24 hours. Well Child Assessment:    Nutrition  Types of milk consumed include formula. Types of cereal consumed include rice. Types of intake include fruits and vegetables. Well Child Assessment:    Nutrition  Types of intake include fruits and vegetables. PHYSICAL EXAM     Vitals:    22 1336   Temp: 97.8 °F (36.6 °C)     Physical Exam  Constitutional:       General: He is active. Appearance: He is well-developed. HENT:      Head: Normocephalic. Anterior fontanelle is full. Mouth/Throat:      Mouth: Mucous membranes are moist.   Eyes:      Pupils: Pupils are equal, round, and reactive to light. Cardiovascular:      Rate and Rhythm: Normal rate and regular rhythm. Pulses: Normal pulses. Heart sounds: Normal heart sounds. Pulmonary:      Effort: Pulmonary effort is normal. No respiratory distress or nasal flaring. Breath sounds: No stridor. No wheezing or rhonchi. Abdominal:      General: Bowel sounds are normal. There is no distension. Skin:     Findings: No rash. ASSESSMENT AND PLAN     1. Weight above 97th percentile  Consistently in the 97th percentile. Discussed cutting back on the formula.   -Instructed mother to give the patient before bedtime, and attempts to cut back on the ounces of her formula at night  -Instructed to rise 6 ounces of formula instead of the 8-9    2. Need for influenza vaccination  Dose 2 today  - INFLUENZA, QUADV,6-35 MO, IM, PF, PREFILL SYR, 0.25ML (AFLURIA QUADV, PF)    3. Positional plagiocephaly   Follows with [facility] neurology, currently wears helmet, mild positional plagiocephaly based on chart review  -We will follow up in a month with [facility] neurology      Return to Office: Return in about 3 months (around 5/11/2022) for 1 year well child. for next well child visit, or sooner as needed.      Obdulio Muñoz MD Discharged

## 2022-02-15 ENCOUNTER — APPOINTMENT (OUTPATIENT)
Dept: CARDIOLOGY | Facility: CLINIC | Age: 72
End: 2022-02-15
Payer: MEDICARE

## 2022-02-15 ENCOUNTER — NON-APPOINTMENT (OUTPATIENT)
Age: 72
End: 2022-02-15

## 2022-02-15 ENCOUNTER — APPOINTMENT (OUTPATIENT)
Dept: ELECTROPHYSIOLOGY | Facility: CLINIC | Age: 72
End: 2022-02-15
Payer: MEDICARE

## 2022-02-15 ENCOUNTER — APPOINTMENT (OUTPATIENT)
Dept: ENDOCRINOLOGY | Facility: CLINIC | Age: 72
End: 2022-02-15
Payer: MEDICARE

## 2022-02-15 VITALS
BODY MASS INDEX: 30.83 KG/M2 | HEIGHT: 63 IN | DIASTOLIC BLOOD PRESSURE: 84 MMHG | SYSTOLIC BLOOD PRESSURE: 130 MMHG | HEART RATE: 77 BPM | WEIGHT: 174 LBS | OXYGEN SATURATION: 97 %

## 2022-02-15 VITALS
BODY MASS INDEX: 30.83 KG/M2 | TEMPERATURE: 98.6 F | OXYGEN SATURATION: 98 % | SYSTOLIC BLOOD PRESSURE: 120 MMHG | DIASTOLIC BLOOD PRESSURE: 80 MMHG | HEART RATE: 94 BPM | WEIGHT: 174 LBS | HEIGHT: 63 IN

## 2022-02-15 PROCEDURE — 99214 OFFICE O/P EST MOD 30 MIN: CPT

## 2022-02-15 PROCEDURE — 99204 OFFICE O/P NEW MOD 45 MIN: CPT

## 2022-02-15 PROCEDURE — 93248 EXT ECG>7D<15D REV&INTERPJ: CPT

## 2022-02-15 PROCEDURE — 93000 ELECTROCARDIOGRAM COMPLETE: CPT

## 2022-02-15 NOTE — HISTORY OF PRESENT ILLNESS
[FreeTextEntry1] : F/u for multiple medical issues\par \par *** Feb 15, 2022 ***\par \par feels ok. dx'ed with a breast cancer- s/p right mastectomy\par on Janumet 50/1000 mg bid,  olmesartan 40 mg qd, labetalol\par still taking potassium supplements and with a persistent edema despite stopping CCB\par \par *** Nov 03, 2021 ***\par \par on Janumet 50/1000 mg bid,  olmesartan 40 mg qd, labetalol\par reports fbs- 90's- 120's, ppg- 110's\par PAC/PRA- 5.7/0.2\par normal plasma free meta\par \par Thyr US (10/2/21)- stable RMP iso 1.6cm, LMP 1.1 cm hypo and LLP 2.2 spongiform nodules\par Parathyroid sestamibi scan (5/27/21)- parathyroid lesion posterior to the LUP and ? another lesion posterior to the RLP\par \par old CT chest from 11/14/18- no adrenal masses seen\par \par *** Sep 13, 2021 ***\par \par on Janumet 50/1000 mg bid,  olmesartan 40 mg qd, labetalol\par off HCTZ\par with leg swelling since adding norvasc- switched to verapamil er 240 mg qd, also on doxazosin and K-dur\par reports fbs- 120's\par FNA (5/21/21)  of RMP 2.1 - nodular goiter, LUP 1.1 - colloid nodule\par labs (8/20/21)- LDL- 43, ca- 10.2, K- 4.0, a1c- 6.6, TSH- 1.01\par \par *** May 21, 2021 ***\par \par on Janumet 50/1000 mg bid,  olmesartan 40 mg qd, labetalol\par off HCTZ. BP is high since stopping it\par reports fbs- 130's, ppg- 150's\par \par Thyroid/parathyroid US (5/14/21)- RMP iso 1.4x1.2x0.8; LMP iso 1.7x1.1x1.6. no definitive parathyroid nodules\par \par on preFNA US- RMP 2.1 cm, LUP 1.1 (round hypo), LLP 2.2 spongi. Patient declined biopsy of all 3 nodules, so only RMP and LUP nodules were biopsied\par \par HISTORY OF PRESENT ILLNESS. \par \par Ms. GAMBOA was diagnosed with Diabetes Mellitus Type 2 more than 10 years ago. She reports history HTN, dyslipidemia, carcinoid tumor of the lung, and denies CAD,  known complications of retinopathy, nephropathy, or neuropathy. She is presently on metformin 500 mg bid, Lipitor 20 mg HS. As per daughter, she was diagnosed with a hypercalcemia sometime in 05/20, calcium rising to 10.5-11.1, and as per daughter was even higher  in December 2020 when was admitted to the hospital with Covid, and was placed on cinacalcet. However, she developed tremors and stopped taking it about 2 months ago. She's taking some MVI once a day\par Blood sugars are checked 3-4 times a day. She noticed that her blood sugars are higher since she had Covid in 12/20\par Did not bring log book, but reported are typically as following: FBS- 150's- 160's, ppg- low 200's\par Hypoglycemia frequency: none \par Fingerstick glucose in the office today is 236 mg/dL 2 hours after eating. \par Diet: not following ADA\par Exercise: none\par \par Lab review (1/21) : a1c- 6.7%, calcium - 11.1, LDL- 50, TSH- 1.05\par \par DXA (8/19/20)- LS (-2.4) with OA changes  at L1-L2, L3-L4 (-2.8), FN (-1.0)\par \par Last dilated eye - 2/21\par Last podiatry visit  -  3/21\par Last cardiology evaluation - 2/22\par Last stress test - 3/21\par Last 2-D Echo - 3/21, EF- 70%\par cath- 4/21\par Last nephrology evaluation - none\par Last neurology evaluation-none\par

## 2022-02-15 NOTE — ASSESSMENT
[Diabetes Foot Care] : diabetes foot care [Long Term Vascular Complications] : long term vascular complications of diabetes [Carbohydrate Consistent Diet] : carbohydrate consistent diet [Importance of Diet and Exercise] : importance of diet and exercise to improve glycemic control, achieve weight loss and improve cardiovascular health [Exercise/Effect on Glucose] : exercise/effect on glucose [Hypoglycemia Management] : hypoglycemia management [Self Monitoring of Blood Glucose] : self monitoring of blood glucose [Retinopathy Screening] : Patient was referred to ophthalmology for retinopathy screening [Diabetic Medications] : Risks and benefits of diabetic medications were discussed [FreeTextEntry1] : 1. DM2, reasonably controlled\par - Janumet 50/1000 mg bid\par - continue lipitor\par - keep off HCTZ.  olmesartan 40 mg qd, Labetalol, verapamil\par \par 2. Uncontrolled HTN with hypokalemia on multiple meds\par Suspicious for PA\par - prior paired PAC/PRA is fine, might repeat if indicated and doing a non-contrast CT adrenals\par - we discussed adding aldactone- will wait for now, given stable BP\par \par 3. Hypercalcemia\par - observe off HCTZ\par - monitor calcium/PTH, vitamin D level\par - might need another  24h Uca collection and potential CASR testing\par \par 4. MNG\par - benign FNA b/l nodules\par - essentially stable sono, can repeat in 10/22\par \par 5. Osteoporosis\par I advised to the patient on antiresorptive therapy, and reviewed potential options for osteoporosis treatment, including oral and IV bisphosphonates, Prolia, Evenity. R+B of each options were discussed in details\par - OTC vitamin D3 2,000 IU/day\par - continue weight-bearing exercises; advised avoiding high risk sports\par - prior dental evaluation advised.\par - Fosamax 70 mg qw\par - DXA 3 sites in 8/22\par RTC 4-5 mos\par \par *** daughter Ismael [phone]\par

## 2022-02-21 NOTE — HISTORY OF PRESENT ILLNESS
[FreeTextEntry1] : I had the pleasure of seeing Pearl Najera today for consultation for palpitations in the arrhythmia clinic at [facility]. As you well know, she is a pleasant 71 year old woman with a history of hypertension, asthma, DM, hyperlipidemia and palpitations that started approximately about a year ago. She states they are intermittent and last for a few seconds. She describes it as a strong contraction and denies feeling her heart racing. She denies any associated symptoms with it and denies chest pain, shortness of breath, near syncope or syncope. She is on labetalol for hypertension. She recently had a mobile telemetry monitor placed and reports that she mailed it out last week and is awaiting the results. She did state that she had symptoms while wearing the monitor although she does not recall pressing a button on the monitor while she was having symptoms. \par She denies having COVID infection or any changes around the time she has noted her palpitations. She denies any aggravating factors.

## 2022-02-21 NOTE — CARDIOLOGY SUMMARY
[de-identified] : today:\par SR @ 77 bpm [de-identified] : 24 hour holter 10/29/2021\par sinus  (mean of 79 bpm)\par rare SVE [de-identified] : Regadenoson stress test 3/23/2021\par Small, mild ot moderate defect in the basal septal wall that is mostly fixed suggestive of scar with minimal ischemia.\par Mild defects in the distal anterior distal anteroseptal, apical and inferolateral walls that are mostly fixed, partially correct with prone imaging, and demonstrate preserved wall motion suggestive of artifact\par Post stress gated wall motion with an LVEF > 70% revealing hypokinesis of the basal septum with overall normal LV EF [de-identified] : TTE 3/22/2021\par Normal L systolic function. No segmental wall motion abnormalities\par Small pericardial effusion anterior to the RA [de-identified] : 4/6/2021\par Normal coronary arteries\par Normal right heart catheterization

## 2022-02-21 NOTE — DISCUSSION/SUMMARY
[FreeTextEntry1] : In summary, Ms. Najera is a 71 year old woman with a history of hypertension, asthma, DM, hyperlipidemia and palpitations that started approximately about a year ago. She had a recent mobile telemetry monitor that was placed and was just mailed last week. She is awaiting the results but does report that she had symptoms of palpitations while wearing the monitor. At this time we do not recommend an ILR as we can correlate her symptoms with what the rhythm was while wearing the monitor. We will await the results of the mobile telemetry monitor and recommend any further management strategies based on those results.

## 2022-02-22 ENCOUNTER — RX RENEWAL (OUTPATIENT)
Age: 72
End: 2022-02-22

## 2022-03-07 ENCOUNTER — RX RENEWAL (OUTPATIENT)
Age: 72
End: 2022-03-07

## 2022-03-11 ENCOUNTER — NON-APPOINTMENT (OUTPATIENT)
Age: 72
End: 2022-03-11

## 2022-03-21 ENCOUNTER — APPOINTMENT (OUTPATIENT)
Dept: HOME HEALTH SERVICES | Facility: HOME HEALTH | Age: 72
End: 2022-03-21

## 2022-03-24 NOTE — ASSESSMENT
[FreeTextEntry1] : Ms. 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 She is currently with #1 issue of poor balance. \par \par Ms. Najera' SOB is multifactorial due to:\par -overweight / out of shape\par -poor mechanics of breathing\par -asthma/restrictive dysfunction (s/p surgery)\par -emphysema \par -?cardiac\par \par Her chronic cough is multifactorial due to: -(active)\par -asthma (eosinophilic)\par -LPR/reflux\par \par problem 1: asthma (improved) - compliant \par -continue Ventolin rescue inhaler 2 inhalations before exercise, Q6H\par -continue Incruse 1 inhalation daily\par -continue Breo Ellipta 200 at 1 inhalation daily \par -continue Singulair 10 mg QHS \par -continue Albuterol via nebulizer PRN q6H\par -Inhaler technique reviewed as well as oral hygiene techniques reviewed with patient. Avoidance of cold air, extremes of temperature, rescue inhaler should be used before exercise. Order of medication reviewed with patient. Recommended use of a cool mist humidifier in the bedroom. \par -Asthma is believed to be caused by inherited (genetic) and environmental factor, but its exact cause is unknown. Asthma may be triggered by allergens, lung infections, or irritants in the air. Asthma triggers are different for each person\par \par problem 2: poor mechanics of breathing\par -Proper breathing techniques were reviewed with an emphasis of exhalation. Patient instructed to breath in for 1 second and out for four seconds. Patient was encouraged to not talk while walking. \par \par problem 3: overweight\par -Weight loss, exercise, and diet control were discussed and are highly encouraged. Treatment options were given such as, aqua therapy, and contacting a nutritionist. Recommended to use the elliptical, stationary bike, less use of treadmill. Mindful eating was explained to the patient Obesity is associated with worsening asthma, shortness of breath, and potential for cardiac disease, diabetes, and other underlying medical conditions. \par \par problem 3A: Poor Balance\par -Given prescription for gait training and balance therapy (STARS)\par \par problem 4: LPR/reflux (controlled) \par -continue Pepcid 40 mg QHS \par -continue Protonix 40 mg QD in the morning \par -Rule of 2s: avoid eating too much, eating too late, eating too spicy, eating two hours before bed\par -Things to avoid including overeating, spicy foods, tight clothing, eating within three hours of bed, this list is not all inclusive. \par -For treatment of reflux, possible options discussed including diet control, H2 blockers, PPIs, as well as coating motility agents discussed as treatment options. Timing of meals and proximity of last meal to sleep were discussed. 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 Recommended weight loss which can reduce AHI especially in weight loss of greater than 5% of BMI. Positional sleep is recommended in those with low AHI, low-moderate MBI, and younger age. For severe sleep apnea, the hypoglossal nerve stimulator was recommended as well.\par \par problem 8: eosinophilia\par -she is a candidate for Nucala injections\par -The safety and efficacy of Nucala was established in three double-blind, randomized, placebo-controlled trials in patients with severe asthma. Compared to a placebo, patients with severe asthma receiving Nucala had fewer exacerbation requiring hospitalization and/or emergency department visits, and a longer time to first exacerbation.  In addition, patients with severe asthma receiving Nucala experienced greater reductions in their daily maintenance oral corticosteroid dose, while maintaining asthma control compared with patients receiving placebo. Treatment with Nucala did not result in a significant improvement in lung function, as measured by the volume of air exhaled by patients in one second. The most common side effects include: headache, injection site reactions, back pain, weakness, and fatigue; hypersensitivity reactions can occur within hours or days including swelling of the face, mouth, and tongue, fainting, dizziness, hives, breathing problems, and rash; herpes zoster infections have occurred. The drug is a monoclonal antibody that inhibits interleukin -5 which helps regular eosinophils, a type of white blood cell that contributes to asthma. The over-production of eosinophils can cause inflammation in the lungs, increasing the frequency of asthma attacks. Patients must also take other medications, including high dose inhaled corticosteroids and at least one additional asthma drug.\par \par problem 9: low vitamin D\par -s/p blood work to include: CBC, vitamin D, LFTs, HbA1c\par -continue supplemental vitamin D 50,000 every 2 weeks (recheck)\par -Has been associated with asthma exacerbations and increased allergic symptoms. The goal based on recent information is maintaining levels between 50-70 and low normal is 30. Recommended 50,000 units every two weeks to once a month depending on the level. \par \par problem 10: anemia\par -referred to have a GI evaluation and was given the name of Dr. Liban Samuel's office  (recheck)\par \par problem 11: Health Maintenance/COVID19 Precautions:\par - Clean your hands often. Wash your hands often with soap and water for at least 20 seconds, especially after blowing your nose, coughing, or sneezing, or having been in a public place.\par - If soap and water are not available, use a hand  that contains at least 60% alcohol.\par - To the extent possible, avoid touching high-touch surfaces in public places - elevator buttons, door handles, handrails, handshaking with people, etc. Use a tissue or your sleeve to cover your hand or finger if you must touch something.\par - Wash your hands after touching surfaces in public places.\par - Avoid touching your face, nose, eyes, etc.\par - Clean and disinfect your home to remove germs: practice routine cleaning of frequently touched surfaces (for example: tables, doorknobs, light switches, handles, desks, toilets, faucets, sinks & cell phones)\par - Avoid crowds, especially in poorly ventilated spaces. Your risk of exposure to respiratory viruses like COVID-19 may increase in crowded, closed-in settings with little air circulation if there are people in the crowd who are sick. All patients are recommended to practice social distancing and stay at least 6 feet away from others.\par - Avoid all non-essential travel including plane trips, and especially avoid embarking on cruise ships.\par -If COVID-19 is spreading in your community, take extra measures to put distance between yourself and other people to further reduce your risk of being exposed to this new virus.\par -Stay home as much as possible.\par - Consider ways of getting food brought to your house through family, social, or commercial networks\par -Be aware that the virus has been known to live in the air up to 3 hours post exposure, cardboard up to 24 hours post exposure, copper up to 4 hours post exposure, steel and plastic up to 2-3 days post exposure. Risk of transmission from these surfaces are affected by many variables.\par Immune Support Recommendations:\par -OTC Vitamin C 500mg BID \par -OTC Quercetin 250-500mg BID \par -OTC Zinc 75-100mg per day \par -OTC Melatonin 1 or 2 mg a night \par -OTC Vitamin D 1-4000mg per day \par -OTC Tonic Water 8oz per day\par Asthma and COVID19:\par You need to make sure your asthma is under control. This often requires the use of inhaled corticosteroids (and sometimes oral corticosteroids). Inhaled corticosteroids do not likely reduce your immune system’s ability to fight infections, but oral corticosteroids may. It is important to use the steps above to protect yourself to limit your exposure to any respiratory virus. \par \par Problem 12: health maintenance\par -s/p influenza vaccine 2020\par -recommended strep pneumonia vaccines after age 65: Prevnar-13 vaccine, followed by Pneumo vaccine 23 one year following (completed) \par -recommended early intervention for URIs\par -recommended regular osteoporosis evaluations\par -recommended early dermatological evaluations\par -recommended after the age of 50 to the age of 70, colonoscopy every 5 years \par \par F/U in 4 months \par She is encouraged to call with any questions, changes, or concerns.
with patient

## 2022-03-31 ENCOUNTER — APPOINTMENT (OUTPATIENT)
Dept: PULMONOLOGY | Facility: CLINIC | Age: 72
End: 2022-03-31
Payer: MEDICARE

## 2022-03-31 ENCOUNTER — NON-APPOINTMENT (OUTPATIENT)
Age: 72
End: 2022-03-31

## 2022-03-31 VITALS
DIASTOLIC BLOOD PRESSURE: 84 MMHG | TEMPERATURE: 97.7 F | HEIGHT: 63 IN | SYSTOLIC BLOOD PRESSURE: 136 MMHG | RESPIRATION RATE: 16 BRPM | WEIGHT: 172 LBS | OXYGEN SATURATION: 98 % | BODY MASS INDEX: 30.48 KG/M2 | HEART RATE: 80 BPM

## 2022-03-31 PROCEDURE — 99214 OFFICE O/P EST MOD 30 MIN: CPT | Mod: 25

## 2022-03-31 PROCEDURE — 94010 BREATHING CAPACITY TEST: CPT

## 2022-03-31 NOTE — PHYSICAL EXAM
[No Acute Distress] : no acute distress [Normal Oropharynx] : normal oropharynx [III] : Mallampati Class: III [Normal Appearance] : normal appearance [No Neck Mass] : no neck mass [Normal Rate/Rhythm] : normal rate/rhythm [Normal S1, S2] : normal s1, s2 [No Murmurs] : no murmurs [No Resp Distress] : no resp distress [Clear to Auscultation Bilaterally] : clear to auscultation bilaterally [No Abnormalities] : no abnormalities [Benign] : benign [Normal Gait] : normal gait [No Clubbing] : no clubbing [No Cyanosis] : no cyanosis [No Edema] : no edema [FROM] : FROM [Normal Color/ Pigmentation] : normal color/ pigmentation [No Focal Deficits] : no focal deficits [Oriented x3] : oriented x3 [Normal Affect] : normal affect [TextBox_2] : ow [TextBox_68] : I:E ratio 1:3; clear  [TextBox_105] : 1+ lower extremity edema

## 2022-03-31 NOTE — ADDENDUM
[FreeTextEntry1] : Documented by Radha Romano acting as a scribe for Dr. Gustavo Cornelius on 03/31/2022 \par \par All medical record entries made by the Scribe were at my, Dr. Gustavo Cornelius's, direction and personally dictated by me on 03/31/2022 . I have reviewed the chart and agree that the record accurately reflects my personal performance of the history, physical exam, assessment and plan. I have also personally directed, reviewed, and agree with the discharge instructions

## 2022-03-31 NOTE — PROCEDURE
[FreeTextEntry1] : PFT revealed moderate restrictive and mild obstructive dysfunction, with a FEV1 of 0.95L,which is 56% of predicted with a normal flow volume loop

## 2022-03-31 NOTE — HISTORY OF PRESENT ILLNESS
[FreeTextEntry1] : Ms. Najera is a 71 year old female with a history of abnormal chest CT, asthma, carcinoid tumor of lung, chronic GERD, eosinophilic asthma, low vitamin D, snoring, OW, and SOB presenting to the office today for a follow up visit. Her chief complaint is \par \par -she notes generally feeling okay \par -she notes constipation \par -she notes abnormal taste in her mouth \par -she notes itchy eyes \par -she notes sleeping 2-3 hrs on average \par -she notes HOWELL \par -she notes weight is stable \par -she notes mild intermittent cough but it has improved \par -she notes intermittent cough when sleeping\par -she notes energy level is below baseline \par -she notes s/p right mastectomy \par -she notes vision is below baseline \par -she denies having nebulizer \par \par -denies any visual issues, headaches, nausea, vomiting, fever, chills, sweats, chest pain, chest pressure, diarrhea, dysphagia, dizziness, leg swelling, leg pain, itchy ears, heartburn, reflux, or sour taste in the mouth.

## 2022-04-04 ENCOUNTER — APPOINTMENT (OUTPATIENT)
Dept: PULMONOLOGY | Facility: CLINIC | Age: 72
End: 2022-04-04

## 2022-04-11 ENCOUNTER — LABORATORY RESULT (OUTPATIENT)
Age: 72
End: 2022-04-11

## 2022-04-11 ENCOUNTER — APPOINTMENT (OUTPATIENT)
Dept: CARDIOLOGY | Facility: CLINIC | Age: 72
End: 2022-04-11
Payer: MEDICARE

## 2022-04-11 VITALS
DIASTOLIC BLOOD PRESSURE: 81 MMHG | WEIGHT: 165 LBS | RESPIRATION RATE: 16 BRPM | BODY MASS INDEX: 29.23 KG/M2 | HEIGHT: 63 IN | OXYGEN SATURATION: 95 % | TEMPERATURE: 97.9 F | HEART RATE: 91 BPM | SYSTOLIC BLOOD PRESSURE: 132 MMHG

## 2022-04-11 PROCEDURE — 99214 OFFICE O/P EST MOD 30 MIN: CPT

## 2022-04-11 PROCEDURE — 93000 ELECTROCARDIOGRAM COMPLETE: CPT

## 2022-04-11 RX ORDER — LEVALBUTEROL HYDROCHLORIDE 0.63 MG/3ML
0.63 SOLUTION RESPIRATORY (INHALATION)
Qty: 120 | Refills: 3 | Status: COMPLETED | COMMUNITY
Start: 2022-03-31 | End: 2022-04-11

## 2022-04-11 RX ORDER — MIRTAZAPINE 30 MG/1
30 TABLET, FILM COATED ORAL
Refills: 0 | Status: COMPLETED | COMMUNITY
Start: 2020-07-27 | End: 2022-04-11

## 2022-04-11 RX ORDER — BUSPIRONE HYDROCHLORIDE 10 MG/1
10 TABLET ORAL TWICE DAILY
Refills: 0 | Status: COMPLETED | COMMUNITY
Start: 2021-01-28 | End: 2022-04-11

## 2022-04-11 NOTE — PHYSICAL EXAM
[Well Developed] : well developed [Well Nourished] : well nourished [No Acute Distress] : no acute distress [Normal Venous Pressure] : normal venous pressure [No Carotid Bruit] : no carotid bruit [Normal S1, S2] : normal S1, S2 [No Rub] : no rub [No Gallop] : no gallop [Murmur] : murmur [Clear Lung Fields] : clear lung fields [Good Air Entry] : good air entry [No Respiratory Distress] : no respiratory distress  [Soft] : abdomen soft [Non Tender] : non-tender [No Masses/organomegaly] : no masses/organomegaly [Normal Bowel Sounds] : normal bowel sounds [Normal Gait] : normal gait [No Cyanosis] : no cyanosis [No Clubbing] : no clubbing [No Varicosities] : no varicosities [No Rash] : no rash [No Skin Lesions] : no skin lesions [Moves all extremities] : moves all extremities [No Focal Deficits] : no focal deficits [Normal Speech] : normal speech [Alert and Oriented] : alert and oriented [Normal memory] : normal memory [General Appearance - Well Developed] : well developed [Normal Appearance] : normal appearance [Well Groomed] : well groomed [General Appearance - Well Nourished] : well nourished [No Deformities] : no deformities [General Appearance - In No Acute Distress] : no acute distress [Normal Conjunctiva] : the conjunctiva exhibited no abnormalities [Eyelids - No Xanthelasma] : the eyelids demonstrated no xanthelasmas [Normal Oral Mucosa] : normal oral mucosa [Normal Jugular Venous A Waves Present] : normal jugular venous A waves present [Normal Jugular Venous V Waves Present] : normal jugular venous V waves present [No Jugular Venous Morejon A Waves] : no jugular venous morejon A waves [Respiration, Rhythm And Depth] : normal respiratory rhythm and effort [Exaggerated Use Of Accessory Muscles For Inspiration] : no accessory muscle use [Auscultation Breath Sounds / Voice Sounds] : lungs were clear to auscultation bilaterally [Abdomen Soft] : soft [Abdomen Tenderness] : non-tender [] : no hepato-splenomegaly [Abdomen Mass (___ Cm)] : no abdominal mass palpated [Abnormal Walk] : normal gait [Cyanosis, Localized] : no localized cyanosis [Skin Color & Pigmentation] : normal skin color and pigmentation [Skin Turgor] : normal skin turgor [No Xanthoma] : no  xanthoma was observed [Oriented To Time, Place, And Person] : oriented to person, place, and time [Affect] : the affect was normal [Mood] : the mood was normal [No Anxiety] : not feeling anxious [5th Left ICS - MCL] : palpated at the 5th LICS in the midclavicular line [Normal] : normal [No Precordial Heave] : no precordial heave was noted [Normal Rate] : normal [Rhythm Regular] : regular [Normal S1] : normal S1 [Normal S2] : normal S2 [II] : a grade 2 [2+] : left 2+ [No Abnormalities] : the abdominal aorta was not enlarged and no bruit was heard [___ +] : bilateral [unfilled]U+ pretibial pitting edema [de-identified] : +2 B/L LE pitting edema/ no calf tenderness [S3] : no S3 [S4] : no S4 [Right Carotid Bruit] : no bruit heard over the right carotid [Left Carotid Bruit] : no bruit heard over the left carotid

## 2022-04-11 NOTE — REVIEW OF SYSTEMS
[Chest Discomfort] : chest discomfort [Lower Ext Edema] : lower extremity edema [Palpitations] : palpitations [Negative] : Heme/Lymph [FreeTextEntry9] : LE swelling

## 2022-04-11 NOTE — DISCUSSION/SUMMARY
[FreeTextEntry1] : This is a 71-year-old female with past medical history significant for hypertension, asthma, history of bronchitis, non-insulin-dependent diabetes mellitus, hyperlipidemia, status post right knee replacement, who comes in for cardiac follow-up evaluation.  She denies chest pain,/dyspnea on exertion may be's, dizziness or syncope.\par The patient still complains of occasional dyspnea on exertion and rare palpitations.  The symptoms not associate with any particular activity.\par The patient had a cardiac catheterization done April 6, 2021 which demonstrated normal left and right heart catheterization and normal pressures.\par The patient is encouraged to increase her fluid intake.  She will have new blood work done today for electrolytes and lipid profile.\par Her blood pressure is under adequate control.\par Her shortness of breath/dyspnea on exertion may be secondary to reduced aerobic capacity, or her underlying pulmonary disease.\par She was seen in consultation by the electrophysiology team and felt that she did not require a loop recorder at this time.\par Electrocardiogram done January 21, 2022 demonstrated normal sinus rhythm rate of 78 bpm is otherwise remarkable for poor R wave progression and left atrial abnormality.\par She had a normal cardiac catheterization done April 6, 2021.\par She will continue her labetalol 200 mg twice a day for now.  Her blood pressure is under adequate control.\par Echo Doppler examination done March 22, 2021 demonstrated mild tricuspid valve regurgitation, small pericardial effusion adjacent to the right atrium.\par Holter monitor done October 29, 2021 demonstrated no significant arrhythmia or heart block.\par . Most recent lab work from January 22,2022 showed total cholesterol 160, triglycerides 44, HDL 72, LDL calculated 80, non-HDL 88 mg/dL. \par \par PMH:\par The edema is localized to the malleolus area and is consistent with calcium channel blocker induced edema.  The patient had been on 2 calcium channel blockers, and Norvasc was discontinued, and the patient remained on verapamil 240 mg/day.\par She still has bilateral 1+/2 pedal edema at the area of the malleolus.  She will discontinue her verapamil and start on hydrochlorothiazide 25 mg/day and potassium chloride 10 mEq only 1 tablet on Saturday and Sunday.  \par Lipid panel done August 20, 2021 demonstrated a direct LDL 48 mg/dL, cholesterol 115, HDL 65, triglycerides of 33, and non-HDL cholesterol 50 mg/dL.  She will continue on her current medical therapy.  Her LDL is at target level.\par \par She was born in [facility] and has no history of rheumatic fever. \par Echo Doppler examination done May 14, 2020 by another cardiologist which demonstrated mild left ventricular hypertrophy, normal left ventricular function with an estimated ejection fraction of 55 to 60%, and trivial pericardial effusion.  There was mild tricuspid valve regurgitation, calcified mitral valve annulus and thickened mitral valve leaflets.\par The patient's daughter reports that she had a normal stress echocardiogram April 26, 2017.\par Electrocardiogram done June 12, 2020 demonstrated normal sinus rhythm rate of 82 bpm is otherwise remarkable left atrial abnormality, and for poor R wave progression.\par \par The patient understands that aerobic exercises must be increased to 40 minutes 4 times per week. A detailed discussion of lifestyle modification was done today. The patient has a good understanding of the diagnosis, and treatment plan. Lifestyle modification was also outlined.\par

## 2022-04-15 ENCOUNTER — NON-APPOINTMENT (OUTPATIENT)
Age: 72
End: 2022-04-15

## 2022-04-20 ENCOUNTER — APPOINTMENT (OUTPATIENT)
Dept: HOME HEALTH SERVICES | Facility: HOME HEALTH | Age: 72
End: 2022-04-20
Payer: MEDICARE

## 2022-04-20 ENCOUNTER — TRANSCRIPTION ENCOUNTER (OUTPATIENT)
Age: 72
End: 2022-04-20

## 2022-04-20 VITALS
SYSTOLIC BLOOD PRESSURE: 140 MMHG | RESPIRATION RATE: 16 BRPM | DIASTOLIC BLOOD PRESSURE: 80 MMHG | HEART RATE: 78 BPM | TEMPERATURE: 97.1 F | OXYGEN SATURATION: 99 %

## 2022-04-20 DIAGNOSIS — Z23 ENCOUNTER FOR IMMUNIZATION: ICD-10-CM

## 2022-04-20 PROCEDURE — 99349 HOME/RES VST EST MOD MDM 40: CPT

## 2022-04-20 NOTE — DATA REVIEWED
[FreeTextEntry1] : Patient PAULINA GAMBOA Invision MRN [id number] Hospital Visit [id number] [facility] - Attending Physician Anjel Byrd \par Status Complete \par  \par \par Hospital Course: \par Discharge Date	23-Mar-2021 \par Admission Date	22-Mar-2021 15:55 \par Reason for Admission	dyspnea on exertion \par Hospital Course	 \par HPI: \par 71 yo female w/pmh asthma, HTN, HLD, diabetes, primary hyperparathyroidism, \par presents to [facility] for cc worsening dyspnea on exertion for the last week. She \par denies chest pain during these episodes. Says she started having trouble \par walking up stairs, and had to stop at around 3-4 steps to catch her breath. \par However, she says she can probably walk about 3 blocks without any issues. \par Sleeps on 2 pillows at baseline for a long time. Has never been diagnosed with \par heart failure or coronary artery disease. Denies hearing herself wheeze and \par does not think her SOB is related to the weather. She has experienced these \par symptoms in the past but can't quite remember when they first started. Saw her \par cardiologist, Dr. Stewart Montilla, last month. \par \par Patient was first admitted to CDU for monitoring. She was hemodynamically \par stable, afebrile. Labs with trop 11 --> 13. Rest of labs unremarkable. She had \par a CT angio of her chest which was negative for PE and lung pathology. She was \par seen by cardiology, Dr. Diallo, who recommended inpatient stay for stress test. \par She had a TTE done which was normal, except for a small pericardial effusion. \par (22 Mar 2021 16:32) \par \par Stress test performed - no reversible ischemia found. \par Etiology of the dyspnea on exertion is not clear. Follow up with outpatient \par cardiologist. \par \par Med Reconciliation: \par Override IMPROVE-DD recommendations due to:	IMPROVE-DD Application Not \par Available \par Recommended Post-Discharge VTE Prophylaxis	IMPROVE-DD Application Not Available \par Medication Reconciliation Status	Admission Reconciliation is Not Complete \par Discharge Reconciliation is Completed \par Discharge Medications	albuterol 90 mcg/inh inhalation aerosol: 2 puff(s) \par inhaled every 6 hours \par as needed for SOB/wheezing \par aspirin 81 mg oral tablet, chewable: 1 tab(s) orally once a day \par atorvastatin 20 mg oral tablet: 1 tab(s) orally once a day \par Benicar HCT 40 mg-12.5 mg oral tablet: 1 tab(s) orally once a day \par Breo Ellipta 100 mcg-25 mcg/inh inhalation powder: 1 puff(s) inhaled once a day \par busPIRone 10 mg oral tablet: 1 tab(s) orally 2 times a day \par cefpodoxime 200 mg oral tablet: 1 tab(s) orally every 12 hours \par cinacalcet 30 mg oral tablet: 1 tab(s) orally once a day \par fluticasone 50 mcg/inh inhalation powder: 1 puff(s) inhaled 2 times a day \par labetalol 100 mg oral tablet: 1 tab(s) orally 2 times a day \par LORazepam 0.5 mg oral tablet: 1 tab(s) orally 2 times a day, As Needed \par metformin 500 mg oral tablet: 1 tab(s) orally 2 times a day \par mirtazapine 45 mg oral tablet: 1 tab(s) orally once a day (at bedtime) \par montelukast 10 mg oral tablet: 1 tab(s) orally once a day \par verapamil 24 hour extended release 240 mg/24 hours oral capsule, extended \par release: 1 cap(s) orally once a day \par Vraylar 1.5 mg oral capsule: 1 cap(s) orally once a day \par , \par , \par \par Care Plan/Procedures: \par Goal(s)	To get better and follow your care plan as instructed. \par Discharge Diagnoses, Assessment and Plan of Treatment	PRINCIPAL DISCHARGE \par DIAGNOSIS \par Diagnosis: Shortness of breath \par Assessment and Plan of Treatment: c/w current meds \par follow with Dr Stewart montilla \par \par Follow Up: \par Care Providers for Follow up (PCP/Outpatient Provider)	Stewart Montilla) \par Cardiology; Cardiovascular Disease; Internal Medicine \par [address] [facility] [address] \[place] [address] \par Phone: [phone] \par Fax: [phone] \par Follow Up Time: \par Patient's Scheduled Appointments	PAULINA GAMBOA ; 04/01/2021 ; [facility] Card[facility] [address] \par PAULINA GAMBOA ; 04/10/2021 ; [facility] Rad [facility] [address] \par PAULINA GAMBOA ; 04/14/2021 ; [facility] Cardio [address] \par PAULINA GAMBOA ; 04/19/2021 ; [facility] Endocrin [address] \par Diet Instructions	Low Fat Low salt Low cholesterol diet \par Activity	No restrictions \par \par Quality Measures: \par Does the patient have difficulty running errands alone like visiting a doctors \par office or shopping?	No \par Does the patient have difficulty climbing stairs?	No \par Patient Condition	Stable \par Hospice Patient	No \par Cognition: The patient has	No difficulties \par Does the patient have a principal diagnosis of ischemic stroke, hemorrhagic \par stroke, or TIA?	No \par Does the patient have a principal diagnosis of Acute Myocardial Infarction?	No \par Has the patient had a Percutaneous Coronary Intervention?	No \par \par Document Complete: \par Care Provider Seen in Hospital	Anjel Byrd \par Physician Section Complete	This document is complete and the patient is ready \par for discharge. \par For questions about your prescriptions, please call:	[phone] \par Is this contact telephone number correct?	Yes \par \par \par \par Electronic Signatures: \par Anjel Byrd)  (Signed 24-Mar-2021 08:21) \par 	Authored: Hospital Course \par 	Co-Signer: Hospital Course, Med Reconciliation, Care Plan/Procedures, Follow \par Up, Quality Measures, Document Complete \par Liana Sprague (NP)  (Signed 23-Mar-2021 18:03) \par 	Authored: Hospital Course, Med Reconciliation, Care Plan/Procedures, Follow \par Up, Quality Measures, Document Complete \par \par \par Last Updated: 24-Mar-2021 08:21 by Anjel Byrd) \par

## 2022-04-20 NOTE — REVIEW OF SYSTEMS
[Palpitations] : palpitations [Shortness Of Breath] : shortness of breath [Negative] : Heme/Lymph [Lower Ext Edema] : no lower extremity edema

## 2022-04-20 NOTE — REASON FOR VISIT
[Follow-Up] : a follow-up visit [Family Member] : family member [Pre-Visit Preparation] : pre-visit preparation was done [Intercurrent Specialty/Sub-specialty Visits] : the patient has intercurrent specialty/sub-specialty visits [FreeTextEntry1] : for chronic conditions [FreeTextEntry3] : PCP, Pulmonary, Endo

## 2022-04-20 NOTE — HISTORY OF PRESENT ILLNESS
[Patient] : patient [Family Member] : family member [FreeTextEntry2] : Patient denies fever, cough, trouble breathing, rash, vomiting and diarrhea. Patient has not been in close contact with someone covid positive. \par N95 mask, gloves, eye wear used during visit: [Y]. 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, (R) mastectomy\par \par Interval events: still w/ SOB- to be started on Nucala per Dr Cornelius note from 3/31- pt reports she does not know about this- daughter "out" today so I cannot be called \par \par \par Pt A&Ox3, mainly Creole speaking;  w/ c/o as above\par Appetite good- has had no weight loss\par Moves bowels well \par Urine - no incontinence\par \par Breast Ca- s/p mastectomy- on letrozole\par \par HTN/HLD: on atorvastatin, labetolol, doxazosin, HCTZ, olmesartan- follows w/ cardiology\par \par Hypercalcemia- started on cinalcalcet in 12/20 which she stopped- follows w/ endo\par \par Depression/Anxiety - now on quetiapine, sertraline\par \par Asthma- taking SIngulair, Breo PRN & albuterol daily - follows w/ pulmonary \par \par DM- A1c 6.1 on 4/11-  uses contour Next one- on Janumet -  followed by endo

## 2022-04-20 NOTE — PHYSICAL EXAM
[No Acute Distress] : no acute distress [Well Nourished] : well nourished [Well Developed] : well developed [Normal Voice/Communication] : normal voice communication [Normal Sclera/Conjunctiva] : normal sclera/conjunctiva [PERRL] : pupils equal, round and reactive to light [EOMI] : extra ocular movement intact [Normal Outer Ear/Nose] : the ears and nose were normal in appearance [Normal Oropharynx] : the oropharynx was normal [Normal TMs] : both tympanic membranes were normal [No JVD] : no jugular venous distention [No Respiratory Distress] : no respiratory distress [Clear to Auscultation] : lungs were clear to auscultation bilaterally [No Accessory Muscle Use] : no accessory muscle use [Normal Rate] : heart rate was normal  [Regular Rhythm] : with a regular rhythm [Normal S1, S2] : normal S1 and S2 [No Murmurs] : no murmurs heard [No Edema] : there was no peripheral edema [Normal Bowel Sounds] : normal bowel sounds [Non Tender] : non-tender [Soft] : abdomen soft [Not Distended] : not distended [No CVA Tenderness] : no ~M costovertebral angle tenderness [No Spinal Tenderness] : no spinal tenderness [Normal Gait] : normal gait [No Joint Swelling] : no joint swelling seen [No Clubbing, Cyanosis] : no clubbing  or cyanosis of the fingernails [Normal Strength/Tone] : muscle strength and tone were normal [No Rash] : no rash [No Skin Lesions] : no skin lesions [Oriented x3] : oriented to person, place, and time [Normal Affect] : the affect was normal [Kyphosis] : no kyphosis present [Scoliosis] : scoliosis not present [de-identified] :  (R) mastectomy

## 2022-05-31 ENCOUNTER — NON-APPOINTMENT (OUTPATIENT)
Age: 72
End: 2022-05-31

## 2022-06-07 ENCOUNTER — APPOINTMENT (OUTPATIENT)
Dept: ENDOCRINOLOGY | Facility: CLINIC | Age: 72
End: 2022-06-07
Payer: MEDICARE

## 2022-06-07 VITALS
HEART RATE: 80 BPM | HEIGHT: 63 IN | WEIGHT: 168 LBS | DIASTOLIC BLOOD PRESSURE: 60 MMHG | BODY MASS INDEX: 29.77 KG/M2 | TEMPERATURE: 98.6 F | SYSTOLIC BLOOD PRESSURE: 118 MMHG | OXYGEN SATURATION: 97 %

## 2022-06-07 LAB — GLUCOSE BLDC GLUCOMTR-MCNC: 169

## 2022-06-07 PROCEDURE — 99214 OFFICE O/P EST MOD 30 MIN: CPT | Mod: 25

## 2022-06-07 PROCEDURE — 82962 GLUCOSE BLOOD TEST: CPT

## 2022-06-07 RX ORDER — MONTELUKAST SODIUM 10 MG/1
10 TABLET, FILM COATED ORAL
Qty: 1 | Refills: 1 | Status: DISCONTINUED | COMMUNITY
Start: 2017-11-13 | End: 2022-06-07

## 2022-06-07 NOTE — ASSESSMENT
[Diabetes Foot Care] : diabetes foot care [Long Term Vascular Complications] : long term vascular complications of diabetes [Carbohydrate Consistent Diet] : carbohydrate consistent diet [Importance of Diet and Exercise] : importance of diet and exercise to improve glycemic control, achieve weight loss and improve cardiovascular health [Exercise/Effect on Glucose] : exercise/effect on glucose [Hypoglycemia Management] : hypoglycemia management [Self Monitoring of Blood Glucose] : self monitoring of blood glucose [Retinopathy Screening] : Patient was referred to ophthalmology for retinopathy screening [Diabetic Medications] : Risks and benefits of diabetic medications were discussed [FreeTextEntry1] : 1. DM2, reasonably controlled\par - Janumet 50/1000 mg bid\par - continue lipitor\par - keep off HCTZ.  olmesartan 40 mg qd, Labetalol, verapamil\par \par 2. Uncontrolled HTN with hypokalemia on multiple meds\par Suspicious for PA\par - prior paired PAC/PRA is fine, might repeat if indicated and doing a non-contrast CT adrenals\par - we discussed adding aldactone- will wait for now, given stable BP\par \par 3. Hypercalcemia\par - observe off HCTZ\par - monitor calcium/PTH, vitamin D level\par - might need another  24h Uca collection and potential CASR testing\par \par 4. MNG\par - benign FNA b/l nodules\par - essentially stable sono, can repeat in 10/22\par \par 5. Osteoporosis\par I advised to the patient on antiresorptive therapy, and reviewed potential options for osteoporosis treatment, including oral and IV bisphosphonates, Prolia, Evenity. R+B of each options were discussed in details\par - OTC vitamin D3 2,000 IU/day\par - continue weight-bearing exercises; advised avoiding high risk sports\par - prior dental evaluation advised.\par - Fosamax 70 mg qw\par - DXA 3 sites in 8/22\par RTC 4-5 mos\par \par *** daughter Ismael [phone]\par

## 2022-06-07 NOTE — HISTORY OF PRESENT ILLNESS
[FreeTextEntry1] : F/u for multiple medical issues\par \par *** Jun 07, 2022 ***\par \par taking Janumet 50/1000 mg bid,  olmesartan 40 mg qd, labetalol\par reports fbs- 95- 116, ppg - upto 160\par last a1c- 6.1%\par ca- 10.9\par \par *** Feb 15, 2022 ***\par \par feels ok. dx'ed with a breast cancer- s/p right mastectomy\par on Janumet 50/1000 mg bid,  olmesartan 40 mg qd, labetalol\par still taking potassium supplements and with a persistent edema despite stopping CCB\par \par *** Nov 03, 2021 ***\par \par on Janumet 50/1000 mg bid,  olmesartan 40 mg qd, labetalol\par reports fbs- 90's- 120's, ppg- 110's\par PAC/PRA- 5.7/0.2\par normal plasma free meta\par \par Thyr US (10/2/21)- stable RMP iso 1.6cm, LMP 1.1 cm hypo and LLP 2.2 spongiform nodules\par Parathyroid sestamibi scan (5/27/21)- parathyroid lesion posterior to the LUP and ? another lesion posterior to the RLP\par \par old CT chest from 11/14/18- no adrenal masses seen\par \par *** Sep 13, 2021 ***\par \par on Janumet 50/1000 mg bid,  olmesartan 40 mg qd, labetalol\par off HCTZ\par with leg swelling since adding norvasc- switched to verapamil er 240 mg qd, also on doxazosin and K-dur\par reports fbs- 120's\par FNA (5/21/21)  of RMP 2.1 - nodular goiter, LUP 1.1 - colloid nodule\par labs (8/20/21)- LDL- 43, ca- 10.2, K- 4.0, a1c- 6.6, TSH- 1.01\par \par *** May 21, 2021 ***\par \par on Janumet 50/1000 mg bid,  olmesartan 40 mg qd, labetalol\par off HCTZ. BP is high since stopping it\par reports fbs- 130's, ppg- 150's\par \par Thyroid/parathyroid US (5/14/21)- RMP iso 1.4x1.2x0.8; LMP iso 1.7x1.1x1.6. no definitive parathyroid nodules\par \par on preFNA US- RMP 2.1 cm, LUP 1.1 (round hypo), LLP 2.2 spongi. Patient declined biopsy of all 3 nodules, so only RMP and LUP nodules were biopsied\par \par HISTORY OF PRESENT ILLNESS. \par \par Ms. GAMBOA was diagnosed with Diabetes Mellitus Type 2 more than 10 years ago. She reports history HTN, dyslipidemia, carcinoid tumor of the lung, and denies CAD,  known complications of retinopathy, nephropathy, or neuropathy. She is presently on metformin 500 mg bid, Lipitor 20 mg HS. As per daughter, she was diagnosed with a hypercalcemia sometime in 05/20, calcium rising to 10.5-11.1, and as per daughter was even higher  in December 2020 when was admitted to the hospital with Covid, and was placed on cinacalcet. However, she developed tremors and stopped taking it about 2 months ago. She's taking some MVI once a day\par Blood sugars are checked 3-4 times a day. She noticed that her blood sugars are higher since she had Covid in 12/20\par Did not bring log book, but reported are typically as following: FBS- 150's- 160's, ppg- low 200's\par Hypoglycemia frequency: none \par Fingerstick glucose in the office today is 236 mg/dL 2 hours after eating. \par Diet: not following ADA\par Exercise: none\par \par Lab review (1/21) : a1c- 6.7%, calcium - 11.1, LDL- 50, TSH- 1.05\par \par DXA (8/19/20)- LS (-2.4) with OA changes  at L1-L2, L3-L4 (-2.8), FN (-1.0)\par \par Last dilated eye - 3/22\par Last podiatry visit  -  3/22\par Last cardiology evaluation - 4/22\par Last stress test - 3/21\par Last 2-D Echo - 3/21, EF- 70%\par cath- 4/21\par Last nephrology evaluation - none\par Last neurology evaluation-none\par

## 2022-06-17 ENCOUNTER — NON-APPOINTMENT (OUTPATIENT)
Age: 72
End: 2022-06-17

## 2022-06-21 ENCOUNTER — APPOINTMENT (OUTPATIENT)
Dept: HOME HEALTH SERVICES | Facility: HOME HEALTH | Age: 72
End: 2022-06-21

## 2022-06-25 ENCOUNTER — NON-APPOINTMENT (OUTPATIENT)
Age: 72
End: 2022-06-25

## 2022-07-04 ENCOUNTER — RX RENEWAL (OUTPATIENT)
Age: 72
End: 2022-07-04

## 2022-07-19 NOTE — PHYSICAL THERAPY INITIAL EVALUATION ADULT - LEVEL OF INDEPENDENCE: STAND/SIT, REHAB EVAL
No changes    We discussed risks of surgery which include but are not limited to: anesthetic complication; pulmonary embolism; cardiac arrest; bleeding requiring blood transfusion; infection; CSF leak; damage to the spinal nerves and/or cord resulting in paralysis, loss of bowel, bladder or sexual function; failure to relieve pain; increase in pain; excessive scar tissue formation; reduction in the range of motion; failure of hardware; need for further surgery; and death.  All questions were answered and consent was obtained.    
independent

## 2022-07-22 ENCOUNTER — RX RENEWAL (OUTPATIENT)
Age: 72
End: 2022-07-22

## 2022-07-26 ENCOUNTER — RX RENEWAL (OUTPATIENT)
Age: 72
End: 2022-07-26

## 2022-07-29 ENCOUNTER — NON-APPOINTMENT (OUTPATIENT)
Age: 72
End: 2022-07-29

## 2022-08-03 NOTE — ED ADULT TRIAGE NOTE - NS ED NURSE DIRECT TO ROOM YN
No
PRINCIPAL PROCEDURE  Procedure: CT scan  Findings and Treatment: FINDINGS:  AORTIC ARCH no dissection or aneurysmal dilatation is noted. Major vessel origins are patent. The subclavian arteries are well visualized and appear to be patent bilaterally.  COMMON CAROTID ARTERIES: Bilaterally patent with tortuosity  RIGHT BIFURCATION: Widely patent  LEFT BIFURCATION: Widely patent  INTERNAL CAROTID ARTERIES: Bilaterally patent with severe tortuosity.  VERTEBRALS bilaterally patent  MISCELLANEOUS:  The cervical vertebral bodies appear to be intact, with no acute fracture identified that. Disc space narrowing and chronic degenerative changes are noted at C5-6 and C6-7. Stenosis is suggested at these levels. No abnormality noted at the C1-C2 level. Prevertebral and paraspinal soft tissuesare unremarkable. Interstitial markings are mildly prominent in the lungs.  BRAIN   there are involutional changes with mild volume loss. No acute infarct, hemorrhage, or space-occupying lesion is identified that. No hydrocephalus or collections are noted. Brainstem and cerebellum are unremarkable. Sinuses and mastoids are grossly clear.  Cavernous and supraclinoid carotid arteries are bilaterally patent with tortuosity. Both anterior cerebral arteries are patent. Both A1 segments are well formed. Both middle cerebral arteries are patent. No M1 lesion or distal branch   occlusion suggested.  Vertebrobasilar system is patent. Posterior cerebral arteries are patent. The venous sinuses are patent.  IMPRESSION:  1. Involutional changes with volume loss in both cerebral hemispheres. No CT evidence of an acute infarct, hemorrhage, or space-occupying lesion.  2. Tortuous but widely patent head and neck vasculature. No significant stenosis, dissection, or occlusion noted.  3. Degenerative changes cervical spine most prevalent at C5-6 and C6-7. No acute fracture suggested.< from: CT Angio Head w/ IV Cont (07.23.22 @ 11:45) >        SECONDARY PROCEDURE  Procedure: CT scan  Findings and Treatment: FINDINGS:  LYMPH NODES: No lymphadenopathy.  HEART/VASCULATURE: Heart size within normal limits. Trace pericardial   effusion. Atherosclerotic changes of coronary arteries and aorta.   Calcifications of the aortic valve.  AIRWAYS/LUNGS/PLEURA: Patent central airways. Right middle lobe   consolidation with volume loss and bronchial dilatation.  UPPER ABDOMEN: Splenule noted but otherwise unremarkable.  BONES/SOFT TISSUES: Degenerative changes of the spine.  IMPRESSION:  Right middle lobe atelectasis. Superimposed pneumonia is a possibility.  < from: CT Chest No Cont (07.23.22 @ 11:47) >

## 2022-09-15 ENCOUNTER — NON-APPOINTMENT (OUTPATIENT)
Age: 72
End: 2022-09-15

## 2022-09-15 ENCOUNTER — APPOINTMENT (OUTPATIENT)
Dept: CARDIOLOGY | Facility: CLINIC | Age: 72
End: 2022-09-15

## 2022-09-15 ENCOUNTER — APPOINTMENT (OUTPATIENT)
Dept: PULMONOLOGY | Facility: CLINIC | Age: 72
End: 2022-09-15

## 2022-09-15 VITALS
HEART RATE: 66 BPM | BODY MASS INDEX: 31.18 KG/M2 | RESPIRATION RATE: 16 BRPM | TEMPERATURE: 97.4 F | SYSTOLIC BLOOD PRESSURE: 125 MMHG | OXYGEN SATURATION: 98 % | WEIGHT: 176 LBS | HEIGHT: 63 IN | DIASTOLIC BLOOD PRESSURE: 75 MMHG

## 2022-09-15 DIAGNOSIS — R26.89 OTHER ABNORMALITIES OF GAIT AND MOBILITY: ICD-10-CM

## 2022-09-15 PROCEDURE — 94010 BREATHING CAPACITY TEST: CPT

## 2022-09-15 PROCEDURE — 99214 OFFICE O/P EST MOD 30 MIN: CPT | Mod: 25

## 2022-09-15 PROCEDURE — 95012 NITRIC OXIDE EXP GAS DETER: CPT

## 2022-09-15 NOTE — PHYSICAL EXAM
[No Acute Distress] : no acute distress [Normal Oropharynx] : normal oropharynx [III] : Mallampati Class: III [Normal Appearance] : normal appearance [No Neck Mass] : no neck mass [Normal Rate/Rhythm] : normal rate/rhythm [Normal S1, S2] : normal s1, s2 [No Murmurs] : no murmurs [No Resp Distress] : no resp distress [Clear to Auscultation Bilaterally] : clear to auscultation bilaterally [No Abnormalities] : no abnormalities [Benign] : benign [Normal Gait] : normal gait [No Clubbing] : no clubbing [No Cyanosis] : no cyanosis [No Edema] : no edema [FROM] : FROM [Normal Color/ Pigmentation] : normal color/ pigmentation [No Focal Deficits] : no focal deficits [Oriented x3] : oriented x3 [Normal Affect] : normal affect [TextBox_2] : ow [TextBox_68] : I:E ratio 1:3; clear

## 2022-09-15 NOTE — ADDENDUM
[FreeTextEntry1] : Documented by Alessandro Cerda acting as a scribe for Dr. Gustavo Cornelius on 09/15/2022.\par \par All medical record entries made by the Scribe were at my, Dr. Gustavo Cornelius's, direction and personally dictated by me on 09/15/2022. I have reviewed the chart and agree that the record accurately reflects my personal performance of the history, physical exam, assessment and plan. I have also personally directed, reviewed, and agree with the discharge instructions.

## 2022-09-15 NOTE — PROCEDURE
[FreeTextEntry1] : Feno was 8; a normal value being less than 25. Fractional exhaled nitric oxide (FENO) is regarded as a simple, noninvasive method for assessing eosinophilic airway inflammation. Produced by a variety of cells within the lung, nitric oxide (NO) concentrations are generally low in healthy individuals. However, high concentrations of NO appear to be involved in nonspecific host defense mechanisms and chronic inflammatory  diseases such as asthma. The American Thoracic Society (ATS) therefore recommended using FENO to aid in the diagnosis and monitoring of eosinophilic airway inflammation and asthma, and for identifying steroid responsive individuals whose chronic respiratory symptoms may be caused by airway inflammation \par \par \par PFT revealed mild restrictive and mild-moderate obstructive dysfunction, with a FEV1 of 0.8L, which is 48% of predicted, with a normal flow volume loop

## 2022-09-15 NOTE — ASSESSMENT
[FreeTextEntry1] : Ms. 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 She is currently with #1 issue restrictive dysfunction post lobectomy - s/p Breast Surgery (right Mastectomy) - #1issue energy/balance \par                    \par Ms. Najera' SOB is multifactorial due to:\par -overweight / out of shape\par -poor mechanics of breathing\par -asthma/restrictive dysfunction (s/p surgery)\par -emphysema \par -?cardiac\par \par Her chronic cough is multifactorial due to: -(active)\par -asthma (eosinophilic)\par -LPR/reflux\par \par problem 1: asthma (severe persistent)- compliant - (Active) \par -continue Ventolin rescue inhaler 2 inhalations before exercise, Q6H\par -continue Incruse 1 inhalation daily\par -continue Breo Ellipta 200 at 1 inhalation daily \par -continue Singulair 10 mg QHS \par -continue Albuterol via nebulizer PRN q6H- rescript 3/2022\par -Inhaler technique reviewed as well as oral hygiene techniques reviewed with patient. Avoidance of cold air, extremes of temperature, rescue inhaler should be used before exercise. Order of medication reviewed with patient. Recommended use of a cool mist humidifier in the bedroom. \par -Asthma is believed to be caused by inherited (genetic) and environmental factor, but its exact cause is unknown. Asthma may be triggered by allergens, lung infections, or irritants in the air. Asthma triggers are different for each person\par \par Problem 1A: Chronic cough \par -Amitriptyline 10 mg 3 x qd \par Any cough greater than three weeks duration-differential diagnosis includes-asthma, upper airway cough syndrome, post nasal drip syndrome, gastroesophageal reflux, laryngopharyngeal reflux, cardiac disease (congestive heart failure, medicines, effects, etc), medication effects (b-blockers, ace inhibitors, ARBs, glaucoma meds, etc.), smoking, infectious, multifactorial, etc.\par \par Problem 1B restrictive dysfunction\par -"fixed" problem s/p surgery\par \par problem 2: poor mechanics of breathing\par -Recommended to research the Bhargav Torresf and Buteyko breathing techniques \par -Proper breathing techniques were reviewed with an emphasis of exhalation. Patient instructed to breath in for 1 second and out for four seconds. Patient was encouraged to not talk while walking. \par \par problem 3: overweight\par -Recommended Tim Malik's 10-day detox diet and book. \par -Weight loss, exercise, and diet control were discussed and are highly encouraged. Treatment options were given such as, aqua therapy, and contacting a nutritionist. Recommended to use the elliptical, stationary bike, less use of treadmill. Mindful eating was explained to the patient Obesity is associated with worsening asthma, shortness of breath, and potential for cardiac disease, diabetes, and other underlying medical conditions. \par \par problem 3A: Poor Balance\par -Given prescription for gait training and balance therapy (STARS)\par \par problem 4: LPR/reflux (controlled) \par -continue Pepcid 40 mg QHS \par -continue Protonix 40 mg QD in the morning \par -Rule of 2s: avoid eating too much, eating too late, eating too spicy, eating two hours before bed\par -Things to avoid including overeating, spicy foods, tight clothing, eating within three hours of bed, this list is not all inclusive. \par -For treatment of reflux, possible options discussed including diet control, H2 blockers, PPIs, as well as coating motility agents discussed as treatment options. Timing of meals and proximity of last meal to sleep were discussed. If symptoms persist, a formal gastrointestinal evaluation is needed.\par \par problem 5: allergy/postnasal drip\par -s/p blood work including: allergen panel food (-), IgE panel (-), asthma panel(-), eosinophil level (-), vitamin D level (low)\par -recommended to use Xlear nasal saline spray \par -continue Clarinex 5 mg QAM \par \par problem 6: carcinoid tumorlets (stable 9/2020) \par -follow up CT scan (last 9/2021), next 9/2023\par \par problem 7: r/o YRIS\par -recommend Home Sleep Study (virtuox)\par -recommended Silenor 3 mg qHS\par Treatment options discussed including CPAP/BiPAP machine, oral appliance, ProVent therapy, Oxy-Aid by Respitec, new technologies, or positional sleep.Recommended use of the CPAP machine for moderate (AHI >15), moderate to severe (AHI 15-30) and severe patients (AHI > 30). Recommended weight loss which can reduce AHI especially in weight loss of greater than 5% of BMI. Positional sleep is recommended in those with low AHI, low-moderate MBI, and younger age. For severe sleep apnea, the hypoglossal nerve stimulator was recommended as well.\par \par problem 8: eosinophilia- asthma \par -she is a candidate for Nucala injections- Set Up Now \par -The safety and efficacy of Nucala was established in three double-blind, randomized, placebo-controlled trials in patients with severe asthma. Compared to a placebo, patients with severe asthma receiving Nucala had fewer exacerbation requiring hospitalization and/or emergency department visits, and a longer time to first exacerbation.  In addition, patients with severe asthma receiving Nucala experienced greater reductions in their daily maintenance oral corticosteroid dose, while maintaining asthma control compared with patients receiving placebo. Treatment with Nucala did not result in a significant improvement in lung function, as measured by the volume of air exhaled by patients in one second. The most common side effects include: headache, injection site reactions, back pain, weakness, and fatigue; hypersensitivity reactions can occur within hours or days including swelling of the face, mouth, and tongue, fainting, dizziness, hives, breathing problems, and rash; herpes zoster infections have occurred. The drug is a monoclonal antibody that inhibits interleukin -5 which helps regular eosinophils, a type of white blood cell that contributes to asthma. The over-production of eosinophils can cause inflammation in the lungs, increasing the frequency of asthma attacks. Patients must also take other medications, including high dose inhaled corticosteroids and at least one additional asthma drug.\par \par problem 9: low vitamin D\par -s/p blood work to include: CBC, vitamin D, LFTs, HbA1c\par -continue supplemental vitamin D 50,000 every 2 weeks (recheck)\par -Has been associated with asthma exacerbations and increased allergic symptoms. The goal based on recent information is maintaining levels between 50-70 and low normal is 30. Recommended 50,000 units every two weeks to once a month depending on the level. \par \par problem 10: anemia\par -referred to have a GI evaluation and was given the name of Dr. Liban Samuel's office  (recheck)\par \par problem 11: Health Maintenance/COVID19 Precautions: covid vaccine x3\par - Clean your hands often. Wash your hands often with soap and water for at least 20 seconds, especially after blowing your nose, coughing, or sneezing, or having been in a public place.\par - If soap and water are not available, use a hand  that contains at least 60% alcohol.\par - To the extent possible, avoid touching high-touch surfaces in public places - elevator buttons, door handles, handrails, handshaking with people, etc. Use a tissue or your sleeve to cover your hand or finger if you must touch something.\par - Wash your hands after touching surfaces in public places.\par - Avoid touching your face, nose, eyes, etc.\par - Clean and disinfect your home to remove germs: practice routine cleaning of frequently touched surfaces (for example: tables, doorknobs, light switches, handles, desks, toilets, faucets, sinks & cell phones)\par - Avoid crowds, especially in poorly ventilated spaces. Your risk of exposure to respiratory viruses like COVID-19 may increase in crowded, closed-in settings with little air circulation if there are people in the crowd who are sick. All patients are recommended to practice social distancing and stay at least 6 feet away from others.\par - Avoid all non-essential travel including plane trips, and especially avoid embarking on cruise ships.\par -If COVID-19 is spreading in your community, take extra measures to put distance between yourself and other people to further reduce your risk of being exposed to this new virus.\par -Stay home as much as possible.\par - Consider ways of getting food brought to your house through family, social, or commercial networks\par -Be aware that the virus has been known to live in the air up to 3 hours post exposure, cardboard up to 24 hours post exposure, copper up to 4 hours post exposure, steel and plastic up to 2-3 days post exposure. Risk of transmission from these surfaces are affected by many variables.\par Immune Support Recommendations:\par -OTC Vitamin C 500mg BID \par -OTC Quercetin 250-500mg BID \par -OTC Zinc 75-100mg per day \par -OTC Melatonin 1 or 2 mg a night \par -OTC Vitamin D 1-4000mg per day \par -OTC Tonic Water 8oz per day\par Asthma and COVID19:\par You need to make sure your asthma is under control. This often requires the use of inhaled corticosteroids (and sometimes oral corticosteroids). Inhaled corticosteroids do not likely reduce your immune system’s ability to fight infections, but oral corticosteroids may. It is important to use the steps above to protect yourself to limit your exposure to any respiratory virus. \par \par Problem 12: health maintenance\par - S/p Covid 19 vaccine (Pfizer) x3\par -s/p influenza vaccine 2021 \par -recommended strep pneumonia vaccines after age 65: Prevnar-13 vaccine, followed by Pneumo vaccine 23 one year following (completed) \par -recommended early intervention for URIs\par -recommended regular osteoporosis evaluations\par -recommended early dermatological evaluations\par -recommended after the age of 50 to the age of 70, colonoscopy every 5 years \par \par F/U in 4 months \par She is encouraged to call with any questions, changes, or concerns.\par \par                              ***************PRE-OP CLEARANCE FOR BREAST SURGERY*********\par -at this point in time there are no absolute pulmonary contraindications to go forward with the planned procedure \par -at the time of surgery s/he should have optimal pain control, incentive spirometry, early ambulation, DVT and GI prophylaxis.

## 2022-09-15 NOTE — HISTORY OF PRESENT ILLNESS
[FreeTextEntry1] : Ms. Najera is a 72 year old female with a history of abnormal chest CT, asthma, carcinoid tumor of lung, chronic GERD, eosinophilic asthma, low vitamin D, snoring, OW, and SOB presenting to the office today for a follow up visit. Her chief complaint is \par -she notes feeling generally well \par -she notes chest pains\par -she notes her bowels are regular\par -she notes her sense of smell and taste are normal\par -she notes HA\par -she denies any visual issues \par -she notes that she is taking aspirin, Vitamin B6, and vitamin D3\par -she notes exercising (walking)\par -she notes coughing at night\par -she notes palpitations\par -s/p covid 19 vaccine x3 \par -she notes that her balance is poor \par \par -patient denies any headaches, nausea, vomiting, fever, chills, sweats, chest pressure, wheezing, fatigue, diarrhea, constipation, dysphagia, myalgias, dizziness, leg swelling, leg pain, itchy eyes, itchy ears, heartburn, reflux or sour taste in the mouth

## 2022-09-26 ENCOUNTER — RX RENEWAL (OUTPATIENT)
Age: 72
End: 2022-09-26

## 2022-09-29 ENCOUNTER — APPOINTMENT (OUTPATIENT)
Dept: HOME HEALTH SERVICES | Facility: HOME HEALTH | Age: 72
End: 2022-09-29

## 2022-10-03 ENCOUNTER — RX RENEWAL (OUTPATIENT)
Age: 72
End: 2022-10-03

## 2022-10-20 ENCOUNTER — RX RENEWAL (OUTPATIENT)
Age: 72
End: 2022-10-20

## 2022-10-27 ENCOUNTER — APPOINTMENT (OUTPATIENT)
Dept: CARDIOLOGY | Facility: CLINIC | Age: 72
End: 2022-10-27

## 2022-10-28 ENCOUNTER — NON-APPOINTMENT (OUTPATIENT)
Age: 72
End: 2022-10-28

## 2022-10-28 NOTE — ED PROVIDER NOTE - WR INTERPRETED BY 1
October 28, 2022    Hello, may I speak with Elisabeth Bustos?    My name is Kristel     I am  with [facility]  [facility]  [address]  [place] [address] [phone].    Before we get started may I verify your date of birth? 1979    I am calling to officially welcome you to our practice and ask about your recent visit. Is this a good time to talk? No - left voicemail     Tell me about your visit with us. What things went well?   na    We're always looking for ways to make our patients' experiences even better. Do you have recommendations on ways we may improve?  na    Overall were you satisfied with your first visit to our practice? na     I appreciate you taking the time to speak with me today. Is there anything else I can do for you? na    Thank you, and have a great day.      
Clif Castano

## 2022-10-31 ENCOUNTER — APPOINTMENT (OUTPATIENT)
Dept: ENDOCRINOLOGY | Facility: CLINIC | Age: 72
End: 2022-10-31

## 2022-11-08 ENCOUNTER — APPOINTMENT (OUTPATIENT)
Dept: HOME HEALTH SERVICES | Facility: HOME HEALTH | Age: 72
End: 2022-11-08

## 2022-11-21 ENCOUNTER — NON-APPOINTMENT (OUTPATIENT)
Age: 72
End: 2022-11-21

## 2022-11-22 ENCOUNTER — NON-APPOINTMENT (OUTPATIENT)
Age: 72
End: 2022-11-22

## 2022-11-22 ENCOUNTER — APPOINTMENT (OUTPATIENT)
Dept: HOME HEALTH SERVICES | Facility: HOME HEALTH | Age: 72
End: 2022-11-22

## 2022-12-02 NOTE — ED ADULT NURSE REASSESSMENT NOTE - ED CARDIAC RATE
What Type Of Note Output Would You Prefer (Optional)?: Standard Output
What Is The Reason For Today's Visit?: Full Body Skin Examination
normal
What Is The Reason For Today's Visit? (Being Monitored For X): concerning skin lesions on an annual basis
normal
normal

## 2022-12-15 ENCOUNTER — APPOINTMENT (OUTPATIENT)
Dept: ENDOCRINOLOGY | Facility: CLINIC | Age: 72
End: 2022-12-15

## 2022-12-15 VITALS
SYSTOLIC BLOOD PRESSURE: 134 MMHG | TEMPERATURE: 97.3 F | OXYGEN SATURATION: 98 % | RESPIRATION RATE: 16 BRPM | DIASTOLIC BLOOD PRESSURE: 62 MMHG | WEIGHT: 178 LBS | BODY MASS INDEX: 31.54 KG/M2 | HEIGHT: 63 IN | HEART RATE: 64 BPM

## 2022-12-15 LAB — GLUCOSE BLDC GLUCOMTR-MCNC: 169

## 2022-12-15 PROCEDURE — 82962 GLUCOSE BLOOD TEST: CPT

## 2022-12-15 PROCEDURE — 99214 OFFICE O/P EST MOD 30 MIN: CPT | Mod: 25

## 2022-12-15 NOTE — ASSESSMENT
[Diabetes Foot Care] : diabetes foot care [Long Term Vascular Complications] : long term vascular complications of diabetes [Carbohydrate Consistent Diet] : carbohydrate consistent diet [Importance of Diet and Exercise] : importance of diet and exercise to improve glycemic control, achieve weight loss and improve cardiovascular health [Exercise/Effect on Glucose] : exercise/effect on glucose [Hypoglycemia Management] : hypoglycemia management [Self Monitoring of Blood Glucose] : self monitoring of blood glucose [Retinopathy Screening] : Patient was referred to ophthalmology for retinopathy screening [Diabetic Medications] : Risks and benefits of diabetic medications were discussed [FreeTextEntry1] : 1. DM2, reasonably controlled\par - Janumet 50/1000 mg bid\par - continue lipitor\par - keep off HCTZ.  olmesartan 40 mg qd, Labetalol, verapamil\par \par 2. Uncontrolled HTN with hypokalemia on multiple meds\par Suspicious for PA\par - prior paired PAC/PRA is fine, might repeat if indicated and doing a non-contrast CT adrenals\par - we discussed adding aldactone- will wait for now, given stable BP\par \par 3. Hypercalcemia\par - observe off HCTZ\par - monitor calcium/PTH, vitamin D level\par - might need another  24h Uca collection and potential CASR testing\par \par 4. MNG\par - benign FNA b/l nodules\par - essentially stable sono, repeat this month\par \par 5. Osteoporosis\par I advised to the patient on antiresorptive therapy, and reviewed potential options for osteoporosis treatment, including oral and IV bisphosphonates, Prolia, Evenity. R+B of each options were discussed in details\par - OTC vitamin D3 2,000 IU/day\par - continue weight-bearing exercises; advised avoiding high risk sports\par - prior dental evaluation advised.\par - resume Fosamax 70 mg qw\par - DXA 3 sites this monht\par RTC 4-5 mos\par \par *** daughter Ismael [phone]\par

## 2022-12-15 NOTE — HISTORY OF PRESENT ILLNESS
[FreeTextEntry1] : F/u for multiple medical issues\par \par *** Dec 15, 2022 ***\par \par taking Janumet 50/1000 mg bid,  olmesartan 40 mg qd, labetalol, \par stopped  Fosamax 70 mg qw (not sure why, likely ran out)\par feels well. no jaw/hip pains\par reports fbs low 100's\par no recent sono/DXA\par \par *** Jun 07, 2022 ***\par \par taking Janumet 50/1000 mg bid,  olmesartan 40 mg qd, labetalol\par reports fbs- 95- 116, ppg - upto 160\par last a1c- 6.1%\par ca- 10.9\par \par *** Feb 15, 2022 ***\par \par feels ok. dx'ed with a breast cancer- s/p right mastectomy\par on Janumet 50/1000 mg bid,  olmesartan 40 mg qd, labetalol\par still taking potassium supplements and with a persistent edema despite stopping CCB\par \par *** Nov 03, 2021 ***\par \par on Janumet 50/1000 mg bid,  olmesartan 40 mg qd, labetalol\par reports fbs- 90's- 120's, ppg- 110's\par PAC/PRA- 5.7/0.2\par normal plasma free meta\par \par Thyr US (10/2/21)- stable RMP iso 1.6cm, LMP 1.1 cm hypo and LLP 2.2 spongiform nodules\par Parathyroid sestamibi scan (5/27/21)- parathyroid lesion posterior to the LUP and ? another lesion posterior to the RLP\par \par old CT chest from 11/14/18- no adrenal masses seen\par \par *** Sep 13, 2021 ***\par \par on Janumet 50/1000 mg bid,  olmesartan 40 mg qd, labetalol\par off HCTZ\par with leg swelling since adding norvasc- switched to verapamil er 240 mg qd, also on doxazosin and K-dur\par reports fbs- 120's\par FNA (5/21/21)  of RMP 2.1 - nodular goiter, LUP 1.1 - colloid nodule\par labs (8/20/21)- LDL- 43, ca- 10.2, K- 4.0, a1c- 6.6, TSH- 1.01\par \par *** May 21, 2021 ***\par \par on Janumet 50/1000 mg bid,  olmesartan 40 mg qd, labetalol\par off HCTZ. BP is high since stopping it\par reports fbs- 130's, ppg- 150's\par \par Thyroid/parathyroid US (5/14/21)- RMP iso 1.4x1.2x0.8; LMP iso 1.7x1.1x1.6. no definitive parathyroid nodules\par \par on preFNA US- RMP 2.1 cm, LUP 1.1 (round hypo), LLP 2.2 spongi. Patient declined biopsy of all 3 nodules, so only RMP and LUP nodules were biopsied\par \par HISTORY OF PRESENT ILLNESS. \par \par Ms. GAMBOA was diagnosed with Diabetes Mellitus Type 2 more than 10 years ago. She reports history HTN, dyslipidemia, carcinoid tumor of the lung, and denies CAD,  known complications of retinopathy, nephropathy, or neuropathy. She is presently on metformin 500 mg bid, Lipitor 20 mg HS. As per daughter, she was diagnosed with a hypercalcemia sometime in 05/20, calcium rising to 10.5-11.1, and as per daughter was even higher  in December 2020 when was admitted to the hospital with Covid, and was placed on cinacalcet. However, she developed tremors and stopped taking it about 2 months ago. She's taking some MVI once a day\par Blood sugars are checked 3-4 times a day. She noticed that her blood sugars are higher since she had Covid in 12/20\par Did not bring log book, but reported are typically as following: FBS- 150's- 160's, ppg- low 200's\par Hypoglycemia frequency: none \par Fingerstick glucose in the office today is 236 mg/dL 2 hours after eating. \par Diet: not following ADA\par Exercise: none\par \par Lab review (1/21) : a1c- 6.7%, calcium - 11.1, LDL- 50, TSH- 1.05\par \par DXA (8/19/20)- LS (-2.4) with OA changes  at L1-L2, L3-L4 (-2.8), FN (-1.0)\par \par Last dilated eye - 3/22\par Last podiatry visit  -  3/22\par Last cardiology evaluation - 4/22\par Last stress test - 3/21\par Last 2-D Echo - 3/21, EF- 70%\par cath- 4/21\par Last nephrology evaluation - none\par Last neurology evaluation-none\par

## 2022-12-16 ENCOUNTER — TRANSCRIPTION ENCOUNTER (OUTPATIENT)
Age: 72
End: 2022-12-16

## 2022-12-16 LAB
25(OH)D3 SERPL-MCNC: 29.8 NG/ML
ALBUMIN SERPL ELPH-MCNC: 4.3 G/DL
ALP BLD-CCNC: 65 U/L
ALT SERPL-CCNC: 19 U/L
ANION GAP SERPL CALC-SCNC: 13 MMOL/L
AST SERPL-CCNC: 22 U/L
BILIRUB SERPL-MCNC: 0.3 MG/DL
BUN SERPL-MCNC: 19 MG/DL
CALCIUM SERPL-MCNC: 10.5 MG/DL
CHLORIDE SERPL-SCNC: 101 MMOL/L
CHOLEST SERPL-MCNC: 197 MG/DL
CO2 SERPL-SCNC: 26 MMOL/L
CREAT SERPL-MCNC: 0.97 MG/DL
CREAT SPEC-SCNC: 97 MG/DL
EGFR: 62 ML/MIN/1.73M2
ESTIMATED AVERAGE GLUCOSE: 126 MG/DL
FOLATE SERPL-MCNC: 12.1 NG/ML
FRUCTOSAMINE SERPL-MCNC: 259 UMOL/L
GLUCOSE SERPL-MCNC: 125 MG/DL
HBA1C MFR BLD HPLC: 6 %
HDLC SERPL-MCNC: 81 MG/DL
LDLC SERPL CALC-MCNC: 105 MG/DL
MICROALBUMIN 24H UR DL<=1MG/L-MCNC: 1.3 MG/DL
MICROALBUMIN/CREAT 24H UR-RTO: 13 MG/G
NONHDLC SERPL-MCNC: 116 MG/DL
POTASSIUM SERPL-SCNC: 3.8 MMOL/L
PROT SERPL-MCNC: 7 G/DL
SODIUM SERPL-SCNC: 140 MMOL/L
T4 FREE SERPL-MCNC: 1.1 NG/DL
TRIGL SERPL-MCNC: 54 MG/DL
TSH SERPL-ACNC: 0.75 UIU/ML
VIT B12 SERPL-MCNC: 308 PG/ML

## 2022-12-19 ENCOUNTER — NON-APPOINTMENT (OUTPATIENT)
Age: 72
End: 2022-12-19

## 2022-12-22 ENCOUNTER — RX RENEWAL (OUTPATIENT)
Age: 72
End: 2022-12-22

## 2023-01-01 NOTE — DISCHARGE NOTE PROVIDER - CARE PROVIDERS DIRECT ADDRESSES
,[email or web address] Statement Selected

## 2023-01-06 ENCOUNTER — NON-APPOINTMENT (OUTPATIENT)
Age: 73
End: 2023-01-06

## 2023-01-19 ENCOUNTER — APPOINTMENT (OUTPATIENT)
Dept: PULMONOLOGY | Facility: CLINIC | Age: 73
End: 2023-01-19
Payer: MEDICARE

## 2023-01-19 VITALS
WEIGHT: 180 LBS | SYSTOLIC BLOOD PRESSURE: 110 MMHG | DIASTOLIC BLOOD PRESSURE: 70 MMHG | TEMPERATURE: 97.6 F | BODY MASS INDEX: 31.89 KG/M2 | RESPIRATION RATE: 17 BRPM | HEIGHT: 63 IN | HEART RATE: 75 BPM | OXYGEN SATURATION: 98 %

## 2023-01-19 PROCEDURE — 94727 GAS DIL/WSHOT DETER LNG VOL: CPT

## 2023-01-19 PROCEDURE — 94729 DIFFUSING CAPACITY: CPT

## 2023-01-19 PROCEDURE — ZZZZZ: CPT

## 2023-01-19 PROCEDURE — 94010 BREATHING CAPACITY TEST: CPT

## 2023-01-19 PROCEDURE — 99214 OFFICE O/P EST MOD 30 MIN: CPT | Mod: 25

## 2023-01-19 NOTE — ASSESSMENT
[FreeTextEntry1] : Ms. Najera is a 73 y/o female with a history of HTN, DM, GERD, eosinophilic asthma, allergies and overweight presenting for pulmonary evaluation following lung surgery which revealed benign carcinoid tumorlets, restrictive dysfunction post lobectomy - s/p Breast Surgery (right Mastectomy) - #1 issue energy\par                    \par Ms. Najera' SOB is multifactorial due to:\par -overweight / out of shape\par -poor mechanics of breathing\par -asthma/restrictive dysfunction (s/p surgery)\par -emphysema \par -?cardiac\par \par Her chronic cough is multifactorial due to: -(active)\par -asthma (eosinophilic)\par -LPR/reflux\par \par problem 1: asthma (severe persistent)- compliant - (Active) \par -continue Ventolin rescue inhaler 2 inhalations before exercise, Q6H\par -continue Incruse 1 inhalation daily\par -continue Breo Ellipta 200 at 1 inhalation daily \par -continue Singulair 10 mg QHS \par -continue Albuterol via nebulizer PRN q6H- rescript 3/2022\par -Inhaler technique reviewed as well as oral hygiene techniques reviewed with patient. Avoidance of cold air, extremes of temperature, rescue inhaler should be used before exercise. Order of medication reviewed with patient. Recommended use of a cool mist humidifier in the bedroom. \par -Asthma is believed to be caused by inherited (genetic) and environmental factor, but its exact cause is unknown. Asthma may be triggered by allergens, lung infections, or irritants in the air. Asthma triggers are different for each person\par \par Problem 1A: Chronic cough \par -Amitriptyline 10 mg 3 x qd \par Any cough greater than three weeks duration-differential diagnosis includes-asthma, upper airway cough syndrome, post nasal drip syndrome, gastroesophageal reflux, laryngopharyngeal reflux, cardiac disease (congestive heart failure, medicines, effects, etc), medication effects (b-blockers, ace inhibitors, ARBs, glaucoma meds, etc.), smoking, infectious, multifactorial, etc.\par \par Problem 1B restrictive dysfunction\par -"fixed" problem s/p surgery\par \par problem 2: poor mechanics of breathing\par -Recommended to research the Bhargav Montejo and Seema breathing techniques \par -Proper breathing techniques were reviewed with an emphasis of exhalation. Patient instructed to breath in for 1 second and out for four seconds. Patient was encouraged to not talk while walking. \par \par problem 3: overweight\par -Recommended Tim Malik's 10-day detox diet and book. \par -Weight loss, exercise, and diet control were discussed and are highly encouraged. Treatment options were given such as, aqua therapy, and contacting a nutritionist. Recommended to use the elliptical, stationary bike, less use of treadmill. Mindful eating was explained to the patient Obesity is associated with worsening asthma, shortness of breath, and potential for cardiac disease, diabetes, and other underlying medical conditions. \par \par problem 3A: Poor Balance\par -Given prescription for gait training and balance therapy (STARS)\par \par problem 4: LPR/reflux (controlled) \par -continue Pepcid 40 mg QHS \par -continue Protonix 40 mg QD in the morning \par -Rule of 2s: avoid eating too much, eating too late, eating too spicy, eating two hours before bed\par -Things to avoid including overeating, spicy foods, tight clothing, eating within three hours of bed, this list is not all inclusive. \par -For treatment of reflux, possible options discussed including diet control, H2 blockers, PPIs, as well as coating motility agents discussed as treatment options. Timing of meals and proximity of last meal to sleep were discussed. If symptoms persist, a formal gastrointestinal evaluation is needed.\par \par problem 5: allergy/postnasal drip\par -s/p blood work including: allergen panel food (-), IgE panel (-), asthma panel(-), eosinophil level (-), vitamin D level (low)\par -recommended to use Xlear nasal saline spray \par -continue Clarinex 5 mg QAM \par \par problem 6: carcinoid tumorlets (stable 9/2020) ([place])\par -follow up CT scan (last 9/2021), next now\par \par problem 7: r/o YRIS ([place])\par -recommend Home Sleep Study (virtuox)\par -recommended Silenor 3 mg qHS\par Treatment options discussed including CPAP/BiPAP machine, oral appliance, ProVent therapy, Oxy-Aid by Respitec, new technologies, or positional sleep.Recommended use of the CPAP machine for moderate (AHI >15), moderate to severe (AHI 15-30) and severe patients (AHI > 30). Recommended weight loss which can reduce AHI especially in weight loss of greater than 5% of BMI. Positional sleep is recommended in those with low AHI, low-moderate MBI, and younger age. For severe sleep apnea, the hypoglossal nerve stimulator was recommended as well.\par \par problem 8: eosinophilia- asthma \par -she is a candidate for Nucala injections- Set Up Now \par -The safety and efficacy of Nucala was established in three double-blind, randomized, placebo-controlled trials in patients with severe asthma. Compared to a placebo, patients with severe asthma receiving Nucala had fewer exacerbation requiring hospitalization and/or emergency department visits, and a longer time to first exacerbation.  In addition, patients with severe asthma receiving Nucala experienced greater reductions in their daily maintenance oral corticosteroid dose, while maintaining asthma control compared with patients receiving placebo. Treatment with Nucala did not result in a significant improvement in lung function, as measured by the volume of air exhaled by patients in one second. The most common side effects include: headache, injection site reactions, back pain, weakness, and fatigue; hypersensitivity reactions can occur within hours or days including swelling of the face, mouth, and tongue, fainting, dizziness, hives, breathing problems, and rash; herpes zoster infections have occurred. The drug is a monoclonal antibody that inhibits interleukin -5 which helps regular eosinophils, a type of white blood cell that contributes to asthma. The over-production of eosinophils can cause inflammation in the lungs, increasing the frequency of asthma attacks. Patients must also take other medications, including high dose inhaled corticosteroids and at least one additional asthma drug.\par \par problem 9: low vitamin D\par -s/p blood work to include: CBC, vitamin D, LFTs, HbA1c\par -continue supplemental vitamin D 50,000 every 2 weeks (recheck)\par -Has been associated with asthma exacerbations and increased allergic symptoms. The goal based on recent information is maintaining levels between 50-70 and low normal is 30. Recommended 50,000 units every two weeks to once a month depending on the level. \par \par problem 10: anemia\par -referred to have a GI evaluation and was given the name of Dr. Liban Samuel's office  (recheck)\par \par problem 11: Health Maintenance/COVID19 Precautions: covid vaccine x3\par - Clean your hands often. Wash your hands often with soap and water for at least 20 seconds, especially after blowing your nose, coughing, or sneezing, or having been in a public place.\par - If soap and water are not available, use a hand  that contains at least 60% alcohol.\par - To the extent possible, avoid touching high-touch surfaces in public places - elevator buttons, door handles, handrails, handshaking with people, etc. Use a tissue or your sleeve to cover your hand or finger if you must touch something.\par - Wash your hands after touching surfaces in public places.\par - Avoid touching your face, nose, eyes, etc.\par - Clean and disinfect your home to remove germs: practice routine cleaning of frequently touched surfaces (for example: tables, doorknobs, light switches, handles, desks, toilets, faucets, sinks & cell phones)\par - Avoid crowds, especially in poorly ventilated spaces. Your risk of exposure to respiratory viruses like COVID-19 may increase in crowded, closed-in settings with little air circulation if there are people in the crowd who are sick. All patients are recommended to practice social distancing and stay at least 6 feet away from others.\par - Avoid all non-essential travel including plane trips, and especially avoid embarking on cruise ships.\par -If COVID-19 is spreading in your community, take extra measures to put distance between yourself and other people to further reduce your risk of being exposed to this new virus.\par -Stay home as much as possible.\par - Consider ways of getting food brought to your house through family, social, or commercial networks\par -Be aware that the virus has been known to live in the air up to 3 hours post exposure, cardboard up to 24 hours post exposure, copper up to 4 hours post exposure, steel and plastic up to 2-3 days post exposure. Risk of transmission from these surfaces are affected by many variables.\par Immune Support Recommendations:\par -OTC Vitamin C 500mg BID \par -OTC Quercetin 250-500mg BID \par -OTC Zinc 75-100mg per day \par -OTC Melatonin 1 or 2 mg a night \par -OTC Vitamin D 1-4000mg per day \par -OTC Tonic Water 8oz per day\par Asthma and COVID19:\par You need to make sure your asthma is under control. This often requires the use of inhaled corticosteroids (and sometimes oral corticosteroids). Inhaled corticosteroids do not likely reduce your immune system’s ability to fight infections, but oral corticosteroids may. It is important to use the steps above to protect yourself to limit your exposure to any respiratory virus. \par \par Problem 12: health maintenance\par - S/p Covid 19 vaccine (Pfizer) x3\par -s/p influenza vaccine 2022\par -recommended strep pneumonia vaccines after age 65: Prevnar-13 vaccine, followed by Pneumo vaccine 23 one year following (completed) \par -recommended early intervention for URIs\par -recommended regular osteoporosis evaluations\par -recommended early dermatological evaluations\par -recommended after the age of 50 to the age of 70, colonoscopy every 5 years \par \par F/U in 4 months \par She is encouraged to call with any questions, changes, or concerns.

## 2023-01-19 NOTE — HISTORY OF PRESENT ILLNESS
[FreeTextEntry1] : Ms. Najera is a 72 year old female with a history of abnormal chest CT, asthma, carcinoid tumor of lung, chronic GERD, eosinophilic asthma, low vitamin D, snoring, OW, and SOB presenting to the office today for a follow up visit. Her chief complaint is \par \par -she notes a URI 12/2022\par -she notes feeling fully recovered\par -she notes sweats\par -she notes chest pressure at times\par -she notes an abnormal taste in her mouth at times\par -she notes itchy eyes\par -she notes that her energy levels are not very good\par -she notes exercising (walking)\par -she notes her weight is stable\par -she notes that her appetite is good\par -she notes her sleep is interrupted \par -she notes getting enough sleep \par -she notes taking an Rx for sleep\par -she notes palpitations at times\par -she notes some breathing issues when walking\par \par -patient denies any headaches, nausea, vomiting, fever, chills, chest pain, coughing, wheezing, fatigue, diarrhea, constipation, dysphagia, myalgias, dizziness, leg swelling, leg pain, itchy eyes, itchy ears, heartburn, reflux or sour taste in the mouth

## 2023-01-19 NOTE — PROCEDURE
[FreeTextEntry1] : Full PFT revealed moderate restrictive and mild obstructive dysfunction, with a FEV1 of 0.92L, which is 45% of predicted, hyperinflation, and a mildly decreased diffusion of 13.5, which is 66% of predicted, with a normal flow volume loop \par \par Feno was unable to be performed; a normal value being less than 25. Fractional exhaled nitric oxide (FENO) is regarded as a simple, noninvasive method for assessing eosinophilic airway inflammation. Produced by a variety of cells within the lung, nitric oxide (NO) concentrations are generally low in healthy individuals. However, high concentrations of NO appear to be involved in nonspecific host defense mechanisms and chronic inflammatory  diseases such as asthma. The American Thoracic Society (ATS) therefore recommended using FENO to aid in the diagnosis and monitoring of eosinophilic airway inflammation and asthma, and for identifying steroid responsive individuals whose chronic respiratory symptoms may be caused by airway inflammation

## 2023-01-19 NOTE — ADDENDUM
[FreeTextEntry1] : Documented by Alessandro Cerda acting as a scribe for Dr. Gustavo Cornelius on 01/19/2023.\par \par All medical record entries made by the Scribe were at my, Dr. Gustavo Cornelius's, direction and personally dictated by me on 01/19/2023. I have reviewed the chart and agree that the record accurately reflects my personal performance of the history, physical exam, assessment and plan. I have also personally directed, reviewed, and agree with the discharge instructions.

## 2023-01-30 ENCOUNTER — NON-APPOINTMENT (OUTPATIENT)
Age: 73
End: 2023-01-30

## 2023-01-30 ENCOUNTER — APPOINTMENT (OUTPATIENT)
Dept: CARDIOLOGY | Facility: CLINIC | Age: 73
End: 2023-01-30
Payer: MEDICARE

## 2023-01-30 VITALS
HEIGHT: 63 IN | SYSTOLIC BLOOD PRESSURE: 138 MMHG | RESPIRATION RATE: 17 BRPM | DIASTOLIC BLOOD PRESSURE: 84 MMHG | WEIGHT: 183 LBS | TEMPERATURE: 97.6 F | BODY MASS INDEX: 32.43 KG/M2 | HEART RATE: 69 BPM | OXYGEN SATURATION: 97 %

## 2023-01-30 DIAGNOSIS — M25.473 EFFUSION, UNSPECIFIED ANKLE: ICD-10-CM

## 2023-01-30 PROCEDURE — 93000 ELECTROCARDIOGRAM COMPLETE: CPT

## 2023-01-30 PROCEDURE — 99214 OFFICE O/P EST MOD 30 MIN: CPT | Mod: 25

## 2023-01-30 NOTE — PHYSICAL EXAM
[Well Developed] : well developed [Well Nourished] : well nourished [No Acute Distress] : no acute distress [Normal Venous Pressure] : normal venous pressure [No Carotid Bruit] : no carotid bruit [Normal S1, S2] : normal S1, S2 [No Rub] : no rub [No Gallop] : no gallop [Murmur] : murmur [Clear Lung Fields] : clear lung fields [Good Air Entry] : good air entry [No Respiratory Distress] : no respiratory distress  [Soft] : abdomen soft [Non Tender] : non-tender [No Masses/organomegaly] : no masses/organomegaly [Normal Bowel Sounds] : normal bowel sounds [Normal Gait] : normal gait [No Cyanosis] : no cyanosis [No Clubbing] : no clubbing [No Varicosities] : no varicosities [No Rash] : no rash [No Skin Lesions] : no skin lesions [Moves all extremities] : moves all extremities [No Focal Deficits] : no focal deficits [Normal Speech] : normal speech [Alert and Oriented] : alert and oriented [Normal memory] : normal memory [General Appearance - Well Developed] : well developed [Normal Appearance] : normal appearance [Well Groomed] : well groomed [General Appearance - Well Nourished] : well nourished [No Deformities] : no deformities [General Appearance - In No Acute Distress] : no acute distress [Normal Conjunctiva] : the conjunctiva exhibited no abnormalities [Eyelids - No Xanthelasma] : the eyelids demonstrated no xanthelasmas [Normal Oral Mucosa] : normal oral mucosa [Normal Jugular Venous A Waves Present] : normal jugular venous A waves present [Normal Jugular Venous V Waves Present] : normal jugular venous V waves present [No Jugular Venous Morejon A Waves] : no jugular venous morejon A waves [Respiration, Rhythm And Depth] : normal respiratory rhythm and effort [Exaggerated Use Of Accessory Muscles For Inspiration] : no accessory muscle use [Auscultation Breath Sounds / Voice Sounds] : lungs were clear to auscultation bilaterally [Abdomen Soft] : soft [Abdomen Tenderness] : non-tender [] : no hepato-splenomegaly [Abdomen Mass (___ Cm)] : no abdominal mass palpated [Abnormal Walk] : normal gait [Cyanosis, Localized] : no localized cyanosis [Skin Color & Pigmentation] : normal skin color and pigmentation [Skin Turgor] : normal skin turgor [No Xanthoma] : no  xanthoma was observed [Oriented To Time, Place, And Person] : oriented to person, place, and time [Affect] : the affect was normal [Mood] : the mood was normal [No Anxiety] : not feeling anxious [5th Left ICS - MCL] : palpated at the 5th LICS in the midclavicular line [Normal] : normal [No Precordial Heave] : no precordial heave was noted [Normal Rate] : normal [Rhythm Regular] : regular [Normal S1] : normal S1 [Normal S2] : normal S2 [II] : a grade 2 [2+] : left 2+ [No Abnormalities] : the abdominal aorta was not enlarged and no bruit was heard [___ +] : bilateral [unfilled]U+ pretibial pitting edema [de-identified] : +2 B/L LE pitting edema/ no calf tenderness [S3] : no S3 [S4] : no S4 [Right Carotid Bruit] : no bruit heard over the right carotid [Left Carotid Bruit] : no bruit heard over the left carotid

## 2023-01-30 NOTE — ASSESSMENT
[FreeTextEntry1] : This is a 72 year year old female here today for follow up cardiac evaluation. \par She has a past medical history significant for hypertension, asthma, history of bronchitis, non-insulin-dependent diabetes mellitus, hyperlipidemia, status post right knee replacement\par \par CHIEF COMPLAINT:\par Today she is feeling generally well and does not have any complaints at this time. \par \par She is currently prescribed aspirin 81 mg daily, atorvastatin 20 mg daily, hydrochlorothiazide 25 mg daily, labetalol 200 mg 1 tablet twice daily, olmesartan 40 mg daily and potassium chloride 10 M EQ's 1 tablet only on Saturday and Sunday.\par \par *The patient states that she ran out of her medication and "maybe I have not been taking it for a few days".\par \par She denies fever, chills, weight loss, malaise, rash, alteration bowel habits, weakness, abdominal  pain, bloating, changes in urination, visual disturbances, chest pain, headaches, dizziness, heart palpitations, recent episodes of syncope or falls at this time.\par \par She was born in [facility] and has no history of rheumatic fever. \par \par BLOOD PRESSURE:\par -BP is borderline elevated on today's exam however this may be because she has not been on her blood pressure medication since the pills ran out.  We will send a refill to her pharmacy now.\par \par -I have discussed the importance of maintaining good BP control and reviewed the newest guidelines with the patient while re-enforcing dietary sodium restrictions to no more than 2-3 g daily, DASH diet, life style modifications as well as the goal of maintaining ideal body weight with the patient today. I have advised the patient to avoid the use of over-the-counter medications/ supplements especially NSAIDS.\par \par I have reviewed with Ms. GAMBOA that serious health consequences can occur when blood pressure is not well controlled and the need for strict compliance with medication and that optimal control can significantly reduce the risk of cardiovascular disease stroke, heart attack and other organ damage. They verbally expressed understanding of the fore mentioned serious health consequences to me today.\par \par BLOOD WORK:\par -New blood work was done by her primary care doctor on December 15, 2022 which demonstrated normal lipid profile.  Her A1c was elevated at a 6.0%.\par \par CHOLESTEROL CONTROL:\par -Patient will continue the advised  TLC diet and to continue follow-up for treatment of hyperlipidemia and repeat blood testing with diet and exercise. I have discussed different exercises and the importance of maintenance of optimal body weight. The importance of staying within guidelines and recommendations was stressed to the patient today and they acknowledged that they understand this to me verbally.\par \par  -Ms. GAMBOA was educated and advised that failure to follow-up with my medical recommendations to lower cholesterol can result in severe health consequences therefore, they will continuing a low saturated and low fat diet and to avoid excessive carbohydrates to help reduce triglycerides and that lowering LDL levels is associated with a significant decrease in serious cardiac events including but not limited to heart attack stroke and overall death. We will continue lipid lowering agents as advised based on blood test results and the patient understands to call if they develop severe muscle discomfort or if they have a reddish tinted discoloration in their urine.\par \par DIABETIC CONTROL:\par I will advise the patient to keep try to stay on a low carbohydrate diet lose weight and exercise to reduce spikes in their blood sugar.  The importance of monitoring blood sugar regularly and HgBA1c level were reviewed. I have also discussed annual eye exams to prevent blindness,  monitoring urine microalbumin regularly to check for kidney damage and the importance of proper foot care and regularly checking feet to prevent sores and possibly loss of limbs was reviewed. \par \par TESTING/REPORTS:\par -EKG done Jan 30, 2023 which demonstrated regular sinus rhythm with nonspecific ST-T wave changes, BPM of 69 otherwise remarkable for poor R wave progression and left atrial abnormality.\par \par -Electrocardiogram done January 21, 2022 demonstrated normal sinus rhythm rate of 78 bpm is otherwise remarkable for poor R wave progression and left atrial abnormality.\par \par -She had a normal cardiac catheterization done April 6, 2021 which demonstrated normal left and right heart catheterization and normal pressures.\par \par -Echo Doppler examination done March 22, 2021 demonstrated mild tricuspid valve regurgitation, small pericardial effusion adjacent to the right atrium.\par \par -Holter monitor done October 29, 2021 demonstrated no significant arrhythmia or heart block.\par \par -Echo Doppler examination done May 14, 2020 by another cardiologist which demonstrated mild left ventricular hypertrophy, normal left ventricular function with an estimated ejection fraction of 55 to 60%, and trivial pericardial effusion.  There was mild tricuspid valve regurgitation, calcified mitral valve annulus and thickened mitral valve leaflets.\par \par -Electrocardiogram done June 12, 2020 demonstrated normal sinus rhythm rate of 82 bpm is otherwise remarkable left atrial abnormality, and for poor R wave progression.\par \par -The patient's daughter reports that she had a normal stress echocardiogram April 26, 2017.\par \par PLAN:\par -She will continue with her current medications and will contact the office if she is having any complaints between now and their next follow up appointment.\par -The patient will schedule an  Echo Doppler examination to evaluate murmur, left ventricular function, chamber size, and rule out hypertrophy.\par -She will follow-up with her primary care doctor and all of her specialists routinely.\par \par I have discussed the plan of care with Ms. PAULINA GAMBOA  and she  will follow up in 3 months. She is compliant with all of her medications.\par \par The patient understands that aerobic exercises must be increased to minutes 4 times/week and a detailed discussion of lifestyle modification was done today. \par The patient has a good understanding of the diagnosis, treatment plan and lifestyle modification. \par She will contact me at the office for any questions with their care or any changes in their health status.\par \par \par Eren ABARCA

## 2023-01-30 NOTE — DISCUSSION/SUMMARY
[FreeTextEntry1] : Dr. Hensley-(PRIOR VISIT and PMH WITH Dr. Hensley): \par This is a 71-year-old female with past medical history significant for hypertension, asthma, history of bronchitis, non-insulin-dependent diabetes mellitus, hyperlipidemia, status post right knee replacement, who comes in for cardiac follow-up evaluation.  \par \par The patient still complains of occasional dyspnea on exertion and rare palpitations.  The symptoms not associate with any particular activity.\par \par The patient had a cardiac catheterization done April 6, 2021 which demonstrated normal left and right heart catheterization and normal pressures.\par \par The patient is encouraged to increase her fluid intake.  She will have new blood work done today for electrolytes and lipid profile.\par Her blood pressure is under adequate control.\par Her shortness of breath/dyspnea on exertion may be secondary to reduced aerobic capacity, or her underlying pulmonary disease.\par \par She was seen in consultation by the electrophysiology team and felt that she did not require a loop recorder at this time.\par \par Electrocardiogram done January 21, 2022 demonstrated normal sinus rhythm rate of 78 bpm is otherwise remarkable for poor R wave progression and left atrial abnormality.\par \par She had a normal cardiac catheterization done April 6, 2021.\par \par Echo Doppler examination done March 22, 2021 demonstrated mild tricuspid valve regurgitation, small pericardial effusion adjacent to the right atrium.\par \par Holter monitor done October 29, 2021 demonstrated no significant arrhythmia or heart block.\par \par Most recent lab work from January 22,2022 showed total cholesterol 160, triglycerides 44, HDL 72, LDL calculated 80, non-HDL 88 mg/dL. \par \par PMH:\par The edema is localized to the malleolus area and is consistent with calcium channel blocker induced edema.  The patient had been on 2 calcium channel blockers, and Norvasc was discontinued, and the patient remained on verapamil 240 mg/day.\par She still has bilateral 1+/2 pedal edema at the area of the malleolus.  She will discontinue her verapamil and start on hydrochlorothiazide 25 mg/day and potassium chloride 10 mEq only 1 tablet on Saturday and Sunday.  \par Lipid panel done August 20, 2021 demonstrated a direct LDL 48 mg/dL, cholesterol 115, HDL 65, triglycerides of 33, and non-HDL cholesterol 50 mg/dL.  She will continue on her current medical therapy.  Her LDL is at target level.\par \par She was born in [facility] and has no history of rheumatic fever. \par \par Echo Doppler examination done May 14, 2020 by another cardiologist which demonstrated mild left ventricular hypertrophy, normal left ventricular function with an estimated ejection fraction of 55 to 60%, and trivial pericardial effusion.  There was mild tricuspid valve regurgitation, calcified mitral valve annulus and thickened mitral valve leaflets.\par \par The patient's daughter reports that she had a normal stress echocardiogram April 26, 2017.\par \par Electrocardiogram done June 12, 2020 demonstrated normal sinus rhythm rate of 82 bpm is otherwise remarkable left atrial abnormality, and for poor R wave progression.\par \par The patient understands that aerobic exercises must be increased to 40 minutes 4 times per week. A detailed discussion of lifestyle modification was done today. The patient has a good understanding of the diagnosis, and treatment plan. Lifestyle modification was also outlined.\par

## 2023-01-30 NOTE — PATIENT PROFILE ADULT - HOW PATIENT ADDRESSED, PROFILE
Pearl Time Out performed at 1622, with patient verifying procedure and consent prior to performing the procedure.    Sign Out performed at 1700, with patient verifying procedure performed and addressing specimens if applicable upon completion of the procedure.

## 2023-01-31 RX ORDER — ALENDRONATE SODIUM 70 MG/1
70 TABLET ORAL
Qty: 12 | Refills: 3 | Status: COMPLETED | COMMUNITY
Start: 2022-04-12 | End: 2023-01-31

## 2023-02-19 ENCOUNTER — NON-APPOINTMENT (OUTPATIENT)
Age: 73
End: 2023-02-19

## 2023-03-03 ENCOUNTER — LABORATORY RESULT (OUTPATIENT)
Age: 73
End: 2023-03-03

## 2023-03-03 ENCOUNTER — APPOINTMENT (OUTPATIENT)
Dept: UROLOGY | Facility: CLINIC | Age: 73
End: 2023-03-03
Payer: MEDICARE

## 2023-03-03 VITALS
OXYGEN SATURATION: 98 % | HEIGHT: 63 IN | WEIGHT: 181 LBS | BODY MASS INDEX: 32.07 KG/M2 | HEART RATE: 74 BPM | TEMPERATURE: 97.8 F | SYSTOLIC BLOOD PRESSURE: 154 MMHG | DIASTOLIC BLOOD PRESSURE: 87 MMHG

## 2023-03-03 DIAGNOSIS — R30.0 DYSURIA: ICD-10-CM

## 2023-03-03 PROCEDURE — 99203 OFFICE O/P NEW LOW 30 MIN: CPT

## 2023-03-03 NOTE — ASSESSMENT
[FreeTextEntry1] : 73y/o female with burning urination since 1 year.\par Eats spicy, chocolate, low hydration.\par Never a smoker.\par \par Kidney percussion test negative bilaterally, no pain reported on bi-manual palpation bilaterally, no pain on superficial and deep palpation on the iliac fossa bilaterally and on the ureteral points \par \par For the next 30 days\par Suggested hydration 2LT/day\par Limiting the use of spicy food, citrus fruits, tomatoes, chocolate, alcohol\par Cranberry-based supplements \par \par Collecting urine for culture.\par \par Will F/U in case of positive results

## 2023-03-03 NOTE — PHYSICAL EXAM
[General Appearance - Well Developed] : well developed [General Appearance - Well Nourished] : well nourished [Normal Appearance] : normal appearance [Well Groomed] : well groomed [General Appearance - In No Acute Distress] : no acute distress [Edema] : no peripheral edema [Respiration, Rhythm And Depth] : normal respiratory rhythm and effort [Exaggerated Use Of Accessory Muscles For Inspiration] : no accessory muscle use [Abdomen Soft] : soft [Abdomen Tenderness] : non-tender [Costovertebral Angle Tenderness] : no ~M costovertebral angle tenderness [FreeTextEntry1] : Kidney percussion test negative bilaterally, no pain reported on bi-manual palpation bilaterally, no pain on superficial and deep palpation on the iliac fossa bilaterally and on the ureteral points  [Urinary Bladder Findings] : the bladder was normal on palpation [Normal Station and Gait] : the gait and station were normal for the patient's age [] : no rash [No Focal Deficits] : no focal deficits [Oriented To Time, Place, And Person] : oriented to person, place, and time [Affect] : the affect was normal [Mood] : the mood was normal [Not Anxious] : not anxious [No Palpable Adenopathy] : no palpable adenopathy

## 2023-03-03 NOTE — REVIEW OF SYSTEMS
[Eyesight Problems] : eyesight problems [Palpitations] : palpitations [Shortness Of Breath] : shortness of breath [Cough] : cough [Abdominal Pain] : abdominal pain [Wake up at night to urinate  How many times?  ___] : wakes up to urinate [unfilled] times during the night [Itching] : itching [Depression] : depression [Negative] : Heme/Lymph

## 2023-03-03 NOTE — HISTORY OF PRESENT ILLNESS
[FreeTextEntry1] : 73y/o female with burning urination since 1 year.\par Eats spicy, chocolate, low hydration.\par Never a smoker.\par

## 2023-03-05 LAB
APPEARANCE: CLEAR
BACTERIA UR CULT: NORMAL
BILIRUBIN URINE: NEGATIVE
BLOOD URINE: NEGATIVE
COLOR: NORMAL
GLUCOSE QUALITATIVE U: NEGATIVE
KETONES URINE: NEGATIVE
LEUKOCYTE ESTERASE URINE: ABNORMAL
NITRITE URINE: NEGATIVE
PH URINE: 7
PROTEIN URINE: NORMAL
SPECIFIC GRAVITY URINE: 1.01
UROBILINOGEN URINE: NORMAL

## 2023-03-17 ENCOUNTER — NON-APPOINTMENT (OUTPATIENT)
Age: 73
End: 2023-03-17

## 2023-04-19 NOTE — COMPREHENSIVE ASSESSMENT
[Comprehensive Assessment Reviewed] : Comprehensive assessment reviewed [No Action Needed] : No action needed Please follow up with your endocrinologist outpatient for glucose management.  Please follow up with the endocrinology clinic at [address] [place] [address] upon discharge for further diabetes management. You have been prescribed a new medication. Please take Metformin 1,000 mg twice daily. Continue to take home dose of lantus 30 units at night and aspart premeals.

## 2023-04-28 ENCOUNTER — NON-APPOINTMENT (OUTPATIENT)
Age: 73
End: 2023-04-28

## 2023-05-01 ENCOUNTER — APPOINTMENT (OUTPATIENT)
Dept: HOME HEALTH SERVICES | Facility: HOME HEALTH | Age: 73
End: 2023-05-01

## 2023-05-01 ENCOUNTER — NON-APPOINTMENT (OUTPATIENT)
Age: 73
End: 2023-05-01

## 2023-05-15 ENCOUNTER — APPOINTMENT (OUTPATIENT)
Dept: PULMONOLOGY | Facility: CLINIC | Age: 73
End: 2023-05-15

## 2023-05-15 ENCOUNTER — NON-APPOINTMENT (OUTPATIENT)
Age: 73
End: 2023-05-15

## 2023-05-22 ENCOUNTER — APPOINTMENT (OUTPATIENT)
Dept: ENDOCRINOLOGY | Facility: CLINIC | Age: 73
End: 2023-05-22
Payer: MEDICARE

## 2023-05-22 VITALS
BODY MASS INDEX: 32.27 KG/M2 | HEIGHT: 63 IN | TEMPERATURE: 97.6 F | WEIGHT: 182.1 LBS | OXYGEN SATURATION: 99 % | HEART RATE: 66 BPM | DIASTOLIC BLOOD PRESSURE: 64 MMHG | RESPIRATION RATE: 16 BRPM | SYSTOLIC BLOOD PRESSURE: 138 MMHG

## 2023-05-22 DIAGNOSIS — E11.9 TYPE 2 DIABETES MELLITUS W/OUT COMPLICATIONS: ICD-10-CM

## 2023-05-22 LAB — GLUCOSE BLDC GLUCOMTR-MCNC: 89

## 2023-05-22 PROCEDURE — 99214 OFFICE O/P EST MOD 30 MIN: CPT | Mod: 25

## 2023-05-22 PROCEDURE — 82962 GLUCOSE BLOOD TEST: CPT

## 2023-05-22 NOTE — ASSESSMENT
[Diabetes Foot Care] : diabetes foot care [Long Term Vascular Complications] : long term vascular complications of diabetes [Carbohydrate Consistent Diet] : carbohydrate consistent diet [Importance of Diet and Exercise] : importance of diet and exercise to improve glycemic control, achieve weight loss and improve cardiovascular health [Exercise/Effect on Glucose] : exercise/effect on glucose [Hypoglycemia Management] : hypoglycemia management [Self Monitoring of Blood Glucose] : self monitoring of blood glucose [Retinopathy Screening] : Patient was referred to ophthalmology for retinopathy screening [Diabetic Medications] : Risks and benefits of diabetic medications were discussed [FreeTextEntry1] : 1. DM2, reasonably controlled\par - Janumet 50/1000 mg bid\par - continue lipitor\par - keep off HCTZ.  olmesartan 40 mg qd, Labetalol, verapamil\par \par 2. H/o uncontrolled HTN with hypokalemia on multiple meds\par Suspicious for PA\par - prior paired PAC/PRA is fine, might repeat if indicated and doing a non-contrast CT adrenals\par - we discussed adding aldactone- will wait for now, given stable BP\par \par 3. Hypercalcemia\par - observe off HCTZ\par - monitor calcium/PTH, vitamin D level\par - might need another  24h Uca collection and potential CASR testing\par \par 4. MNG\par - benign FNA b/l nodules\par - essentially stable sono, repeat this month\par \par 5. Osteoporosis\par I advised to the patient on antiresorptive therapy, and reviewed potential options for osteoporosis treatment, including oral and IV bisphosphonates, Prolia, Evenity. R+B of each options were discussed in details\par - OTC vitamin D3 2,000 IU/day\par - continue weight-bearing exercises; advised avoiding high risk sports\par - prior dental evaluation advised.\par - resume Fosamax 70 mg qw\par - DXA 3 sites this month\par RTC 4-5 mos\par \par *** daughter Ismael [phone]\par

## 2023-05-22 NOTE — HISTORY OF PRESENT ILLNESS
[FreeTextEntry1] : F/u for multiple medical issues\par \par *** May 22, 2023 ***\par \par taking Janumet 50/1000 mg bid,  olmesartan 40 mg qd, labetalol, \par back on Fosamax 70 mg qw\par started iron pills for anemia\par no recent sono/DXA\par reports fbs- low 100's\par \par *** Dec 15, 2022 ***\par \par taking Janumet 50/1000 mg bid,  olmesartan 40 mg qd, labetalol, \par stopped  Fosamax 70 mg qw (not sure why, likely ran out)\par feels well. no jaw/hip pains\par reports fbs low 100's\par no recent sono/DXA\par \par *** Jun 07, 2022 ***\par \par taking Janumet 50/1000 mg bid,  olmesartan 40 mg qd, labetalol\par reports fbs- 95- 116, ppg - upto 160\par last a1c- 6.1%\par ca- 10.9\par \par *** Feb 15, 2022 ***\par \par feels ok. dx'ed with a breast cancer- s/p right mastectomy\par on Janumet 50/1000 mg bid,  olmesartan 40 mg qd, labetalol\par still taking potassium supplements and with a persistent edema despite stopping CCB\par \par *** Nov 03, 2021 ***\par \par on Janumet 50/1000 mg bid,  olmesartan 40 mg qd, labetalol\par reports fbs- 90's- 120's, ppg- 110's\par PAC/PRA- 5.7/0.2\par normal plasma free meta\par \par Thyr US (10/2/21)- stable RMP iso 1.6cm, LMP 1.1 cm hypo and LLP 2.2 spongiform nodules\par Parathyroid sestamibi scan (5/27/21)- parathyroid lesion posterior to the LUP and ? another lesion posterior to the RLP\par \par old CT chest from 11/14/18- no adrenal masses seen\par \par *** Sep 13, 2021 ***\par \par on Janumet 50/1000 mg bid,  olmesartan 40 mg qd, labetalol\par off HCTZ\par with leg swelling since adding norvasc- switched to verapamil er 240 mg qd, also on doxazosin and K-dur\par reports fbs- 120's\par FNA (5/21/21)  of RMP 2.1 - nodular goiter, LUP 1.1 - colloid nodule\par labs (8/20/21)- LDL- 43, ca- 10.2, K- 4.0, a1c- 6.6, TSH- 1.01\par \par *** May 21, 2021 ***\par \par on Janumet 50/1000 mg bid,  olmesartan 40 mg qd, labetalol\par off HCTZ. BP is high since stopping it\par reports fbs- 130's, ppg- 150's\par \par Thyroid/parathyroid US (5/14/21)- RMP iso 1.4x1.2x0.8; LMP iso 1.7x1.1x1.6. no definitive parathyroid nodules\par \par on preFNA US- RMP 2.1 cm, LUP 1.1 (round hypo), LLP 2.2 spongi. Patient declined biopsy of all 3 nodules, so only RMP and LUP nodules were biopsied\par \par HISTORY OF PRESENT ILLNESS. \par \par Ms. GAMBOA was diagnosed with Diabetes Mellitus Type 2 more than 10 years ago. She reports history HTN, dyslipidemia, carcinoid tumor of the lung, and denies CAD,  known complications of retinopathy, nephropathy, or neuropathy. She is presently on metformin 500 mg bid, Lipitor 20 mg HS. As per daughter, she was diagnosed with a hypercalcemia sometime in 05/20, calcium rising to 10.5-11.1, and as per daughter was even higher  in December 2020 when was admitted to the hospital with Covid, and was placed on cinacalcet. However, she developed tremors and stopped taking it about 2 months ago. She's taking some MVI once a day\par Blood sugars are checked 3-4 times a day. She noticed that her blood sugars are higher since she had Covid in 12/20\par Did not bring log book, but reported are typically as following: FBS- 150's- 160's, ppg- low 200's\par Hypoglycemia frequency: none \par Fingerstick glucose in the office today is 236 mg/dL 2 hours after eating. \par Diet: not following ADA\par Exercise: none\par \par Lab review (1/21) : a1c- 6.7%, calcium - 11.1, LDL- 50, TSH- 1.05\par \par DXA (8/19/20)- LS (-2.4) with OA changes  at L1-L2, L3-L4 (-2.8), FN (-1.0)\par \par Last dilated eye - 3/22\par Last podiatry visit  -  3/22\par Last cardiology evaluation - 4/22\par Last stress test - 3/21\par Last 2-D Echo - 3/21, EF- 70%\par cath- 4/21\par Last nephrology evaluation - none\par Last neurology evaluation-none\par

## 2023-05-25 ENCOUNTER — NON-APPOINTMENT (OUTPATIENT)
Age: 73
End: 2023-05-25

## 2023-05-25 LAB
25(OH)D3 SERPL-MCNC: 30 NG/ML
ALBUMIN SERPL ELPH-MCNC: 4.4 G/DL
ALP BLD-CCNC: 60 U/L
ALT SERPL-CCNC: 15 U/L
ANION GAP SERPL CALC-SCNC: 13 MMOL/L
AST SERPL-CCNC: 22 U/L
BILIRUB SERPL-MCNC: 0.3 MG/DL
BUN SERPL-MCNC: 14 MG/DL
CALCIUM SERPL-MCNC: 10.9 MG/DL
CHLORIDE SERPL-SCNC: 99 MMOL/L
CHOLEST SERPL-MCNC: 183 MG/DL
CO2 SERPL-SCNC: 27 MMOL/L
CREAT SERPL-MCNC: 0.87 MG/DL
CREAT SPEC-SCNC: 78 MG/DL
EGFR: 70 ML/MIN/1.73M2
ESTIMATED AVERAGE GLUCOSE: 131 MG/DL
FOLATE SERPL-MCNC: 12.6 NG/ML
FRUCTOSAMINE SERPL-MCNC: 270 UMOL/L
GLUCOSE SERPL-MCNC: 82 MG/DL
HBA1C MFR BLD HPLC: 6.2 %
HDLC SERPL-MCNC: 67 MG/DL
LDLC SERPL CALC-MCNC: 103 MG/DL
MICROALBUMIN 24H UR DL<=1MG/L-MCNC: 1.3 MG/DL
MICROALBUMIN/CREAT 24H UR-RTO: 17 MG/G
NONHDLC SERPL-MCNC: 115 MG/DL
POTASSIUM SERPL-SCNC: 4.4 MMOL/L
PROT SERPL-MCNC: 6.9 G/DL
SODIUM SERPL-SCNC: 139 MMOL/L
T4 FREE SERPL-MCNC: 1.2 NG/DL
TRIGL SERPL-MCNC: 63 MG/DL
TSH SERPL-ACNC: 0.63 UIU/ML
VIT B12 SERPL-MCNC: 258 PG/ML

## 2023-06-02 ENCOUNTER — APPOINTMENT (OUTPATIENT)
Dept: HOME HEALTH SERVICES | Facility: HOME HEALTH | Age: 73
End: 2023-06-02

## 2023-06-14 ENCOUNTER — RX RENEWAL (OUTPATIENT)
Age: 73
End: 2023-06-14

## 2023-06-19 NOTE — H&P PST ADULT - VENOUS THROMBOEMBOLISM FOR WOMEN ONLY
6/19/2023         RE: Libby Grimaldo  [id number] [facility] [address]        Dear Colleague,    Thank you for referring your patient, Libby Grimaldo, to the [facility]. Please see a copy of my visit note below.      BMT Progress Note    BMT Physician: Dr. Guillory/Rashad Chen     Oncology History   Multiple myeloma not having achieved remission (H)   11/7/2018 -  Cancer Staged    Staging form: Plasma Cell Myeloma and Disorders, AJCC 8th Edition  - Clinical stage from 11/7/2018: Albumin (g/dL): 3.4, ISS: Stage II, High-risk cytogenetics: Absent, LDH: Unknown     11/7/2018 Initial Diagnosis    Multiple myeloma not having achieved remission (H)     1/23/2019 - 11/14/2019 Chemotherapy    KRD x 6 cycles     11/4/2019 - 2/14/2021 Chemotherapy    Rev/Dex(20)      4/3/2020 - 7/17/2020 Chemotherapy    Velcade maintenance - dose reductions for orthostatic hypotension     7/17/2020 - 12/15/2020 Chemotherapy    Revlimid maintenance     11/23/2020 Progression    Bone marrow 35% plasma cells, PET CT demonstrating new lytic lesions     12/15/2020 - 6/11/2021 Chemotherapy    Elsy, Kd x 6 cycles     7/7/2021 -  Cancer Staged    PET/CT - CR     7/21/2021 -  Chemotherapy    Subcutaneous Daratumumab, IVIG, denosumab monthly     11/22/2021 -  Cancer Staged    Bone marrow aspirate - MRD 0.0022% positive     2/1/2022 - 2/5/2022 Chemotherapy    IP BMT Chemotherapy For Mobilization - High Dose Cyclophosphamide  Plan Provider: Julio C Guillory MD  Treatment goal: Other  Line of treatment: [No plan line of treatment]     5/25/2022 - 11/23/2022 Chemotherapy    IP - OP BMT 2016-35 Auto Multiple Myeloma - Melphalan (CrCL < 30 mL/min, >/= 2 comorbidities, OR age > 75yrs) - Adult (Version: 7/13/21)  Plan Provider: Julio C Guillory MD  Treatment goal: Other  Line of treatment: [No plan line of treatment]     9/29/2022 - 3/9/2023 Supportive Treatment    Oral ONC - ixazomib maintenance  post-transplant  Plan Provider: Julio C Guillory MD  Treatment goal: Curative  Line of treatment: [No plan line of treatment]     4/21/2023 -  Supportive Treatment    OP ONC Zoledronic Acid (Zometa) MONTHLY  Plan Provider: Julio C Guillory MD  Treatment goal: Palliative  Line of treatment: Maintenance     4/24/2023 -  Chemotherapy    OP ONC Multiple Myeloma - Elotuzumab / Pomalidomide / Dexamethasone (<75 years old)  Plan Provider: Julio C Guillory MD  Treatment goal: Palliative  Line of treatment: Third Line         ID: Libby Grimaldo is a 68 yo woman, 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 Now on ninlaro maintenance.     INTERIM HISTORY:   Libby returns to clinic for a dose of elotuzumab, (along w/ taking pomalidomide, dexamethasone)--C2D22.  She has fatigue and n/v and wonders about a dose reduction w/ next cycle.  She had no reactions to elotuzumab.  No other new complaints today.    ROS: 8 point ROS neg other than the symptoms noted above in the HPI.     PHYSICAL EXAM:   Vitals:  Blood pressure 133/78, pulse 99, temperature 99.5  F (37.5  C), temperature source Oral, resp. rate 16, weight 47.6 kg (104 lb 14.4 oz), SpO2 97 %.      Wt Readings from Last 4 Encounters:   06/19/23 47.6 kg (104 lb 14.4 oz)   06/07/23 48.5 kg (107 lb)   05/22/23 49.5 kg (109 lb 3.2 oz)   05/15/23 48.7 kg (107 lb 4.8 oz)     General: NAD   Eyes: GREGG, sclera anicteric; exopthalmous  Lymphatics: no edema  Lungs:  Few coarse BS w/ cough during exam; otherwise clear  CV:  RRR  Skin: no rashes or petechaie. No thickening/hidebound changes  Neuro: A&O   Additional Findings: Port site NT, no drainage.      Labs:  Lab Results   Component Value Date    WBC 6.2 06/07/2023    ANEU 3.9 06/16/2022    HGB 10.4 (L) 06/07/2023    HCT 32.8 (L) 06/07/2023     06/07/2023     05/22/2023    POTASSIUM 3.3 (L) 05/22/2023    CHLORIDE 105 05/22/2023    CO2 28 05/22/2023     (H) 05/22/2023    BUN 12.5 05/22/2023    CR 0.96 (H)  05/22/2023    MAG 2.1 04/17/2023    INR 1.40 (H) 05/30/2022    BILITOTAL 0.4 05/22/2023    AST 10 05/22/2023    ALT 10 05/22/2023    ALKPHOS 127 (H) 05/22/2023    PROTTOTAL 5.2 (L) 05/22/2023    ALBUMIN 3.1 (L) 05/22/2023        ASSESSMENT AND PLAN:   Libby Grimaldo is a 68 year old female with 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      Myeloma/BMT/IEC PROTOCOL for 2016-35  - Chemo protocol: HD Melphalan 140mg/m2  Day 0: Cell dose: 3.94x10^6 (s/p cytoxan chemo priming 2/1/2022 and subsequent stem cell collection).   - Restaging plan: per protocol.  - Maintenance Ninlaro started early Oct 2022; Day 28 (11/4) and stopped in January 2023  - Elotuzumab, pomalidomide, dexamethasone initiated April 2023  - C2 D22 today (6/19):  elotuzumab today  - next infusion 6/26: C3, D1--have messaged Dr. Guillory regarding dosing of this cycle        HEME/COAG  - anemia, thrombocytopenia due to chemo.  monitor on current regimen  - Relevant thrombosis or bleeding history:   2/15/22: new non-occlusive LUE DVT. No longer on xarelto      ID  - Prophylaxis plan: ACV, Bactrim    - Covid vacc #1 (post BMT) and flu shot 10/3/22     CARDIOVASCULAR  - Risk of cardiomyopathy:  Baseline EF 56%  - known hyperlipidemia- per notes previously on Crestor but not on med list - follow-up with PCP if needed    History of Afib - now in NSR but tachy. Continue metoprolol XL 25 mg/d      RESPIRATORY  - hx of COPD, continue breo-ellipa inhaler      GI/NUTRITION  - CINV: continues on Zyprexa bid, Zofran PRN & Compazine prn.     - Ulcer prophylaxis: protonix 40mg daily.  - note of LFT abnormality today, ? Chemo effect        RENAL/ELECTROLYTES/:  - hypokalemia - she will increase her K rich foods. Add metamucil.   - hypoNa:  Free water restriction; pharm review for hypoNa: isaias: 40%; polalidomide 11%; effexor up to 39%.  No med change today; may be dose adjusting next cycle.    PSYCH  - continue effexor 75mg tid     SYMPTOM MANAGEMENT.  - # Pain Assessment:  - Libby vivas  experiencing pain due to chronic pain - Continue suboxone and prn oxycodone.      Respiratory infection  - much improved after abx    Plan  - patient started pom late so schedule shifted to day 7-28  - continue lucia/pom/dex -- Lucia today  - 6/21 ophtho referral  - 6/26 C3D1 (msged Dr. Guillory as above for dosing rec)    35 minutes spent on the date of the encounter doing chart review, history and exam, lab review, documentation and coordniating care.    Sarita Jimenez pa-c  [phone]       none

## 2023-06-20 ENCOUNTER — APPOINTMENT (OUTPATIENT)
Dept: CARDIOLOGY | Facility: CLINIC | Age: 73
End: 2023-06-20
Payer: MEDICARE

## 2023-06-20 ENCOUNTER — NON-APPOINTMENT (OUTPATIENT)
Age: 73
End: 2023-06-20

## 2023-06-20 VITALS
WEIGHT: 176 LBS | OXYGEN SATURATION: 99 % | HEART RATE: 57 BPM | HEIGHT: 63 IN | BODY MASS INDEX: 31.18 KG/M2 | SYSTOLIC BLOOD PRESSURE: 128 MMHG | DIASTOLIC BLOOD PRESSURE: 72 MMHG | RESPIRATION RATE: 16 BRPM | TEMPERATURE: 97.4 F

## 2023-06-20 DIAGNOSIS — I31.39 OTHER PERICARDIAL EFFUSION (NONINFLAMMATORY): ICD-10-CM

## 2023-06-20 DIAGNOSIS — R06.09 OTHER FORMS OF DYSPNEA: ICD-10-CM

## 2023-06-20 DIAGNOSIS — R42 DIZZINESS AND GIDDINESS: ICD-10-CM

## 2023-06-20 PROCEDURE — 93000 ELECTROCARDIOGRAM COMPLETE: CPT

## 2023-06-20 PROCEDURE — 93306 TTE W/DOPPLER COMPLETE: CPT

## 2023-06-20 PROCEDURE — 99214 OFFICE O/P EST MOD 30 MIN: CPT | Mod: 25

## 2023-06-20 NOTE — PHYSICAL EXAM
[Well Developed] : well developed [Well Nourished] : well nourished [No Acute Distress] : no acute distress [Normal Venous Pressure] : normal venous pressure [No Carotid Bruit] : no carotid bruit [Normal S1, S2] : normal S1, S2 [No Rub] : no rub [No Gallop] : no gallop [Murmur] : murmur [Clear Lung Fields] : clear lung fields [Good Air Entry] : good air entry [No Respiratory Distress] : no respiratory distress  [Soft] : abdomen soft [Non Tender] : non-tender [No Masses/organomegaly] : no masses/organomegaly [Normal Bowel Sounds] : normal bowel sounds [Normal Gait] : normal gait [No Cyanosis] : no cyanosis [No Clubbing] : no clubbing [No Varicosities] : no varicosities [No Skin Lesions] : no skin lesions [No Rash] : no rash [Moves all extremities] : moves all extremities [No Focal Deficits] : no focal deficits [Normal Speech] : normal speech [Alert and Oriented] : alert and oriented [Normal memory] : normal memory [General Appearance - Well Developed] : well developed [Normal Appearance] : normal appearance [Well Groomed] : well groomed [General Appearance - Well Nourished] : well nourished [No Deformities] : no deformities [General Appearance - In No Acute Distress] : no acute distress [Normal Conjunctiva] : the conjunctiva exhibited no abnormalities [Eyelids - No Xanthelasma] : the eyelids demonstrated no xanthelasmas [Normal Oral Mucosa] : normal oral mucosa [Normal Jugular Venous A Waves Present] : normal jugular venous A waves present [Normal Jugular Venous V Waves Present] : normal jugular venous V waves present [No Jugular Venous Morejon A Waves] : no jugular venous morejon A waves [Respiration, Rhythm And Depth] : normal respiratory rhythm and effort [Exaggerated Use Of Accessory Muscles For Inspiration] : no accessory muscle use [Auscultation Breath Sounds / Voice Sounds] : lungs were clear to auscultation bilaterally [Abdomen Soft] : soft [Abdomen Tenderness] : non-tender [] : no hepato-splenomegaly [Abdomen Mass (___ Cm)] : no abdominal mass palpated [Abnormal Walk] : normal gait [Cyanosis, Localized] : no localized cyanosis [Skin Color & Pigmentation] : normal skin color and pigmentation [Skin Turgor] : normal skin turgor [No Xanthoma] : no  xanthoma was observed [Oriented To Time, Place, And Person] : oriented to person, place, and time [Affect] : the affect was normal [Mood] : the mood was normal [No Anxiety] : not feeling anxious [5th Left ICS - MCL] : palpated at the 5th LICS in the midclavicular line [Normal] : normal [No Precordial Heave] : no precordial heave was noted [Normal Rate] : normal [Rhythm Regular] : regular [Normal S1] : normal S1 [Normal S2] : normal S2 [II] : a grade 2 [2+] : left 2+ [No Abnormalities] : the abdominal aorta was not enlarged and no bruit was heard [___ +] : bilateral [unfilled]U+ pretibial pitting edema [de-identified] : +2 B/L LE pitting edema/ no calf tenderness [S3] : no S3 [S4] : no S4 [Right Carotid Bruit] : no bruit heard over the right carotid [Left Carotid Bruit] : no bruit heard over the left carotid

## 2023-06-20 NOTE — REVIEW OF SYSTEMS
[Negative] : Heme/Lymph [Chest Discomfort] : no chest discomfort [Lower Ext Edema] : no extremity edema [Palpitations] : no palpitations [FreeTextEntry9] : LE swelling

## 2023-06-20 NOTE — ASSESSMENT
[FreeTextEntry1] : This is a 73 year year old female here today for follow up cardiac evaluation. \par She has a past medical history significant for hypertension, asthma, history of bronchitis, non-insulin-dependent diabetes mellitus, hyperlipidemia, status post right knee replacement\par \par CHIEF COMPLAINT:\par Today she is feeling generally well and does not have any complaints at this time. \par \par She is currently prescribed aspirin 81 mg daily, atorvastatin 20 mg daily, hydrochlorothiazide 25 mg daily, labetalol 200 mg 1 tablet twice daily, olmesartan 40 mg daily and potassium chloride 10 M EQ's 1 tablet only on Saturday and Sunday.\par \par She denies fever, chills, weight loss, malaise, rash, alteration bowel habits, weakness, abdominal  pain, bloating, changes in urination, visual disturbances, chest pain, headaches, dizziness, heart palpitations, recent episodes of syncope or falls at this time.\par \par She was born in [facility] and has no history of rheumatic fever. \par \par BLOOD PRESSURE:\par -BP is controlled on today's exam\par \par BLOOD WORK:\par -Blood work done by primary care  May 22nd 2023 demonstrated normal lipid profile.\par \par -New blood work was done by her primary care doctor on December 15, 2022 which demonstrated normal lipid profile.  Her A1c was elevated at a 6.0%.\par \par TESTING/REPORTS:\par -EKG done 06/20/2023 which demonstrated regular sinus rhythm with nonspecific ST-T wave changes BPM of 57\par \par -Echocardiogram done in the office 6/20/2023 and results are pending. \par \par -EKG done Jan 30, 2023 which demonstrated regular sinus rhythm with nonspecific ST-T wave changes, BPM of 69 otherwise remarkable for poor R wave progression and left atrial abnormality.\par \par -Electrocardiogram done January 21, 2022 demonstrated normal sinus rhythm rate of 78 bpm is otherwise remarkable for poor R wave progression and left atrial abnormality.\par \par -She had a normal cardiac catheterization done April 6, 2021 which demonstrated normal left and right heart catheterization and normal pressures.\par \par -Echo Doppler examination done March 22, 2021 demonstrated mild tricuspid valve regurgitation, small pericardial effusion adjacent to the right atrium.\par \par -Holter monitor done October 29, 2021 demonstrated no significant arrhythmia or heart block.\par \par -Echo Doppler examination done May 14, 2020 by another cardiologist which demonstrated mild left ventricular hypertrophy, normal left ventricular function with an estimated ejection fraction of 55 to 60%, and trivial pericardial effusion.  There was mild tricuspid valve regurgitation, calcified mitral valve annulus and thickened mitral valve leaflets.\par \par -Electrocardiogram done June 12, 2020 demonstrated normal sinus rhythm rate of 82 bpm is otherwise remarkable left atrial abnormality, and for poor R wave progression.\par \par -The patient's daughter reports that she had a normal stress echocardiogram April 26, 2017.\par \par PLAN:\par -The patient is clinically stable from a cardiac standpoint on today's exam. \par -She will continue with her current medications and will contact the office if she is having any complaints between now and their next follow up appointment.\par -She will follow-up with her primary care doctor and all of her specialists routinely.\par \par I have discussed the plan of care with Ms. PAULINA GAMBOA  and she  will follow up in 4-6 months. She is compliant with all of her medications.\par \par The patient understands that aerobic exercises must be increased to minutes 4 times/week and a detailed discussion of lifestyle modification was done today. \par The patient has a good understanding of the diagnosis, treatment plan and lifestyle modification. \par She will contact me at the office for any questions with their care or any changes in their health status.\par \par \par Eren ABARCA

## 2023-06-20 NOTE — DISCUSSION/SUMMARY
[FreeTextEntry1] : Dr. Hensley-(PRIOR VISIT and PMH WITH Dr. Hensley): \par This is a 71-year-old female with past medical history significant for hypertension, asthma, history of bronchitis, non-insulin-dependent diabetes mellitus, hyperlipidemia, status post right knee replacement, who comes in for cardiac follow-up evaluation.  \par \par The patient still complains of occasional dyspnea on exertion and rare palpitations.  The symptoms not associate with any particular activity.\par \par The patient had a cardiac catheterization done April 6, 2021 which demonstrated normal left and right heart catheterization and normal pressures.\par \par The patient is encouraged to increase her fluid intake.  She will have new blood work done today for electrolytes and lipid profile.\par Her blood pressure is under adequate control.\par Her shortness of breath/dyspnea on exertion may be secondary to reduced aerobic capacity, or her underlying pulmonary disease.\par \par She was seen in consultation by the electrophysiology team and felt that she did not require a loop recorder at this time.\par \par Electrocardiogram done January 21, 2022 demonstrated normal sinus rhythm rate of 78 bpm is otherwise remarkable for poor R wave progression and left atrial abnormality.\par \par She had a normal cardiac catheterization done April 6, 2021.\par \par Echo Doppler examination done March 22, 2021 demonstrated mild tricuspid valve regurgitation, small pericardial effusion adjacent to the right atrium.\par \par Holter monitor done October 29, 2021 demonstrated no significant arrhythmia or heart block.\par \par Most recent lab work from January 22,2022 showed total cholesterol 160, triglycerides 44, HDL 72, LDL calculated 80, non-HDL 88 mg/dL. \par \par PMH:\par The edema is localized to the malleolus area and is consistent with calcium channel blocker induced edema.  The patient had been on 2 calcium channel blockers, and Norvasc was discontinued, and the patient remained on verapamil 240 mg/day.\par She still has bilateral 1+/2 pedal edema at the area of the malleolus.  She will discontinue her verapamil and start on hydrochlorothiazide 25 mg/day and potassium chloride 10 mEq only 1 tablet on Saturday and Sunday.  \par Lipid panel done August 20, 2021 demonstrated a direct LDL 48 mg/dL, cholesterol 115, HDL 65, triglycerides of 33, and non-HDL cholesterol 50 mg/dL.  She will continue on her current medical therapy.  Her LDL is at target level.\par \par She was born in [facility] and has no history of rheumatic fever. \par \par Echo Doppler examination done May 14, 2020 by another cardiologist which demonstrated mild left ventricular hypertrophy, normal left ventricular function with an estimated ejection fraction of 55 to 60%, and trivial pericardial effusion.  There was mild tricuspid valve regurgitation, calcified mitral valve annulus and thickened mitral valve leaflets.\par \par The patient's daughter reports that she had a normal stress echocardiogram April 26, 2017.\par \par Electrocardiogram done June 12, 2020 demonstrated normal sinus rhythm rate of 82 bpm is otherwise remarkable left atrial abnormality, and for poor R wave progression.\par \par The patient understands that aerobic exercises must be increased to 40 minutes 4 times per week. A detailed discussion of lifestyle modification was done today. The patient has a good understanding of the diagnosis, and treatment plan. Lifestyle modification was also outlined.\par

## 2023-06-21 RX ORDER — FLUTICASONE FUROATE AND VILANTEROL TRIFENATATE 100; 25 UG/1; UG/1
100-25 POWDER RESPIRATORY (INHALATION)
Qty: 1 | Refills: 11 | Status: COMPLETED | COMMUNITY
Start: 2017-09-01 | End: 2023-06-21

## 2023-06-21 RX ORDER — ALBUTEROL SULFATE 90 UG/1
108 (90 BASE) AEROSOL, METERED RESPIRATORY (INHALATION)
Qty: 1 | Refills: 3 | Status: COMPLETED | COMMUNITY
Start: 2021-03-31 | End: 2023-06-21

## 2023-06-21 RX ORDER — DOXAZOSIN 4 MG/1
4 TABLET ORAL DAILY
Qty: 90 | Refills: 3 | Status: COMPLETED | COMMUNITY
Start: 2021-06-21 | End: 2023-06-21

## 2023-07-08 PROBLEM — R42 DIZZINESS: Status: ACTIVE | Noted: 2020-06-12

## 2023-07-08 PROBLEM — R06.09 DOE (DYSPNEA ON EXERTION): Status: ACTIVE | Noted: 2021-03-29

## 2023-08-09 NOTE — H&P CARDIOLOGY - ADDITIONAL PE
mallampati 2 Bexarotene Pregnancy And Lactation Text: This medication is Pregnancy Category X and should not be given to women who are pregnant or may become pregnant. This medication should not be used if you are breast feeding.

## 2023-08-15 ENCOUNTER — APPOINTMENT (OUTPATIENT)
Dept: HOME HEALTH SERVICES | Facility: HOME HEALTH | Age: 73
End: 2023-08-15

## 2023-08-31 ENCOUNTER — APPOINTMENT (OUTPATIENT)
Dept: PULMONOLOGY | Facility: CLINIC | Age: 73
End: 2023-08-31
Payer: MEDICARE

## 2023-08-31 VITALS
SYSTOLIC BLOOD PRESSURE: 120 MMHG | OXYGEN SATURATION: 98 % | RESPIRATION RATE: 16 BRPM | HEIGHT: 63 IN | BODY MASS INDEX: 34.2 KG/M2 | WEIGHT: 193 LBS | TEMPERATURE: 97.7 F | HEART RATE: 67 BPM | DIASTOLIC BLOOD PRESSURE: 70 MMHG

## 2023-08-31 DIAGNOSIS — J82.83 EOSINOPHILIC ASTHMA: ICD-10-CM

## 2023-08-31 DIAGNOSIS — K21.9 GASTRO-ESOPHAGEAL REFLUX DISEASE W/OUT ESOPHAGITIS: ICD-10-CM

## 2023-08-31 DIAGNOSIS — R79.89 OTHER SPECIFIED ABNORMAL FINDINGS OF BLOOD CHEMISTRY: ICD-10-CM

## 2023-08-31 DIAGNOSIS — R93.89 ABNORMAL FINDINGS ON DIAGNOSTIC IMAGING OF OTHER SPECIFIED BODY STRUCTURES: ICD-10-CM

## 2023-08-31 DIAGNOSIS — R06.02 SHORTNESS OF BREATH: ICD-10-CM

## 2023-08-31 DIAGNOSIS — D3A.090 BENIGN CARCINOID TUMOR OF THE BRONCHUS AND LUNG: ICD-10-CM

## 2023-08-31 PROCEDURE — 99214 OFFICE O/P EST MOD 30 MIN: CPT | Mod: 25

## 2023-08-31 PROCEDURE — 94010 BREATHING CAPACITY TEST: CPT

## 2023-08-31 NOTE — ASSESSMENT
[FreeTextEntry1] : Ms. Najera is a 72 y/o female with a history of HTN, DM, GERD, eosinophilic asthma, allergies and overweight presenting for pulmonary evaluation following lung surgery which revealed benign carcinoid tumorlets, restrictive dysfunction post lobectomy - s/p Breast Surgery (right Mastectomy) - #1 issue energy (still); poor balance                      Her SOB is multifactorial due to: -overweight / out of shape -poor mechanics of breathing -asthma/restrictive dysfunction (s/p surgery) -emphysema  -?cardiac  Her chronic cough is multifactorial due to: -(active) -asthma (eosinophilic) -LPR/reflux  problem 1: asthma (severe persistent)- compliant - (Active)  -continue Ventolin rescue inhaler 2 inhalations before exercise, Q6H -continue Incruse 1 inhalation daily -continue Breo Ellipta 200 at 1 inhalation daily  -continue Singulair 10 mg QHS  -continue Albuterol via nebulizer PRN q6H- rescript 3/2022 -add Theophylline 400 mg QD -Inhaler technique reviewed as well as oral hygiene techniques reviewed with patient. Avoidance of cold air, extremes of temperature, rescue inhaler should be used before exercise. Order of medication reviewed with patient. Recommended use of a cool mist humidifier in the bedroom.  -Asthma is believed to be caused by inherited (genetic) and environmental factor, but its exact cause is unknown. Asthma may be triggered by allergens, lung infections, or irritants in the air. Asthma triggers are different for each person  Problem 1A: Chronic cough  -Amitriptyline 10 mg 3 x qd  Any cough greater than three weeks duration-differential diagnosis includes-asthma, upper airway cough syndrome, post nasal drip syndrome, gastroesophageal reflux, laryngopharyngeal reflux, cardiac disease (congestive heart failure, medicines, effects, etc), medication effects (b-blockers, ace inhibitors, ARBs, glaucoma meds, etc.), smoking, infectious, multifactorial, etc.  Problem 1B restrictive dysfunction -"fixed" problem s/p surgery  problem 2: poor mechanics of breathing -Recommended to research the Bhargav Torresf and Seema breathing techniques  -Proper breathing techniques were reviewed with an emphasis of exhalation. Patient instructed to breath in for 1 second and out for four seconds. Patient was encouraged to not talk while walking.   problem 3: overweight -Recommended Tim Malik's 10-day detox diet and book.  -Weight loss, exercise, and diet control were discussed and are highly encouraged. Treatment options were given such as, aqua therapy, and contacting a nutritionist. Recommended to use the elliptical, stationary bike, less use of treadmill. Mindful eating was explained to the patient Obesity is associated with worsening asthma, shortness of breath, and potential for cardiac disease, diabetes, and other underlying medical conditions.   problem 3A: Poor Balance -Given prescription for gait training and balance therapy (STARS)  problem 4: LPR/reflux (controlled)  -continue Pepcid 40 mg QHS  -continue Protonix 40 mg QD in the morning  -Rule of 2s: avoid eating too much, eating too late, eating too spicy, eating two hours before bed -Things to avoid including overeating, spicy foods, tight clothing, eating within three hours of bed, this list is not all inclusive.  -For treatment of reflux, possible options discussed including diet control, H2 blockers, PPIs, as well as coating motility agents discussed as treatment options. Timing of meals and proximity of last meal to sleep were discussed. If symptoms persist, a formal gastrointestinal evaluation is needed.  problem 5: allergy/postnasal drip -s/p blood work including: allergen panel food (-), IgE panel (-), asthma panel(-), eosinophil level (-), vitamin D level (low) -recommended to use Xlear nasal saline spray  -continue Clarinex 5 mg QAM   problem 6: carcinoid tumorlets (stable 9/2020) ([place]) -follow up CT scan (last 9/2021), next now (rescripted multiple times)   problem 7: r/o YRIS ([place]) -recommend Home Sleep Study (virtuox) -recommended Silenor 3 mg qHS Treatment options discussed including CPAP/BiPAP machine, oral appliance, ProVent therapy, Oxy-Aid by [employer]pite[employer], new technologies, or positional sleep.Recommended use of the CPAP machine for moderate (AHI >15), moderate to severe (AHI 15-30) and severe patients (AHI > 30). Recommended weight loss which can reduce AHI especially in weight loss of greater than 5% of BMI. Positional sleep is recommended in those with low AHI, low-moderate MBI, and younger age. For severe sleep apnea, the hypoglossal nerve stimulator was recommended as well.  problem 8: eosinophilia- asthma  -she is a candidate for Nucala injections- Set Up Now  -The safety and efficacy of Nucala was established in three double-blind, randomized, placebo-controlled trials in patients with severe asthma. Compared to a placebo, patients with severe asthma receiving Nucala had fewer exacerbation requiring hospitalization and/or emergency department visits, and a longer time to first exacerbation.  In addition, patients with severe asthma receiving Nucala experienced greater reductions in their daily maintenance oral corticosteroid dose, while maintaining asthma control compared with patients receiving placebo. Treatment with Nucala did not result in a significant improvement in lung function, as measured by the volume of air exhaled by patients in one second. The most common side effects include: headache, injection site reactions, back pain, weakness, and fatigue; hypersensitivity reactions can occur within hours or days including swelling of the face, mouth, and tongue, fainting, dizziness, hives, breathing problems, and rash; herpes zoster infections have occurred. The drug is a monoclonal antibody that inhibits interleukin -5 which helps regular eosinophils, a type of white blood cell that contributes to asthma. The over-production of eosinophils can cause inflammation in the lungs, increasing the frequency of asthma attacks. Patients must also take other medications, including high dose inhaled corticosteroids and at least one additional asthma drug.  problem 9: low vitamin D -s/p blood work to include: CBC, vitamin D, LFTs, HbA1c -continue supplemental vitamin D 50,000 every 2 weeks (recheck) -Has been associated with asthma exacerbations and increased allergic symptoms. The goal based on recent information is maintaining levels between 50-70 and low normal is 30. Recommended 50,000 units every two weeks to once a month depending on the level.   problem 10: anemia -referred to have a GI evaluation and was given the name of Dr. Liban Samuel's office  (recheck)  problem 11: Health Maintenance/COVID19 Precautions: covid vaccine x3 - Clean your hands often. Wash your hands often with soap and water for at least 20 seconds, especially after blowing your nose, coughing, or sneezing, or having been in a public place. - If soap and water are not available, use a hand  that contains at least 60% alcohol. - To the extent possible, avoid touching high-touch surfaces in public places - elevator buttons, door handles, handrails, handshaking with people, etc. Use a tissue or your sleeve to cover your hand or finger if you must touch something. - Wash your hands after touching surfaces in public places. - Avoid touching your face, nose, eyes, etc. - Clean and disinfect your home to remove germs: practice routine cleaning of frequently touched surfaces (for example: tables, doorknobs, light switches, handles, desks, toilets, faucets, sinks & cell phones) - Avoid crowds, especially in poorly ventilated spaces. Your risk of exposure to respiratory viruses like COVID-19 may increase in crowded, closed-in settings with little air circulation if there are people in the crowd who are sick. All patients are recommended to practice social distancing and stay at least 6 feet away from others. - Avoid all non-essential travel including plane trips, and especially avoid embarking on cruise ships. -If COVID-19 is spreading in your community, take extra measures to put distance between yourself and other people to further reduce your risk of being exposed to this new virus. -Stay home as much as possible. - Consider ways of getting food brought to your house through family, social, or commercial networks -Be aware that the virus has been known to live in the air up to 3 hours post exposure, cardboard up to 24 hours post exposure, copper up to 4 hours post exposure, steel and plastic up to 2-3 days post exposure. Risk of transmission from these surfaces are affected by many variables. Immune Support Recommendations: -OTC Vitamin C 500mg BID  -OTC Quercetin 250-500mg BID  -OTC Zinc 75-100mg per day  -OTC Melatonin 1 or 2 mg a night  -OTC Vitamin D 1-4000mg per day  -OTC Tonic Water 8oz per day Asthma and COVID19: You need to make sure your asthma is under control. This often requires the use of inhaled corticosteroids (and sometimes oral corticosteroids). Inhaled corticosteroids do not likely reduce your immune system's ability to fight infections, but oral corticosteroids may. It is important to use the steps above to protect yourself to limit your exposure to any respiratory virus.   Problem 12: health maintenance -recommended RSV vaccine in the Fall for anyone over the age of 60 - S/p Covid 19 vaccine (Pfizer) x3 -s/p influenza vaccine 2022 -recommended strep pneumonia vaccines after age 65: Prevnar-13 vaccine, followed by Pneumo vaccine 23 one year following (completed)  -recommended early intervention for URIs -recommended regular osteoporosis evaluations -recommended early dermatological evaluations -recommended after the age of 50 to the age of 70, colonoscopy every 5 years   F/U in 4 months  She is encouraged to call with any questions, changes, or concerns.

## 2023-08-31 NOTE — PROCEDURE
[FreeTextEntry1] : PFT reveals moderate restrictive and obstructive dysfunction, with an FEV1 of 0.90 L, which is 53% of predicted, with a normal flow volume loop. PFTs were performed to evaluate for SOB  FENO Unable to Perform

## 2023-08-31 NOTE — ADDENDUM
Mother called asking for call back she thought she had a missed call from the office but wasn't sure  I advised [facility] not in office today and is okay with waiting  [FreeTextEntry1] : Documented by Rip Cook acting as a scribe for Dr. Gustavo Cornelius on 08/31/2023.   All medical record entries made by the Scribe were at my, Dr. Gustavo Cornelius's, direction and personally dictated by me on 08/31/2023. I have reviewed the chart and agree that the record accurately reflects my personal performance of the history, physical exam, assessment and plan. I have also personally directed, reviewed, and agree with the discharge instructions.

## 2023-08-31 NOTE — PHYSICAL EXAM
[No Acute Distress] : no acute distress [Normal Oropharynx] : normal oropharynx [III] : Mallampati Class: III [Normal Appearance] : normal appearance [No Neck Mass] : no neck mass [Normal Rate/Rhythm] : normal rate/rhythm [Normal S1, S2] : normal s1, s2 [No Murmurs] : no murmurs [No Resp Distress] : no resp distress [Clear to Auscultation Bilaterally] : clear to auscultation bilaterally [No Abnormalities] : no abnormalities [Benign] : benign [Normal Gait] : normal gait [No Clubbing] : no clubbing [No Cyanosis] : no cyanosis [FROM] : FROM [Normal Color/ Pigmentation] : normal color/ pigmentation [No Focal Deficits] : no focal deficits [Oriented x3] : oriented x3 [Normal Affect] : normal affect [TextBox_2] : ow [TextBox_68] : I:E ratio 1:3; clear  [TextBox_105] : 1+ LE edema

## 2023-08-31 NOTE — HISTORY OF PRESENT ILLNESS
[FreeTextEntry1] : Ms. Najera is a 73 year old female with a history of abnormal chest CT, asthma, carcinoid tumor of lung, chronic GERD, eosinophilic asthma, low vitamin D, snoring, OW, and SOB presenting to the office today for a follow up visit. Her chief complaint is   - she notes feeling somewhat okay - she notes occasional chest pain - she notes occasional dizziness when she changes positions - she notes sweating at night - she notes her balance is off - she notes some heartburn and reflux every other day  - she notes her energy levels could be better - she notes her weight is stable - she notes getting enough sleep  - she notes no new medications, vitamins, or supplements - she notes she is using her Breo and Incruse - she notes she is still coughing but not as much as before  - she notes never receiving her shot for breathing   -she denies any headaches, nausea, emesis, fever, chills, chest pressure, wheezing, palpitations, constipation, diarrhea, vertigo, dysphagia, itchy eyes, itchy ears, leg swelling, leg pain, arthralgias, myalgias, or sour taste in the mouth.

## 2023-09-07 ENCOUNTER — APPOINTMENT (OUTPATIENT)
Dept: HOME HEALTH SERVICES | Facility: HOME HEALTH | Age: 73
End: 2023-09-07

## 2023-09-13 ENCOUNTER — RX RENEWAL (OUTPATIENT)
Age: 73
End: 2023-09-13

## 2023-09-23 ENCOUNTER — NON-APPOINTMENT (OUTPATIENT)
Age: 73
End: 2023-09-23

## 2023-09-23 NOTE — ED ADULT NURSE NOTE - DOES PATIENT HAVE ADVANCE DIRECTIVE
Refill Request     CONFIRM preferred pharmacy with the patient. If Mail Order Rx - Pend for 90 day refill. Last Seen: Last Seen Department: 7/20/2023  Last Seen by PCP: 7/20/2023    Last Written: 8/11/23 30 tabs 3 refills    If no future appointment scheduled:  Review the last OV with PCP and review information for follow-up visit,  Route STAFF MESSAGE with patient name to the [facility] for scheduling with the following information:            -  Timing of next visit           -  Visit type ie Physical, OV, etc           -  Diagnoses/Reason ie. COPD, HTN - Do not use MEDICATION, Follow-up or CHECK UP - Give reason for visit      Next Appointment:   No future appointments. Message sent to [facility] to schedule appt with patient?   NO      Requested Prescriptions     Pending Prescriptions Disp Refills    citalopram (CELEXA) 40 MG tablet [Pharmacy Med Name: Citalopram Hydrobromide 40 MG Oral Tablet] 30 tablet 11     Sig: TAKE 1 TABLET BY MOUTH DAILY No

## 2023-09-25 ENCOUNTER — APPOINTMENT (OUTPATIENT)
Dept: CT IMAGING | Facility: CLINIC | Age: 73
End: 2023-09-25

## 2023-09-25 NOTE — H&P CARDIOLOGY - HEIGHT IN CM
165.1 Advancement Flap (Double) Text: The defect edges were debeveled with a #15 scalpel blade.  Given the location of the defect and the proximity to free margins a double advancement flap was deemed most appropriate.  Using a sterile surgical marker, the appropriate advancement flaps were drawn incorporating the defect and placing the expected incisions within the relaxed skin tension lines where possible.    The area thus outlined was incised deep to adipose tissue with a #15 scalpel blade.  The skin margins were undermined to an appropriate distance in all directions utilizing iris scissors.

## 2023-09-26 ENCOUNTER — RX RENEWAL (OUTPATIENT)
Age: 73
End: 2023-09-26

## 2023-09-29 RX ORDER — EPINEPHRINE 0.3 MG/.3ML
0.3 INJECTION INTRAMUSCULAR
Qty: 1 | Refills: 0 | Status: ACTIVE | COMMUNITY
Start: 2023-09-29 | End: 1900-01-01

## 2023-10-04 ENCOUNTER — APPOINTMENT (OUTPATIENT)
Dept: CT IMAGING | Facility: CLINIC | Age: 73
End: 2023-10-04

## 2023-10-19 ENCOUNTER — NON-APPOINTMENT (OUTPATIENT)
Age: 73
End: 2023-10-19

## 2023-11-07 ENCOUNTER — APPOINTMENT (OUTPATIENT)
Dept: PULMONOLOGY | Facility: CLINIC | Age: 73
End: 2023-11-07
Payer: MEDICARE

## 2023-11-07 VITALS
WEIGHT: 193 LBS | HEIGHT: 63 IN | BODY MASS INDEX: 34.2 KG/M2 | DIASTOLIC BLOOD PRESSURE: 78 MMHG | TEMPERATURE: 97.3 F | SYSTOLIC BLOOD PRESSURE: 146 MMHG | HEART RATE: 91 BPM | OXYGEN SATURATION: 93 % | RESPIRATION RATE: 16 BRPM

## 2023-11-07 DIAGNOSIS — J30.9 ALLERGIC RHINITIS, UNSPECIFIED: ICD-10-CM

## 2023-11-07 DIAGNOSIS — J45.50 SEVERE PERSISTENT ASTHMA, UNCOMPLICATED: ICD-10-CM

## 2023-11-07 PROCEDURE — 99214 OFFICE O/P EST MOD 30 MIN: CPT | Mod: 25

## 2023-11-07 PROCEDURE — 94010 BREATHING CAPACITY TEST: CPT

## 2023-11-09 ENCOUNTER — RX RENEWAL (OUTPATIENT)
Age: 73
End: 2023-11-09

## 2023-11-10 ENCOUNTER — APPOINTMENT (OUTPATIENT)
Dept: HOME HEALTH SERVICES | Facility: HOME HEALTH | Age: 73
End: 2023-11-10
Payer: MEDICARE

## 2023-11-10 VITALS
HEART RATE: 68 BPM | TEMPERATURE: 98.6 F | RESPIRATION RATE: 16 BRPM | OXYGEN SATURATION: 95 % | DIASTOLIC BLOOD PRESSURE: 80 MMHG | SYSTOLIC BLOOD PRESSURE: 130 MMHG

## 2023-11-10 DIAGNOSIS — Z85.3 PERSONAL HISTORY OF MALIGNANT NEOPLASM OF BREAST: ICD-10-CM

## 2023-11-10 DIAGNOSIS — R10.13 EPIGASTRIC PAIN: ICD-10-CM

## 2023-11-10 DIAGNOSIS — R05.9 COUGH, UNSPECIFIED: ICD-10-CM

## 2023-11-10 DIAGNOSIS — F33.9 MAJOR DEPRESSIVE DISORDER, RECURRENT, UNSPECIFIED: ICD-10-CM

## 2023-11-10 DIAGNOSIS — J43.9 EMPHYSEMA, UNSPECIFIED: ICD-10-CM

## 2023-11-10 DIAGNOSIS — J98.4 EMPHYSEMA, UNSPECIFIED: ICD-10-CM

## 2023-11-10 PROCEDURE — 99349 HOME/RES VST EST MOD MDM 40: CPT

## 2023-11-10 RX ORDER — SERTRALINE HYDROCHLORIDE 50 MG/1
50 TABLET, FILM COATED ORAL
Qty: 90 | Refills: 4 | Status: DISCONTINUED | COMMUNITY
Start: 2022-04-12 | End: 2023-11-10

## 2023-11-10 RX ORDER — AMITRIPTYLINE HYDROCHLORIDE 10 MG/1
10 TABLET, FILM COATED ORAL 3 TIMES DAILY
Qty: 90 | Refills: 5 | Status: DISCONTINUED | COMMUNITY
Start: 2021-12-23 | End: 2023-11-10

## 2023-11-13 PROBLEM — F33.9 DEPRESSION, RECURRENT: Status: ACTIVE | Noted: 2017-10-12

## 2023-11-13 PROBLEM — Z85.3 HISTORY OF MALIGNANT NEOPLASM OF FEMALE BREAST: Status: RESOLVED | Noted: 2021-10-11 | Resolved: 2023-11-13

## 2023-11-13 PROBLEM — R05.9 COUGH: Status: ACTIVE | Noted: 2023-11-07

## 2023-11-13 PROBLEM — J43.9 MIXED RESTRICTIVE AND OBSTRUCTIVE LUNG DISEASE: Status: ACTIVE | Noted: 2022-03-31

## 2023-11-13 PROBLEM — R10.13 DYSPEPSIA: Status: RESOLVED | Noted: 2017-12-11 | Resolved: 2023-11-13

## 2023-11-18 ENCOUNTER — NON-APPOINTMENT (OUTPATIENT)
Age: 73
End: 2023-11-18

## 2023-11-18 ENCOUNTER — EMERGENCY (EMERGENCY)
Facility: HOSPITAL | Age: 73
LOS: 0 days | Discharge: HOME | End: 2023-11-18
Attending: EMERGENCY MEDICINE
Payer: MEDICARE

## 2023-11-18 ENCOUNTER — APPOINTMENT (OUTPATIENT)
Dept: HOME HEALTH SERVICES | Facility: HOME HEALTH | Age: 73
End: 2023-11-18

## 2023-11-18 VITALS
HEART RATE: 79 BPM | TEMPERATURE: 98 F | SYSTOLIC BLOOD PRESSURE: 145 MMHG | RESPIRATION RATE: 20 BRPM | DIASTOLIC BLOOD PRESSURE: 77 MMHG | OXYGEN SATURATION: 99 %

## 2023-11-18 VITALS
HEIGHT: 62 IN | HEART RATE: 81 BPM | DIASTOLIC BLOOD PRESSURE: 88 MMHG | RESPIRATION RATE: 20 BRPM | WEIGHT: 179.02 LBS | OXYGEN SATURATION: 99 % | SYSTOLIC BLOOD PRESSURE: 173 MMHG | TEMPERATURE: 98 F

## 2023-11-18 DIAGNOSIS — Z90.49 ACQUIRED ABSENCE OF OTHER SPECIFIED PARTS OF DIGESTIVE TRACT: Chronic | ICD-10-CM

## 2023-11-18 DIAGNOSIS — Z90.710 ACQUIRED ABSENCE OF BOTH CERVIX AND UTERUS: Chronic | ICD-10-CM

## 2023-11-18 DIAGNOSIS — Z96.651 PRESENCE OF RIGHT ARTIFICIAL KNEE JOINT: Chronic | ICD-10-CM

## 2023-11-18 DIAGNOSIS — R05.9 COUGH, UNSPECIFIED: ICD-10-CM

## 2023-11-18 DIAGNOSIS — J45.901 UNSPECIFIED ASTHMA WITH (ACUTE) EXACERBATION: ICD-10-CM

## 2023-11-18 DIAGNOSIS — Z20.822 CONTACT WITH AND (SUSPECTED) EXPOSURE TO COVID-19: ICD-10-CM

## 2023-11-18 DIAGNOSIS — R06.02 SHORTNESS OF BREATH: ICD-10-CM

## 2023-11-18 DIAGNOSIS — Z98.42 CATARACT EXTRACTION STATUS, LEFT EYE: Chronic | ICD-10-CM

## 2023-11-18 LAB
ALBUMIN SERPL ELPH-MCNC: 3.6 G/DL — SIGNIFICANT CHANGE UP (ref 3.3–5)
ALBUMIN SERPL ELPH-MCNC: 3.6 G/DL — SIGNIFICANT CHANGE UP (ref 3.3–5)
ALP SERPL-CCNC: 60 U/L — SIGNIFICANT CHANGE UP (ref 40–120)
ALP SERPL-CCNC: 60 U/L — SIGNIFICANT CHANGE UP (ref 40–120)
ALT FLD-CCNC: 26 U/L — SIGNIFICANT CHANGE UP (ref 12–78)
ALT FLD-CCNC: 26 U/L — SIGNIFICANT CHANGE UP (ref 12–78)
ANION GAP SERPL CALC-SCNC: 4 MMOL/L — LOW (ref 5–17)
ANION GAP SERPL CALC-SCNC: 4 MMOL/L — LOW (ref 5–17)
ANISOCYTOSIS BLD QL: SLIGHT — SIGNIFICANT CHANGE UP
ANISOCYTOSIS BLD QL: SLIGHT — SIGNIFICANT CHANGE UP
AST SERPL-CCNC: 10 U/L — LOW (ref 15–37)
AST SERPL-CCNC: 10 U/L — LOW (ref 15–37)
BASOPHILS # BLD AUTO: 0.03 K/UL — SIGNIFICANT CHANGE UP (ref 0–0.2)
BASOPHILS # BLD AUTO: 0.03 K/UL — SIGNIFICANT CHANGE UP (ref 0–0.2)
BASOPHILS NFR BLD AUTO: 0.7 % — SIGNIFICANT CHANGE UP (ref 0–2)
BASOPHILS NFR BLD AUTO: 0.7 % — SIGNIFICANT CHANGE UP (ref 0–2)
BILIRUB SERPL-MCNC: 0.3 MG/DL — SIGNIFICANT CHANGE UP (ref 0.2–1.2)
BILIRUB SERPL-MCNC: 0.3 MG/DL — SIGNIFICANT CHANGE UP (ref 0.2–1.2)
BUN SERPL-MCNC: 8 MG/DL — SIGNIFICANT CHANGE UP (ref 7–23)
BUN SERPL-MCNC: 8 MG/DL — SIGNIFICANT CHANGE UP (ref 7–23)
CALCIUM SERPL-MCNC: 10.2 MG/DL — HIGH (ref 8.5–10.1)
CALCIUM SERPL-MCNC: 10.2 MG/DL — HIGH (ref 8.5–10.1)
CHLORIDE SERPL-SCNC: 106 MMOL/L — SIGNIFICANT CHANGE UP (ref 96–108)
CHLORIDE SERPL-SCNC: 106 MMOL/L — SIGNIFICANT CHANGE UP (ref 96–108)
CO2 SERPL-SCNC: 32 MMOL/L — HIGH (ref 22–31)
CO2 SERPL-SCNC: 32 MMOL/L — HIGH (ref 22–31)
CREAT SERPL-MCNC: 0.92 MG/DL — SIGNIFICANT CHANGE UP (ref 0.5–1.3)
CREAT SERPL-MCNC: 0.92 MG/DL — SIGNIFICANT CHANGE UP (ref 0.5–1.3)
EGFR: 65 ML/MIN/1.73M2 — SIGNIFICANT CHANGE UP
EGFR: 65 ML/MIN/1.73M2 — SIGNIFICANT CHANGE UP
ELLIPTOCYTES BLD QL SMEAR: SIGNIFICANT CHANGE UP
ELLIPTOCYTES BLD QL SMEAR: SIGNIFICANT CHANGE UP
EOSINOPHIL # BLD AUTO: 0.07 K/UL — SIGNIFICANT CHANGE UP (ref 0–0.5)
EOSINOPHIL # BLD AUTO: 0.07 K/UL — SIGNIFICANT CHANGE UP (ref 0–0.5)
EOSINOPHIL NFR BLD AUTO: 1.7 % — SIGNIFICANT CHANGE UP (ref 0–6)
EOSINOPHIL NFR BLD AUTO: 1.7 % — SIGNIFICANT CHANGE UP (ref 0–6)
FLUAV AG NPH QL: SIGNIFICANT CHANGE UP
FLUAV AG NPH QL: SIGNIFICANT CHANGE UP
FLUBV AG NPH QL: SIGNIFICANT CHANGE UP
FLUBV AG NPH QL: SIGNIFICANT CHANGE UP
GLUCOSE SERPL-MCNC: 137 MG/DL — HIGH (ref 70–99)
GLUCOSE SERPL-MCNC: 137 MG/DL — HIGH (ref 70–99)
HCT VFR BLD CALC: 36.8 % — SIGNIFICANT CHANGE UP (ref 34.5–45)
HCT VFR BLD CALC: 36.8 % — SIGNIFICANT CHANGE UP (ref 34.5–45)
HGB BLD-MCNC: 11.4 G/DL — LOW (ref 11.5–15.5)
HGB BLD-MCNC: 11.4 G/DL — LOW (ref 11.5–15.5)
IMM GRANULOCYTES NFR BLD AUTO: 0.2 % — SIGNIFICANT CHANGE UP (ref 0–0.9)
IMM GRANULOCYTES NFR BLD AUTO: 0.2 % — SIGNIFICANT CHANGE UP (ref 0–0.9)
LYMPHOCYTES # BLD AUTO: 1 K/UL — SIGNIFICANT CHANGE UP (ref 1–3.3)
LYMPHOCYTES # BLD AUTO: 1 K/UL — SIGNIFICANT CHANGE UP (ref 1–3.3)
LYMPHOCYTES # BLD AUTO: 24.9 % — SIGNIFICANT CHANGE UP (ref 13–44)
LYMPHOCYTES # BLD AUTO: 24.9 % — SIGNIFICANT CHANGE UP (ref 13–44)
MANUAL SMEAR VERIFICATION: SIGNIFICANT CHANGE UP
MANUAL SMEAR VERIFICATION: SIGNIFICANT CHANGE UP
MCHC RBC-ENTMCNC: 22.6 PG — LOW (ref 27–34)
MCHC RBC-ENTMCNC: 22.6 PG — LOW (ref 27–34)
MCHC RBC-ENTMCNC: 31 G/DL — LOW (ref 32–36)
MCHC RBC-ENTMCNC: 31 G/DL — LOW (ref 32–36)
MCV RBC AUTO: 72.9 FL — LOW (ref 80–100)
MCV RBC AUTO: 72.9 FL — LOW (ref 80–100)
MICROCYTES BLD QL: SIGNIFICANT CHANGE UP
MICROCYTES BLD QL: SIGNIFICANT CHANGE UP
MONOCYTES # BLD AUTO: 0.38 K/UL — SIGNIFICANT CHANGE UP (ref 0–0.9)
MONOCYTES # BLD AUTO: 0.38 K/UL — SIGNIFICANT CHANGE UP (ref 0–0.9)
MONOCYTES NFR BLD AUTO: 9.5 % — SIGNIFICANT CHANGE UP (ref 2–14)
MONOCYTES NFR BLD AUTO: 9.5 % — SIGNIFICANT CHANGE UP (ref 2–14)
NEUTROPHILS # BLD AUTO: 2.52 K/UL — SIGNIFICANT CHANGE UP (ref 1.8–7.4)
NEUTROPHILS # BLD AUTO: 2.52 K/UL — SIGNIFICANT CHANGE UP (ref 1.8–7.4)
NEUTROPHILS NFR BLD AUTO: 63 % — SIGNIFICANT CHANGE UP (ref 43–77)
NEUTROPHILS NFR BLD AUTO: 63 % — SIGNIFICANT CHANGE UP (ref 43–77)
NRBC # BLD: 0 /100 WBCS — SIGNIFICANT CHANGE UP (ref 0–0)
NRBC # BLD: 0 /100 WBCS — SIGNIFICANT CHANGE UP (ref 0–0)
OVALOCYTES BLD QL SMEAR: SIGNIFICANT CHANGE UP
OVALOCYTES BLD QL SMEAR: SIGNIFICANT CHANGE UP
PLAT MORPH BLD: NORMAL — SIGNIFICANT CHANGE UP
PLAT MORPH BLD: NORMAL — SIGNIFICANT CHANGE UP
PLATELET # BLD AUTO: 252 K/UL — SIGNIFICANT CHANGE UP (ref 150–400)
PLATELET # BLD AUTO: 252 K/UL — SIGNIFICANT CHANGE UP (ref 150–400)
POTASSIUM SERPL-MCNC: 3.4 MMOL/L — LOW (ref 3.5–5.3)
POTASSIUM SERPL-MCNC: 3.4 MMOL/L — LOW (ref 3.5–5.3)
POTASSIUM SERPL-SCNC: 3.4 MMOL/L — LOW (ref 3.5–5.3)
POTASSIUM SERPL-SCNC: 3.4 MMOL/L — LOW (ref 3.5–5.3)
PROT SERPL-MCNC: 7.5 GM/DL — SIGNIFICANT CHANGE UP (ref 6–8.3)
PROT SERPL-MCNC: 7.5 GM/DL — SIGNIFICANT CHANGE UP (ref 6–8.3)
RBC # BLD: 5.05 M/UL — SIGNIFICANT CHANGE UP (ref 3.8–5.2)
RBC # BLD: 5.05 M/UL — SIGNIFICANT CHANGE UP (ref 3.8–5.2)
RBC # FLD: 14.1 % — SIGNIFICANT CHANGE UP (ref 10.3–14.5)
RBC # FLD: 14.1 % — SIGNIFICANT CHANGE UP (ref 10.3–14.5)
RBC BLD AUTO: ABNORMAL
RBC BLD AUTO: ABNORMAL
SARS-COV-2 RNA SPEC QL NAA+PROBE: SIGNIFICANT CHANGE UP
SARS-COV-2 RNA SPEC QL NAA+PROBE: SIGNIFICANT CHANGE UP
SCHISTOCYTES BLD QL AUTO: SLIGHT — SIGNIFICANT CHANGE UP
SCHISTOCYTES BLD QL AUTO: SLIGHT — SIGNIFICANT CHANGE UP
SODIUM SERPL-SCNC: 142 MMOL/L — SIGNIFICANT CHANGE UP (ref 135–145)
SODIUM SERPL-SCNC: 142 MMOL/L — SIGNIFICANT CHANGE UP (ref 135–145)
WBC # BLD: 4.01 K/UL — SIGNIFICANT CHANGE UP (ref 3.8–10.5)
WBC # BLD: 4.01 K/UL — SIGNIFICANT CHANGE UP (ref 3.8–10.5)
WBC # FLD AUTO: 4.01 K/UL — SIGNIFICANT CHANGE UP (ref 3.8–10.5)
WBC # FLD AUTO: 4.01 K/UL — SIGNIFICANT CHANGE UP (ref 3.8–10.5)

## 2023-11-18 PROCEDURE — 99285 EMERGENCY DEPT VISIT HI MDM: CPT

## 2023-11-18 PROCEDURE — 71046 X-RAY EXAM CHEST 2 VIEWS: CPT | Mod: 26

## 2023-11-18 RX ORDER — LETROZOLE TABLETS 2.5 MG/1
2.5 TABLET, FILM COATED ORAL DAILY
Refills: 0 | Status: ACTIVE | COMMUNITY
Start: 2022-04-12

## 2023-11-18 RX ORDER — IPRATROPIUM/ALBUTEROL SULFATE 18-103MCG
3 AEROSOL WITH ADAPTER (GRAM) INHALATION
Refills: 0 | Status: COMPLETED | OUTPATIENT
Start: 2023-11-18 | End: 2023-11-18

## 2023-11-18 RX ORDER — MONTELUKAST 10 MG/1
10 TABLET, FILM COATED ORAL
Qty: 1 | Refills: 1 | Status: ACTIVE | COMMUNITY
Start: 2023-01-31

## 2023-11-18 RX ORDER — FLUTICASONE FUROATE AND VILANTEROL TRIFENATATE 200; 25 UG/1; UG/1
200-25 POWDER RESPIRATORY (INHALATION)
Qty: 3 | Refills: 1 | Status: ACTIVE | COMMUNITY
Start: 2023-11-07

## 2023-11-18 RX ORDER — QUETIAPINE FUMARATE 50 MG/1
50 TABLET ORAL DAILY
Refills: 0 | Status: ACTIVE | COMMUNITY
Start: 2022-01-24

## 2023-11-18 RX ORDER — ALBUTEROL SULFATE 90 UG/1
108 (90 BASE) INHALANT RESPIRATORY (INHALATION)
Qty: 1 | Refills: 2 | Status: ACTIVE | COMMUNITY
Start: 2023-11-07

## 2023-11-18 RX ORDER — MULTIVIT-MIN/IRON/FOLIC ACID/K 18-600-40
50 MCG CAPSULE ORAL
Qty: 30 | Refills: 2 | Status: ACTIVE | COMMUNITY
Start: 2023-11-10

## 2023-11-18 RX ORDER — ESCITALOPRAM OXALATE 20 MG/1
20 TABLET ORAL DAILY
Qty: 30 | Refills: 2 | Status: ACTIVE | COMMUNITY
Start: 2023-01-31

## 2023-11-18 RX ORDER — TIOTROPIUM BROMIDE INHALATION SPRAY 3.12 UG/1
2.5 SPRAY, METERED RESPIRATORY (INHALATION) DAILY
Qty: 3 | Refills: 1 | Status: ACTIVE | COMMUNITY
Start: 2023-11-07

## 2023-11-18 RX ORDER — OMEPRAZOLE 40 MG/1
40 CAPSULE, DELAYED RELEASE ORAL
Qty: 90 | Refills: 3 | Status: ACTIVE | COMMUNITY
Start: 2023-11-10

## 2023-11-18 RX ADMIN — Medication 3 MILLILITER(S): at 11:14

## 2023-11-18 RX ADMIN — Medication 3 MILLILITER(S): at 12:04

## 2023-11-18 RX ADMIN — Medication 3 MILLILITER(S): at 10:55

## 2023-11-18 NOTE — ED ADULT NURSE NOTE - NSFALLUNIVINTERV_ED_ALL_ED
Bed/Stretcher in lowest position, wheels locked, appropriate side rails in place/Call bell, personal items and telephone in reach/Instruct patient to call for assistance before getting out of bed/chair/stretcher/Non-slip footwear applied when patient is off stretcher/[place] to call system/Physically safe environment - no spills, clutter or unnecessary equipment/Purposeful proactive rounding/Room/bathroom lighting operational, light cord in reach

## 2023-11-18 NOTE — ED ADULT NURSE NOTE - OBJECTIVE STATEMENT
72 yo female, A&Ox4,  coughing since last month seen by several Drs but not feeling better lungs with bilateral wheezing, denies fever, chest pain, dizziness H/O Asthma

## 2023-11-18 NOTE — ED PROVIDER NOTE - PATIENT PORTAL LINK FT
You can access the FollowMyHealth Patient Portal offered by [facility] by registering at the following website: [email or web address]. By joining [facility]’s FollowMyHealth portal, you will also be able to view your health information using other applications (apps) compatible with our system.

## 2023-11-18 NOTE — ED PROVIDER NOTE - OBJECTIVE STATEMENT
73-year-old complaining about difficulty breathing and cough for several days no fever.  Patient states she has a history of asthma.  Patient speaking in complete sentences.

## 2023-11-18 NOTE — ED ADULT TRIAGE NOTE - CHIEF COMPLAINT QUOTE
coughing since last month seen by several Drs but not feeling better lungs with bilateral wheezing H/O Asthma

## 2023-11-18 NOTE — ED PROVIDER NOTE - NSICDXPASTMEDICALHX_GEN_ALL_CORE_FT
PAST MEDICAL HISTORY:  Arrhythmia     Asthma     Cataract of right eye     COVID-19 dec 2020    Depression     DM (diabetes mellitus)     GERD (gastroesophageal reflux disease)     HTN (hypertension)     Obese     Seasonal allergies

## 2023-11-18 NOTE — ED PROVIDER NOTE - NSICDXPASTSURGICALHX_GEN_ALL_CORE_FT
PAST SURGICAL HISTORY:  H/O total knee replacement, right     History of cholecystectomy 1980's    History of left cataract surgery 1990's    S/P FERNANDO-BSO (total abdominal hysterectomy and bilateral salpingo-oophorectomy) 1980's

## 2023-12-08 PROBLEM — J45.50 ASTHMA, SEVERE PERSISTENT, POORLY-CONTROLLED: Status: ACTIVE | Noted: 2021-12-23

## 2023-12-08 PROBLEM — J30.9 ALLERGIC RHINITIS: Status: ACTIVE | Noted: 2023-12-08

## 2023-12-12 ENCOUNTER — APPOINTMENT (OUTPATIENT)
Dept: CARDIOLOGY | Facility: CLINIC | Age: 73
End: 2023-12-12

## 2023-12-12 ENCOUNTER — RX RENEWAL (OUTPATIENT)
Age: 73
End: 2023-12-12

## 2023-12-12 RX ORDER — PANTOPRAZOLE 40 MG/1
40 TABLET, DELAYED RELEASE ORAL
Qty: 90 | Refills: 1 | Status: ACTIVE | COMMUNITY
Start: 2020-06-03 | End: 1900-01-01

## 2023-12-14 ENCOUNTER — APPOINTMENT (OUTPATIENT)
Dept: CARDIOLOGY | Facility: CLINIC | Age: 73
End: 2023-12-14

## 2023-12-14 NOTE — DISCUSSION/SUMMARY
[FreeTextEntry1] : Dr. Hensley-(PRIOR VISIT and PMH WITH Dr. Hensley): \par  This is a 71-year-old female with past medical history significant for hypertension, asthma, history of bronchitis, non-insulin-dependent diabetes mellitus, hyperlipidemia, status post right knee replacement, who comes in for cardiac follow-up evaluation.  \par  \par  The patient still complains of occasional dyspnea on exertion and rare palpitations.  The symptoms not associate with any particular activity.\par  \par  The patient had a cardiac catheterization done April 6, 2021 which demonstrated normal left and right heart catheterization and normal pressures.\par  \par  The patient is encouraged to increase her fluid intake.  She will have new blood work done today for electrolytes and lipid profile.\par  Her blood pressure is under adequate control.\par  Her shortness of breath/dyspnea on exertion may be secondary to reduced aerobic capacity, or her underlying pulmonary disease.\par  \par  She was seen in consultation by the electrophysiology team and felt that she did not require a loop recorder at this time.\par  \par  Electrocardiogram done January 21, 2022 demonstrated normal sinus rhythm rate of 78 bpm is otherwise remarkable for poor R wave progression and left atrial abnormality.\par  \par  She had a normal cardiac catheterization done April 6, 2021.\par  \par  Echo Doppler examination done March 22, 2021 demonstrated mild tricuspid valve regurgitation, small pericardial effusion adjacent to the right atrium.\par  \par  Holter monitor done October 29, 2021 demonstrated no significant arrhythmia or heart block.\par  \par  Most recent lab work from January 22,2022 showed total cholesterol 160, triglycerides 44, HDL 72, LDL calculated 80, non-HDL 88 mg/dL. \par  \par  PMH:\par  The edema is localized to the malleolus area and is consistent with calcium channel blocker induced edema.  The patient had been on 2 calcium channel blockers, and Norvasc was discontinued, and the patient remained on verapamil 240 mg/day.\par  She still has bilateral 1+/2 pedal edema at the area of the malleolus.  She will discontinue her verapamil and start on hydrochlorothiazide 25 mg/day and potassium chloride 10 mEq only 1 tablet on Saturday and Sunday.  \par  Lipid panel done August 20, 2021 demonstrated a direct LDL 48 mg/dL, cholesterol 115, HDL 65, triglycerides of 33, and non-HDL cholesterol 50 mg/dL.  She will continue on her current medical therapy.  Her LDL is at target level.\par  \par  She was born in [facility] and has no history of rheumatic fever. \par  \par  Echo Doppler examination done May 14, 2020 by another cardiologist which demonstrated mild left ventricular hypertrophy, normal left ventricular function with an estimated ejection fraction of 55 to 60%, and trivial pericardial effusion.  There was mild tricuspid valve regurgitation, calcified mitral valve annulus and thickened mitral valve leaflets.\par  \par  The patient's daughter reports that she had a normal stress echocardiogram April 26, 2017.\par  \par  Electrocardiogram done June 12, 2020 demonstrated normal sinus rhythm rate of 82 bpm is otherwise remarkable left atrial abnormality, and for poor R wave progression.\par  \par  The patient understands that aerobic exercises must be increased to 40 minutes 4 times per week. A detailed discussion of lifestyle modification was done today. The patient has a good understanding of the diagnosis, and treatment plan. Lifestyle modification was also outlined.\par

## 2023-12-14 NOTE — ASSESSMENT
[FreeTextEntry1] : This is a 73-year-old female here today for follow-up cardiac evaluation.  She has a past medical history significant for hypertension, asthma, history of bronchitis, non-insulin-dependent diabetes mellitus, hyperlipidemia, status post right knee replacement.  She was born in [facility] and has no history of rheumatic fever.   HPI: She is feeling generally well today and denies chest pain, dizziness, heart palpitations, recent episodes of syncope or falls, SOB, or dyspnea at this time.  Current Medications: aspirin 81 mg daily, atorvastatin 20 mg daily, hydrochlorothiazide 25 mg daily, labetalol 200 mg 1 tablet twice daily, Olmesartan 40 mg daily, and potassium chloride 10 M EQ's 1 tablet only on Saturday and Sunday.   BLOOD PRESSURE: -BP is controlled on today's exam   BLOOD WORK: -New blood work was done 12/14/2023 to evaluate lipid profile, CBC, BMP, hepatic function, A1C and TSH. -Blood work done by primary care  May 22nd 2023 demonstrated normal lipid profile. -New blood work was done by her primary care doctor on December 15, 2022 which demonstrated normal lipid profile. Her A1c was elevated at a 6.0%.    TESTING/REPORTS: -EKG done 06/20/2023 which demonstrated regular sinus rhythm with nonspecific ST-T wave changes BPM of 57  -Echocardiogram done in the office 6/20/2023 demonstrated normal left ventricular systolic function EF 65%, borderline left ventricular hypertrophy, mild mitral regurgitation, mild tricuspid regurgitation, minimal pulmonic regurgitation, small pericardial effusion.  -EKG done Jan 30, 2023 which demonstrated regular sinus rhythm with nonspecific ST-T wave changes, BPM of 69 otherwise remarkable for poor R wave progression and left atrial abnormality.  -Electrocardiogram done January 21, 2022 demonstrated normal sinus rhythm rate of 78 bpm is otherwise remarkable for poor R wave progression and left atrial abnormality.  -She had a normal cardiac catheterization done April 6, 2021 which demonstrated normal left and right heart catheterization and normal pressures.  -Echo Doppler examination done March 22, 2021 demonstrated mild tricuspid valve regurgitation, small pericardial effusion adjacent to the right atrium.  -Holter monitor done October 29, 2021 demonstrated no significant arrhythmia or heart block.  -Echo Doppler examination done May 14, 2020 by another cardiologist which demonstrated mild left ventricular hypertrophy, normal left ventricular function with an estimated ejection fraction of 55 to 60%, and trivial pericardial effusion. There was mild tricuspid valve regurgitation, calcified mitral valve annulus and thickened mitral valve leaflets.  -Electrocardiogram done June 12, 2020 demonstrated normal sinus rhythm rate of 82 bpm is otherwise remarkable left atrial abnormality, and for poor R wave progression.  -The patient's daughter reports that she had a normal stress echocardiogram April 26, 2017.    PLAN: -The patient is clinically stable from a cardiac standpoint on today's exam. -She will continue with her current medications and will contact the office if she is having any complaints between now and their next follow up appointment. -She will follow-up with her primary care doctor and all of her specialists routinely.  I have discussed the plan of care with Ms. PAULINA GAMBOA and she will follow up in 4-6 months. She is compliant with all of her medications.  The patient understands that aerobic exercises must be increased to minutes 4 times/week and a detailed discussion of lifestyle modification was done today.  The patient has a good understanding of the diagnosis, treatment plan and lifestyle modification.  She will contact me at the office for any questions with their care or any changes in their health status.  Dr Hensley, supervising physician, was present in the office with ORLY Fletcher NP during the ramos portions of the history and exam. The plan was discussed in detail as documented in the note.  ORLY Fletcher NP

## 2023-12-14 NOTE — REVIEW OF SYSTEMS
[Chest Discomfort] : no chest discomfort [Lower Ext Edema] : no extremity edema [Palpitations] : no palpitations [Negative] : Heme/Lymph [FreeTextEntry9] : LE swelling

## 2023-12-14 NOTE — PHYSICAL EXAM
[Well Developed] : well developed [Well Nourished] : well nourished [No Acute Distress] : no acute distress [Normal Venous Pressure] : normal venous pressure [No Carotid Bruit] : no carotid bruit [Normal S1, S2] : normal S1, S2 [No Rub] : no rub [No Gallop] : no gallop [Murmur] : murmur [Clear Lung Fields] : clear lung fields [Good Air Entry] : good air entry [No Respiratory Distress] : no respiratory distress  [Soft] : abdomen soft [Non Tender] : non-tender [No Masses/organomegaly] : no masses/organomegaly [Normal Bowel Sounds] : normal bowel sounds [Normal Gait] : normal gait [No Cyanosis] : no cyanosis [No Clubbing] : no clubbing [No Varicosities] : no varicosities [No Rash] : no rash [No Skin Lesions] : no skin lesions [Moves all extremities] : moves all extremities [No Focal Deficits] : no focal deficits [Normal Speech] : normal speech [Alert and Oriented] : alert and oriented [Normal memory] : normal memory [de-identified] : +2 B/L LE pitting edema/ no calf tenderness [General Appearance - Well Developed] : well developed [Normal Appearance] : normal appearance [Well Groomed] : well groomed [General Appearance - Well Nourished] : well nourished [No Deformities] : no deformities [General Appearance - In No Acute Distress] : no acute distress [Normal Conjunctiva] : the conjunctiva exhibited no abnormalities [Eyelids - No Xanthelasma] : the eyelids demonstrated no xanthelasmas [Normal Oral Mucosa] : normal oral mucosa [Normal Jugular Venous A Waves Present] : normal jugular venous A waves present [Normal Jugular Venous V Waves Present] : normal jugular venous V waves present [No Jugular Venous Morejon A Waves] : no jugular venous morjeon A waves [Respiration, Rhythm And Depth] : normal respiratory rhythm and effort [Exaggerated Use Of Accessory Muscles For Inspiration] : no accessory muscle use [Auscultation Breath Sounds / Voice Sounds] : lungs were clear to auscultation bilaterally [Abdomen Soft] : soft [Abdomen Tenderness] : non-tender [] : no hepato-splenomegaly [Abdomen Mass (___ Cm)] : no abdominal mass palpated [Abnormal Walk] : normal gait [Cyanosis, Localized] : no localized cyanosis [Skin Color & Pigmentation] : normal skin color and pigmentation [Skin Turgor] : normal skin turgor [No Xanthoma] : no  xanthoma was observed [Oriented To Time, Place, And Person] : oriented to person, place, and time [Affect] : the affect was normal [Mood] : the mood was normal [No Anxiety] : not feeling anxious [5th Left ICS - MCL] : palpated at the 5th LICS in the midclavicular line [Normal] : normal [No Precordial Heave] : no precordial heave was noted [Normal Rate] : normal [Rhythm Regular] : regular [Normal S1] : normal S1 [Normal S2] : normal S2 [S3] : no S3 [S4] : no S4 [II] : a grade 2 [Right Carotid Bruit] : no bruit heard over the right carotid [Left Carotid Bruit] : no bruit heard over the left carotid [2+] : left 2+ [No Abnormalities] : the abdominal aorta was not enlarged and no bruit was heard [___ +] : bilateral [unfilled]U+ pretibial pitting edema

## 2023-12-19 ENCOUNTER — APPOINTMENT (OUTPATIENT)
Dept: PULMONOLOGY | Facility: CLINIC | Age: 73
End: 2023-12-19

## 2023-12-21 ENCOUNTER — APPOINTMENT (OUTPATIENT)
Dept: OBGYN | Facility: CLINIC | Age: 73
End: 2023-12-21
Payer: MEDICARE

## 2023-12-21 VITALS
HEIGHT: 63 IN | BODY MASS INDEX: 32.64 KG/M2 | WEIGHT: 184.25 LBS | SYSTOLIC BLOOD PRESSURE: 129 MMHG | DIASTOLIC BLOOD PRESSURE: 78 MMHG

## 2023-12-21 DIAGNOSIS — Z01.419 ENCOUNTER FOR GYNECOLOGICAL EXAMINATION (GENERAL) (ROUTINE) W/OUT ABNORMAL FINDINGS: ICD-10-CM

## 2023-12-21 DIAGNOSIS — R30.0 DYSURIA: ICD-10-CM

## 2023-12-21 LAB
BILIRUB UR QL STRIP: NORMAL
GLUCOSE UR-MCNC: NORMAL
HCG UR QL: 1 EU/DL
HGB UR QL STRIP.AUTO: NORMAL
KETONES UR-MCNC: NORMAL
LEUKOCYTE ESTERASE UR QL STRIP: NORMAL
NITRITE UR QL STRIP: NORMAL
PH UR STRIP: 6
PROT UR STRIP-MCNC: NORMAL
SP GR UR STRIP: 1.01

## 2023-12-21 PROCEDURE — G0101: CPT

## 2023-12-21 NOTE — HISTORY OF PRESENT ILLNESS
[FreeTextEntry1] : 72yo  with hx of FERNANDO and R breast cancer s/p R mastectomy on letrozole here for wellness. Notes some dysuria at times, mild sx now. No vaginal odor or PMB. Follows with breast onc, last mammo this year wnl/  Med hx of asthma, HLD, HTN, DM2, primary hyperparathyroidism, carcinoid tumor.  BMD 2020

## 2023-12-21 NOTE — HISTORY OF PRESENT ILLNESS
[FreeTextEntry1] : Ms. Najera is a 73 year old female with a history of abnormal chest CT, asthma, carcinoid tumor of lung, chronic GERD, eosinophilic asthma, low vitamin D, snoring, OW, and SOB presenting to the office today for a follow up visit. Her chief complaint is    -patient denies any headaches, nausea, vomiting, fever, chills, sweats, chest pain, chest pressure, palpitations, coughing, wheezing, fatigue, diarrhea, constipation, dysphagia, myalgias, dizziness, leg swelling, leg pain, itchy eyes, itchy ears, heartburn, reflux or sour taste in the mouth

## 2023-12-21 NOTE — ADDENDUM
[FreeTextEntry1] : Documented by Alessandro Cerda acting as a scribe for Dr. Gustavo Cornelius on 12/19/2023.  All medical record entries made by the Scribe were at my, Dr. Gustavo Cornelius's, direction and personally dictated by me on 12/18/2023. I have reviewed the chart and agree that the record accurately reflects my personal performance of the history, physical exam, assessment and plan. I have also personally directed, reviewed, and agree with the discharge instructions.

## 2023-12-21 NOTE — PHYSICAL EXAM
[Chaperone Declined] : Patient declined chaperone [Appropriately responsive] : appropriately responsive [Alert] : alert [No Acute Distress] : no acute distress [Soft] : soft [Non-tender] : non-tender [Non-distended] : non-distended [No HSM] : No HSM [No Lesions] : no lesions [No Mass] : no mass [Oriented x3] : oriented x3 [Right Breast Absent] : a total mastectomy [The Left Breast Was Examined] : a normal appearance [Labia Majora] : normal [Normal] : normal [Atrophy] : atrophy [Absent] : absent [FreeTextEntry4] : mild POP

## 2023-12-25 LAB — BACTERIA UR CULT: NORMAL

## 2024-01-03 ENCOUNTER — RX RENEWAL (OUTPATIENT)
Age: 74
End: 2024-01-03

## 2024-01-05 ENCOUNTER — NON-APPOINTMENT (OUTPATIENT)
Age: 74
End: 2024-01-05

## 2024-01-08 ENCOUNTER — APPOINTMENT (OUTPATIENT)
Dept: HOME HEALTH SERVICES | Facility: HOME HEALTH | Age: 74
End: 2024-01-08

## 2024-01-15 ENCOUNTER — LABORATORY RESULT (OUTPATIENT)
Age: 74
End: 2024-01-15

## 2024-01-15 ENCOUNTER — APPOINTMENT (OUTPATIENT)
Dept: CARDIOLOGY | Facility: CLINIC | Age: 74
End: 2024-01-15
Payer: MEDICARE

## 2024-01-15 ENCOUNTER — NON-APPOINTMENT (OUTPATIENT)
Age: 74
End: 2024-01-15

## 2024-01-15 VITALS
SYSTOLIC BLOOD PRESSURE: 126 MMHG | RESPIRATION RATE: 16 BRPM | WEIGHT: 178 LBS | TEMPERATURE: 98.5 F | DIASTOLIC BLOOD PRESSURE: 80 MMHG | OXYGEN SATURATION: 97 % | HEIGHT: 63 IN | HEART RATE: 63 BPM | BODY MASS INDEX: 31.54 KG/M2

## 2024-01-15 DIAGNOSIS — Z86.79 PERSONAL HISTORY OF OTHER DISEASES OF THE CIRCULATORY SYSTEM: ICD-10-CM

## 2024-01-15 DIAGNOSIS — I70.0 ATHEROSCLEROSIS OF AORTA: ICD-10-CM

## 2024-01-15 DIAGNOSIS — Z87.898 PERSONAL HISTORY OF OTHER SPECIFIED CONDITIONS: ICD-10-CM

## 2024-01-15 DIAGNOSIS — I51.7 CARDIOMEGALY: ICD-10-CM

## 2024-01-15 PROCEDURE — 99214 OFFICE O/P EST MOD 30 MIN: CPT | Mod: 25

## 2024-01-15 PROCEDURE — 93000 ELECTROCARDIOGRAM COMPLETE: CPT

## 2024-01-15 RX ORDER — ALENDRONATE SODIUM 70 MG/1
70 TABLET ORAL
Qty: 12 | Refills: 3 | Status: ACTIVE | COMMUNITY
Start: 2021-04-19

## 2024-01-15 RX ORDER — POTASSIUM CHLORIDE 750 MG/1
10 CAPSULE, EXTENDED RELEASE ORAL
Qty: 24 | Refills: 1 | Status: ACTIVE | COMMUNITY
Start: 2021-10-29 | End: 1900-01-01

## 2024-01-15 RX ORDER — MEPOLIZUMAB 100 MG/ML
100 INJECTION, POWDER, FOR SOLUTION SUBCUTANEOUS
Qty: 1 | Refills: 11 | Status: COMPLETED | COMMUNITY
Start: 2023-09-29 | End: 2024-01-15

## 2024-01-15 RX ORDER — BUSPIRONE HYDROCHLORIDE 10 MG/1
10 TABLET ORAL
Refills: 0 | Status: COMPLETED | COMMUNITY
Start: 2023-01-31 | End: 2024-01-15

## 2024-01-15 RX ORDER — SITAGLIPTIN AND METFORMIN HYDROCHLORIDE 50; 1000 MG/1; MG/1
50-1000 TABLET, FILM COATED ORAL TWICE DAILY
Qty: 60 | Refills: 10 | Status: ACTIVE | COMMUNITY
Start: 2021-04-19

## 2024-01-15 RX ORDER — ATORVASTATIN CALCIUM 20 MG/1
20 TABLET, FILM COATED ORAL DAILY
Qty: 90 | Refills: 1 | Status: ACTIVE | COMMUNITY
Start: 2020-05-13 | End: 1900-01-01

## 2024-01-15 RX ORDER — HYDROCHLOROTHIAZIDE 25 MG/1
25 TABLET ORAL
Qty: 90 | Refills: 1 | Status: ACTIVE | COMMUNITY
Start: 2021-10-29 | End: 1900-01-01

## 2024-01-15 NOTE — REASON FOR VISIT
[CV Risk Factors and Non-Cardiac Disease] : CV risk factors and non-cardiac disease [Hyperlipidemia] : hyperlipidemia [Follow-Up - Clinic] : a clinic follow-up of [Hypertension] : hypertension [Palpitations] : palpitations [FreeTextEntry3] : Dr. Horne [FreeTextEntry1] : murmur

## 2024-01-15 NOTE — ASSESSMENT
[FreeTextEntry1] : This is a 73-year-old female here today for follow-up cardiac evaluation.  She has a past medical history significant for hypertension, asthma, history of bronchitis, non-insulin-dependent diabetes mellitus, hyperlipidemia, status post right knee replacement.  She was born in [facility] and has no history of rheumatic fever.   HPI: She is feeling generally well today and denies chest pain, dizziness, heart palpitations, recent episodes of syncope or falls, SOB, or dyspnea at this time.  Current Medications: Aspirin 81 mg daily, Atorvastatin 20 mg daily, Hydrochlorothiazide 25 mg daily, Labetalol 200 mg 1 tablet twice daily, Olmesartan 40 mg daily, and Potassium chloride 10 M EQ's 1 tablet only on Saturday and Sunday.   BLOOD PRESSURE: -BP is controlled on today's exam   BLOOD WORK: -New blood work was done 01/15/2024 to evaluate lipid profile, CBC, BMP, hepatic function, A1C and TSH. -Blood work done by primary care doctor May 22nd 2023 demonstrated normal lipid profile. -New blood work was done by her primary care doctor on December 15, 2022 which demonstrated normal lipid profile. Her A1c was elevated at a 6.0%.    TESTING/REPORTS: -EKG done 01/15/2024 demonstrated regular sinus rhythm with nonspecific ST-T wave changes BPM of 63.   -EKG done 06/20/2023 which demonstrated regular sinus rhythm with nonspecific ST-T wave changes BPM of 57  -Echocardiogram done in the office 6/20/2023 demonstrated normal left ventricular systolic function EF 65%, borderline left ventricular hypertrophy, mild mitral regurgitation, mild tricuspid regurgitation, minimal pulmonic regurgitation, small pericardial effusion.  -EKG done Jan 30, 2023 which demonstrated regular sinus rhythm with nonspecific ST-T wave changes, BPM of 69 otherwise remarkable for poor R wave progression and left atrial abnormality.  -Electrocardiogram done January 21, 2022 demonstrated normal sinus rhythm rate of 78 bpm is otherwise remarkable for poor R wave progression and left atrial abnormality.  -She had a normal cardiac catheterization done April 6, 2021 which demonstrated normal left and right heart catheterization and normal pressures.  -Echo Doppler examination done March 22, 2021 demonstrated mild tricuspid valve regurgitation, small pericardial effusion adjacent to the right atrium.  -Holter monitor done October 29, 2021 demonstrated no significant arrhythmia or heart block.  -Echo Doppler examination done May 14, 2020 by another cardiologist which demonstrated mild left ventricular hypertrophy, normal left ventricular function with an estimated ejection fraction of 55 to 60%, and trivial pericardial effusion. There was mild tricuspid valve regurgitation, calcified mitral valve annulus and thickened mitral valve leaflets.  -Electrocardiogram done June 12, 2020 demonstrated normal sinus rhythm rate of 82 bpm is otherwise remarkable left atrial abnormality, and for poor R wave progression.  -The patient's daughter reports that she had a normal stress echocardiogram April 26, 2017.   PLAN: -The patient is clinically stable from a cardiac standpoint on today's exam. -She will continue with her current medications and will contact the office if she is having any complaints between now and their next follow up appointment. -Patient will schedule echocardiogram doppler examination to evaluate murmur, left ventricular function, chamber size, and rule out hypertrophy. -She will follow-up with her primary care doctor and all of her specialists routinely.  I have discussed the plan of care with Ms. PAULINA GAMBOA and she will follow up in 4-6 months. She is compliant with all of her medications.  The patient understands that aerobic exercises must be increased to minutes 4 times/week and a detailed discussion of lifestyle modification was done today.  The patient has a good understanding of the diagnosis, treatment plan and lifestyle modification.  She will contact me at the office for any questions with their care or any changes in their health status.  Dr Hensley, supervising physician, was present in the office with ORLY Fletcher NP during the ramos portions of the history and exam. The plan was discussed in detail as documented in the note.  ORLY Fletcher NP

## 2024-01-15 NOTE — DISCUSSION/SUMMARY
[FreeTextEntry1] : Dr. Hensley-(PRIOR VISIT and PMH WITH Dr. Hensley):   This is a 71-year-old female with past medical history significant for hypertension, asthma, history of bronchitis, non-insulin-dependent diabetes mellitus, hyperlipidemia, status post right knee replacement, who comes in for cardiac follow-up evaluation.    The patient still complains of occasional dyspnea on exertion and rare palpitations.  The symptoms not associate with any particular activity.  The patient had a cardiac catheterization done April 6, 2021 which demonstrated normal left and right heart catheterization and normal pressures.  The patient is encouraged to increase her fluid intake.  She will have new blood work done today for electrolytes and lipid profile. Her blood pressure is under adequate control. Her shortness of breath/dyspnea on exertion may be secondary to reduced aerobic capacity, or her underlying pulmonary disease.  She was seen in consultation by the electrophysiology team and felt that she did not require a loop recorder at this time.  Electrocardiogram done January 21, 2022 demonstrated normal sinus rhythm rate of 78 bpm is otherwise remarkable for poor R wave progression and left atrial abnormality.  She had a normal cardiac catheterization done April 6, 2021.  Echo Doppler examination done March 22, 2021 demonstrated mild tricuspid valve regurgitation, small pericardial effusion adjacent to the right atrium.  Holter monitor done October 29, 2021 demonstrated no significant arrhythmia or heart block.  Most recent lab work from January 22,2022 showed total cholesterol 160, triglycerides 44, HDL 72, LDL calculated 80, non-HDL 88 mg/dL.   PMH: The edema is localized to the malleolus area and is consistent with calcium channel blocker induced edema.  The patient had been on 2 calcium channel blockers, and Norvasc was discontinued, and the patient remained on verapamil 240 mg/day. She still has bilateral 1+/2 pedal edema at the area of the malleolus.  She will discontinue her verapamil and start on hydrochlorothiazide 25 mg/day and potassium chloride 10 mEq only 1 tablet on Saturday and Sunday.   Lipid panel done August 20, 2021 demonstrated a direct LDL 48 mg/dL, cholesterol 115, HDL 65, triglycerides of 33, and non-HDL cholesterol 50 mg/dL.  She will continue on her current medical therapy.  Her LDL is at target level.  She was born in [facility] and has no history of rheumatic fever.   Echo Doppler examination done May 14, 2020 by another cardiologist which demonstrated mild left ventricular hypertrophy, normal left ventricular function with an estimated ejection fraction of 55 to 60%, and trivial pericardial effusion.  There was mild tricuspid valve regurgitation, calcified mitral valve annulus and thickened mitral valve leaflets.  The patient's daughter reports that she had a normal stress echocardiogram April 26, 2017.  Electrocardiogram done June 12, 2020 demonstrated normal sinus rhythm rate of 82 bpm is otherwise remarkable left atrial abnormality, and for poor R wave progression.  The patient understands that aerobic exercises must be increased to 40 minutes 4 times per week. A detailed discussion of lifestyle modification was done today. The patient has a good understanding of the diagnosis, and treatment plan. Lifestyle modification was also outlined.

## 2024-01-15 NOTE — PHYSICAL EXAM
[Well Developed] : well developed [Well Nourished] : well nourished [No Acute Distress] : no acute distress [Normal Venous Pressure] : normal venous pressure [No Carotid Bruit] : no carotid bruit [Normal S1, S2] : normal S1, S2 [No Rub] : no rub [No Gallop] : no gallop [Murmur] : murmur [Clear Lung Fields] : clear lung fields [Good Air Entry] : good air entry [No Respiratory Distress] : no respiratory distress  [Soft] : abdomen soft [No Masses/organomegaly] : no masses/organomegaly [Non Tender] : non-tender [Normal Bowel Sounds] : normal bowel sounds [Normal Gait] : normal gait [No Cyanosis] : no cyanosis [No Clubbing] : no clubbing [No Varicosities] : no varicosities [No Rash] : no rash [No Skin Lesions] : no skin lesions [Moves all extremities] : moves all extremities [No Focal Deficits] : no focal deficits [Normal Speech] : normal speech [Alert and Oriented] : alert and oriented [Normal memory] : normal memory [General Appearance - Well Developed] : well developed [Normal Appearance] : normal appearance [General Appearance - Well Nourished] : well nourished [Well Groomed] : well groomed [No Deformities] : no deformities [General Appearance - In No Acute Distress] : no acute distress [Normal Conjunctiva] : the conjunctiva exhibited no abnormalities [Normal Oral Mucosa] : normal oral mucosa [Eyelids - No Xanthelasma] : the eyelids demonstrated no xanthelasmas [Normal Jugular Venous A Waves Present] : normal jugular venous A waves present [Normal Jugular Venous V Waves Present] : normal jugular venous V waves present [No Jugular Venous Morejon A Waves] : no jugular venous morejon A waves [Exaggerated Use Of Accessory Muscles For Inspiration] : no accessory muscle use [Respiration, Rhythm And Depth] : normal respiratory rhythm and effort [Auscultation Breath Sounds / Voice Sounds] : lungs were clear to auscultation bilaterally [Abdomen Soft] : soft [Abdomen Tenderness] : non-tender [] : no hepato-splenomegaly [Abdomen Mass (___ Cm)] : no abdominal mass palpated [Abnormal Walk] : normal gait [Cyanosis, Localized] : no localized cyanosis [Skin Color & Pigmentation] : normal skin color and pigmentation [Skin Turgor] : normal skin turgor [No Xanthoma] : no  xanthoma was observed [Oriented To Time, Place, And Person] : oriented to person, place, and time [Affect] : the affect was normal [Mood] : the mood was normal [No Anxiety] : not feeling anxious [II] : a grade 2 [___ +] : bilateral [unfilled]U+ pretibial pitting edema [5th Left ICS - MCL] : palpated at the 5th LICS in the midclavicular line [Normal] : normal [No Precordial Heave] : no precordial heave was noted [Apical Thrill] : no thrill palpable at the apex [Normal Rate] : normal [Rhythm Regular] : regular [Normal S1] : normal S1 [Normal S2] : normal S2 [I] : a grade 1 [No Pitting Edema] : no pitting edema present [Right Femoral Bruit] : no bruit heard over the right femoral artery [Left Femoral Bruit] : no bruit heard over the left femoral artery [2+] : left 2+ [de-identified] : +2 B/L LE pitting edema/ no calf tenderness [No Abnormalities] : the abdominal aorta was not enlarged and no bruit was heard [S3] : no S3 [S4] : no S4 [Right Carotid Bruit] : no bruit heard over the right carotid [Left Carotid Bruit] : no bruit heard over the left carotid

## 2024-01-19 ENCOUNTER — NON-APPOINTMENT (OUTPATIENT)
Age: 74
End: 2024-01-19

## 2024-02-22 ENCOUNTER — NON-APPOINTMENT (OUTPATIENT)
Age: 74
End: 2024-02-22

## 2024-02-29 ENCOUNTER — APPOINTMENT (OUTPATIENT)
Dept: PULMONOLOGY | Facility: CLINIC | Age: 74
End: 2024-02-29
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
## 2024-03-12 ENCOUNTER — RX RENEWAL (OUTPATIENT)
Age: 74
End: 2024-03-12

## 2024-03-12 RX ORDER — THEOPHYLLINE 400 MG/1
400 TABLET, EXTENDED RELEASE ORAL
Qty: 90 | Refills: 1 | Status: ACTIVE | COMMUNITY
Start: 2023-08-31 | End: 1900-01-01

## 2024-04-17 ENCOUNTER — RX RENEWAL (OUTPATIENT)
Age: 74
End: 2024-04-17

## 2024-04-18 RX ORDER — LABETALOL HYDROCHLORIDE 200 MG/1
200 TABLET, FILM COATED ORAL
Qty: 180 | Refills: 1 | Status: ACTIVE | COMMUNITY
Start: 2021-03-02 | End: 1900-01-01

## 2024-04-29 NOTE — ED PROVIDER NOTE - PHYSICAL EXAMINATION
NPO  Guidelines Before Surgery    Stop food at midnight. Food includes anything that's not formula, milk, breast milk or clear liquids.  Stop formula, G-tube feeds, and non-human milk 6 hours prior to arrival time.  Stop breast milk 4 hours prior to arrival time.  Stop all clear liquids 2 hours prior to arrival time. Clear liquids include only water, clear apple juice (no pulp, no apple cider), Pedialyte and Gatorade.  Oral medications deemed essential (anticonvulsants, anticoagulants, antihypertensives, and cardiac medications such as beta-blockers) should be taken as prescribed with a sip of clear liquid.     If your child has sleep apnea or uses a CPAP/BiPAP or Ventilator, please bring this device along with power cord, mask, and tubing/ spare circuit with you on the day of surgery.     If your child has a surgically implanted feeding tube, please bring the extension tubing or any necessary liquid thickeners with you on the day of surgery.     If your child requires special formula and is unable to tolerate apple juice or sugar containing carbonated beverages, please bring the formula from home to use in the recovery phase.     If your child has a tracheostomy, please bring spare tracheostomy tube with you on the day of surgery.     If there are any changes in your child's health conditions, please call the surgeon's office to alert them and give details of their symptoms.     Sharon Olmos, MSN, CPNP-PC   Pediatric Nurse Practioner   Department of Anesthesiology and Perioperative Medicine   [address]   [address]  Main: [phone]  Fax: [phone]         Nguyen:  General: No distress.  Mentation at baseline.   HEENT: WNL  Chest/Lungs: CTAB, No wheeze, No retractions, No increased work of breathing, Normal rate  Heart: S1S2 RRR, No M/R/G, Pules equal Bilaterally in upper and lower extremities distally  Abd: soft, NT/ND, No guarding, No rebound.  No hernias, no palpable masses.  Extrem: FROM in all joints, no significant edema noted, No ulcers.  Cap refil < 2sec.  Skin: No rash noted, warm dry.  Neuro:  Grossly normal.  No difficulty ambulating. No focal deficits.  Psychiatric: No evidence of delusions. No SI/HI.

## 2024-05-07 ENCOUNTER — APPOINTMENT (OUTPATIENT)
Dept: ENDOCRINOLOGY | Facility: CLINIC | Age: 74
End: 2024-05-07
Payer: MEDICARE

## 2024-05-07 VITALS
BODY MASS INDEX: 31.71 KG/M2 | HEART RATE: 77 BPM | SYSTOLIC BLOOD PRESSURE: 142 MMHG | WEIGHT: 179 LBS | RESPIRATION RATE: 16 BRPM | HEIGHT: 63 IN | OXYGEN SATURATION: 97 % | DIASTOLIC BLOOD PRESSURE: 82 MMHG | TEMPERATURE: 98 F

## 2024-05-07 DIAGNOSIS — I10 ESSENTIAL (PRIMARY) HYPERTENSION: ICD-10-CM

## 2024-05-07 DIAGNOSIS — E78.00 PURE HYPERCHOLESTEROLEMIA, UNSPECIFIED: ICD-10-CM

## 2024-05-07 DIAGNOSIS — E04.2 NONTOXIC MULTINODULAR GOITER: ICD-10-CM

## 2024-05-07 DIAGNOSIS — E11.9 TYPE 2 DIABETES MELLITUS W/OUT COMPLICATIONS: ICD-10-CM

## 2024-05-07 DIAGNOSIS — M81.0 AGE-RELATED OSTEOPOROSIS W/OUT CURRENT PATHOLOGICAL FRACTURE: ICD-10-CM

## 2024-05-07 DIAGNOSIS — E83.52 HYPERCALCEMIA: ICD-10-CM

## 2024-05-07 LAB — GLUCOSE BLDC GLUCOMTR-MCNC: 111

## 2024-05-07 PROCEDURE — 99215 OFFICE O/P EST HI 40 MIN: CPT

## 2024-05-07 PROCEDURE — G2211 COMPLEX E/M VISIT ADD ON: CPT | Mod: NC,1L

## 2024-05-07 NOTE — HISTORY OF PRESENT ILLNESS
[FreeTextEntry1] : F/u for multiple medical issues  *** May 07, 2024 ***  last visit a year ago remains on  Janumet 50/1000 mg bid,  Olmesartan 40 mg qd, labetalol, Fosamax 70 mg qw, Lipitor 20 mg HS, back on HCTZ  25 mg qd, potassium supplements  reports fbs- 120's, ppg- upto 190 labs from 01/24 reviewed - a1c- 6.5, calcium- 11.1   *** May 22, 2023 ***  taking Janumet 50/1000 mg bid,  olmesartan 40 mg qd, labetalol,  back on Fosamax 70 mg qw started iron pills for anemia no recent sono/DXA reports fbs- low 100's  *** Dec 15, 2022 ***  taking Janumet 50/1000 mg bid,  olmesartan 40 mg qd, labetalol,  stopped  Fosamax 70 mg qw (not sure why, likely ran out) feels well. no jaw/hip pains reports fbs low 100's no recent sono/DXA  *** Jun 07, 2022 ***  taking Janumet 50/1000 mg bid,  olmesartan 40 mg qd, labetalol reports fbs- 95- 116, ppg - upto 160 last a1c- 6.1% ca- 10.9  *** Feb 15, 2022 ***  feels ok. dx'ed with a breast cancer- s/p right mastectomy on Janumet 50/1000 mg bid,  olmesartan 40 mg qd, labetalol still taking potassium supplements and with a persistent edema despite stopping CCB  *** Nov 03, 2021 ***  on Janumet 50/1000 mg bid,  olmesartan 40 mg qd, labetalol reports fbs- 90's- 120's, ppg- 110's PAC/PRA- 5.7/0.2 normal plasma free meta  Thyr US (10/2/21)- stable RMP iso 1.6cm, LMP 1.1 cm hypo and LLP 2.2 spongiform nodules Parathyroid sestamibi scan (5/27/21)- parathyroid lesion posterior to the LUP and ? another lesion posterior to the RLP  old CT chest from 11/14/18- no adrenal masses seen  *** Sep 13, 2021 ***  on Janumet 50/1000 mg bid,  olmesartan 40 mg qd, labetalol off HCTZ with leg swelling since adding norvasc- switched to verapamil er 240 mg qd, also on doxazosin and K-dur reports fbs- 120's FNA (5/21/21)  of RMP 2.1 - nodular goiter, LUP 1.1 - colloid nodule labs (8/20/21)- LDL- 43, ca- 10.2, K- 4.0, a1c- 6.6, TSH- 1.01  *** May 21, 2021 ***  on Janumet 50/1000 mg bid,  olmesartan 40 mg qd, labetalol off HCTZ. BP is high since stopping it reports fbs- 130's, ppg- 150's  Thyroid/parathyroid US (5/14/21)- RMP iso 1.4x1.2x0.8; LMP iso 1.7x1.1x1.6. no definitive parathyroid nodules  on preFNA US- RMP 2.1 cm, LUP 1.1 (round hypo), LLP 2.2 spongi. Patient declined biopsy of all 3 nodules, so only RMP and LUP nodules were biopsied  HISTORY OF PRESENT ILLNESS.   Ms. GAMBOA was diagnosed with Diabetes Mellitus Type 2 more than 10 years ago. She reports history HTN, dyslipidemia, carcinoid tumor of the lung, and denies CAD,  known complications of retinopathy, nephropathy, or neuropathy. She is presently on metformin 500 mg bid, Lipitor 20 mg HS. As per daughter, she was diagnosed with a hypercalcemia sometime in 05/20, calcium rising to 10.5-11.1, and as per daughter was even higher  in December 2020 when was admitted to the hospital with Covid, and was placed on cinacalcet. However, she developed tremors and stopped taking it about 2 months ago. She's taking some MVI once a day Blood sugars are checked 3-4 times a day. She noticed that her blood sugars are higher since she had Covid in 12/20 Did not bring log book, but reported are typically as following: FBS- 150's- 160's, ppg- low 200's Hypoglycemia frequency: none  Fingerstick glucose in the office today is 236 mg/dL 2 hours after eating.  Diet: not following ADA Exercise: none  Lab review (1/21) : a1c- 6.7%, calcium - 11.1, LDL- 50, TSH- 1.05  DXA (8/19/20)- LS (-2.4) with OA changes  at L1-L2, L3-L4 (-2.8), FN (-1.0)  Last dilated eye - 2023 Last podiatry visit  -  3/22 Last cardiology evaluation - 01/24 Last stress test - 3/21 Last 2-D Echo - 3/21, EF- 70% cath- 4/21 Last nephrology evaluation - none Last neurology evaluation-none

## 2024-05-07 NOTE — ASSESSMENT
[Diabetes Foot Care] : diabetes foot care [Long Term Vascular Complications] : long term vascular complications of diabetes [Carbohydrate Consistent Diet] : carbohydrate consistent diet [Importance of Diet and Exercise] : importance of diet and exercise to improve glycemic control, achieve weight loss and improve cardiovascular health [Exercise/Effect on Glucose] : exercise/effect on glucose [Hypoglycemia Management] : hypoglycemia management [Self Monitoring of Blood Glucose] : self monitoring of blood glucose [Retinopathy Screening] : Patient was referred to ophthalmology for retinopathy screening [Diabetic Medications] : Risks and benefits of diabetic medications were discussed [FreeTextEntry1] : 1. DM2, reasonably controlled - Janumet 50/1000 mg bid - continue lipitor - olmesartan 40 mg qd, Labetalol, verapamil  2. H/o uncontrolled HTN with hypokalemia on multiple meds Suspicious for PA - prior paired PAC/PRA is fine, will repeat today and consider non-contrast CT adrenals - we discussed adding Aldactone- will wait for now, given stable BP  3. Hypercalcemia, worsened again in settings of HCTZ - monitor calcium/PTH, vitamin D level - advised on stopping HCTZ if able and close BP monitoring - might need another  24h Uca collection while off HCTZ and potential CASR testing  4. MNG - benign FNA b/l nodules - essentially stable sono, repeat this month  5. Osteoporosis I advised to the patient on antiresorptive therapy, and reviewed potential options for osteoporosis treatment, including oral and IV bisphosphonates, Prolia, Evenity. R+B of each options were discussed in details - OTC vitamin D3 2,000 IU/day - continue weight-bearing exercises; advised avoiding high risk sports - prior dental evaluation advised. - Fosamax 70 mg qw - DXA 3 sites this month RTC 3 mos  *** daughter Ismael [phone]

## 2024-05-13 LAB
25(OH)D3 SERPL-MCNC: 43.3 NG/ML
ALBUMIN SERPL ELPH-MCNC: 4.5 G/DL
ALDOSTERONE SERUM: 8.4 NG/DL
ALP BLD-CCNC: 65 U/L
ALT SERPL-CCNC: 13 U/L
ANION GAP SERPL CALC-SCNC: 14 MMOL/L
AST SERPL-CCNC: 17 U/L
BILIRUB SERPL-MCNC: 0.3 MG/DL
BUN SERPL-MCNC: 18 MG/DL
CA-I SERPL-SCNC: 5.8 MG/DL
CALCIUM SERPL-MCNC: 10.6 MG/DL
CALCIUM SERPL-MCNC: 10.6 MG/DL
CHLORIDE SERPL-SCNC: 102 MMOL/L
CO2 SERPL-SCNC: 27 MMOL/L
CREAT SERPL-MCNC: 1.03 MG/DL
DHEA-S SERPL-MCNC: 38.2 UG/DL
EGFR: 57 ML/MIN/1.73M2
ESTIMATED AVERAGE GLUCOSE: 137 MG/DL
FOLATE SERPL-MCNC: 11.9 NG/ML
FRUCTOSAMINE SERPL-MCNC: 278 UMOL/L
GLUCOSE SERPL-MCNC: 117 MG/DL
HBA1C MFR BLD HPLC: 6.4 %
PARATHYROID HORMONE INTACT: 42 PG/ML
POTASSIUM SERPL-SCNC: 4.7 MMOL/L
PROT SERPL-MCNC: 7.3 G/DL
SODIUM SERPL-SCNC: 142 MMOL/L
T4 FREE SERPL-MCNC: 1.3 NG/DL
TSH SERPL-ACNC: 1.01 UIU/ML
VIT B12 SERPL-MCNC: 335 PG/ML

## 2024-05-14 LAB — RENIN ACTIVITY, PLASMA: 0.85 NG/ML/HR

## 2024-05-20 ENCOUNTER — RX RENEWAL (OUTPATIENT)
Age: 74
End: 2024-05-20

## 2024-05-20 RX ORDER — OLMESARTAN MEDOXOMIL 40 MG/1
40 TABLET, FILM COATED ORAL
Qty: 90 | Refills: 1 | Status: ACTIVE | COMMUNITY
Start: 2021-08-31 | End: 1900-01-01

## 2024-05-21 LAB
METANEPHRINE, PL: <25 PG/ML
NORMETANEPHRINE, PL: 148.4 PG/ML

## 2024-05-31 LAB
MISCELLANEOUS TEST: NORMAL
PROC NAME: NORMAL

## 2024-07-09 NOTE — ED PROVIDER NOTE - IV ALTEPLASE EXCL REL HIDDEN
Occupational Therapy 5klm  Goals reviewed and updated    Visit Type: treatment  SUBJECTIVE  Patient agreed to participate in therapy this date.  RN in agreement to work with patient for therapy session.  \"I usually put the chair this way and pivot\"  Patient / Family Goal: maximize function    OBJECTIVE     Cognitive Status   Orientation    - Oriented to: person, place and time  Following Direction   - follows one step commands with increased time  Transition Between Tasks   - transitions with cues      Sitting Balance  (MARY = base of support)  Static      - Trial 1 details: supervision       Transfers  Assistive devices: gait belt  - Stand pivot: total assist - non-dependent (mod of 2 for partial stand pivot to chair from bed)  - Toilet: total assist - dependent (via STEDY)      Activities of Daily Living (ADLs)  Toileting:   - Assist: total assist - dependent (pt declined to attempt within STEDY)  Interventions      Additional exercise details: Issued minimal resistance theraband and reviewed UE strengthening exercises with pt  Skilled input: verbal instruction/cues and tactile instruction/cues  Verbal Consent: Writer verbally educated and received verbal consent for hand placement, positioning of patient, and techniques to be performed today from patient for clothing adjustments for techniques and therapist position for techniques as described above and how they are pertinent to the patient's plan of care.         Education:   - Present and ready to learn: patient  Education provided during session:  - Goals and transfer techniques  - Results of above outlined education: Verbalizes understanding, Demonstrates understanding and Needs reinforcement    ASSESSMENT   Progress: progressing toward goals  Interferring components: medical status limitations and decreased activity tolerance    Discharge needs based on today's assessment:  - Current level of function: slightly below baseline level of function  - Therapy  needs at discharge: therapy 5 or more times per week  - Activities of daily living (ADLs) requiring support at discharge: transfers, dressing, toileting and bathing  - Instrumental activities of daily living (IADLs) requiring support at discharge: care of others, home management, shopping, meal preparation, health/medication management, emergency responses, community mobility and care of pets  - Impairments that require further therapy intervention: strength, activity tolerance and balance  AM-PAC  - Prior Level of Function:         Key: MOD A=moderate assistance, IND/MOD I=independent/modified independent  - Generalized Current Level of Function     - Current Self-Cares: 16       Scoring Key= >21 Modified Independent; 20-21 Supervision; 18-19 Minimal assist; 13-17 Moderate assist; 9-12 Max assist; <9 Total assist      PLAN (while hospitalized)  Suggestions for next session as indicated: Progress pivot to recliner or wc  UE t band  ADLs seated    OT Frequency: 3-5 x per week      PT/OT Mobility Equipment for Discharge: owns w/c; defer to DENISE  PT/OT ADL Equipment for Discharge: none with DENISE  Agreement to plan and goals: patient agrees with goals and treatment plan      GOALS  Review Date: 7/16/2024  Long Term Goals: (to be met by time of discharge from hospital)  Grooming: Patient will complete grooming tasks modified independent.  Status: progressing/ongoing  Upper body dressing: Patient will complete upper body dressing modified independent.  Status: progressing/ongoing  Lower body dressing: Patient will complete lower body dressing modified independent.  Status: progressing/ongoing  Toileting: Patient will complete toileting modified independent.  Status: progressing/ongoing  Bathing: Patient will complete bathingmodified independent   Status: progressing/ongoingToilet transfer: Patient will complete toilet transfer with modified independent.   Status: progressing/ongoing  Tub/shower transfer: Patient will  complete tub/shower transfer with modified independent.   Status: progressing/ongoing  Home setting transfer: Patient will complete home setting transfers with modified independent.   Status: progressing/ongoing    Documented in the chart in the following areas: Assessment/Plan.    Patient at End of Session:   Location: in chair  Safety measures: alarm system in place/re-engaged  Handoff to: nurse assistant      Therapy procedure time and total treatment time can be found documented on the Time Entry flowsheet   show

## 2024-07-16 ENCOUNTER — LABORATORY RESULT (OUTPATIENT)
Age: 74
End: 2024-07-16

## 2024-07-16 ENCOUNTER — APPOINTMENT (OUTPATIENT)
Dept: CARDIOLOGY | Facility: CLINIC | Age: 74
End: 2024-07-16
Payer: MEDICARE

## 2024-07-16 ENCOUNTER — NON-APPOINTMENT (OUTPATIENT)
Age: 74
End: 2024-07-16

## 2024-07-16 VITALS
SYSTOLIC BLOOD PRESSURE: 138 MMHG | OXYGEN SATURATION: 97 % | DIASTOLIC BLOOD PRESSURE: 85 MMHG | BODY MASS INDEX: 32.25 KG/M2 | TEMPERATURE: 97.5 F | HEIGHT: 63 IN | RESPIRATION RATE: 16 BRPM | WEIGHT: 182 LBS | HEART RATE: 64 BPM

## 2024-07-16 DIAGNOSIS — E78.00 PURE HYPERCHOLESTEROLEMIA, UNSPECIFIED: ICD-10-CM

## 2024-07-16 DIAGNOSIS — Z87.898 PERSONAL HISTORY OF OTHER SPECIFIED CONDITIONS: ICD-10-CM

## 2024-07-16 DIAGNOSIS — I10 ESSENTIAL (PRIMARY) HYPERTENSION: ICD-10-CM

## 2024-07-16 DIAGNOSIS — E11.9 TYPE 2 DIABETES MELLITUS W/OUT COMPLICATIONS: ICD-10-CM

## 2024-07-16 DIAGNOSIS — Z86.79 PERSONAL HISTORY OF OTHER DISEASES OF THE CIRCULATORY SYSTEM: ICD-10-CM

## 2024-07-16 DIAGNOSIS — I51.7 CARDIOMEGALY: ICD-10-CM

## 2024-07-16 PROCEDURE — 99214 OFFICE O/P EST MOD 30 MIN: CPT | Mod: 25

## 2024-07-16 PROCEDURE — G2211 COMPLEX E/M VISIT ADD ON: CPT | Mod: NC

## 2024-07-16 PROCEDURE — 93000 ELECTROCARDIOGRAM COMPLETE: CPT

## 2024-07-16 PROCEDURE — 93306 TTE W/DOPPLER COMPLETE: CPT

## 2024-07-16 RX ORDER — AMLODIPINE BESYLATE 5 MG/1
5 TABLET ORAL
Qty: 90 | Refills: 1 | Status: ACTIVE | COMMUNITY
Start: 2024-07-16 | End: 1900-01-01

## 2024-07-17 ENCOUNTER — RX RENEWAL (OUTPATIENT)
Age: 74
End: 2024-07-17

## 2024-07-24 ENCOUNTER — APPOINTMENT (OUTPATIENT)
Dept: ULTRASOUND IMAGING | Facility: IMAGING CENTER | Age: 74
End: 2024-07-24
Payer: MEDICARE

## 2024-07-24 ENCOUNTER — TRANSCRIPTION ENCOUNTER (OUTPATIENT)
Age: 74
End: 2024-07-24

## 2024-07-24 ENCOUNTER — APPOINTMENT (OUTPATIENT)
Dept: RADIOLOGY | Facility: IMAGING CENTER | Age: 74
End: 2024-07-24
Payer: MEDICARE

## 2024-07-24 PROCEDURE — 76536 US EXAM OF HEAD AND NECK: CPT | Mod: 26

## 2024-07-24 PROCEDURE — 77080 DXA BONE DENSITY AXIAL: CPT | Mod: 26

## 2024-07-26 ENCOUNTER — NON-APPOINTMENT (OUTPATIENT)
Age: 74
End: 2024-07-26

## 2024-08-06 NOTE — ED PROVIDER NOTE - RATE
82 Azithromycin Counseling:  I discussed with the patient the risks of azithromycin including but not limited to GI upset, allergic reaction, drug rash, diarrhea, and yeast infections. Itraconazole Counseling:  I discussed with the patient the risks of itraconazole including but not limited to liver damage, nausea/vomiting, neuropathy, and severe allergy.  The patient understands that this medication is best absorbed when taken with acidic beverages such as non-diet cola or ginger ale.  The patient understands that monitoring is required including baseline LFTs and repeat LFTs at intervals.  The patient understands that they are to contact us or the primary physician if concerning signs are noted. Xeljanz Counseling: I discussed with the patient the risks of Xeljanz therapy including increased risk of infection, liver issues, headache, diarrhea, or cold symptoms. Live vaccines should be avoided. They were instructed to call if they have any problems. Isotretinoin Pregnancy And Lactation Text: This medication is Pregnancy Category X and is considered extremely dangerous during pregnancy. It is unknown if it is excreted in breast milk. Arava Pregnancy And Lactation Text: This medication is Pregnancy Category X and is absolutely contraindicated during pregnancy. It is unknown if it is excreted in breast milk. Doxepin Counseling:  Patient advised that the medication is sedating and not to drive a car after taking this medication. Patient informed of potential adverse effects including but not limited to dry mouth, urinary retention, and blurry vision.  The patient verbalized understanding of the proper use and possible adverse effects of doxepin.  All of the patient's questions and concerns were addressed. Doxycycline Pregnancy And Lactation Text: This medication is Pregnancy Category D and not consider safe during pregnancy. It is also excreted in breast milk but is considered safe for shorter treatment courses. Birth Control Pills Pregnancy And Lactation Text: This medication should be avoided if pregnant and for the first 30 days post-partum. Tranexamic Acid Pregnancy And Lactation Text: It is unknown if this medication is safe during pregnancy or breast feeding. Oxybutynin Counseling:  I discussed with the patient the risks of oxybutynin including but not limited to skin rash, drowsiness, dry mouth, difficulty urinating, and blurred vision. Cibinqo Pregnancy And Lactation Text: It is unknown if this medication will adversely affect pregnancy or breast feeding.  You should not take this medication if you are currently pregnant or planning a pregnancy or while breastfeeding. Minoxidil Counseling: Minoxidil is a topical medication which can increase blood flow where it is applied. It is uncertain how this medication increases hair growth. Side effects are uncommon and include stinging and allergic reactions. Elidel Pregnancy And Lactation Text: This medication is Pregnancy Category C. It is unknown if this medication is excreted in breast milk. Niacinamide Pregnancy And Lactation Text: These medications are considered safe during pregnancy. Topical Metronidazole Pregnancy And Lactation Text: This medication is Pregnancy Category B and considered safe during pregnancy.  It is also considered safe to use while breastfeeding. Rifampin Counseling: I discussed with the patient the risks of rifampin including but not limited to liver damage, kidney damage, red-orange body fluids, nausea/vomiting and severe allergy. Topical Retinoid counseling:  Patient advised to apply a pea-sized amount only at bedtime and wait 30 minutes after washing their face before applying.  If too drying, patient may add a non-comedogenic moisturizer. The patient verbalized understanding of the proper use and possible adverse effects of retinoids.  All of the patient's questions and concerns were addressed. Protopic Pregnancy And Lactation Text: This medication is Pregnancy Category C. It is unknown if this medication is excreted in breast milk when applied topically. Elidel Counseling: Patient may experience a mild burning sensation during topical application. Elidel is not approved in children less than 2 years of age. There have been case reports of hematologic and skin malignancies in patients using topical calcineurin inhibitors although causality is questionable. Carac Counseling:  I discussed with the patient the risks of Carac including but not limited to erythema, scaling, itching, weeping, crusting, and pain. VTAMA Counseling: I discussed with the patient that VTAMA is not for use in the eyes, mouth or mouth. They should call the office if they develop any signs of allergic reactions to VTAMA. The patient verbalized understanding of the proper use and possible adverse effects of VTAMA.  All of the patient's questions and concerns were addressed. Ilumya Pregnancy And Lactation Text: The risk during pregnancy and breastfeeding is uncertain with this medication. Cyclophosphamide Pregnancy And Lactation Text: This medication is Pregnancy Category D and it isn't considered safe during pregnancy. This medication is excreted in breast milk. Dupixent Counseling: I discussed with the patient the risks of dupilumab including but not limited to eye inflammation and irritation, cold sores, injection site reactions, allergic reactions and increased risk of parasitic infection. The patient understands that monitoring is required and they must alert us or the primary physician if symptoms of infection or other concerning signs are noted. Griseofulvin Pregnancy And Lactation Text: This medication is Pregnancy Category X and is known to cause serious birth defects. It is unknown if this medication is excreted in breast milk but breast feeding should be avoided. Xolair Pregnancy And Lactation Text: This medication is Pregnancy Category B and is considered safe during pregnancy. This medication is excreted in breast milk. Skyrizi Counseling: I discussed with the patient the risks of risankizumab-rzaa including but not limited to immunosuppression, and serious infections.  The patient understands that monitoring is required including a PPD at baseline and must alert us or the primary physician if symptoms of infection or other concerning signs are noted. Clofazimine Counseling:  I discussed with the patient the risks of clofazimine including but not limited to skin and eye pigmentation, liver damage, nausea/vomiting, gastrointestinal bleeding and allergy. Azithromycin Pregnancy And Lactation Text: This medication is considered safe during pregnancy and is also secreted in breast milk. High Dose Vitamin A Counseling: Side effects reviewed, pt to contact office should one occur. Spironolactone Counseling: Patient advised regarding risks of diarrhea, abdominal pain, hyperkalemia, birth defects (for female patients), liver toxicity and renal toxicity. The patient may need blood work to monitor liver and kidney function and potassium levels while on therapy. The patient verbalized understanding of the proper use and possible adverse effects of spironolactone.  All of the patient's questions and concerns were addressed. Valtrex Counseling: I discussed with the patient the risks of valacyclovir including but not limited to kidney damage, nausea, vomiting and severe allergy.  The patient understands that if the infection seems to be worsening or is not improving, they are to call. Litfulo Counseling: I discussed with the patient the risks of Litfulo therapy including but not limited to upper respiratory tract infections, shingles, cold sores, and nausea. Live vaccines should be avoided.  This medication has been linked to serious infections; higher rate of mortality; malignancy and lymphoproliferative disorders; major adverse cardiovascular events; thrombosis; gastrointestinal perforations; neutropenia; lymphopenia; anemia; liver enzyme elevations; and lipid elevations. Xelginz Pregnancy And Lactation Text: This medication is Pregnancy Category D and is not considered safe during pregnancy.  The risk during breast feeding is also uncertain. Cimetidine Pregnancy And Lactation Text: This medication is Pregnancy Category B and is considered safe during pregnancy. It is also excreted in breast milk and breast feeding isn't recommended. Erythromycin Counseling:  I discussed with the patient the risks of erythromycin including but not limited to GI upset, allergic reaction, drug rash, diarrhea, increase in liver enzymes, and yeast infections. Nsaids Counseling: NSAID Counseling: I discussed with the patient that NSAIDs should be taken with food. Prolonged use of NSAIDs can result in the development of stomach ulcers.  Patient advised to stop taking NSAIDs if abdominal pain occurs.  The patient verbalized understanding of the proper use and possible adverse effects of NSAIDs.  All of the patient's questions and concerns were addressed. Oxybutynin Pregnancy And Lactation Text: This medication is Pregnancy Category B and is considered safe during pregnancy. It is unknown if it is excreted in breast milk. Ivermectin Pregnancy And Lactation Text: This medication is Pregnancy Category C and it isn't known if it is safe during pregnancy. It is also excreted in breast milk. Soolantra Pregnancy And Lactation Text: This medication is Pregnancy Category C. This medication is considered safe during breast feeding. Protopic Counseling: Patient may experience a mild burning sensation during topical application. Protopic is not approved in children less than 2 years of age. There have been case reports of hematologic and skin malignancies in patients using topical calcineurin inhibitors although causality is questionable. Carac Pregnancy And Lactation Text: This medication is Pregnancy Category X and contraindicated in pregnancy and in women who may become pregnant. It is unknown if this medication is excreted in breast milk. Klisyri Pregnancy And Lactation Text: It is unknown if this medication can harm a developing fetus or if it is excreted in breast milk. Winlevi Pregnancy And Lactation Text: This medication is considered safe during pregnancy and breastfeeding. Rifampin Pregnancy And Lactation Text: This medication is Pregnancy Category C and it isn't know if it is safe during pregnancy. It is also excreted in breast milk and should not be used if you are breast feeding. Topical Metronidazole Counseling: Metronidazole is a topical antibiotic medication. You may experience burning, stinging, redness, or allergic reactions.  Please call our office if you develop any problems from using this medication. Cosentyx Pregnancy And Lactation Text: This medication is Pregnancy Category B and is considered safe during pregnancy. It is unknown if this medication is excreted in breast milk. Griseofulvin Counseling:  I discussed with the patient the risks of griseofulvin including but not limited to photosensitivity, cytopenia, liver damage, nausea/vomiting and severe allergy.  The patient understands that this medication is best absorbed when taken with a fatty meal (e.g., ice cream or french fries). Ilumya Counseling: I discussed with the patient the risks of tildrakizumab including but not limited to immunosuppression, malignancy, posterior leukoencephalopathy syndrome, and serious infections.  The patient understands that monitoring is required including a PPD at baseline and must alert us or the primary physician if symptoms of infection or other concerning signs are noted. Cyclosporine Counseling:  I discussed with the patient the risks of cyclosporine including but not limited to hypertension, gingival hyperplasia,myelosuppression, immunosuppression, liver damage, kidney damage, neurotoxicity, lymphoma, and serious infections. The patient understands that monitoring is required including baseline blood pressure, CBC, CMP, lipid panel and uric acid, and then 1-2 times monthly CMP and blood pressure. Spevigo Pregnancy And Lactation Text: The risk during pregnancy and breastfeeding is uncertain with this medication. This medication does cross the placenta. It is unknown if this medication is found in breast milk. Bactrim Counseling:  I discussed with the patient the risks of sulfa antibiotics including but not limited to GI upset, allergic reaction, drug rash, diarrhea, dizziness, photosensitivity, and yeast infections.  Rarely, more serious reactions can occur including but not limited to aplastic anemia, agranulocytosis, methemoglobinemia, blood dyscrasias, liver or kidney failure, lung infiltrates or desquamative/blistering drug rashes. Litfulo Pregnancy And Lactation Text: Based on animal studies, Lifulo may cause embryo-fetal harm when administered to pregnant women.  The medication should not be used in pregnancy.  Breastfeeding is not recommended during treatment. Spironolactone Pregnancy And Lactation Text: This medication can cause feminization of the male fetus and should be avoided during pregnancy. The active metabolite is also found in breast milk. Zoryve Pregnancy And Lactation Text: It is unknown if this medication can cause problems during pregnancy and breastfeeding. High Dose Vitamin A Pregnancy And Lactation Text: High dose vitamin A therapy is contraindicated during pregnancy and breast feeding. Clofazimine Pregnancy And Lactation Text: This medication is Pregnancy Category C and isn't considered safe during pregnancy. It is excreted in breast milk. Cimetidine Counseling:  I discussed with the patient the risks of Cimetidine including but not limited to gynecomastia, headache, diarrhea, nausea, drowsiness, arrhythmias, pancreatitis, skin rashes, psychosis, bone marrow suppression and kidney toxicity. Erythromycin Pregnancy And Lactation Text: This medication is Pregnancy Category B and is considered safe during pregnancy. It is also excreted in breast milk. Dutasteride Male Counseling: Dustasteride Counseling:  I discussed with the patient the risks of use of dutasteride including but not limited to decreased libido, decreased ejaculate volume, and gynecomastia. Women who can become pregnant should not handle medication.  All of the patient's questions and concerns were addressed. Nsaids Pregnancy And Lactation Text: These medications are considered safe up to 30 weeks gestation. It is excreted in breast milk. Propranolol Counseling:  I discussed with the patient the risks of propranolol including but not limited to low heart rate, low blood pressure, low blood sugar, restlessness and increased cold sensitivity. They should call the office if they experience any of these side effects. Valtrex Pregnancy And Lactation Text: this medication is Pregnancy Category B and is considered safe during pregnancy. This medication is not directly found in breast milk but it's metabolite acyclovir is present. Erivedge Counseling- I discussed with the patient the risks of Erivedge including but not limited to nausea, vomiting, diarrhea, constipation, weight loss, changes in the sense of taste, decreased appetite, muscle spasms, and hair loss.  The patient verbalized understanding of the proper use and possible adverse effects of Erivedge.  All of the patient's questions and concerns were addressed. Calcipotriene Counseling:  I discussed with the patient the risks of calcipotriene including but not limited to erythema, scaling, itching, and irritation. Klisyri Counseling:  I discussed with the patient the risks of Klisyri including but not limited to erythema, scaling, itching, weeping, crusting, and pain. Glycopyrrolate Counseling:  I discussed with the patient the risks of glycopyrrolate including but not limited to skin rash, drowsiness, dry mouth, difficulty urinating, and blurred vision. Sarecycline Counseling: Patient advised regarding possible photosensitivity and discoloration of the teeth, skin, lips, tongue and gums.  Patient instructed to avoid sunlight, if possible.  When exposed to sunlight, patients should wear protective clothing, sunglasses, and sunscreen.  The patient was instructed to call the office immediately if the following severe adverse effects occur:  hearing changes, easy bruising/bleeding, severe headache, or vision changes.  The patient verbalized understanding of the proper use and possible adverse effects of sarecycline.  All of the patient's questions and concerns were addressed. Winlevi Counseling:  I discussed with the patient the risks of topical clascoterone including but not limited to erythema, scaling, itching, and stinging. Patient voiced their understanding. Ivermectin Counseling:  Patient instructed to take medication on an empty stomach with a full glass of water.  Patient informed of potential adverse effects including but not limited to nausea, diarrhea, dizziness, itching, and swelling of the extremities or lymph nodes.  The patient verbalized understanding of the proper use and possible adverse effects of ivermectin.  All of the patient's questions and concerns were addressed. Soolantra Counseling: I discussed with the patients the risks of topial Soolantra. This is a medicine which decreases the number of mites and inflammation in the skin. You experience burning, stinging, eye irritation or allergic reactions.  Please call our office if you develop any problems from using this medication. Simponi Counseling:  I discussed with the patient the risks of golimumab including but not limited to myelosuppression, immunosuppression, autoimmune hepatitis, demyelinating diseases, lymphoma, and serious infections.  The patient understands that monitoring is required including a PPD at baseline and must alert us or the primary physician if symptoms of infection or other concerning signs are noted. Cyclosporine Pregnancy And Lactation Text: This medication is Pregnancy Category C and it isn't know if it is safe during pregnancy. This medication is excreted in breast milk. Cosentyx Counseling:  I discussed with the patient the risks of Cosentyx including but not limited to worsening of Crohn's disease, immunosuppression, allergic reactions and infections.  The patient understands that monitoring is required including a PPD at baseline and must alert us or the primary physician if symptoms of infection or other concerning signs are noted. Fluconazole Pregnancy And Lactation Text: This medication is Pregnancy Category C and it isn't know if it is safe during pregnancy. It is also excreted in breast milk. Zyclara Counseling:  I discussed with the patient the risks of imiquimod including but not limited to erythema, scaling, itching, weeping, crusting, and pain.  Patient understands that the inflammatory response to imiquimod is variable from person to person and was educated regarded proper titration schedule.  If flu-like symptoms develop, patient knows to discontinue the medication and contact us. Stelara Counseling:  I discussed with the patient the risks of ustekinumab including but not limited to immunosuppression, malignancy, posterior leukoencephalopathy syndrome, and serious infections.  The patient understands that monitoring is required including a PPD at baseline and must alert us or the primary physician if symptoms of infection or other concerning signs are noted. Colchicine Counseling:  Patient counseled regarding adverse effects including but not limited to stomach upset (nausea, vomiting, stomach pain, or diarrhea).  Patient instructed to limit alcohol consumption while taking this medication.  Colchicine may reduce blood counts especially with prolonged use.  The patient understands that monitoring of kidney function and blood counts may be required, especially at baseline. The patient verbalized understanding of the proper use and possible adverse effects of colchicine.  All of the patient's questions and concerns were addressed. Bactrim Pregnancy And Lactation Text: This medication is Pregnancy Category D and is known to cause fetal risk.  It is also excreted in breast milk. Dutasteride Female Counseling: Dutasteride Counseling:  I discussed with the patient the risks of use of dutasteride including but not limited to decreased libido and sexual dysfunction. Explained the teratogenic nature of the medication and stressed the importance of not getting pregnant during treatment. All of the patient's questions and concerns were addressed. Propranolol Pregnancy And Lactation Text: This medication is Pregnancy Category C and it isn't known if it is safe during pregnancy. It is excreted in breast milk. Use Enhanced Medication Counseling?: No Olumiant Counseling: I discussed with the patient the risks of Olumiant therapy including but not limited to upper respiratory tract infections, shingles, cold sores, and nausea. Live vaccines should be avoided.  This medication has been linked to serious infections; higher rate of mortality; malignancy and lymphoproliferative disorders; major adverse cardiovascular events; thrombosis; gastrointestinal perforations; neutropenia; lymphopenia; anemia; liver enzyme elevations; and lipid elevations. Glycopyrrolate Pregnancy And Lactation Text: This medication is Pregnancy Category B and is considered safe during pregnancy. It is unknown if it is excreted breast milk. Sarecycline Pregnancy And Lactation Text: This medication is Pregnancy Category D and not consider safe during pregnancy. It is also excreted in breast milk. Olanzapine Counseling- I discussed with the patient the common side effects of olanzapine including but are not limited to: lack of energy, dry mouth, increased appetite, sleepiness, tremor, constipation, dizziness, changes in behavior, or restlessness.  Explained that teenagers are more likely to experience headaches, abdominal pain, pain in the arms or legs, tiredness, and sleepiness.  Serious side effects include but are not limited: increased risk of death in elderly patients who are confused, have memory loss, or dementia-related psychosis; hyperglycemia; increased cholesterol and triglycerides; and weight gain. Metronidazole Counseling:  I discussed with the patient the risks of metronidazole including but not limited to seizures, nausea/vomiting, a metallic taste in the mouth, nausea/vomiting and severe allergy. Topical Ketoconazole Counseling: Patient counseled that this medication may cause skin irritation or allergic reactions.  In the event of skin irritation, the patient was advised to reduce the amount of the drug applied or use it less frequently.   The patient verbalized understanding of the proper use and possible adverse effects of ketoconazole.  All of the patient's questions and concerns were addressed. Solaraze Pregnancy And Lactation Text: This medication is Pregnancy Category B and is considered safe. There is some data to suggest avoiding during the third trimester. It is unknown if this medication is excreted in breast milk. Aklief counseling:  Patient advised to apply a pea-sized amount only at bedtime and wait 30 minutes after washing their face before applying.  If too drying, patient may add a non-comedogenic moisturizer.  The most commonly reported side effects including irritation, redness, scaling, dryness, stinging, burning, itching, and increased risk of sunburn.  The patient verbalized understanding of the proper use and possible adverse effects of retinoids.  All of the patient's questions and concerns were addressed. Picato Counseling:  I discussed with the patient the risks of Picato including but not limited to erythema, scaling, itching, weeping, crusting, and pain. Calcipotriene Pregnancy And Lactation Text: The use of this medication during pregnancy or lactation is not recommended as there is insufficient data. Hyrimoz Counseling:  I discussed with the patient the risks of adalimumab including but not limited to myelosuppression, immunosuppression, autoimmune hepatitis, demyelinating diseases, lymphoma, and serious infections.  The patient understands that monitoring is required including a PPD at baseline and must alert us or the primary physician if symptoms of infection or other concerning signs are noted. Azathioprine Counseling:  I discussed with the patient the risks of azathioprine including but not limited to myelosuppression, immunosuppression, hepatotoxicity, lymphoma, and infections.  The patient understands that monitoring is required including baseline LFTs, Creatinine, possible TPMP genotyping and weekly CBCs for the first month and then every 2 weeks thereafter.  The patient verbalized understanding of the proper use and possible adverse effects of azathioprine.  All of the patient's questions and concerns were addressed. Methotrexate Counseling:  Patient counseled regarding adverse effects of methotrexate including but not limited to nausea, vomiting, abnormalities in liver function tests. Patients may develop mouth sores, rash, diarrhea, and abnormalities in blood counts. The patient understands that monitoring is required including LFT's and blood counts.  There is a rare possibility of scarring of the liver and lung problems that can occur when taking methotrexate. Persistent nausea, loss of appetite, pale stools, dark urine, cough, and shortness of breath should be reported immediately. Patient advised to discontinue methotrexate treatment at least three months before attempting to become pregnant.  I discussed the need for folate supplements while taking methotrexate.  These supplements can decrease side effects during methotrexate treatment. The patient verbalized understanding of the proper use and possible adverse effects of methotrexate.  All of the patient's questions and concerns were addressed. Cimzia Pregnancy And Lactation Text: This medication crosses the placenta but can be considered safe in certain situations. Cimzia may be excreted in breast milk. Fluconazole Counseling:  Patient counseled regarding adverse effects of fluconazole including but not limited to headache, diarrhea, nausea, upset stomach, liver function test abnormalities, taste disturbance, and stomach pain.  There is a rare possibility of liver failure that can occur when taking fluconazole.  The patient understands that monitoring of LFTs and kidney function test may be required, especially at baseline. The patient verbalized understanding of the proper use and possible adverse effects of fluconazole.  All of the patient's questions and concerns were addressed. Acitretin Counseling:  I discussed with the patient the risks of acitretin including but not limited to hair loss, dry lips/skin/eyes, liver damage, hyperlipidemia, depression/suicidal ideation, photosensitivity.  Serious rare side effects can include but are not limited to pancreatitis, pseudotumor cerebri, bony changes, clot formation/stroke/heart attack.  Patient understands that alcohol is contraindicated since it can result in liver toxicity and significantly prolong the elimination of the drug by many years. Cephalexin Counseling: I counseled the patient regarding use of cephalexin as an antibiotic for prophylactic and/or therapeutic purposes. Cephalexin (commonly prescribed under brand name Keflex) is a cephalosporin antibiotic which is active against numerous classes of bacteria, including most skin bacteria. Side effects may include nausea, diarrhea, gastrointestinal upset, rash, hives, yeast infections, and in rare cases, hepatitis, kidney disease, seizures, fever, confusion, neurologic symptoms, and others. Patients with severe allergies to penicillin medications are cautioned that there is about a 10% incidence of cross-reactivity with cephalosporins. When possible, patients with penicillin allergies should use alternatives to cephalosporins for antibiotic therapy. Dutasteride Pregnancy And Lactation Text: This medication is absolutely contraindicated in women, especially during pregnancy and breast feeding. Feminization of male fetuses is possible if taking while pregnant. SSKI Counseling:  I discussed with the patient the risks of SSKI including but not limited to thyroid abnormalities, metallic taste, GI upset, fever, headache, acne, arthralgias, paraesthesias, lymphadenopathy, easy bleeding, arrhythmias, and allergic reaction. Olumiant Pregnancy And Lactation Text: Based on animal studies, Olumiant may cause embryo-fetal harm when administered to pregnant women.  The medication should not be used in pregnancy.  Breastfeeding is not recommended during treatment. Hydroxychloroquine Counseling:  I discussed with the patient that a baseline ophthalmologic exam is needed at the start of therapy and every year thereafter while on therapy. A CBC may also be warranted for monitoring.  The side effects of this medication were discussed with the patient, including but not limited to agranulocytosis, aplastic anemia, seizures, rashes, retinopathy, and liver toxicity. Patient instructed to call the office should any adverse effect occur.  The patient verbalized understanding of the proper use and possible adverse effects of Plaquenil.  All the patient's questions and concerns were addressed. Metronidazole Pregnancy And Lactation Text: This medication is Pregnancy Category B and considered safe during pregnancy.  It is also excreted in breast milk. Olanzapine Pregnancy And Lactation Text: This medication is pregnancy category C.   There are no adequate and well controlled trials with olanzapine in pregnant females.  Olanzapine should be used during pregnancy only if the potential benefit justifies the potential risk to the fetus.   In a study in lactating healthy women, olanzapine was excreted in breast milk.  It is recommended that women taking olanzapine should not breast feed. Solaraze Counseling:  I discussed with the patient the risks of Solaraze including but not limited to erythema, scaling, itching, weeping, crusting, and pain. Albendazole Counseling:  I discussed with the patient the risks of albendazole including but not limited to cytopenia, kidney damage, nausea/vomiting and severe allergy.  The patient understands that this medication is being used in an off-label manner. Opzelura Pregnancy And Lactation Text: There is insufficient data to evaluate drug-associated risk for major birth defects, miscarriage, or other adverse maternal or fetal outcomes.  There is a pregnancy registry that monitors pregnancy outcomes in pregnant persons exposed to the medication during pregnancy.  It is unknown if this medication is excreted in breast milk.  Do not breastfeed during treatment and for about 4 weeks after the last dose. Libtayo Counseling- I discussed with the patient the risks of Libtayo including but not limited to nausea, vomiting, diarrhea, and bone or muscle pain.  The patient verbalized understanding of the proper use and possible adverse effects of Libtayo.  All of the patient's questions and concerns were addressed. Imiquimod Counseling:  I discussed with the patient the risks of imiquimod including but not limited to erythema, scaling, itching, weeping, crusting, and pain.  Patient understands that the inflammatory response to imiquimod is variable from person to person and was educated regarded proper titration schedule.  If flu-like symptoms develop, patient knows to discontinue the medication and contact us. Cantharidin Counseling:  I discussed with the patient the risks of Cantharidin including but not limited to pain, redness, burning, itching, and blistering. Azathioprine Pregnancy And Lactation Text: This medication is Pregnancy Category D and isn't considered safe during pregnancy. It is unknown if this medication is excreted in breast milk. Methotrexate Pregnancy And Lactation Text: This medication is Pregnancy Category X and is known to cause fetal harm. This medication is excreted in breast milk. Wartpeel Counseling:  I discussed with the patient the risks of Wartpeel including but not limited to erythema, scaling, itching, weeping, crusting, and pain. Tetracycline Counseling: Patient counseled regarding possible photosensitivity and increased risk for sunburn.  Patient instructed to avoid sunlight, if possible.  When exposed to sunlight, patients should wear protective clothing, sunglasses, and sunscreen.  The patient was instructed to call the office immediately if the following severe adverse effects occur:  hearing changes, easy bruising/bleeding, severe headache, or vision changes.  The patient verbalized understanding of the proper use and possible adverse effects of tetracycline.  All of the patient's questions and concerns were addressed. Patient understands to avoid pregnancy while on therapy due to potential birth defects. Aklief Pregnancy And Lactation Text: It is unknown if this medication is safe to use during pregnancy.  It is unknown if this medication is excreted in breast milk.  Breastfeeding women should use the topical cream on the smallest area of the skin for the shortest time needed while breastfeeding.  Do not apply to nipple and areola. Cimzia Counseling:  I discussed with the patient the risks of Cimzia including but not limited to immunosuppression, allergic reactions and infections.  The patient understands that monitoring is required including a PPD at baseline and must alert us or the primary physician if symptoms of infection or other concerning signs are noted. Terbinafine Counseling: Patient counseling regarding adverse effects of terbinafine including but not limited to headache, diarrhea, rash, upset stomach, liver function test abnormalities, itching, taste/smell disturbance, nausea, abdominal pain, and flatulence.  There is a rare possibility of liver failure that can occur when taking terbinafine.  The patient understands that a baseline LFT and kidney function test may be required. The patient verbalized understanding of the proper use and possible adverse effects of terbinafine.  All of the patient's questions and concerns were addressed. Siliq Counseling:  I discussed with the patient the risks of Siliq including but not limited to new or worsening depression, suicidal thoughts and behavior, immunosuppression, malignancy, posterior leukoencephalopathy syndrome, and serious infections.  The patient understands that monitoring is required including a PPD at baseline and must alert us or the primary physician if symptoms of infection or other concerning signs are noted. There is also a special program designed to monitor depression which is required with Siliq. Taltz Counseling: I discussed with the patient the risks of ixekizumab including but not limited to immunosuppression, serious infections, worsening of inflammatory bowel disease and drug reactions.  The patient understands that monitoring is required including a PPD at baseline and must alert us or the primary physician if symptoms of infection or other concerning signs are noted. Cantharidin Pregnancy And Lactation Text: This medication has not been proven safe during pregnancy. It is unknown if this medication is excreted in breast milk. Rinvoq Counseling: I discussed with the patient the risks of Rinvoq therapy including but not limited to upper respiratory tract infections, shingles, cold sores, bronchitis, nausea, cough, fever, acne, and headache. Live vaccines should be avoided.  This medication has been linked to serious infections; higher rate of mortality; malignancy and lymphoproliferative disorders; major adverse cardiovascular events; thrombosis; thrombocytopenia, anemia, and neutropenia; lipid elevations; liver enzyme elevations; and gastrointestinal perforations. Acitretin Pregnancy And Lactation Text: This medication is Pregnancy Category X and should not be given to women who are pregnant or may become pregnant in the future. This medication is excreted in breast milk. Dapsone Counseling: I discussed with the patient the risks of dapsone including but not limited to hemolytic anemia, agranulocytosis, rashes, methemoglobinemia, kidney failure, peripheral neuropathy, headaches, GI upset, and liver toxicity.  Patients who start dapsone require monitoring including baseline LFTs and weekly CBCs for the first month, then every month thereafter.  The patient verbalized understanding of the proper use and possible adverse effects of dapsone.  All of the patient's questions and concerns were addressed. Minocycline Counseling: Patient advised regarding possible photosensitivity and discoloration of the teeth, skin, lips, tongue and gums.  Patient instructed to avoid sunlight, if possible.  When exposed to sunlight, patients should wear protective clothing, sunglasses, and sunscreen.  The patient was instructed to call the office immediately if the following severe adverse effects occur:  hearing changes, easy bruising/bleeding, severe headache, or vision changes.  The patient verbalized understanding of the proper use and possible adverse effects of minocycline.  All of the patient's questions and concerns were addressed. Cephalexin Pregnancy And Lactation Text: This medication is Pregnancy Category B and considered safe during pregnancy.  It is also excreted in breast milk but can be used safely for shorter doses. Oral Minoxidil Counseling- I discussed with the patient the risks of oral minoxidil including but not limited to shortness of breath, swelling of the feet or ankles, dizziness, lightheadedness, unwanted hair growth and allergic reaction.  The patient verbalized understanding of the proper use and possible adverse effects of oral minoxidil.  All of the patient's questions and concerns were addressed. Finasteride Male Counseling: Finasteride Counseling:  I discussed with the patient the risks of use of finasteride including but not limited to decreased libido, decreased ejaculate volume, gynecomastia, and depression. Women should not handle medication.  All of the patient's questions and concerns were addressed. Sski Pregnancy And Lactation Text: This medication is Pregnancy Category D and isn't considered safe during pregnancy. It is excreted in breast milk. Rhofade Pregnancy And Lactation Text: This medication has not been assigned a Pregnancy Risk Category. It is unknown if the medication is excreted in breast milk. Hydroxychloroquine Pregnancy And Lactation Text: This medication has been shown to cause fetal harm but it isn't assigned a Pregnancy Risk Category. There are small amounts excreted in breast milk. Hydroquinone Pregnancy And Lactation Text: This medication has not been assigned a Pregnancy Risk Category but animal studies failed to show danger with the topical medication. It is unknown if the medication is excreted in breast milk. Opzelura Counseling:  I discussed with the patient the risks of Opzelura including but not limited to nasopharngitis, bronchitis, ear infection, eosinophila, hives, diarrhea, folliculitis, tonsillitis, and rhinorrhea.  Taken orally, this medication has been linked to serious infections; higher rate of mortality; malignancy and lymphoproliferative disorders; major adverse cardiovascular events; thrombosis; thrombocytopenia, anemia, and neutropenia; and lipid elevations. Topical Sulfur Applications Pregnancy And Lactation Text: This medication is considered safe during pregnancy and breast feeding secondary to limited systemic absorption. Cellcept Counseling:  I discussed with the patient the risks of mycophenolate mofetil including but not limited to infection/immunosuppression, GI upset, hypokalemia, hypercholesterolemia, bone marrow suppression, lymphoproliferative disorders, malignancy, GI ulceration/bleed/perforation, colitis, interstitial lung disease, kidney failure, progressive multifocal leukoencephalopathy, and birth defects.  The patient understands that monitoring is required including a baseline creatinine and regular CBC testing. In addition, patient must alert us immediately if symptoms of infection or other concerning signs are noted. Topical Clindamycin Counseling: Patient counseled that this medication may cause skin irritation or allergic reactions.  In the event of skin irritation, the patient was advised to reduce the amount of the drug applied or use it less frequently.   The patient verbalized understanding of the proper use and possible adverse effects of clindamycin.  All of the patient's questions and concerns were addressed. Opioid Counseling: I discussed with the patient the potential side effects of opioids including but not limited to addiction, altered mental status, and depression. I stressed avoiding alcohol, benzodiazepines, muscle relaxants and sleep aids unless specifically okayed by a physician. The patient verbalized understanding of the proper use and possible adverse effects of opioids. All of the patient's questions and concerns were addressed. They were instructed to flush the remaining pills down the toilet if they did not need them for pain. 5-Fu Counseling: 5-Fluorouracil Counseling:  I discussed with the patient the risks of 5-fluorouracil including but not limited to erythema, scaling, itching, weeping, crusting, and pain. Ketoconazole Pregnancy And Lactation Text: This medication is Pregnancy Category C and it isn't know if it is safe during pregnancy. It is also excreted in breast milk and breast feeding isn't recommended. Azelaic Acid Counseling: Patient counseled that medicine may cause skin irritation and to avoid applying near the eyes.  In the event of skin irritation, the patient was advised to reduce the amount of the drug applied or use it less frequently.   The patient verbalized understanding of the proper use and possible adverse effects of azelaic acid.  All of the patient's questions and concerns were addressed. Rituxan Pregnancy And Lactation Text: This medication is Pregnancy Category C and it isn't know if it is safe during pregnancy. It is unknown if this medication is excreted in breast milk but similar antibodies are known to be excreted. Humira Counseling:  I discussed with the patient the risks of adalimumab including but not limited to myelosuppression, immunosuppression, autoimmune hepatitis, demyelinating diseases, lymphoma, and serious infections.  The patient understands that monitoring is required including a PPD at baseline and must alert us or the primary physician if symptoms of infection or other concerning signs are noted. Prednisone Counseling:  I discussed with the patient the risks of prolonged use of prednisone including but not limited to weight gain, insomnia, osteoporosis, mood changes, diabetes, susceptibility to infection, glaucoma and high blood pressure.  In cases where prednisone use is prolonged, patients should be monitored with blood pressure checks, serum glucose levels and an eye exam.  Additionally, the patient may need to be placed on GI prophylaxis, PCP prophylaxis, and calcium and vitamin D supplementation and/or a bisphosphonate.  The patient verbalized understanding of the proper use and the possible adverse effects of prednisone.  All of the patient's questions and concerns were addressed. Detail Level: Generalized Bimzelx Pregnancy And Lactation Text: This medication crosses the placenta and the safety is uncertain during pregnancy. It is unknown if this medication is present in breast milk. Hydroxyzine Pregnancy And Lactation Text: This medication is not safe during pregnancy and should not be taken. It is also excreted in breast milk and breast feeding isn't recommended. Bexarotene Counseling:  I discussed with the patient the risks of bexarotene including but not limited to hair loss, dry lips/skin/eyes, liver abnormalities, hyperlipidemia, pancreatitis, depression/suicidal ideation, photosensitivity, drug rash/allergic reactions, hypothyroidism, anemia, leukopenia, infection, cataracts, and teratogenicity.  Patient understands that they will need regular blood tests to check lipid profile, liver function tests, white blood cell count, thyroid function tests and pregnancy test if applicable. Dapsone Pregnancy And Lactation Text: This medication is Pregnancy Category C and is not considered safe during pregnancy or breast feeding. Finasteride Female Counseling: Finasteride Counseling:  I discussed with the patient the risks of use of finasteride including but not limited to decreased libido and sexual dysfunction. Explained the teratogenic nature of the medication and stressed the importance of not getting pregnant during treatment. All of the patient's questions and concerns were addressed. Clindamycin Counseling: I counseled the patient regarding use of clindamycin as an antibiotic for prophylactic and/or therapeutic purposes. Clindamycin is active against numerous classes of bacteria, including skin bacteria. Side effects may include nausea, diarrhea, gastrointestinal upset, rash, hives, yeast infections, and in rare cases, colitis. Oral Minoxidil Pregnancy And Lactation Text: This medication should only be used when clearly needed if you are pregnant, attempting to become pregnant or breast feeding. Rinvoq Pregnancy And Lactation Text: Based on animal studies, Rinvoq may cause embryo-fetal harm when administered to pregnant women.  The medication should not be used in pregnancy.  Breastfeeding is not recommended during treatment and for 6 days after the last dose. Thalidomide Counseling: I discussed with the patient the risks of thalidomide including but not limited to birth defects, anxiety, weakness, chest pain, dizziness, cough and severe allergy. Low Dose Naltrexone Counseling- I discussed with the patient the potential risks and side effects of low dose naltrexone including but not limited to: more vivid dreams, headaches, nausea, vomiting, abdominal pain, fatigue, dizziness, and anxiety. Hydroquinone Counseling:  Patient advised that medication may result in skin irritation, lightening (hypopigmentation), dryness, and burning.  In the event of skin irritation, the patient was advised to reduce the amount of the drug applied or use it less frequently.  Rarely, spots that are treated with hydroquinone can become darker (pseudoochronosis).  Should this occur, patient instructed to stop medication and call the office. The patient verbalized understanding of the proper use and possible adverse effects of hydroquinone.  All of the patient's questions and concerns were addressed. Tazorac Pregnancy And Lactation Text: This medication is not safe during pregnancy. It is unknown if this medication is excreted in breast milk. Opioid Pregnancy And Lactation Text: These medications can lead to premature delivery and should be avoided during pregnancy. These medications are also present in breast milk in small amounts. Rhofade Counseling: Rhofade is a topical medication which can decrease superficial blood flow where applied. Side effects are uncommon and include stinging, redness and allergic reactions. Azelaic Acid Pregnancy And Lactation Text: This medication is considered safe during pregnancy and breast feeding. Rituxan Counseling:  I discussed with the patient the risks of Rituxan infusions. Side effects can include infusion reactions, severe drug rashes including mucocutaneous reactions, reactivation of latent hepatitis and other infections and rarely progressive multifocal leukoencephalopathy.  All of the patient's questions and concerns were addressed. Topical Sulfur Applications Counseling: Topical Sulfur Counseling: Patient counseled that this medication may cause skin irritation or allergic reactions.  In the event of skin irritation, the patient was advised to reduce the amount of the drug applied or use it less frequently.   The patient verbalized understanding of the proper use and possible adverse effects of topical sulfur application.  All of the patient's questions and concerns were addressed. Bimzelx Counseling:  I discussed with the patient the risks of Bimzelx including but not limited to depression, immunosuppression, allergic reactions and infections.  The patient understands that monitoring is required including a PPD at baseline and must alert us or the primary physician if symptoms of infection or other concerning signs are noted. Ketoconazole Counseling:   Patient counseled regarding improving absorption with orange juice.  Adverse effects include but are not limited to breast enlargement, headache, diarrhea, nausea, upset stomach, liver function test abnormalities, taste disturbance, and stomach pain.  There is a rare possibility of liver failure that can occur when taking ketoconazole. The patient understands that monitoring of LFTs may be required, especially at baseline. The patient verbalized understanding of the proper use and possible adverse effects of ketoconazole.  All of the patient's questions and concerns were addressed. Tremfya Counseling: I discussed with the patient the risks of guselkumab including but not limited to immunosuppression, serious infections, and drug reactions.  The patient understands that monitoring is required including a PPD at baseline and must alert us or the primary physician if symptoms of infection or other concerning signs are noted. Hydroxyzine Counseling: Patient advised that the medication is sedating and not to drive a car after taking this medication.  Patient informed of potential adverse effects including but not limited to dry mouth, urinary retention, and blurry vision.  The patient verbalized understanding of the proper use and possible adverse effects of hydroxyzine.  All of the patient's questions and concerns were addressed. Bexarotene Pregnancy And Lactation Text: This medication is Pregnancy Category X and should not be given to women who are pregnant or may become pregnant. This medication should not be used if you are breast feeding. Gabapentin Counseling: I discussed with the patient the risks of gabapentin including but not limited to dizziness, somnolence, fatigue and ataxia. Clindamycin Pregnancy And Lactation Text: This medication can be used in pregnancy if certain situations. Clindamycin is also present in breast milk. Finasteride Pregnancy And Lactation Text: This medication is absolutely contraindicated during pregnancy. It is unknown if it is excreted in breast milk. Sotyktu Counseling:  I discussed the most common side effects of Sotyktu including: common cold, sore throat, sinus infections, cold sores, canker sores, folliculitis, and acne.  I also discussed more serious side effects of Sotyktu including but not limited to: serious allergic reactions; increased risk for infections such as TB; cancers such as lymphomas; rhabdomyolysis and elevated CPK; and elevated triglycerides and liver enzymes.  Low Dose Naltrexone Pregnancy And Lactation Text: Naltrexone is pregnancy category C.  There have been no adequate and well-controlled studies in pregnant women.  It should be used in pregnancy only if the potential benefit justifies the potential risk to the fetus.   Limited data indicates that naltrexone is minimally excreted into breastmilk. Quinolones Counseling:  I discussed with the patient the risks of fluoroquinolones including but not limited to GI upset, allergic reaction, drug rash, diarrhea, dizziness, photosensitivity, yeast infections, liver function test abnormalities, tendonitis/tendon rupture. Otezla Counseling: The side effects of Otezla were discussed with the patient, including but not limited to worsening or new depression, weight loss, diarrhea, nausea, upper respiratory tract infection, and headache. Patient instructed to call the office should any adverse effect occur.  The patient verbalized understanding of the proper use and possible adverse effects of Otezla.  All the patient's questions and concerns were addressed. Tazorac Counseling:  Patient advised that medication is irritating and drying.  Patient may need to apply sparingly and wash off after an hour before eventually leaving it on overnight.  The patient verbalized understanding of the proper use and possible adverse effects of tazorac.  All of the patient's questions and concerns were addressed. Benzoyl Peroxide Counseling: Patient counseled that medicine may cause skin irritation and bleach clothing.  In the event of skin irritation, the patient was advised to reduce the amount of the drug applied or use it less frequently.   The patient verbalized understanding of the proper use and possible adverse effects of benzoyl peroxide.  All of the patient's questions and concerns were addressed. Qbrexza Pregnancy And Lactation Text: There is no available data on Qbrexza use in pregnant women.  There is no available data on Qbrexza use in lactation. Mirvaso Counseling: Mirvaso is a topical medication which can decrease superficial blood flow where applied. Side effects are uncommon and include stinging, redness and allergic reactions. Zoryve Counseling:  I discussed with the patient that Zoryve is not for use in the eyes, mouth or vagina. The most commonly reported side effects include diarrhea, headache, insomnia, application site pain, upper respiratory tract infections, and urinary tract infections.  All of the patient's questions and concerns were addressed. Drysol Counseling:  I discussed with the patient the risks of drysol/aluminum chloride including but not limited to skin rash, itching, irritation, burning. Topical Steroids Applications Pregnancy And Lactation Text: Most topical steroids are considered safe to use during pregnancy and lactation.  Any topical steroid applied to the breast or nipple should be washed off before breastfeeding. Adbry Pregnancy And Lactation Text: It is unknown if this medication will adversely affect pregnancy or breast feeding. Libtayo Pregnancy And Lactation Text: This medication is contraindicated in pregnancy and when breast feeding. Spevigo Counseling: I discussed with the patient the risks of Spevigo including but not limited to fatigue, nasuea, vomiting, headache, pruritus, urinary tract infection, an infusion related reactions.  The patient understands that monitoring is required including screening for tuberculosis at baseline and yearly screening thereafter while continuing Spevigo therapy. They should contact us if symptoms of infection or other concerning signs are noted. Enbrel Counseling:  I discussed with the patient the risks of etanercept including but not limited to myelosuppression, immunosuppression, autoimmune hepatitis, demyelinating diseases, lymphoma, and infections.  The patient understands that monitoring is required including a PPD at baseline and must alert us or the primary physician if symptoms of infection or other concerning signs are noted. Cibinqo Counseling: I discussed with the patient the risks of Cibinqo therapy including but not limited to common cold, nausea, headache, cold sores, increased blood CPK levels, dizziness, UTIs, fatigue, acne, and vomitting. Live vaccines should be avoided.  This medication has been linked to serious infections; higher rate of mortality; malignancy and lymphoproliferative disorders; major adverse cardiovascular events; thrombosis; thrombocytopenia and lymphopenia; lipid elevations; and retinal detachment. Isotretinoin Counseling: Patient should get monthly blood tests, not donate blood, not drive at night if vision affected, not share medication, and not undergo elective surgery for 6 months after tx completed. Side effects reviewed, pt to contact office should one occur. Doxepin Pregnancy And Lactation Text: This medication is Pregnancy Category C and it isn't known if it is safe during pregnancy. It is also excreted in breast milk and breast feeding isn't recommended. Arava Counseling:  Patient counseled regarding adverse effects of Arava including but not limited to nausea, vomiting, abnormalities in liver function tests. Patients may develop mouth sores, rash, diarrhea, and abnormalities in blood counts. The patient understands that monitoring is required including LFTs and blood counts.  There is a rare possibility of scarring of the liver and lung problems that can occur when taking methotrexate. Persistent nausea, loss of appetite, pale stools, dark urine, cough, and shortness of breath should be reported immediately. Patient advised to discontinue Arava treatment and consult with a physician prior to attempting conception. The patient will have to undergo a treatment to eliminate Arava from the body prior to conception. Sotyktu Pregnancy And Lactation Text: There is insufficient data to evaluate whether or not Sotyktu is safe to use during pregnancy.   It is not known if Sotyktu passes into breast milk and whether or not it is safe to use when breastfeeding.   Birth Control Pills Counseling: Birth Control Pill Counseling: I discussed with the patient the potential side effects of OCPs including but not limited to increased risk of stroke, heart attack, thrombophlebitis, deep venous thrombosis, hepatic adenomas, breast changes, GI upset, headaches, and depression.  The patient verbalized understanding of the proper use and possible adverse effects of OCPs. All of the patient's questions and concerns were addressed. Niacinamide Counseling: I recommended taking niacin or niacinamide, also know as vitamin B3, twice daily. Recent evidence suggests that taking vitamin B3 (500 mg twice daily) can reduce the risk of actinic keratoses and non-melanoma skin cancers. Side effects of vitamin B3 include flushing and headache. Doxycycline Counseling:  Patient counseled regarding possible photosensitivity and increased risk for sunburn.  Patient instructed to avoid sunlight, if possible.  When exposed to sunlight, patients should wear protective clothing, sunglasses, and sunscreen.  The patient was instructed to call the office immediately if the following severe adverse effects occur:  hearing changes, easy bruising/bleeding, severe headache, or vision changes.  The patient verbalized understanding of the proper use and possible adverse effects of doxycycline.  All of the patient's questions and concerns were addressed. Otezla Pregnancy And Lactation Text: This medication is Pregnancy Category C and it isn't known if it is safe during pregnancy. It is unknown if it is excreted in breast milk. Tranexamic Acid Counseling:  Patient advised of the small risk of bleeding problems with tranexamic acid. They were also instructed to call if they developed any nausea, vomiting or diarrhea. All of the patient's questions and concerns were addressed. Qbrexza Counseling:  I discussed with the patient the risks of Qbrexza including but not limited to headache, mydriasis, blurred vision, dry eyes, nasal dryness, dry mouth, dry throat, dry skin, urinary hesitation, and constipation.  Local skin reactions including erythema, burning, stinging, and itching can also occur. Eucrisa Counseling: Patient may experience a mild burning sensation during topical application. Eucrisa is not approved in children less than 3 months of age. Topical Steroids Counseling: I discussed with the patient that prolonged use of topical steroids can result in the increased appearance of superficial blood vessels (telangiectasias), lightening (hypopigmentation) and thinning of the skin (atrophy).  Patient understands to avoid using high potency steroids in skin folds, the groin or the face.  The patient verbalized understanding of the proper use and possible adverse effects of topical steroids.  All of the patient's questions and concerns were addressed. Benzoyl Peroxide Pregnancy And Lactation Text: This medication is Pregnancy Category C. It is unknown if benzoyl peroxide is excreted in breast milk. Odomzo Counseling- I discussed with the patient the risks of Odomzo including but not limited to nausea, vomiting, diarrhea, constipation, weight loss, changes in the sense of taste, decreased appetite, muscle spasms, and hair loss.  The patient verbalized understanding of the proper use and possible adverse effects of Odomzo.  All of the patient's questions and concerns were addressed. Cyclophosphamide Counseling:  I discussed with the patient the risks of cyclophosphamide including but not limited to hair loss, hormonal abnormalities, decreased fertility, abdominal pain, diarrhea, nausea and vomiting, bone marrow suppression and infection. The patient understands that monitoring is required while taking this medication. Infliximab Counseling:  I discussed with the patient the risks of infliximab including but not limited to myelosuppression, immunosuppression, autoimmune hepatitis, demyelinating diseases, lymphoma, and serious infections.  The patient understands that monitoring is required including a PPD at baseline and must alert us or the primary physician if symptoms of infection or other concerning signs are noted. Dupixent Pregnancy And Lactation Text: This medication likely crosses the placenta but the risk for the fetus is uncertain. This medication is excreted in breast milk. Adbry Counseling: I discussed with the patient the risks of tralokinumab including but not limited to eye infection and irritation, cold sores, injection site reactions, worsening of asthma, allergic reactions and increased risk of parasitic infection.  Live vaccines should be avoided while taking tralokinumab. The patient understands that monitoring is required and they must alert us or the primary physician if symptoms of infection or other concerning signs are noted. Xolair Counseling:  Patient informed of potential adverse effects including but not limited to fever, muscle aches, rash and allergic reactions.  The patient verbalized understanding of the proper use and possible adverse effects of Xolair.  All of the patient's questions and concerns were addressed.

## 2024-08-19 ENCOUNTER — APPOINTMENT (OUTPATIENT)
Dept: ENDOCRINOLOGY | Facility: CLINIC | Age: 74
End: 2024-08-19
Payer: MEDICARE

## 2024-08-19 VITALS
TEMPERATURE: 98.1 F | OXYGEN SATURATION: 95 % | DIASTOLIC BLOOD PRESSURE: 80 MMHG | BODY MASS INDEX: 32.25 KG/M2 | HEART RATE: 63 BPM | SYSTOLIC BLOOD PRESSURE: 161 MMHG | RESPIRATION RATE: 16 BRPM | WEIGHT: 182 LBS | HEIGHT: 63 IN

## 2024-08-19 DIAGNOSIS — E11.9 TYPE 2 DIABETES MELLITUS W/OUT COMPLICATIONS: ICD-10-CM

## 2024-08-19 DIAGNOSIS — E04.2 NONTOXIC MULTINODULAR GOITER: ICD-10-CM

## 2024-08-19 DIAGNOSIS — M81.0 AGE-RELATED OSTEOPOROSIS W/OUT CURRENT PATHOLOGICAL FRACTURE: ICD-10-CM

## 2024-08-19 DIAGNOSIS — E83.52 HYPERCALCEMIA: ICD-10-CM

## 2024-08-19 LAB — GLUCOSE BLDC GLUCOMTR-MCNC: 137

## 2024-08-19 PROCEDURE — G2211 COMPLEX E/M VISIT ADD ON: CPT | Mod: NC

## 2024-08-19 PROCEDURE — 82962 GLUCOSE BLOOD TEST: CPT

## 2024-08-19 PROCEDURE — 99214 OFFICE O/P EST MOD 30 MIN: CPT | Mod: 25

## 2024-08-19 NOTE — ASSESSMENT
[Diabetes Foot Care] : diabetes foot care [Long Term Vascular Complications] : long term vascular complications of diabetes [Carbohydrate Consistent Diet] : carbohydrate consistent diet [Importance of Diet and Exercise] : importance of diet and exercise to improve glycemic control, achieve weight loss and improve cardiovascular health [Exercise/Effect on Glucose] : exercise/effect on glucose [Hypoglycemia Management] : hypoglycemia management [Self Monitoring of Blood Glucose] : self monitoring of blood glucose [Retinopathy Screening] : Patient was referred to ophthalmology for retinopathy screening [Diabetic Medications] : Risks and benefits of diabetic medications were discussed [FreeTextEntry1] : 1. DM2, reasonably controlled - Janumet 50/1000 mg bid - continue lipitor - olmesartan 40 mg qd, Labetalol, verapamil  2. H/o uncontrolled HTN with hypokalemia on multiple meds Suspicious for PA - prior paired PAC/PRA is fine, will repeat today and consider non-contrast CT adrenals - resume olmesartan and labetalol. D/w daughter- should f/u with PCP within 2 wks for BP recheck - we discussed adding Aldactone- will wait for now, given stable BP  3. Hypercalcemia, worsened again in settings of HCTZ - monitor calcium/PTH, vitamin D level - advised on stopping HCTZ if able and close BP monitoring - will do another  24h Uca collection while off HCTZ  4. MNG - benign FNA b/l nodules - essentially stable sono  5. Osteoporosis I advised to the patient on antiresorptive therapy, and reviewed potential options for osteoporosis treatment, including oral and IV bisphosphonates, Prolia, Evenity. R+B of each options were discussed in details - OTC vitamin D3 2,000 IU/day - continue weight-bearing exercises; advised avoiding high risk sports - prior dental evaluation advised. - Fosamax 70 mg qw - DXA 3 sites in 7/26 RTC 3 mos   plan is discussed with daughter  *** daughter Ismael [phone]

## 2024-08-19 NOTE — HISTORY OF PRESENT ILLNESS
[FreeTextEntry1] : F/u for multiple medical issues  *** Aug 19, 2024 ***  staying on remains on  Janumet 50/1000 mg bid,  Olmesartan 40 mg qd, labetalol, Fosamax 70 mg qw, Lipitor 20 mg HS, potassium supplements  BP is high today- was not taking her meds on and off HCTZ resumed Fosamax 70 mg qw but is inconsistent  by report, fbs-   labs done on 7/16/24- a1c- 6.5% , calcium- 11.0, albumin- 4.5, LDL- 65, TSH- 1.09  Thyroid US(7/24/24)- RMP 1.6x1.4x1.0 iso (stable, TR3), LUMP posterior 1.5x1.4x0.9 (? PA), LLP 2.0x1.6x1.4 spongi (TR1)  DXA(7/24/24)- LS (-2.6), FN (-1.5), radius 33% (+1.2) DXA (8/19/20)- LS (-2.4) with OA changes  at L1-L2, L3-L4 (-2.8), FN (-1.0)  *** prior CASR negative   *** May 07, 2024 ***  last visit a year ago remains on  Janumet 50/1000 mg bid,  Olmesartan 40 mg qd, labetalol, Fosamax 70 mg qw, Lipitor 20 mg HS, back on HCTZ  25 mg qd, potassium supplements  reports fbs- 120's, ppg- upto 190 labs from 01/24 reviewed - a1c- 6.5, calcium- 11.1   *** May 22, 2023 ***  taking Janumet 50/1000 mg bid,  olmesartan 40 mg qd, labetalol,  back on Fosamax 70 mg qw started iron pills for anemia no recent sono/DXA reports fbs- low 100's  *** Dec 15, 2022 ***  taking Janumet 50/1000 mg bid,  olmesartan 40 mg qd, labetalol,  stopped  Fosamax 70 mg qw (not sure why, likely ran out) feels well. no jaw/hip pains reports fbs low 100's no recent sono/DXA  *** Jun 07, 2022 ***  taking Janumet 50/1000 mg bid,  olmesartan 40 mg qd, labetalol reports fbs- 95- 116, ppg - upto 160 last a1c- 6.1% ca- 10.9  *** Feb 15, 2022 ***  feels ok. dx'ed with a breast cancer- s/p right mastectomy on Janumet 50/1000 mg bid,  olmesartan 40 mg qd, labetalol still taking potassium supplements and with a persistent edema despite stopping CCB  *** Nov 03, 2021 ***  on Janumet 50/1000 mg bid,  olmesartan 40 mg qd, labetalol reports fbs- 90's- 120's, ppg- 110's PAC/PRA- 5.7/0.2 normal plasma free meta  Thyr US (10/2/21)- stable RMP iso 1.6cm, LMP 1.1 cm hypo and LLP 2.2 spongiform nodules Parathyroid sestamibi scan (5/27/21)- parathyroid lesion posterior to the LUP and ? another lesion posterior to the RLP  old CT chest from 11/14/18- no adrenal masses seen  *** Sep 13, 2021 ***  on Janumet 50/1000 mg bid,  olmesartan 40 mg qd, labetalol off HCTZ with leg swelling since adding norvasc- switched to verapamil er 240 mg qd, also on doxazosin and K-dur reports fbs- 120's FNA (5/21/21)  of RMP 2.1 - nodular goiter, LUP 1.1 - colloid nodule labs (8/20/21)- LDL- 43, ca- 10.2, K- 4.0, a1c- 6.6, TSH- 1.01  *** May 21, 2021 ***  on Janumet 50/1000 mg bid,  olmesartan 40 mg qd, labetalol off HCTZ. BP is high since stopping it reports fbs- 130's, ppg- 150's  Thyroid/parathyroid US (5/14/21)- RMP iso 1.4x1.2x0.8; LMP iso 1.7x1.1x1.6. no definitive parathyroid nodules  on preFNA US- RMP 2.1 cm, LUP 1.1 (round hypo), LLP 2.2 spongi. Patient declined biopsy of all 3 nodules, so only RMP and LUP nodules were biopsied  HISTORY OF PRESENT ILLNESS.   Ms. GAMBOA was diagnosed with Diabetes Mellitus Type 2 more than 10 years ago. She reports history HTN, dyslipidemia, carcinoid tumor of the lung, and denies CAD,  known complications of retinopathy, nephropathy, or neuropathy. She is presently on metformin 500 mg bid, Lipitor 20 mg HS. As per daughter, she was diagnosed with a hypercalcemia sometime in 05/20, calcium rising to 10.5-11.1, and as per daughter was even higher  in December 2020 when was admitted to the hospital with Covid, and was placed on cinacalcet. However, she developed tremors and stopped taking it about 2 months ago. She's taking some MVI once a day Blood sugars are checked 3-4 times a day. She noticed that her blood sugars are higher since she had Covid in 12/20 Did not bring log book, but reported are typically as following: FBS- 150's- 160's, ppg- low 200's Hypoglycemia frequency: none  Fingerstick glucose in the office today is 236 mg/dL 2 hours after eating.  Diet: not following ADA Exercise: none  Lab review (1/21) : a1c- 6.7%, calcium - 11.1, LDL- 50, TSH- 1.05  DXA (8/19/20)- LS (-2.4) with OA changes  at L1-L2, L3-L4 (-2.8), FN (-1.0)  Last dilated eye - 2023 Last podiatry visit  -  3/22 Last cardiology evaluation - 7/24 Last stress test - 3/21 Last 2-D Echo - 3/21, EF- 70% cath- 4/21 Last nephrology evaluation - none Last neurology evaluation-none

## 2024-08-20 ENCOUNTER — APPOINTMENT (OUTPATIENT)
Dept: ORTHOPEDIC SURGERY | Facility: CLINIC | Age: 74
End: 2024-08-20

## 2024-08-27 ENCOUNTER — APPOINTMENT (OUTPATIENT)
Dept: PULMONOLOGY | Facility: CLINIC | Age: 74
End: 2024-08-27
Payer: MEDICARE

## 2024-08-27 VITALS
TEMPERATURE: 97.3 F | HEIGHT: 63 IN | BODY MASS INDEX: 32.78 KG/M2 | SYSTOLIC BLOOD PRESSURE: 130 MMHG | OXYGEN SATURATION: 96 % | DIASTOLIC BLOOD PRESSURE: 72 MMHG | RESPIRATION RATE: 16 BRPM | HEART RATE: 71 BPM | WEIGHT: 185 LBS

## 2024-08-27 DIAGNOSIS — J30.9 ALLERGIC RHINITIS, UNSPECIFIED: ICD-10-CM

## 2024-08-27 DIAGNOSIS — R06.02 SHORTNESS OF BREATH: ICD-10-CM

## 2024-08-27 DIAGNOSIS — J43.9 EMPHYSEMA, UNSPECIFIED: ICD-10-CM

## 2024-08-27 DIAGNOSIS — R93.89 ABNORMAL FINDINGS ON DIAGNOSTIC IMAGING OF OTHER SPECIFIED BODY STRUCTURES: ICD-10-CM

## 2024-08-27 DIAGNOSIS — R79.89 OTHER SPECIFIED ABNORMAL FINDINGS OF BLOOD CHEMISTRY: ICD-10-CM

## 2024-08-27 DIAGNOSIS — J98.4 EMPHYSEMA, UNSPECIFIED: ICD-10-CM

## 2024-08-27 DIAGNOSIS — D3A.090 BENIGN CARCINOID TUMOR OF THE BRONCHUS AND LUNG: ICD-10-CM

## 2024-08-27 DIAGNOSIS — R26.89 OTHER ABNORMALITIES OF GAIT AND MOBILITY: ICD-10-CM

## 2024-08-27 LAB
25(OH)D3 SERPL-MCNC: 47.3 NG/ML
ALBUMIN SERPL ELPH-MCNC: 4.6 G/DL
ALDOSTERONE SERUM: 6 NG/DL
CA-I SERPL-SCNC: 5.4 MG/DL
CALCIUM SERPL-MCNC: 10.3 MG/DL
CALCIUM SERPL-MCNC: 10.3 MG/DL
CAU: 2 MG/DL
CREAT 24H UR-MCNC: 1.2 G/24 H
CREAT 24H UR-MCNC: 1.2 G/24 H
CREAT ?TM UR-MCNC: 56 MG/DL
CREAT ?TM UR-MCNC: 56 MG/DL
PARATHYROID HORMONE INTACT: 65 PG/ML
PROT ?TM UR-MCNC: 24 HR
PROT ?TM UR-MCNC: 24 HR
SPECIMEN VOL 24H UR: 2200 ML
SPECIMEN VOL 24H UR: 2200 ML
SPECIMEN VOL 24H UR: 44 MG/24 H
THYROGLOB AB SERPL-ACNC: 25.4 IU/ML
THYROPEROXIDASE AB SERPL IA-ACNC: <10 IU/ML

## 2024-08-27 PROCEDURE — 95012 NITRIC OXIDE EXP GAS DETER: CPT

## 2024-08-27 PROCEDURE — 94010 BREATHING CAPACITY TEST: CPT

## 2024-08-27 PROCEDURE — 99214 OFFICE O/P EST MOD 30 MIN: CPT | Mod: 25

## 2024-08-27 PROCEDURE — 94729 DIFFUSING CAPACITY: CPT

## 2024-08-27 PROCEDURE — 94727 GAS DIL/WSHOT DETER LNG VOL: CPT

## 2024-08-27 NOTE — PHYSICAL EXAM
[No Acute Distress] : no acute distress [Normal Oropharynx] : normal oropharynx [III] : Mallampati Class: III [Normal Appearance] : normal appearance [No Neck Mass] : no neck mass [Normal Rate/Rhythm] : normal rate/rhythm [Normal S1, S2] : normal s1, s2 [No Murmurs] : no murmurs [No Resp Distress] : no resp distress [Clear to Auscultation Bilaterally] : clear to auscultation bilaterally [No Abnormalities] : no abnormalities [Benign] : benign [Normal Gait] : normal gait [No Clubbing] : no clubbing [No Cyanosis] : no cyanosis [FROM] : FROM [Normal Color/ Pigmentation] : normal color/ pigmentation [No Focal Deficits] : no focal deficits [Oriented x3] : oriented x3 [Normal Affect] : normal affect [Kyphosis] : kyphosis [TextBox_2] : ow [TextBox_68] : I:E ratio 1:3; clear  [TextBox_105] : 1+ LE edema

## 2024-08-27 NOTE — ASSESSMENT
[FreeTextEntry1] : Ms. Najera is a 73 y/o female with a history of HTN, DM, GERD, eosinophilic asthma, allergies and overweight presenting for pulmonary evaluation following lung surgery which revealed benign carcinoid tumorlets, restrictive dysfunction post lobectomy - s/p Breast Surgery (right Mastectomy) - poor balance / thyroid issues                      Her SOB is multifactorial due to: -overweight / out of shape -poor mechanics of breathing -asthma/restrictive dysfunction (s/p surgery) -emphysema  -?cardiac  Her chronic cough is multifactorial due to: -(active) -asthma (eosinophilic) -LPR/reflux  problem 1: asthma (severe persistent)- compliant - (Active)  -continue Ventolin rescue inhaler 2 inhalations before exercise, Q6H -continue Incruse 1 inhalation daily -continue Breo Ellipta 200 at 1 inhalation daily  -continue Singulair 10 mg QHS  -continue Albuterol via nebulizer PRN q6H- rescript 3/2022 -add Theophylline 400 mg QD -Inhaler technique reviewed as well as oral hygiene techniques reviewed with patient. Avoidance of cold air, extremes of temperature, rescue inhaler should be used before exercise. Order of medication reviewed with patient. Recommended use of a cool mist humidifier in the bedroom.  -Asthma is believed to be caused by inherited (genetic) and environmental factor, but its exact cause is unknown. Asthma may be triggered by allergens, lung infections, or irritants in the air. Asthma triggers are different for each person  Problem 1A: Chronic cough  -Amitriptyline 10 mg 3 x qd  Any cough greater than three weeks duration-differential diagnosis includes-asthma, upper airway cough syndrome, post nasal drip syndrome, gastroesophageal reflux, laryngopharyngeal reflux, cardiac disease (congestive heart failure, medicines, effects, etc), medication effects (b-blockers, ace inhibitors, ARBs, glaucoma meds, etc.), smoking, infectious, multifactorial, etc.  Problem 1B restrictive dysfunction -"fixed" problem s/p surgery -recommend The Breather OTC  problem 2: poor mechanics of breathing -Recommended to research the Bhargav Montejo and Seema breathing techniques  -Proper breathing techniques were reviewed with an emphasis of exhalation. Patient instructed to breath in for 1 second and out for four seconds. Patient was encouraged to not talk while walking.   problem 3: overweight -Recommended Tim Malik's 10-day detox diet and book.  -Weight loss, exercise, and diet control were discussed and are highly encouraged. Treatment options were given such as, aqua therapy, and contacting a nutritionist. Recommended to use the elliptical, stationary bike, less use of treadmill. Mindful eating was explained to the patient Obesity is associated with worsening asthma, shortness of breath, and potential for cardiac disease, diabetes, and other underlying medical conditions.   problem 3A: Poor Balance -Given prescription for gait training and balance therapy (STARS)  problem 4: LPR/reflux (controlled)  -continue Pepcid 40 mg QHS  -continue Protonix 40 mg QD in the morning  -Rule of 2s: avoid eating too much, eating too late, eating too spicy, eating two hours before bed -Things to avoid including overeating, spicy foods, tight clothing, eating within three hours of bed, this list is not all inclusive.  -For treatment of reflux, possible options discussed including diet control, H2 blockers, PPIs, as well as coating motility agents discussed as treatment options. Timing of meals and proximity of last meal to sleep were discussed. If symptoms persist, a formal gastrointestinal evaluation is needed.  problem 5: allergy/postnasal drip -s/p blood work including: allergen panel food (-), IgE panel (-), asthma panel(-), eosinophil level (-), vitamin D level (low) -recommended to use Xlear nasal saline spray  -continue Clarinex 5 mg QAM   problem 6: carcinoid tumorlets (stable 9/2020) ([place]) -follow up CT scan (last 9/2021), next now (rescripted multiple times) - 33228  problem 7: r/o YRIS ([place]) -recommend Home Sleep Study (virtuox) -recommended Silenor 3 mg qHS Treatment options discussed including CPAP/BiPAP machine, oral appliance, ProVent therapy, Oxy-Aid by Respitec, new technologies, or positional sleep.Recommended use of the CPAP machine for moderate (AHI >15), moderate to severe (AHI 15-30) and severe patients (AHI > 30). Recommended weight loss which can reduce AHI especially in weight loss of greater than 5% of BMI. Positional sleep is recommended in those with low AHI, low-moderate MBI, and younger age. For severe sleep apnea, the hypoglossal nerve stimulator was recommended as well.  problem 8: eosinophilia- asthma  -she is a candidate for Nucala/ Fasenra and Dupixent -The safety and efficacy of Nucala was established in three double-blind, randomized, placebo-controlled trials in patients with severe asthma. Compared to a placebo, patients with severe asthma receiving Nucala had fewer exacerbation requiring hospitalization and/or emergency department visits, and a longer time to first exacerbation.  In addition, patients with severe asthma receiving Nucala experienced greater reductions in their daily maintenance oral corticosteroid dose, while maintaining asthma control compared with patients receiving placebo. Treatment with Nucala did not result in a significant improvement in lung function, as measured by the volume of air exhaled by patients in one second. The most common side effects include: headache, injection site reactions, back pain, weakness, and fatigue; hypersensitivity reactions can occur within hours or days including swelling of the face, mouth, and tongue, fainting, dizziness, hives, breathing problems, and rash; herpes zoster infections have occurred. The drug is a monoclonal antibody that inhibits interleukin -5 which helps regular eosinophils, a type of white blood cell that contributes to asthma. The over-production of eosinophils can cause inflammation in the lungs, increasing the frequency of asthma attacks. Patients must also take other medications, including high dose inhaled corticosteroids and at least one additional asthma drug.  problem 9: low vitamin D -s/p blood work to include: CBC, vitamin D, LFTs, HbA1c -continue supplemental vitamin D 50,000 every 2 weeks (recheck) -Has been associated with asthma exacerbations and increased allergic symptoms. The goal based on recent information is maintaining levels between 50-70 and low normal is 30. Recommended 50,000 units every two weeks to once a month depending on the level.   Problem 9A: Thyroid Nodule-8/2024 pending bx  problem 10: anemia -referred to have a GI evaluation and was given the name of Dr. Liban Samuel's office  (recheck)  problem 11: Health Maintenance/COVID19 Precautions: covid vaccine x3 - Clean your hands often. Wash your hands often with soap and water for at least 20 seconds, especially after blowing your nose, coughing, or sneezing, or having been in a public place. - If soap and water are not available, use a hand  that contains at least 60% alcohol. - To the extent possible, avoid touching high-touch surfaces in public places - elevator buttons, door handles, handrails, handshaking with people, etc. Use a tissue or your sleeve to cover your hand or finger if you must touch something. - Wash your hands after touching surfaces in public places. - Avoid touching your face, nose, eyes, etc. - Clean and disinfect your home to remove germs: practice routine cleaning of frequently touched surfaces (for example: tables, doorknobs, light switches, handles, desks, toilets, faucets, sinks & cell phones) - Avoid crowds, especially in poorly ventilated spaces. Your risk of exposure to respiratory viruses like COVID-19 may increase in crowded, closed-in settings with little air circulation if there are people in the crowd who are sick. All patients are recommended to practice social distancing and stay at least 6 feet away from others. - Avoid all non-essential travel including plane trips, and especially avoid embarking on cruise ships. -If COVID-19 is spreading in your community, take extra measures to put distance between yourself and other people to further reduce your risk of being exposed to this new virus. -Stay home as much as possible. - Consider ways of getting food brought to your house through family, social, or commercial networks -Be aware that the virus has been known to live in the air up to 3 hours post exposure, cardboard up to 24 hours post exposure, copper up to 4 hours post exposure, steel and plastic up to 2-3 days post exposure. Risk of transmission from these surfaces are affected by many variables. Immune Support Recommendations: -OTC Vitamin C 500mg BID  -OTC Quercetin 250-500mg BID  -OTC Zinc 75-100mg per day  -OTC Melatonin 1 or 2 mg a night  -OTC Vitamin D 1-4000mg per day  -OTC Tonic Water 8oz per day Asthma and COVID19: You need to make sure your asthma is under control. This often requires the use of inhaled corticosteroids (and sometimes oral corticosteroids). Inhaled corticosteroids do not likely reduce your immune system's ability to fight infections, but oral corticosteroids may. It is important to use the steps above to protect yourself to limit your exposure to any respiratory virus.   Problem 12: health maintenance -recommended RSV vaccine in the Fall for anyone over the age of 60 - S/p Covid 19 vaccine (Pfizer) x3 -s/p influenza vaccine 2022 -recommended strep pneumonia vaccines after age 65: Prevnar-13 vaccine, followed by Pneumo vaccine 23 one year following (completed)  -recommended early intervention for URIs -recommended regular osteoporosis evaluations -recommended early dermatological evaluations -recommended after the age of 50 to the age of 70, colonoscopy every 5 years   F/U in 4 months  She is encouraged to call with any questions, changes, or concerns.

## 2024-08-27 NOTE — PROCEDURE
[FreeTextEntry1] :  Full PFT reveals moderate  restrictive dysfunction; FEV1 was 1.05 L which is 53 % of predicted, normal lung volumes, mild  diffusions, at 11.24 L which is 62% predicted, normal flow volume loop. PFT's for performed to evaluate for SOB and Asthma.    FENO was 6; a normal value being less than 25. Fractional exhaled nitric oxide (FENO) is regarded as a simple, noninvasive method for assessing eosinophilic airway inflammation. Produced by a variety of cells within the lung, nitric oxide (NO) concentrations are generally low in healthy individuals. However, high concentrations of NO appear to be involved in nonspecific host defense mechanisms and chronic inflammatory diseases such as asthma. The American Thoracic Society (ATS) therefore has strongly recommended using FENO to aid in the assessment, management, and long-term monitoring of eosinophilic airway inflammation and asthma, and for identifying steroid responsive individuals whose chronic respiratory symptoms may be caused by airway inflammation. In their 2011 clinical practice guideline, the ATS emphasizes the importance of using FENO.

## 2024-08-27 NOTE — ADDENDUM
[FreeTextEntry1] :  Documented by Raquel Coleman acting as a scribe for Dr. Gustavo Cornelius on 08/27/2024 .   All medical record entries made by the Scribe were at my, Dr. Gustavo Cornelius's direction and personally dictated by me on 08/27/2024 . I have reviewed the chart and agree that the record accurately reflects my personal performance of the history, Physical exam, assessment, and plan. I have also personally directed, reviewed, and agree with the discharge instructions.

## 2024-08-27 NOTE — HISTORY OF PRESENT ILLNESS
[FreeTextEntry1] : Ms. Najera is a 74 year old female with a history of abnormal chest CT, asthma, carcinoid tumor of lung, chronic GERD, eosinophilic asthma, low vitamin D, snoring, OW, and SOB presenting to the office today for a follow up visit. Her chief complaint is   -she notes she is feeling well -she notes chest pain and pressure -she notes some dysphagia  -she notes some back pain -she notes her legs are a little swollen -she notes not drinking enough water -she notes getting enough sleep -she notes her weight is stable -she notes intended travel to the [facility]     -Patient denies any headaches, nausea, vomiting, fever, chills, sweats, chest pain, chest pressure, palpitations, coughing, wheezing, fatigue, diarrhea, constipation, dysphagia, arthralgias, myalgias, dizziness, leg pain, itchy eyes, itchy ears, dysphonia, heartburn, reflux or sour taste in mouth.

## 2024-08-28 ENCOUNTER — APPOINTMENT (OUTPATIENT)
Dept: ORTHOPEDIC SURGERY | Facility: CLINIC | Age: 74
End: 2024-08-28

## 2024-08-28 VITALS — HEIGHT: 63 IN | BODY MASS INDEX: 32.78 KG/M2 | WEIGHT: 185 LBS

## 2024-08-28 DIAGNOSIS — M22.8X1 OTHER DISORDERS OF PATELLA, RIGHT KNEE: ICD-10-CM

## 2024-08-28 DIAGNOSIS — M25.561 PAIN IN RIGHT KNEE: ICD-10-CM

## 2024-08-28 DIAGNOSIS — Z96.651 PAIN DUE TO INTERNAL ORTHOPEDIC PROSTHETIC DEVICES, IMPLANTS AND GRAFTS, INITIAL ENCOUNTER: ICD-10-CM

## 2024-08-28 DIAGNOSIS — T84.84XA PAIN DUE TO INTERNAL ORTHOPEDIC PROSTHETIC DEVICES, IMPLANTS AND GRAFTS, INITIAL ENCOUNTER: ICD-10-CM

## 2024-08-28 DIAGNOSIS — G89.29 PAIN IN RIGHT KNEE: ICD-10-CM

## 2024-08-28 PROCEDURE — 99204 OFFICE O/P NEW MOD 45 MIN: CPT | Mod: 25

## 2024-08-28 PROCEDURE — 73562 X-RAY EXAM OF KNEE 3: CPT | Mod: RT

## 2024-08-30 ENCOUNTER — APPOINTMENT (OUTPATIENT)
Dept: RADIOLOGY | Facility: CLINIC | Age: 74
End: 2024-08-30

## 2024-08-31 LAB — RENIN ACTIVITY, PLASMA: 0.46 NG/ML/HR

## 2024-09-04 ENCOUNTER — RX RENEWAL (OUTPATIENT)
Age: 74
End: 2024-09-04

## 2024-09-06 NOTE — PHYSICAL EXAM
[Normal] : Gait: normal [de-identified] : General: No acute distress Mental: Alert and oriented x3 Eyes: Conjunctivitis not seen Chest: Symmetric chest rise, no audible wheezing Skin: Bilateral lower extremities absent from rashes and ulcers Abdomen: No distention  Right knee: Skin: Clean, dry, intact, healed midline scar Inspection: No obvious malalignment, no masses, no swelling, no effusion.  Tenderness: Tenderness along the medial and lateral soft tissues.  No tenderness over the medial and lateral patella facets. No ttp medial/lateral epicondyle, patella tendon, tibial tubercle, pes anserinus, or proximal fibula.  ROM: 0 to 130 Stability: Stable to varus and valgus Strength: 5/5 Q/H/TA/GS/EHL, no atrophy  Neuro: Sensation intact to light touch throughout in dp/sp/tib/gail/saph nerve distributions Pulses: 2+ DP/PT pulses. [de-identified] : X-rays of the right knee with 3 views today shows cemented total knee arthroplasty components, no loosening or fracture, lateral tilting of the patella.  Implant is Molly natural knee.

## 2024-09-06 NOTE — DISCUSSION/SUMMARY
[de-identified] : 74-year-old female with chronic right knee pain of total knee arthroplasty from 2019.  She has pain at the anterior and medial aspects which is likely related to patellar maltracking.  I do not see any ligamentous instability or component loosening.  Infection is unlikely.  I discussed management with her.  I recommended physical therapy, knee bracing, Tylenol or NSAIDs as needed.  Follow-up in 3 months

## 2024-09-12 DIAGNOSIS — R93.89 ABNORMAL FINDINGS ON DIAGNOSTIC IMAGING OF OTHER SPECIFIED BODY STRUCTURES: ICD-10-CM

## 2024-09-13 NOTE — ED ADULT NURSE NOTE - NSFALLRSKHARMRISK_ED_ALL_ED
Remember, your care process does not end after your visit today. Please follow-up closely with your primary care doctor and your gastroenterologist for a follow-up check to ensure you are improving, to see if you need any further evaluation/testing, or to evaluate for any alternate diagnoses. Call first thing in the morning to make your appointment!    Please read the educational materials provided. Return to the ER for the symptoms outlined in your educational materials, for worsening symptoms, abdominal pain, vomiting, severe bleeding, dizziness, shortness of breath, chest pain, loss of consciousness, or for any concerns.    
no

## 2024-09-17 ENCOUNTER — APPOINTMENT (OUTPATIENT)
Dept: PODIATRY | Facility: CLINIC | Age: 74
End: 2024-09-17

## 2024-09-18 ENCOUNTER — APPOINTMENT (OUTPATIENT)
Dept: ORTHOPEDIC SURGERY | Facility: CLINIC | Age: 74
End: 2024-09-18

## 2024-09-18 NOTE — HISTORY OF PRESENT ILLNESS
[de-identified] :   The patient's past medical history, past surgical history, medications, allergies, and social history were reviewed by me today with the patient and documented accordingly. In addition, the patient's family history, which is noncontributory to this visit, was also reviewed.

## 2024-09-18 NOTE — PHYSICAL EXAM
[de-identified] : General Exam  Well developed, well nourished  No apparent distress  Oriented to person, place, and time  Mood: Normal  Affect: Normal  Balance and coordination: Normal  Gait: Normal  Left shoulder exam   Inspection: No swelling, ecchymosis or gross deformity. Skin: No masses, No lesions  Tenderness: No bicipital tenderness, no tenderness to the greater tuberosity/RTC insertion, no anterior shoulder/lesser tuberosity tenderness. No tenderness SC joint, clavicle, AC joint. ROM: 160/60/T6 Impingement tests: Positive Mccurdy AC Joint: no pain with cross arm testing Biceps: Negative speed Strength: 5/5 abduction, external rotation, and internal rotation Neuro: AIN, PIN, Ulnar nerve motor intact Sensation: Intact to light touch in radial, median, ulnar, and axillary nerve distributions Vasc: 2+ radial pulse  [de-identified] : The following radiographs were ordered and read by me during this patients visit. I reviewed each radiograph in detail with the patient and discussed the findings as highlighted below.

## 2024-09-24 ENCOUNTER — NON-APPOINTMENT (OUTPATIENT)
Age: 74
End: 2024-09-24

## 2024-09-25 ENCOUNTER — NON-APPOINTMENT (OUTPATIENT)
Age: 74
End: 2024-09-25

## 2024-09-25 ENCOUNTER — APPOINTMENT (OUTPATIENT)
Dept: SURGERY | Facility: CLINIC | Age: 74
End: 2024-09-25
Payer: MEDICARE

## 2024-09-25 VITALS
HEART RATE: 70 BPM | DIASTOLIC BLOOD PRESSURE: 77 MMHG | BODY MASS INDEX: 31.71 KG/M2 | HEIGHT: 63 IN | WEIGHT: 179 LBS | SYSTOLIC BLOOD PRESSURE: 150 MMHG

## 2024-09-25 DIAGNOSIS — E04.2 NONTOXIC MULTINODULAR GOITER: ICD-10-CM

## 2024-09-25 DIAGNOSIS — E21.3 HYPERPARATHYROIDISM, UNSPECIFIED: ICD-10-CM

## 2024-09-25 DIAGNOSIS — M85.80 OTHER SPECIFIED DISORDERS OF BONE DENSITY AND STRUCTURE, UNSPECIFIED SITE: ICD-10-CM

## 2024-09-25 DIAGNOSIS — E83.52 HYPERCALCEMIA: ICD-10-CM

## 2024-09-25 DIAGNOSIS — M81.0 AGE-RELATED OSTEOPOROSIS W/OUT CURRENT PATHOLOGICAL FRACTURE: ICD-10-CM

## 2024-09-25 PROCEDURE — 10005 FNA BX W/US GDN 1ST LES: CPT

## 2024-09-25 PROCEDURE — 99204 OFFICE O/P NEW MOD 45 MIN: CPT | Mod: 25

## 2024-09-25 PROCEDURE — 10006 FNA BX W/US GDN EA ADDL: CPT

## 2024-09-25 NOTE — PROCEDURE
[TextEntry] : After obtaining informed consent and application of a topical cooling spray, two ultrasound-guided FNA biopsies were performed.  (1) First, the left mid to upper posterior 1.5 cm isoechoic solid nodule was biopsied using one pass of a 25-gauge needle.  A drop was placed into a ThyroSeq tube to potentially be sent for ThyroSeq, into saline to be analyzed for PTH, and onto a slide which was smeared and fixed in alcohol.  All remaining aspirated material was washed into CytoLyt.  (2) Next, the left lower 2.0 cm spongiform thyroid nodule was biopsied using one pass of a 22-gauge needle.  A drop was placed into a ThyroSeq tube to potentially be sent for ThyroSeq as well as onto a slide which was smeared and fixed in alcohol.  All remaining aspirated material was washed into CytoLyt.  She tolerated the procedure well.

## 2024-09-25 NOTE — PHYSICAL EXAM
[Midline] : located in midline position [Normal] : orientation to person, place, and time: normal [de-identified] : The neck appears flat.  There are no palpable masses.  A limited in-office ultrasound revealed a mildly heterogeneous thyroid with bilateral thyroid nodules as described including small circumscribed bilateral heterogeneous nodules and a more hypoechoic nodule along the posterior aspect of the left upper lobe which appears suspicious for a left superior parathyroid lesion, however is unclear if this is intrathyroidal versus just beneath the thyroid capsule.   [de-identified] : Extremities: CARPENTER x 4.   Skin: No obvious skin lesions.   Voice: clear

## 2024-09-25 NOTE — HISTORY OF PRESENT ILLNESS
[de-identified] : Ms. Najera is a 74-year-old woman, with history significant for osteoporosis and bilateral thyroid nodules, who presents for initial evaluation of hyperparathyroidism.  Labs performed 05/07/2024 revealed a mildly elevated serum calcium = 10.6, a marginally inappropriately non-suppressed iPTH = 42, and 25-OH vitamin D = 43.3.  Serum creatinine = 0.76 (01/15/2024).  Her serum calcium has been elevated as high as 11.5 (12/28/2023) and her PTH has been mildly elevated = 69 (05/21/2021).  TSH = 1.09 (07/16/2024).  TPO Ab = < 10 and TG Ab = 25.4 (08/22/2024).  24-hour urinary calcium = 44 (08/22/2024).  Genetic testing, performed 05/07/2024), was negative for a CASR gene mutation.  Neck ultrasound, performed 07/24/2024 ([facility]), reported a right mid 1.6 cm heterogeneous thyroid nodule, a left lower 2.0 cm spongiform thyroid nodule, and a left mid to upper posterior 1.5 cm isoechoic solid nodule which was felt to represent a thyroid nodule versus parathyroid adenoma.  There was no lymphadenopathy appreciated.  When compared to her prior imaging and biopsy reports, the left mid to upper nodule measured slightly larger while the other two nodules had remained stable.  FNA biopsies of a right mid 2.1 cm nodule and a left upper 1.1 cm nodule, performed on 05/21/2021 ([facility]), were both cytologically felt to be consistent with benign nodules (Berlin II).  A Sestamibi scan, performed on 05/21/2021 ([facility]), reported increased uptake contiguous with extending posteriorly from the upper pole of the left thyroid which was felt to be consistent with a parathyroid lesion.  There was a second focus of mildly increased uptake contiguous with extending posteriorly from the lower pole of the right thyroid lobe which was felt to probably represent a second parathyroid lesion.  DEXA scan, performed 07/24/2024 ([facility]), revealed osteoporosis.  She denies known family history of thyroid or parathyroid disorders or personal history of dysphagia, recent voice changes, recent fractures, or kidney stones.

## 2024-09-25 NOTE — ASSESSMENT
[FreeTextEntry1] : Assessment:  74-year-old woman, with history significant for osteoporosis and bilateral thyroid nodules including a stable presumably benign right thyroid nodule, presents with primary hyperparathyroidism.  Plan: - The nature of hyperparathyroidism, long-term risks, and indications for parathyroidectomy were first discussed with Ms. Najera along with her daughter, Keyanna over the phone.  Considering that she has osteoporosis and the degree of her prior hypercalcemia, I have recommended that she ultimately undergo an elective parathyroidectomy.  - The nature of thyroid nodules, as well as the indications for biopsy and thyroidectomy, were then discussed and I explained that a biopsy is recommended for hypoechoic solid thyroid nodules > 1 cm in diameter, isoechoic and complex thyroid nodules > 1.5 cm in diameter, and spongiform-appearing thyroid nodules > 2 cm in diameter.  Based upon these guidelines, I explained that all three of her reported nodules meet CLAUS criteria for a biopsy.  I then explained that (1) the previously biopsied left mid to upper posterior nodule appears suspicious for a parathyroid lesion and may be possibly intrathyroidal.  I have therefore recommended and performed a repeat biopsy of this nodule with PTH washout.  In addition, I have recommended and performed a biopsy of (2) the left lower 2.0 cm spongiform thyroid nodule which was not previously biopsied. - Considering that it has been over three years since her Sestamibi was performed, will arrange for a follow-up Sestamibi scan to confirm the suspected localization and evaluate for persistent right-sided uptake.  - Finally, will check baseline coags today.  - The patient and her daughter expressed understanding of the above and agreed with this plan.  She was instructed to follow-up after she has the Sestamibi scan to review all results and further management.

## 2024-09-26 ENCOUNTER — NON-APPOINTMENT (OUTPATIENT)
Age: 74
End: 2024-09-26

## 2024-09-26 ENCOUNTER — APPOINTMENT (OUTPATIENT)
Dept: CARDIOLOGY | Facility: CLINIC | Age: 74
End: 2024-09-26
Payer: MEDICARE

## 2024-09-26 ENCOUNTER — LABORATORY RESULT (OUTPATIENT)
Age: 74
End: 2024-09-26

## 2024-09-26 VITALS
BODY MASS INDEX: 31.71 KG/M2 | HEART RATE: 74 BPM | RESPIRATION RATE: 16 BRPM | SYSTOLIC BLOOD PRESSURE: 140 MMHG | DIASTOLIC BLOOD PRESSURE: 90 MMHG | WEIGHT: 179 LBS | TEMPERATURE: 97.6 F | OXYGEN SATURATION: 99 % | HEIGHT: 63 IN

## 2024-09-26 DIAGNOSIS — I10 ESSENTIAL (PRIMARY) HYPERTENSION: ICD-10-CM

## 2024-09-26 DIAGNOSIS — E11.9 TYPE 2 DIABETES MELLITUS W/OUT COMPLICATIONS: ICD-10-CM

## 2024-09-26 DIAGNOSIS — I51.7 CARDIOMEGALY: ICD-10-CM

## 2024-09-26 DIAGNOSIS — E78.00 PURE HYPERCHOLESTEROLEMIA, UNSPECIFIED: ICD-10-CM

## 2024-09-26 DIAGNOSIS — R00.2 PALPITATIONS: ICD-10-CM

## 2024-09-26 DIAGNOSIS — Z86.79 PERSONAL HISTORY OF OTHER DISEASES OF THE CIRCULATORY SYSTEM: ICD-10-CM

## 2024-09-26 PROCEDURE — 93000 ELECTROCARDIOGRAM COMPLETE: CPT

## 2024-09-26 PROCEDURE — 99214 OFFICE O/P EST MOD 30 MIN: CPT | Mod: 25

## 2024-09-26 NOTE — ASSESSMENT
[FreeTextEntry1] : This is a 74-year-old female here today for follow-up cardiac evaluation.  She has a past medical history significant for hypertension, asthma, history of bronchitis, non-insulin-dependent diabetes mellitus, hyperlipidemia, status post right knee replacement.  She was born in [facility] and has no history of rheumatic fever.  HPI: She is reporting symptoms of palpitations that occur multiple times per week and last for a few minutes at a time. Denies associated symptoms of chest pain, dizziness, lightheadedness, syncope, HOWELL, or falls.   Current Medications: Aspirin 81 mg daily, Amlodipine 5 mg daily, Atorvastatin 20 mg daily, Olmesartan 40 mg daily, Hydrochlorothiazide 25 mg daily, Labetalol 200 mg BID, and Potassium chloride 10 M EQ's 1 tablet only on Saturday and Sunday.  She is also reporting symptoms of bilateral lower extremity swelling since starting Amlodipine 5 mg daily and states she was told by an urgent care physician that Amlodipine is not good for her..? She would prefer to stop the medication.    BLOOD PRESSURE: Remains elevated.  *Recommended she increase her Labetalol to 400 mg BID, and she will discontinue Amlodipine 5 mg daily. Patient agreeable to plan.    BLOOD WORK:  *LDL target goal < 70* -Blood work done July 2024 demonstrated triglyceride 42, cholesterol 141, HDL 66, LDL 64, non-HDL 75, LDL direct 65, hemoglobin A1c 6.5%.  -Blood work done January 2024 demonstrated triglycerides 40, cholesterol 140, HDL 64, LDL 66, Non-HDL 76, LDL direct 69, hemoglobin A1c 6.5%.  -Blood work done by primary care doctor May 22nd 2023 demonstrated normal lipid profile. -New blood work was done by her primary care doctor on December 15, 2022 which demonstrated normal lipid profile. Her A1c was elevated at a 6.0%.    TESTING/REPORTS: -EKG done 09/26/2024 demonstrated regular sinus rhythm rate 60 bpm otherwise remarkable for poor R wave progression.   -EKG done 07/16/2024 demonstrated regular sinus rhythm otherwise remarkable for left atrial abnormality and poor R wave progression.   -Echocardiogram done July 2024 demonstrated normal left ventricular systolic function EF 65%, minimal to mild mitral regurgitation, mild tricuspid regurgitation, minimal pulmonic regurgitation, small pericardial effusion noted adjacent to the right ventricle.  -EKG done 01/15/2024 demonstrated regular sinus rhythm with nonspecific ST-T wave changes BPM of 63.   -EKG done 06/20/2023 which demonstrated regular sinus rhythm with nonspecific ST-T wave changes BPM of 57  -Echocardiogram done in the office 6/20/2023 demonstrated normal left ventricular systolic function EF 65%, borderline left ventricular hypertrophy, mild mitral regurgitation, mild tricuspid regurgitation, minimal pulmonic regurgitation, small pericardial effusion.  -EKG done Jan 30, 2023 which demonstrated regular sinus rhythm with nonspecific ST-T wave changes, BPM of 69 otherwise remarkable for poor R wave progression and left atrial abnormality.  -Electrocardiogram done January 21, 2022 demonstrated normal sinus rhythm rate of 78 bpm is otherwise remarkable for poor R wave progression and left atrial abnormality.  -She had a normal cardiac catheterization done April 6, 2021 which demonstrated normal left and right heart catheterization and normal pressures.  -Echo Doppler examination done March 22, 2021 demonstrated mild tricuspid valve regurgitation, small pericardial effusion adjacent to the right atrium.  -Holter monitor done October 29, 2021 demonstrated no significant arrhythmia or heart block.  -Echo Doppler examination done May 14, 2020 by another cardiologist which demonstrated mild left ventricular hypertrophy, normal left ventricular function with an estimated ejection fraction of 55 to 60%, and trivial pericardial effusion. There was mild tricuspid valve regurgitation, calcified mitral valve annulus and thickened mitral valve leaflets.  -Electrocardiogram done June 12, 2020 demonstrated normal sinus rhythm rate of 82 bpm is otherwise remarkable left atrial abnormality, and for poor R wave progression.  -The patient's daughter reports that she had a normal stress echocardiogram April 26, 2017.   PLAN: -She will increase to Labetalol 400 mg BID and discontinue Amlodipine 5 mg daily in the evening.  -She will continue with her other current medications and will contact the office if she is having any complaints between now and their next follow up appointment. -She will follow-up with her primary care doctor and all of her specialists routinely.  I have discussed the plan of care with Ms. PAULINA GAMBOA and she will follow up in 3 months. She is compliant with all of her medications.  The patient understands that aerobic exercises must be increased to minutes 4 times/week and a detailed discussion of lifestyle modification was done today.  The patient has a good understanding of the diagnosis, treatment plan and lifestyle modification.  She will contact me at the office for any questions with their care or any changes in their health status.  Dr Hensley, supervising physician, was present in the office with ORLY Fletcher NP during the ramos portions of the history and exam. The plan was discussed in detail as documented in the note.  ORLY Fletcher NP

## 2024-09-26 NOTE — DISCUSSION/SUMMARY
[FreeTextEntry1] : Dr. Hensley-(PRIOR VISIT and PMH WITH Dr. Hensley):   This is a 71-year-old female with past medical history significant for hypertension, asthma, history of bronchitis, non-insulin-dependent diabetes mellitus, hyperlipidemia, status post right knee replacement, who comes in for cardiac follow-up evaluation.    The patient still complains of occasional dyspnea on exertion and rare palpitations.  The symptoms not associate with any particular activity.  The patient had a cardiac catheterization done April 6, 2021 which demonstrated normal left and right heart catheterization and normal pressures.  The patient is encouraged to increase her fluid intake.  She will have new blood work done today for electrolytes and lipid profile. Her blood pressure is under adequate control. Her shortness of breath/dyspnea on exertion may be secondary to reduced aerobic capacity, or her underlying pulmonary disease.  She was seen in consultation by the electrophysiology team and felt that she did not require a loop recorder at this time.  Electrocardiogram done January 21, 2022 demonstrated normal sinus rhythm rate of 78 bpm is otherwise remarkable for poor R wave progression and left atrial abnormality.  She had a normal cardiac catheterization done April 6, 2021.  Echo Doppler examination done March 22, 2021 demonstrated mild tricuspid valve regurgitation, small pericardial effusion adjacent to the right atrium.  Holter monitor done October 29, 2021 demonstrated no significant arrhythmia or heart block.  Most recent lab work from January 22,2022 showed total cholesterol 160, triglycerides 44, HDL 72, LDL calculated 80, non-HDL 88 mg/dL.   PMH: The edema is localized to the malleolus area and is consistent with calcium channel blocker induced edema.  The patient had been on 2 calcium channel blockers, and Norvasc was discontinued, and the patient remained on verapamil 240 mg/day. She still has bilateral 1+/2 pedal edema at the area of the malleolus.  She will discontinue her verapamil and start on hydrochlorothiazide 25 mg/day and potassium chloride 10 mEq only 1 tablet on Saturday and Sunday.   Lipid panel done August 20, 2021 demonstrated a direct LDL 48 mg/dL, cholesterol 115, HDL 65, triglycerides of 33, and non-HDL cholesterol 50 mg/dL.  She will continue on her current medical therapy.  Her LDL is at target level.  She was born in [facility] and has no history of rheumatic fever.   Echo Doppler examination done May 14, 2020 by another cardiologist which demonstrated mild left ventricular hypertrophy, normal left ventricular function with an estimated ejection fraction of 55 to 60%, and trivial pericardial effusion.  There was mild tricuspid valve regurgitation, calcified mitral valve annulus and thickened mitral valve leaflets.  The patient's daughter reports that she had a normal stress echocardiogram April 26, 2017.  Electrocardiogram done June 12, 2020 demonstrated normal sinus rhythm rate of 82 bpm is otherwise remarkable left atrial abnormality, and for poor R wave progression.  The patient understands that aerobic exercises must be increased to 40 minutes 4 times per week. A detailed discussion of lifestyle modification was done today. The patient has a good understanding of the diagnosis, and treatment plan. Lifestyle modification was also outlined.

## 2024-09-26 NOTE — PHYSICAL EXAM
[Well Developed] : well developed [Well Nourished] : well nourished [No Acute Distress] : no acute distress [Normal Venous Pressure] : normal venous pressure [No Carotid Bruit] : no carotid bruit [Normal S1, S2] : normal S1, S2 [No Rub] : no rub [No Gallop] : no gallop [Murmur] : murmur [I] : a grade 1 [No Pitting Edema] : no pitting edema present [Clear Lung Fields] : clear lung fields [Good Air Entry] : good air entry [No Respiratory Distress] : no respiratory distress  [Soft] : abdomen soft [Non Tender] : non-tender [No Masses/organomegaly] : no masses/organomegaly [Normal Bowel Sounds] : normal bowel sounds [Normal Gait] : normal gait [No Cyanosis] : no cyanosis [No Clubbing] : no clubbing [No Varicosities] : no varicosities [No Rash] : no rash [No Skin Lesions] : no skin lesions [Moves all extremities] : moves all extremities [No Focal Deficits] : no focal deficits [Normal Speech] : normal speech [Alert and Oriented] : alert and oriented [Normal memory] : normal memory [General Appearance - Well Developed] : well developed [Normal Appearance] : normal appearance [Well Groomed] : well groomed [General Appearance - Well Nourished] : well nourished [No Deformities] : no deformities [General Appearance - In No Acute Distress] : no acute distress [Normal Conjunctiva] : the conjunctiva exhibited no abnormalities [Eyelids - No Xanthelasma] : the eyelids demonstrated no xanthelasmas [Normal Oral Mucosa] : normal oral mucosa [Normal Jugular Venous A Waves Present] : normal jugular venous A waves present [Normal Jugular Venous V Waves Present] : normal jugular venous V waves present [No Jugular Venous Morejon A Waves] : no jugular venous morejon A waves [Respiration, Rhythm And Depth] : normal respiratory rhythm and effort [Exaggerated Use Of Accessory Muscles For Inspiration] : no accessory muscle use [Auscultation Breath Sounds / Voice Sounds] : lungs were clear to auscultation bilaterally [Abdomen Soft] : soft [Abdomen Tenderness] : non-tender [] : no hepato-splenomegaly [Abdomen Mass (___ Cm)] : no abdominal mass palpated [Abnormal Walk] : normal gait [Cyanosis, Localized] : no localized cyanosis [Skin Color & Pigmentation] : normal skin color and pigmentation [Skin Turgor] : normal skin turgor [No Xanthoma] : no  xanthoma was observed [Oriented To Time, Place, And Person] : oriented to person, place, and time [Affect] : the affect was normal [Mood] : the mood was normal [No Anxiety] : not feeling anxious [5th Left ICS - MCL] : palpated at the 5th LICS in the midclavicular line [Normal] : normal [No Precordial Heave] : no precordial heave was noted [Normal Rate] : normal [Rhythm Regular] : regular [Normal S1] : normal S1 [Normal S2] : normal S2 [II] : a grade 2 [2+] : left 2+ [No Abnormalities] : the abdominal aorta was not enlarged and no bruit was heard [___ +] : bilateral [unfilled]U+ pretibial pitting edema [Apical Thrill] : no thrill palpable at the apex [Right Femoral Bruit] : no bruit heard over the right femoral artery [Left Femoral Bruit] : no bruit heard over the left femoral artery [de-identified] : +2 B/L LE pitting edema/ no calf tenderness [S3] : no S3 [S4] : no S4 [Right Carotid Bruit] : no bruit heard over the right carotid [Left Carotid Bruit] : no bruit heard over the left carotid

## 2024-09-26 NOTE — REVIEW OF SYSTEMS
[Negative] : Heme/Lymph [Lower Ext Edema] : lower extremity edema [SOB] : no shortness of breath [Dyspnea on exertion] : not dyspnea during exertion [Chest Discomfort] : no chest discomfort [Leg Claudication] : no intermittent leg claudication [Palpitations] : palpitations [Orthopnea] : no orthopnea [PND] : no PND [Syncope] : no syncope [FreeTextEntry9] : LE swelling

## 2024-10-03 ENCOUNTER — NON-APPOINTMENT (OUTPATIENT)
Age: 74
End: 2024-10-03

## 2024-10-04 LAB
PTH INTACT, FNA: 1687 PG/ML
THYROGLOBULIN FNA RESULTS RECEIVED: NORMAL

## 2024-10-08 ENCOUNTER — NON-APPOINTMENT (OUTPATIENT)
Age: 74
End: 2024-10-08

## 2024-10-08 ENCOUNTER — APPOINTMENT (OUTPATIENT)
Dept: PODIATRY | Facility: CLINIC | Age: 74
End: 2024-10-08

## 2024-10-14 ENCOUNTER — NON-APPOINTMENT (OUTPATIENT)
Age: 74
End: 2024-10-14

## 2024-10-15 ENCOUNTER — RX RENEWAL (OUTPATIENT)
Age: 74
End: 2024-10-15

## 2024-10-17 ENCOUNTER — APPOINTMENT (OUTPATIENT)
Dept: ENDOCRINOLOGY | Facility: CLINIC | Age: 74
End: 2024-10-17
Payer: MEDICARE

## 2024-10-17 VITALS
OXYGEN SATURATION: 98 % | HEART RATE: 66 BPM | TEMPERATURE: 98.3 F | RESPIRATION RATE: 16 BRPM | WEIGHT: 178 LBS | HEIGHT: 63 IN | BODY MASS INDEX: 31.54 KG/M2 | SYSTOLIC BLOOD PRESSURE: 132 MMHG | DIASTOLIC BLOOD PRESSURE: 76 MMHG

## 2024-10-17 DIAGNOSIS — E11.9 TYPE 2 DIABETES MELLITUS W/OUT COMPLICATIONS: ICD-10-CM

## 2024-10-17 DIAGNOSIS — E21.3 HYPERPARATHYROIDISM, UNSPECIFIED: ICD-10-CM

## 2024-10-17 DIAGNOSIS — M81.0 AGE-RELATED OSTEOPOROSIS W/OUT CURRENT PATHOLOGICAL FRACTURE: ICD-10-CM

## 2024-10-17 LAB — GLUCOSE BLDC GLUCOMTR-MCNC: 113

## 2024-10-17 PROCEDURE — 82962 GLUCOSE BLOOD TEST: CPT

## 2024-10-17 PROCEDURE — 99214 OFFICE O/P EST MOD 30 MIN: CPT | Mod: 25

## 2024-10-19 ENCOUNTER — APPOINTMENT (OUTPATIENT)
Dept: NUCLEAR MEDICINE | Facility: IMAGING CENTER | Age: 74
End: 2024-10-19
Payer: MEDICARE

## 2024-10-19 ENCOUNTER — OUTPATIENT (OUTPATIENT)
Dept: OUTPATIENT SERVICES | Facility: HOSPITAL | Age: 74
LOS: 1 days | End: 2024-10-19
Payer: MEDICARE

## 2024-10-19 DIAGNOSIS — Z96.651 PRESENCE OF RIGHT ARTIFICIAL KNEE JOINT: Chronic | ICD-10-CM

## 2024-10-19 DIAGNOSIS — Z90.710 ACQUIRED ABSENCE OF BOTH CERVIX AND UTERUS: Chronic | ICD-10-CM

## 2024-10-19 DIAGNOSIS — E21.3 HYPERPARATHYROIDISM, UNSPECIFIED: ICD-10-CM

## 2024-10-19 DIAGNOSIS — Z90.49 ACQUIRED ABSENCE OF OTHER SPECIFIED PARTS OF DIGESTIVE TRACT: Chronic | ICD-10-CM

## 2024-10-19 DIAGNOSIS — Z98.42 CATARACT EXTRACTION STATUS, LEFT EYE: Chronic | ICD-10-CM

## 2024-10-19 LAB
25(OH)D3 SERPL-MCNC: 43.1 NG/ML
ALBUMIN SERPL ELPH-MCNC: 4.5 G/DL
ALP BLD-CCNC: 69 U/L
ALT SERPL-CCNC: 14 U/L
ANION GAP SERPL CALC-SCNC: 12 MMOL/L
AST SERPL-CCNC: 19 U/L
BILIRUB SERPL-MCNC: 0.6 MG/DL
BUN SERPL-MCNC: 8 MG/DL
CALCIUM SERPL-MCNC: 10.9 MG/DL
CALCIUM SERPL-MCNC: 10.9 MG/DL
CHLORIDE SERPL-SCNC: 102 MMOL/L
CHOLEST SERPL-MCNC: 133 MG/DL
CO2 SERPL-SCNC: 28 MMOL/L
CREAT SERPL-MCNC: 0.77 MG/DL
CREAT SPEC-SCNC: 72 MG/DL
EGFR: 80 ML/MIN/1.73M2
ESTIMATED AVERAGE GLUCOSE: 137 MG/DL
FOLATE SERPL-MCNC: >20 NG/ML
GLUCOSE SERPL-MCNC: 107 MG/DL
HBA1C MFR BLD HPLC: 6.4 %
HDLC SERPL-MCNC: 69 MG/DL
LDLC SERPL CALC-MCNC: 53 MG/DL
MICROALBUMIN 24H UR DL<=1MG/L-MCNC: 3.3 MG/DL
MICROALBUMIN/CREAT 24H UR-RTO: 46 MG/G
NONHDLC SERPL-MCNC: 64 MG/DL
PARATHYROID HORMONE INTACT: 77 PG/ML
POTASSIUM SERPL-SCNC: 4.2 MMOL/L
PROT SERPL-MCNC: 7.2 G/DL
SODIUM SERPL-SCNC: 142 MMOL/L
TRIGL SERPL-MCNC: 44 MG/DL
TSH SERPL-ACNC: 1 UIU/ML
VIT B12 SERPL-MCNC: 266 PG/ML

## 2024-10-19 PROCEDURE — A9500: CPT

## 2024-10-19 PROCEDURE — 78072 PARATHYRD PLANAR W/SPECT&CT: CPT | Mod: 26

## 2024-10-19 PROCEDURE — 78072 PARATHYRD PLANAR W/SPECT&CT: CPT

## 2024-10-19 PROCEDURE — A9512: CPT

## 2024-10-21 ENCOUNTER — NON-APPOINTMENT (OUTPATIENT)
Age: 74
End: 2024-10-21

## 2024-10-21 LAB — CA-I SERPL-SCNC: 5.5 MG/DL

## 2024-10-22 ENCOUNTER — NON-APPOINTMENT (OUTPATIENT)
Age: 74
End: 2024-10-22

## 2024-10-22 NOTE — ED PROVIDER NOTE - BIRTH SEX
MINDY BROWN  Female

## 2024-11-04 ENCOUNTER — APPOINTMENT (OUTPATIENT)
Dept: SURGERY | Facility: CLINIC | Age: 74
End: 2024-11-04
Payer: MEDICARE

## 2024-11-04 DIAGNOSIS — E21.3 HYPERPARATHYROIDISM, UNSPECIFIED: ICD-10-CM

## 2024-11-04 DIAGNOSIS — E04.2 NONTOXIC MULTINODULAR GOITER: ICD-10-CM

## 2024-11-04 DIAGNOSIS — R79.89 OTHER SPECIFIED ABNORMAL FINDINGS OF BLOOD CHEMISTRY: ICD-10-CM

## 2024-11-04 DIAGNOSIS — M81.0 AGE-RELATED OSTEOPOROSIS W/OUT CURRENT PATHOLOGICAL FRACTURE: ICD-10-CM

## 2024-11-04 PROCEDURE — 99214 OFFICE O/P EST MOD 30 MIN: CPT

## 2024-11-05 ENCOUNTER — NON-APPOINTMENT (OUTPATIENT)
Age: 74
End: 2024-11-05

## 2024-11-05 DIAGNOSIS — R79.1 ABNORMAL COAGULATION PROFILE: ICD-10-CM

## 2024-11-05 LAB
APTT BLD: 37.5 SEC
INR PPP: 0.94 RATIO
PT BLD: 11.1 SEC

## 2024-11-07 ENCOUNTER — APPOINTMENT (OUTPATIENT)
Dept: SURGERY | Facility: CLINIC | Age: 74
End: 2024-11-07

## 2024-11-08 ENCOUNTER — NON-APPOINTMENT (OUTPATIENT)
Age: 74
End: 2024-11-08

## 2024-11-08 LAB
APTT BLD: 36.3 SEC
INR PPP: 0.97 RATIO
PT BLD: 11.4 SEC

## 2024-11-19 ENCOUNTER — APPOINTMENT (OUTPATIENT)
Dept: PULMONOLOGY | Facility: CLINIC | Age: 74
End: 2024-11-19
Payer: MEDICARE

## 2024-11-19 VITALS
BODY MASS INDEX: 31.71 KG/M2 | SYSTOLIC BLOOD PRESSURE: 142 MMHG | WEIGHT: 179 LBS | DIASTOLIC BLOOD PRESSURE: 79 MMHG | OXYGEN SATURATION: 98 % | HEART RATE: 72 BPM | TEMPERATURE: 97.9 F | RESPIRATION RATE: 18 BRPM | HEIGHT: 63 IN

## 2024-11-19 DIAGNOSIS — J30.9 ALLERGIC RHINITIS, UNSPECIFIED: ICD-10-CM

## 2024-11-19 DIAGNOSIS — J45.50 SEVERE PERSISTENT ASTHMA, UNCOMPLICATED: ICD-10-CM

## 2024-11-19 DIAGNOSIS — J82.83 EOSINOPHILIC ASTHMA: ICD-10-CM

## 2024-11-19 PROCEDURE — 99214 OFFICE O/P EST MOD 30 MIN: CPT | Mod: 25

## 2024-11-19 PROCEDURE — 94727 GAS DIL/WSHOT DETER LNG VOL: CPT

## 2024-11-19 PROCEDURE — 94729 DIFFUSING CAPACITY: CPT

## 2024-11-19 PROCEDURE — 94010 BREATHING CAPACITY TEST: CPT

## 2024-11-21 ENCOUNTER — APPOINTMENT (OUTPATIENT)
Dept: CARDIOLOGY | Facility: CLINIC | Age: 74
End: 2024-11-21
Payer: MEDICARE

## 2024-11-21 ENCOUNTER — APPOINTMENT (OUTPATIENT)
Dept: ENDOCRINOLOGY | Facility: CLINIC | Age: 74
End: 2024-11-21

## 2024-11-21 ENCOUNTER — NON-APPOINTMENT (OUTPATIENT)
Age: 74
End: 2024-11-21

## 2024-11-21 VITALS
OXYGEN SATURATION: 98 % | WEIGHT: 180 LBS | RESPIRATION RATE: 18 BRPM | HEART RATE: 71 BPM | TEMPERATURE: 98.4 F | HEIGHT: 63 IN | BODY MASS INDEX: 31.89 KG/M2 | DIASTOLIC BLOOD PRESSURE: 83 MMHG | SYSTOLIC BLOOD PRESSURE: 134 MMHG

## 2024-11-21 DIAGNOSIS — Z01.810 ENCOUNTER FOR PREPROCEDURAL CARDIOVASCULAR EXAMINATION: ICD-10-CM

## 2024-11-21 DIAGNOSIS — I51.7 CARDIOMEGALY: ICD-10-CM

## 2024-11-21 DIAGNOSIS — E11.9 TYPE 2 DIABETES MELLITUS W/OUT COMPLICATIONS: ICD-10-CM

## 2024-11-21 DIAGNOSIS — R00.2 PALPITATIONS: ICD-10-CM

## 2024-11-21 DIAGNOSIS — E78.00 PURE HYPERCHOLESTEROLEMIA, UNSPECIFIED: ICD-10-CM

## 2024-11-21 DIAGNOSIS — I10 ESSENTIAL (PRIMARY) HYPERTENSION: ICD-10-CM

## 2024-11-21 PROCEDURE — 99215 OFFICE O/P EST HI 40 MIN: CPT | Mod: 25

## 2024-11-21 PROCEDURE — 93000 ELECTROCARDIOGRAM COMPLETE: CPT

## 2024-11-22 ENCOUNTER — OUTPATIENT (OUTPATIENT)
Dept: OUTPATIENT SERVICES | Facility: HOSPITAL | Age: 74
LOS: 1 days | End: 2024-11-22
Payer: COMMERCIAL

## 2024-11-22 VITALS
SYSTOLIC BLOOD PRESSURE: 147 MMHG | RESPIRATION RATE: 16 BRPM | DIASTOLIC BLOOD PRESSURE: 70 MMHG | HEIGHT: 64 IN | WEIGHT: 179.9 LBS | HEART RATE: 81 BPM | OXYGEN SATURATION: 95 % | TEMPERATURE: 98 F

## 2024-11-22 DIAGNOSIS — E21.3 HYPERPARATHYROIDISM, UNSPECIFIED: ICD-10-CM

## 2024-11-22 DIAGNOSIS — Z01.818 ENCOUNTER FOR OTHER PREPROCEDURAL EXAMINATION: ICD-10-CM

## 2024-11-22 DIAGNOSIS — Z90.710 ACQUIRED ABSENCE OF BOTH CERVIX AND UTERUS: Chronic | ICD-10-CM

## 2024-11-22 DIAGNOSIS — R79.1 ABNORMAL COAGULATION PROFILE: ICD-10-CM

## 2024-11-22 DIAGNOSIS — Z90.49 ACQUIRED ABSENCE OF OTHER SPECIFIED PARTS OF DIGESTIVE TRACT: Chronic | ICD-10-CM

## 2024-11-22 DIAGNOSIS — Z98.890 OTHER SPECIFIED POSTPROCEDURAL STATES: Chronic | ICD-10-CM

## 2024-11-22 DIAGNOSIS — Z90.11 ACQUIRED ABSENCE OF RIGHT BREAST AND NIPPLE: Chronic | ICD-10-CM

## 2024-11-22 DIAGNOSIS — E11.9 TYPE 2 DIABETES MELLITUS WITHOUT COMPLICATIONS: ICD-10-CM

## 2024-11-22 DIAGNOSIS — E04.2 NONTOXIC MULTINODULAR GOITER: ICD-10-CM

## 2024-11-22 DIAGNOSIS — Z98.42 CATARACT EXTRACTION STATUS, LEFT EYE: Chronic | ICD-10-CM

## 2024-11-22 LAB
HCT VFR BLD CALC: 39.5 % — SIGNIFICANT CHANGE UP (ref 34.5–45)
HGB BLD-MCNC: 12.1 G/DL — SIGNIFICANT CHANGE UP (ref 11.5–15.5)
MCHC RBC-ENTMCNC: 22.8 PG — LOW (ref 27–34)
MCHC RBC-ENTMCNC: 30.6 G/DL — LOW (ref 32–36)
MCV RBC AUTO: 74.5 FL — LOW (ref 80–100)
NRBC # BLD: 0 /100 WBCS — SIGNIFICANT CHANGE UP (ref 0–0)
PLATELET # BLD AUTO: 204 K/UL — SIGNIFICANT CHANGE UP (ref 150–400)
RBC # BLD: 5.3 M/UL — HIGH (ref 3.8–5.2)
RBC # FLD: 15 % — HIGH (ref 10.3–14.5)
WBC # BLD: 4.2 K/UL — SIGNIFICANT CHANGE UP (ref 3.8–10.5)
WBC # FLD AUTO: 4.2 K/UL — SIGNIFICANT CHANGE UP (ref 3.8–10.5)

## 2024-11-22 PROCEDURE — 85027 COMPLETE CBC AUTOMATED: CPT

## 2024-11-22 PROCEDURE — 36415 COLL VENOUS BLD VENIPUNCTURE: CPT

## 2024-11-22 PROCEDURE — G0463: CPT

## 2024-11-22 RX ORDER — 0.9 % SODIUM CHLORIDE 0.9 %
1000 INTRAVENOUS SOLUTION INTRAVENOUS
Refills: 0 | Status: ACTIVE | OUTPATIENT
Start: 2024-12-06

## 2024-11-22 RX ORDER — GLUCAGON INJECTION, SOLUTION 0.5 MG/.1ML
1 INJECTION, SOLUTION SUBCUTANEOUS ONCE
Refills: 0 | Status: ACTIVE | OUTPATIENT
Start: 2024-12-06

## 2024-11-22 NOTE — H&P PST ADULT - NSICDXPASTMEDICALHX_GEN_ALL_CORE_FT
PAST MEDICAL HISTORY:  Arrhythmia     Asthma     Breast cancer     Cataract of right eye     COVID-19 dec 2020    Depression     DM (diabetes mellitus)     GERD (gastroesophageal reflux disease)     HTN (hypertension)     Obese     Seasonal allergies      PAST MEDICAL HISTORY:  Arrhythmia     Asthma     Breast cancer     Cataract of right eye     COVID-19 dec 2020    Depression     DM (diabetes mellitus)     Elevated partial thromboplastin time (PTT)     GERD (gastroesophageal reflux disease)     HTN (hypertension)     Hyperparathyroidism, unspecified     Nontoxic multinodular goiter     Obese     Seasonal allergies

## 2024-11-22 NOTE — H&P PST ADULT - PROBLEM SELECTOR PLAN 3
Coags, A1c and CMP on chart. CBC pending  MC with Dr. Young on 12/4. CC and Pulmonary on chart.   Pre op and Hibiclens instructions reviewed and given via Creole -speaking . Take routine am meds, day of surgery with sip of water. Use the Breo am of procedure. See DM instructions. Instructed to hold and/or avoid other NSAIDs and OTC supplements. Tylenol is ok. Verbalized understanding

## 2024-11-22 NOTE — H&P PST ADULT - ASSESSMENT
scheduled for a parathyroidectomy with PTH assay - possible left thyroidectomy - [facility] on 12/6 with Dr. Snell

## 2024-11-22 NOTE — H&P PST ADULT - PATIENT'S GENDER IDENTITY
February 16, 2022      [facility]  [address] [address]  Phone: [phone]  Fax: [phone]       Patient: Adrian Nichols   YOB: 2015  Date of Visit: 02/16/2022    To Whom It May Concern:    Dennis Nichols  was at [facility] on 02/16/2022. Please excuse his mother from work, as she needed to attend to her child while he is sick. She may return to work on 02/17/22 with no restrictions. If you have any questions or concerns, or if I can be of further assistance, please do not hesitate to contact me.    Sincerely,    Morenita Floyd PA-C          Female

## 2024-11-22 NOTE — H&P PST ADULT - NSICDXPASTSURGICALHX_GEN_ALL_CORE_FT
PAST SURGICAL HISTORY:  H/O mastectomy, right     H/O total knee replacement, right     History of cholecystectomy 1980's    History of left cataract surgery 1990's    History of lung surgery RML and RLL wedge resection 2017 - Noted in [place] and [employer] but pt denies    S/P FERNANDO-BSO (total abdominal hysterectomy and bilateral salpingo-oophorectomy) 1980's

## 2024-11-22 NOTE — H&P PST ADULT - HISTORY OF PRESENT ILLNESS
75 yo [place] speaking female with HTN, HLD, T2DM, Asthma s/p lung surgery secondary to nodules ? carcinoid tumors (as per SR notes) and breast cancer s/p right mastectomy, presents to PST with H/O abnormal calcium levels. After further work-up and a neck ultrasound, pt was dx with hyperparathyroidism and bilateral thyroid nodules. Denies dysphagia. Now scheduled for a parathyroidectomy with PTH assay - possible left thyroidectomy - F.S. - [facility] on 12/6 with Dr. Snell

## 2024-11-22 NOTE — H&P PST ADULT - PROBLEM SELECTOR PLAN 5
Alis Mansfield aware. Hematology recommended  Scheduled to see Dr Estrada on 11/26. Appt confirmed with daughter.

## 2024-11-24 ENCOUNTER — NON-APPOINTMENT (OUTPATIENT)
Age: 74
End: 2024-11-24

## 2024-11-25 ENCOUNTER — NON-APPOINTMENT (OUTPATIENT)
Age: 74
End: 2024-11-25

## 2024-11-26 ENCOUNTER — APPOINTMENT (OUTPATIENT)
Dept: HEMATOLOGY ONCOLOGY | Facility: CLINIC | Age: 74
End: 2024-11-26

## 2024-12-02 ENCOUNTER — APPOINTMENT (OUTPATIENT)
Dept: ORTHOPEDIC SURGERY | Facility: CLINIC | Age: 74
End: 2024-12-02

## 2024-12-06 ENCOUNTER — APPOINTMENT (OUTPATIENT)
Dept: SURGERY | Facility: HOSPITAL | Age: 74
End: 2024-12-06

## 2024-12-06 ENCOUNTER — OUTPATIENT (OUTPATIENT)
Dept: OUTPATIENT SERVICES | Facility: HOSPITAL | Age: 74
LOS: 1 days | End: 2024-12-06
Payer: COMMERCIAL

## 2024-12-06 ENCOUNTER — RESULT REVIEW (OUTPATIENT)
Age: 74
End: 2024-12-06

## 2024-12-06 ENCOUNTER — TRANSCRIPTION ENCOUNTER (OUTPATIENT)
Age: 74
End: 2024-12-06

## 2024-12-06 VITALS
HEIGHT: 64 IN | SYSTOLIC BLOOD PRESSURE: 162 MMHG | WEIGHT: 179.9 LBS | TEMPERATURE: 98 F | DIASTOLIC BLOOD PRESSURE: 76 MMHG | OXYGEN SATURATION: 98 % | RESPIRATION RATE: 18 BRPM | HEART RATE: 68 BPM

## 2024-12-06 DIAGNOSIS — Z96.651 PRESENCE OF RIGHT ARTIFICIAL KNEE JOINT: Chronic | ICD-10-CM

## 2024-12-06 DIAGNOSIS — Z98.890 OTHER SPECIFIED POSTPROCEDURAL STATES: Chronic | ICD-10-CM

## 2024-12-06 DIAGNOSIS — Z86.39 PERSONAL HISTORY OF OTHER ENDOCRINE, NUTRITIONAL AND METABOLIC DISEASE: ICD-10-CM

## 2024-12-06 DIAGNOSIS — Z90.49 ACQUIRED ABSENCE OF OTHER SPECIFIED PARTS OF DIGESTIVE TRACT: Chronic | ICD-10-CM

## 2024-12-06 DIAGNOSIS — Z98.42 CATARACT EXTRACTION STATUS, LEFT EYE: Chronic | ICD-10-CM

## 2024-12-06 DIAGNOSIS — E21.3 HYPERPARATHYROIDISM, UNSPECIFIED: ICD-10-CM

## 2024-12-06 DIAGNOSIS — Z90.89 OTHER SPECIFIED POSTPROCEDURAL STATES: ICD-10-CM

## 2024-12-06 DIAGNOSIS — Z90.11 ACQUIRED ABSENCE OF RIGHT BREAST AND NIPPLE: Chronic | ICD-10-CM

## 2024-12-06 DIAGNOSIS — E04.2 NONTOXIC MULTINODULAR GOITER: ICD-10-CM

## 2024-12-06 DIAGNOSIS — Z98.890 OTHER SPECIFIED POSTPROCEDURAL STATES: ICD-10-CM

## 2024-12-06 DIAGNOSIS — Z90.710 ACQUIRED ABSENCE OF BOTH CERVIX AND UTERUS: Chronic | ICD-10-CM

## 2024-12-06 DIAGNOSIS — R79.1 ABNORMAL COAGULATION PROFILE: ICD-10-CM

## 2024-12-06 LAB
GLUCOSE BLDC GLUCOMTR-MCNC: 119 MG/DL — HIGH (ref 70–99)
GLUCOSE BLDC GLUCOMTR-MCNC: 127 MG/DL — HIGH (ref 70–99)
GLUCOSE BLDC GLUCOMTR-MCNC: 152 MG/DL — HIGH (ref 70–99)
GLUCOSE BLDC GLUCOMTR-MCNC: 189 MG/DL — HIGH (ref 70–99)
PTH INTACT, INTRAOP SPECIMEN 2: SIGNIFICANT CHANGE UP
PTH INTACT, INTRAOP SPECIMEN 3: SIGNIFICANT CHANGE UP
PTH INTACT, INTRAOP SPECIMEN 4: SIGNIFICANT CHANGE UP
PTH INTACT, INTRAOP SPECIMEN 5: SIGNIFICANT CHANGE UP
PTH INTACT, INTRAOP TIMING 2: SIGNIFICANT CHANGE UP
PTH INTACT, INTRAOP TIMING 3: SIGNIFICANT CHANGE UP
PTH INTACT, INTRAOP TIMING 4: SIGNIFICANT CHANGE UP
PTH INTACT, INTRAOP TIMING 5: SIGNIFICANT CHANGE UP
PTH INTACT, INTRAOPERATIVE 2: 55.5 PG/ML — SIGNIFICANT CHANGE UP (ref 15–65)
PTH INTACT, INTRAOPERATIVE 3: 47.3 PG/ML — SIGNIFICANT CHANGE UP (ref 15–65)
PTH INTACT, INTRAOPERATIVE 4: 44.1 PG/ML — SIGNIFICANT CHANGE UP (ref 15–65)
PTH INTACT, INTRAOPERATIVE 5: 40.3 PG/ML — SIGNIFICANT CHANGE UP (ref 15–65)
PTH-INTACT IO % DIF SERPL: 85.4 PG/ML — HIGH (ref 15–65)

## 2024-12-06 PROCEDURE — 88305 TISSUE EXAM BY PATHOLOGIST: CPT | Mod: 26

## 2024-12-06 PROCEDURE — 60200 REMOVE THYROID LESION: CPT

## 2024-12-06 PROCEDURE — 60500 EXPLORE PARATHYROID GLANDS: CPT

## 2024-12-06 PROCEDURE — 36500 INSERTION OF CATHETER VEIN: CPT

## 2024-12-06 PROCEDURE — 88331 PATH CONSLTJ SURG 1 BLK 1SPC: CPT | Mod: 26

## 2024-12-06 DEVICE — TUBE EMG NIM TRIVANTAGE 7MM: Type: IMPLANTABLE DEVICE | Status: FUNCTIONAL

## 2024-12-06 DEVICE — SURGICEL 2 X 14": Type: IMPLANTABLE DEVICE | Status: FUNCTIONAL

## 2024-12-06 DEVICE — LIGATING CLIPS WECK HORIZON SMALL-WIDE (RED) 24: Type: IMPLANTABLE DEVICE | Status: FUNCTIONAL

## 2024-12-06 RX ORDER — LYSINE HCL 500 MG
1 TABLET ORAL
Refills: 0 | Status: ACTIVE | OUTPATIENT
Start: 2024-12-06 | End: 2025-11-04

## 2024-12-06 RX ORDER — 0.9 % SODIUM CHLORIDE 0.9 %
1000 INTRAVENOUS SOLUTION INTRAVENOUS
Refills: 0 | Status: ACTIVE | OUTPATIENT
Start: 2024-12-06 | End: 2025-11-04

## 2024-12-06 RX ORDER — VERAPAMIL HCL 180 MG
240 TABLET, EXTENDED RELEASE ORAL AT BEDTIME
Refills: 0 | Status: ACTIVE | OUTPATIENT
Start: 2024-12-06 | End: 2025-11-04

## 2024-12-06 RX ORDER — ONDANSETRON HYDROCHLORIDE 4 MG/1
4 TABLET, FILM COATED ORAL ONCE
Refills: 0 | Status: DISCONTINUED | OUTPATIENT
Start: 2024-12-06 | End: 2024-12-06

## 2024-12-06 RX ORDER — PANTOPRAZOLE SODIUM 40 MG/1
40 TABLET, DELAYED RELEASE ORAL
Refills: 0 | Status: ACTIVE | OUTPATIENT
Start: 2024-12-06 | End: 2025-11-04

## 2024-12-06 RX ORDER — 0.9 % SODIUM CHLORIDE 0.9 %
1000 INTRAVENOUS SOLUTION INTRAVENOUS
Refills: 0 | Status: DISCONTINUED | OUTPATIENT
Start: 2024-12-06 | End: 2024-12-06

## 2024-12-06 RX ORDER — CEFAZOLIN SODIUM 10 G
2000 VIAL (EA) INJECTION ONCE
Refills: 0 | Status: COMPLETED | OUTPATIENT
Start: 2024-12-06 | End: 2024-12-06

## 2024-12-06 RX ORDER — FLUTICASONE PROPIONATE AND SALMETEROL XINAFOATE 45; 21 UG/1; UG/1
1 AEROSOL, METERED RESPIRATORY (INHALATION)
Refills: 0 | Status: ACTIVE | OUTPATIENT
Start: 2024-12-06 | End: 2025-11-04

## 2024-12-06 RX ORDER — ACETAMINOPHEN 500MG 500 MG/1
1000 TABLET, COATED ORAL EVERY 6 HOURS
Refills: 0 | Status: ACTIVE | OUTPATIENT
Start: 2024-12-07 | End: 2025-11-05

## 2024-12-06 RX ORDER — MIRTAZAPINE 15 MG/1
30 TABLET, FILM COATED ORAL AT BEDTIME
Refills: 0 | Status: ACTIVE | OUTPATIENT
Start: 2024-12-06 | End: 2025-11-04

## 2024-12-06 RX ORDER — ACETAMINOPHEN 500MG 500 MG/1
1000 TABLET, COATED ORAL ONCE
Refills: 0 | Status: COMPLETED | OUTPATIENT
Start: 2024-12-06 | End: 2024-12-06

## 2024-12-06 RX ORDER — OXYCODONE HYDROCHLORIDE 30 MG/1
5 TABLET ORAL EVERY 4 HOURS
Refills: 0 | Status: DISCONTINUED | OUTPATIENT
Start: 2024-12-06 | End: 2024-12-06

## 2024-12-06 RX ORDER — LABETALOL 100 MG/1
100 TABLET, FILM COATED ORAL
Refills: 0 | Status: ACTIVE | OUTPATIENT
Start: 2024-12-06 | End: 2025-11-04

## 2024-12-06 RX ORDER — BUSPIRONE HCL 15 MG
10 TABLET ORAL
Refills: 0 | Status: ACTIVE | OUTPATIENT
Start: 2024-12-06 | End: 2025-11-04

## 2024-12-06 RX ORDER — ONDANSETRON HYDROCHLORIDE 4 MG/1
4 TABLET, FILM COATED ORAL EVERY 6 HOURS
Refills: 0 | Status: ACTIVE | OUTPATIENT
Start: 2024-12-06 | End: 2025-11-04

## 2024-12-06 RX ORDER — HYDROMORPHONE HYDROCHLORIDE 2 MG/1
0.5 TABLET ORAL
Refills: 0 | Status: DISCONTINUED | OUTPATIENT
Start: 2024-12-06 | End: 2024-12-06

## 2024-12-06 RX ORDER — GLUCAGON INJECTION, SOLUTION 0.5 MG/.1ML
1 INJECTION, SOLUTION SUBCUTANEOUS ONCE
Refills: 0 | Status: ACTIVE | OUTPATIENT
Start: 2024-12-06 | End: 2025-11-04

## 2024-12-06 RX ORDER — ACETAMINOPHEN 500MG 500 MG/1
1000 TABLET, COATED ORAL ONCE
Refills: 0 | Status: COMPLETED | OUTPATIENT
Start: 2024-12-07 | End: 2024-12-07

## 2024-12-06 RX ORDER — ACETAMINOPHEN 500MG 500 MG/1
650 TABLET, COATED ORAL EVERY 6 HOURS
Refills: 0 | Status: DISCONTINUED | OUTPATIENT
Start: 2024-12-06 | End: 2024-12-06

## 2024-12-06 RX ORDER — OXYCODONE HYDROCHLORIDE 30 MG/1
5 TABLET ORAL EVERY 6 HOURS
Refills: 0 | Status: DISCONTINUED | OUTPATIENT
Start: 2024-12-06 | End: 2024-12-06

## 2024-12-06 RX ADMIN — OXYCODONE HYDROCHLORIDE 5 MILLIGRAM(S): 30 TABLET ORAL at 21:48

## 2024-12-06 RX ADMIN — OXYCODONE HYDROCHLORIDE 5 MILLIGRAM(S): 30 TABLET ORAL at 17:33

## 2024-12-06 RX ADMIN — HYDROMORPHONE HYDROCHLORIDE 0.5 MILLIGRAM(S): 2 TABLET ORAL at 13:01

## 2024-12-06 RX ADMIN — LABETALOL 100 MILLIGRAM(S): 100 TABLET, FILM COATED ORAL at 17:22

## 2024-12-06 RX ADMIN — Medication 1: at 17:23

## 2024-12-06 RX ADMIN — Medication 1 TABLET(S): at 14:42

## 2024-12-06 RX ADMIN — ACETAMINOPHEN 500MG 400 MILLIGRAM(S): 500 TABLET, COATED ORAL at 23:27

## 2024-12-06 RX ADMIN — OXYCODONE HYDROCHLORIDE 5 MILLIGRAM(S): 30 TABLET ORAL at 18:20

## 2024-12-06 RX ADMIN — OXYCODONE HYDROCHLORIDE 5 MILLIGRAM(S): 30 TABLET ORAL at 22:00

## 2024-12-06 RX ADMIN — ACETAMINOPHEN 500MG 650 MILLIGRAM(S): 500 TABLET, COATED ORAL at 18:20

## 2024-12-06 RX ADMIN — Medication 20 MILLIGRAM(S): at 21:21

## 2024-12-06 RX ADMIN — MIRTAZAPINE 30 MILLIGRAM(S): 15 TABLET, FILM COATED ORAL at 21:21

## 2024-12-06 RX ADMIN — ACETAMINOPHEN 500MG 650 MILLIGRAM(S): 500 TABLET, COATED ORAL at 17:23

## 2024-12-06 RX ADMIN — Medication 10 MILLIGRAM(S): at 17:22

## 2024-12-06 RX ADMIN — HYDROMORPHONE HYDROCHLORIDE 0.5 MILLIGRAM(S): 2 TABLET ORAL at 12:46

## 2024-12-06 RX ADMIN — Medication 30 MILLILITER(S): at 12:46

## 2024-12-06 NOTE — ASU PATIENT PROFILE, ADULT - LANGUAGE ASSISTANCE NEEDED
No-Patient/Caregiver offered and refused free interpretation services. pt requesting son for translation/No-Patient/Caregiver offered and refused free interpretation services.

## 2024-12-06 NOTE — ASU PATIENT PROFILE, ADULT - NSICDXPASTMEDICALHX_GEN_ALL_CORE_FT
PAST MEDICAL HISTORY:  Arrhythmia     Asthma     Breast cancer     Cataract of right eye     COVID-19 dec 2020    Depression     DM (diabetes mellitus)     Elevated partial thromboplastin time (PTT)     GERD (gastroesophageal reflux disease)     HTN (hypertension)     Hyperparathyroidism, unspecified     Nontoxic multinodular goiter     Obese     Seasonal allergies

## 2024-12-06 NOTE — PATIENT PROFILE ADULT - FALL HARM RISK - HARM RISK INTERVENTIONS

## 2024-12-06 NOTE — PATIENT PROFILE ADULT - FUNCTIONAL ASSESSMENT - BASIC MOBILITY ASSESSMENT TYPE

## 2024-12-06 NOTE — ASU PATIENT PROFILE, ADULT - NSICDXPASTSURGICALHX_GEN_ALL_CORE_FT
Rex Echeverria from preadmission testing calling name and  of patient verified. Patient needs pre op orders placed she is coming at 1100. PAST SURGICAL HISTORY:  H/O mastectomy, right     H/O total knee replacement, right     History of cholecystectomy 1980's    History of left cataract surgery 1990's    History of lung surgery RML and RLL wedge resection 2017 - Noted in [place] and [employer] but pt denies    S/P FERNANDO-BSO (total abdominal hysterectomy and bilateral salpingo-oophorectomy) 1980's

## 2024-12-06 NOTE — BRIEF OPERATIVE NOTE - NSICDXBRIEFPROCEDURE_GEN_ALL_CORE_FT
PROCEDURES:  Excision of parathyroid gland 06-Dec-2024 11:42:28 AND excision of ectopic thyroid tissue (CPT 11796) Alexandria Voss

## 2024-12-07 ENCOUNTER — TRANSCRIPTION ENCOUNTER (OUTPATIENT)
Age: 74
End: 2024-12-07

## 2024-12-07 VITALS — HEART RATE: 80 BPM | DIASTOLIC BLOOD PRESSURE: 80 MMHG | SYSTOLIC BLOOD PRESSURE: 155 MMHG

## 2024-12-07 LAB
GLUCOSE BLDC GLUCOMTR-MCNC: 140 MG/DL — HIGH (ref 70–99)
GLUCOSE BLDC GLUCOMTR-MCNC: 143 MG/DL — HIGH (ref 70–99)
GLUCOSE BLDC GLUCOMTR-MCNC: 222 MG/DL — HIGH (ref 70–99)
MAGNESIUM SERPL-MCNC: 1.9 MG/DL — SIGNIFICANT CHANGE UP (ref 1.6–2.6)

## 2024-12-07 PROCEDURE — 83036 HEMOGLOBIN GLYCOSYLATED A1C: CPT

## 2024-12-07 PROCEDURE — 83735 ASSAY OF MAGNESIUM: CPT

## 2024-12-07 PROCEDURE — 60500 EXPLORE PARATHYROID GLANDS: CPT

## 2024-12-07 PROCEDURE — 88305 TISSUE EXAM BY PATHOLOGIST: CPT

## 2024-12-07 PROCEDURE — C1889: CPT

## 2024-12-07 PROCEDURE — 36415 COLL VENOUS BLD VENIPUNCTURE: CPT

## 2024-12-07 PROCEDURE — 80048 BASIC METABOLIC PNL TOTAL CA: CPT

## 2024-12-07 PROCEDURE — 83970 ASSAY OF PARATHORMONE: CPT

## 2024-12-07 PROCEDURE — 82962 GLUCOSE BLOOD TEST: CPT

## 2024-12-07 PROCEDURE — 88331 PATH CONSLTJ SURG 1 BLK 1SPC: CPT

## 2024-12-07 RX ORDER — OXYCODONE HYDROCHLORIDE 30 MG/1
1 TABLET ORAL
Qty: 6 | Refills: 0
Start: 2024-12-07

## 2024-12-07 RX ORDER — ACETAMINOPHEN 500MG 500 MG/1
2 TABLET, COATED ORAL
Qty: 40 | Refills: 0
Start: 2024-12-07

## 2024-12-07 RX ORDER — DOCUSATE SODIUM 100 MG
1 CAPSULE ORAL
Qty: 60 | Refills: 0
Start: 2024-12-07

## 2024-12-07 RX ADMIN — Medication 10 MILLIGRAM(S): at 17:21

## 2024-12-07 RX ADMIN — Medication 1 TABLET(S): at 17:21

## 2024-12-07 RX ADMIN — ACETAMINOPHEN 500MG 1000 MILLIGRAM(S): 500 TABLET, COATED ORAL at 17:22

## 2024-12-07 RX ADMIN — LABETALOL 100 MILLIGRAM(S): 100 TABLET, FILM COATED ORAL at 05:36

## 2024-12-07 RX ADMIN — Medication 1 TABLET(S): at 05:36

## 2024-12-07 RX ADMIN — ACETAMINOPHEN 500MG 400 MILLIGRAM(S): 500 TABLET, COATED ORAL at 05:36

## 2024-12-07 RX ADMIN — Medication 10 MILLIGRAM(S): at 05:37

## 2024-12-07 RX ADMIN — LABETALOL 100 MILLIGRAM(S): 100 TABLET, FILM COATED ORAL at 17:21

## 2024-12-07 RX ADMIN — PANTOPRAZOLE SODIUM 40 MILLIGRAM(S): 40 TABLET, DELAYED RELEASE ORAL at 05:36

## 2024-12-07 RX ADMIN — ACETAMINOPHEN 500MG 1000 MILLIGRAM(S): 500 TABLET, COATED ORAL at 00:00

## 2024-12-07 RX ADMIN — ACETAMINOPHEN 500MG 1000 MILLIGRAM(S): 500 TABLET, COATED ORAL at 12:44

## 2024-12-07 RX ADMIN — Medication 2: at 17:08

## 2024-12-07 NOTE — ASU DISCHARGE PLAN (ADULT/PEDIATRIC) - FINANCIAL ASSISTANCE
[facility] provides services at a reduced cost to those who are determined to be eligible through [facility]’s financial assistance program. Information regarding [facility]’s financial assistance program can be found by going to [email or web address] or by calling [phone].

## 2024-12-07 NOTE — PATIENT CHOICE NOTE. - NSPTCHOICENOTES_GEN_ALL_CORE
D/C resource folder provided at bedside which includes lists for both rehab facilities and home care agencies and transportation letter advising on ambulance and ambulette transportation. CM reviewed folder during assessment.

## 2024-12-07 NOTE — CASE MANAGEMENT PROGRESS NOTE - NSCMPROGRESSNOTE_GEN_ALL_CORE
Pt had a dx of Hyperparathyroidism and is S/P Excision of parathyroid gland on 06-Dec-2024. Pt was cleared medically for transition home today with no skilled needs as the pt is independent. Pt lives with her daughters, and one daughter will transport her home this afternoon when finished with work. The pt is independent with ADL's, however the daughter's will assist at home with any needs during her recovery period. Pt is aware that she will need to follow up with the surgeon for her post-op visit. The pt verbalized understanding. This CM offered to call her daughter, however the pt nicely declined stating she had just spoke with her daughter who is aware of the discharge plan and is at work. The pt did request the MD to call her daughter with an update. This CM spoke to the pt's bedside nurse who stated that when the daughter comes to transport the pt home, she will call the surgical resident to speak to the daughter. Pt verbalized understanding.

## 2024-12-07 NOTE — DISCHARGE NOTE NURSING/CASE MANAGEMENT/SOCIAL WORK - FINANCIAL ASSISTANCE
[facility] provides services at a reduced cost to those who are determined to be eligible through [facility]’s financial assistance program. Information regarding [facility]’s financial assistance program can be found by going to [email or web address] or by calling [phone].

## 2024-12-07 NOTE — ASU DISCHARGE PLAN (ADULT/PEDIATRIC) - ASU DC SPECIAL INSTRUCTIONSFT
- Leave Steri-strips (tapes) on.  Some blood staining on the tape is normal.    - Okay to shower 48 hours after surgery. Keep showers short.  No baths or soaking the incision.  Remember to gently towel dry over the incision to avoid rubbing off the Steri-strips.    - Start gentle neck exercises after 72 hours.  Look all the way to the right and left and then let your head roll all the way around.  Do this 10 times in a row at least 3 times a day.    - You may take Tylenol or oxycodone for pain.  After 24 hours it is okay to take Ibuprofen.    - Remember that it is expected that your calcium level may be low after parathyroidectomy.  You should take around 500 mg of over-the-counter calcium TWO TIMES DAILY starting tonight.  You may take any supplement that has 500-600 mg elemental calcium per pill.  If you begin experiencing symptoms of low calcium, such as numbness or tingling in your fingertips or around your mouth, immediately take an extra 1000 mg of elemental calcium.    - You may resume your aspirin 48 hours after surgery (Sunday).    - Please call Dr. Snell’s office ([phone]) to schedule a follow-up appointment.

## 2024-12-07 NOTE — ASU DISCHARGE PLAN (ADULT/PEDIATRIC) - CARE PROVIDER_API CALL
Surinder Snell [facility]  Surgery  [address], [address]  [place] [address]  Phone: [phone]  Fax: [phone]  Follow Up Time:

## 2024-12-07 NOTE — DISCHARGE NOTE NURSING/CASE MANAGEMENT/SOCIAL WORK - PATIENT PORTAL LINK FT
You can access the FollowMyHealth Patient Portal offered by [facility] by registering at the following website: [email or web address]. By joining [employer]’s FollowMyHealth portal, you will also be able to view your health information using other applications (apps) compatible with our system.

## 2024-12-07 NOTE — CARE COORDINATION ASSESSMENT. - NSPASTMEDSURGHISTORY_GEN_ALL_CORE_FT
PAST MEDICAL & SURGICAL HISTORY:  GERD (gastroesophageal reflux disease)      Depression      Arrhythmia      HTN (hypertension)      DM (diabetes mellitus)      Asthma      Obese      Cataract of right eye      Seasonal allergies      History of left cataract surgery  1990's      History of cholecystectomy  1980's      S/P FERNANDO-BSO (total abdominal hysterectomy and bilateral salpingo-oophorectomy)  1980's      COVID-19  dec 2020      H/O total knee replacement, right      Elevated partial thromboplastin time (PTT)      Hyperparathyroidism, unspecified      Nontoxic multinodular goiter      Breast cancer      H/O mastectomy, right      History of lung surgery  RML and RLL wedge resection 2017 - Noted in [employer] and [employer] but pt denies

## 2024-12-07 NOTE — ASU DISCHARGE PLAN (ADULT/PEDIATRIC) - NS MD DC FALL RISK RISK
For information on Fall & Injury Prevention, visit: [email or web address] OR  [email or web address] OR  https://www.cdc.gov/steadi/patient.html

## 2024-12-07 NOTE — CARE COORDINATION ASSESSMENT. - PRO ARRIVE FROM
CM met w pt. Per pt lives with daughter in an apartment with 3 stairs to enter home, stairs to basement inside but stated everything is on same level. Pt was independent w ADL, ambulation and med management prior to admission. Pt daughter drives to appointments,  denies community services, denies DME or need for usage. CM verified: PCP: Dr. Bauer  Pharmacy: [facility]. [place]: stated no. Pt stated her daughter Radha Ray daughter [phone] will  when medically safe for discharge./home

## 2024-12-07 NOTE — CARE COORDINATION ASSESSMENT. - NSCAREPROVIDERS_GEN_ALL_CORE_FT
CARE PROVIDERS:  Administration: Mitchell Larios  Admitting: Surindre Snell  Attending: Surinder Snell  Case Management: Sergio Blair  Consultant: Wilfredo Benitez  Consultant: Kia Chris  Consultant: Simran Almendarez  Consultant: Alexandria Voss  Nurse: Jil Kinney  Ordered: ADM, User  Ordered: Robert-Scemarya, Liat  Override: Ursula Hollis  Override: Marycarmen Salazar  Primary Team: Maddi Rodriguez  Primary Team: Abhijeet Padilla  Primary Team: Bk Bone// Supp. Assoc.: Sal Nicole

## 2024-12-07 NOTE — PROGRESS NOTE ADULT - SUBJECTIVE AND OBJECTIVE BOX
POD#1 s/p Excision of parathyroid gland & ectopic thyroid tissue    S: Patient seen and examined at bedside.  No overnight events.  Patient reports minimal periincisional tenderness. Tolerating diet. Patient denies any fever, chills, chest pain, shortness of breath, nausea, vomiting, or urinary complaints.    MEDICATIONS:  MEDICATIONS  (STANDING):  acetaminophen     Tablet .. 1000 milliGRAM(s) Oral every 6 hours  atorvastatin 20 milliGRAM(s) Oral at bedtime  busPIRone 10 milliGRAM(s) Oral two times a day  calcium carbonate 1250 mG  + Vitamin D (OsCal 500 + D) 1 Tablet(s) Oral two times a day  dextrose 5%. 1000 milliLiter(s) (100 mL/Hr) IV Continuous <Continuous>  dextrose 5%. 1000 milliLiter(s) (50 mL/Hr) IV Continuous <Continuous>  dextrose 5%. 1000 milliLiter(s) (50 mL/Hr) IV Continuous <Continuous>  dextrose 5%. 1000 milliLiter(s) (100 mL/Hr) IV Continuous <Continuous>  dextrose 50% Injectable 25 Gram(s) IV Push once  dextrose 50% Injectable 12.5 Gram(s) IV Push once  dextrose 50% Injectable 25 Gram(s) IV Push once  dextrose 50% Injectable 25 Gram(s) IV Push Once  dextrose 50% Injectable 12.5 Gram(s) IV Push Once  dextrose 50% Injectable 25 Gram(s) IV Push Once  dextrose Oral Gel 15 Gram(s) Oral Once  glucagon  Injectable 1 milliGRAM(s) IntraMuscular once  glucagon  Injectable 1 milliGRAM(s) IntraMuscular Once  insulin lispro (ADMELOG) corrective regimen sliding scale   SubCutaneous three times a day before meals  labetalol 100 milliGRAM(s) Oral two times a day  mirtazapine 30 milliGRAM(s) Oral at bedtime  pantoprazole    Tablet 40 milliGRAM(s) Oral before breakfast  verapamil  milliGRAM(s) Oral at bedtime    MEDICATIONS  (PRN):  fluticasone propionate/ salmeterol 100-50 MICROgram(s) Diskus 1 Dose(s) Inhalation two times a day PRN dyspnea  ondansetron Injectable 4 milliGRAM(s) IV Push every 6 hours PRN Nausea  oxyCODONE    IR 5 milliGRAM(s) Oral every 4 hours PRN Moderate Pain (4 - 6)      O:  VITAL SIGNS:  Vital Signs Last 24 Hrs  T(C): 36.8 (07 Dec 2024 04:35), Max: 37.2 (06 Dec 2024 21:02)  T(F): 98.2 (07 Dec 2024 04:35), Max: 99 (06 Dec 2024 21:02)  HR: 62 (07 Dec 2024 04:35) (62 - 84)  BP: 147/68 (07 Dec 2024 04:35) (93/55 - 155/80)  BP(mean): 71 (06 Dec 2024 13:06) (71 - 71)  RR: 18 (07 Dec 2024 04:35) (12 - 20)  SpO2: 95% (07 Dec 2024 04:35) (88% - 100%)    Parameters below as of 07 Dec 2024 04:35  Patient On (Oxygen Delivery Method): nasal cannula        PHYSICAL EXAM:  GENERAL: No acute distress, lying comfortably in bed  NECK: trachea midline, neck soft and supple, no edema, crepitus or erythema, dressing in place c/d/i, no paresthesias   HEAD:  Atraumatic, Normocephalic  CHEST/LUNG: Non labored respirations, no accessory muscle use  HEART: Regular rate and rhythm; No murmurs, rubs, or gallops  EXT: calves non-tender b/l, no edema  NEUROLOGY: A&O x 3, no focal deficits    INTAKE & OUTPUT:  I&O's Summary    06 Dec 2024 07:01  -  07 Dec 2024 07:00  --------------------------------------------------------  IN: 1160 mL / OUT: 400 mL / NET: 760 mL      I&O's Detail    06 Dec 2024 07:01  -  07 Dec 2024 07:00  --------------------------------------------------------  IN:    Lactated Ringers: 120 mL    Oral Fluid: 1040 mL  Total IN: 1160 mL    OUT:    Voided (mL): 400 mL  Total OUT: 400 mL    Total NET: 760 mL          LABS:    12-07    139  |  106  |  13  ----------------------------<  151[H]  3.9   |  29  |  0.91    Ca    9.8      07 Dec 2024 07:25  Mg     1.9     12-07      A: 75y/o Female POD#1 s/p Excision of parathyroid gland & ectopic thyroid tissue. VSSAF, exam and labs reassuring.    P:  - VSSAF  - Exam and labs reassuring  - Will plan for d/c today    Discussed with Dr. Snell.
POD#0 s/p excision of left superior parathyroid gland & excision of ectopic thyroid    S: Patient seen and examined at bedside. Resting comfortably with son at bedside. Complaining of mild post-operative incisional site soreness and some pain when swallowing. As per son, patient has been tolerating clear liquid diet appropriately and finished almost all of her tray. Patient denies any fever, chills, chest pain, shortness of breath, nausea, vomiting, or urinary complaints.    MEDICATIONS:  acetaminophen     Tablet .. 650 milliGRAM(s) Oral every 6 hours  atorvastatin 20 milliGRAM(s) Oral at bedtime  busPIRone 10 milliGRAM(s) Oral two times a day  calcium carbonate 1250 mG  + Vitamin D (OsCal 500 + D) 1 Tablet(s) Oral two times a day  dextrose 5%. 1000 milliLiter(s) IV Continuous <Continuous>  dextrose 5%. 1000 milliLiter(s) IV Continuous <Continuous>  dextrose 5%. 1000 milliLiter(s) IV Continuous <Continuous>  dextrose 5%. 1000 milliLiter(s) IV Continuous <Continuous>  dextrose 50% Injectable 25 Gram(s) IV Push once  dextrose 50% Injectable 12.5 Gram(s) IV Push once  dextrose 50% Injectable 25 Gram(s) IV Push once  dextrose 50% Injectable 25 Gram(s) IV Push Once  dextrose 50% Injectable 12.5 Gram(s) IV Push Once  dextrose 50% Injectable 25 Gram(s) IV Push Once  dextrose Oral Gel 15 Gram(s) Oral Once  fluticasone propionate/ salmeterol 100-50 MICROgram(s) Diskus 1 Dose(s) Inhalation two times a day PRN  glucagon  Injectable 1 milliGRAM(s) IntraMuscular once  glucagon  Injectable 1 milliGRAM(s) IntraMuscular Once  insulin lispro (ADMELOG) corrective regimen sliding scale   SubCutaneous three times a day before meals  labetalol 100 milliGRAM(s) Oral two times a day  mirtazapine 30 milliGRAM(s) Oral at bedtime  ondansetron Injectable 4 milliGRAM(s) IV Push every 6 hours PRN  oxyCODONE    IR 5 milliGRAM(s) Oral every 6 hours PRN  pantoprazole    Tablet 40 milliGRAM(s) Oral before breakfast  verapamil  milliGRAM(s) Oral at bedtime      O:  Vital Signs Last 24 Hrs  T(C): 36.8 (06 Dec 2024 15:43), Max: 36.8 (06 Dec 2024 06:05)  T(F): 98.2 (06 Dec 2024 15:43), Max: 98.2 (06 Dec 2024 06:05)  HR: 82 (06 Dec 2024 15:43) (67 - 84)  BP: 154/72 (06 Dec 2024 15:43) (120/47 - 162/76)  BP(mean): 71 (06 Dec 2024 13:06) (71 - 71)  RR: 20 (06 Dec 2024 15:43) (12 - 20)  SpO2: 97% (06 Dec 2024 15:43) (95% - 100%)    Parameters below as of 06 Dec 2024 15:43  Patient On (Oxygen Delivery Method): room air        PHYSICAL EXAM:  GENERAL: No acute distress, lying comfortably in bed  HEAD:  Atraumatic, Normocephalic  CHEST/LUNG: Non labored respirations, no accessory muscle use. CTAB; No wheezes, rales, or rhonchi  HEART: Regular rate and rhythm; No murmurs, rubs, or gallops  ABDOMEN: Soft, non-tender, non-distended; bowel sounds+  EXT: calves non-tender b/l, no edema  NEUROLOGY: A&O x 3, no focal deficits  SKIN: post-op parathyroidectomy incision midline covered with steri strips, c/d/i    I&O SUMMARY:    12-06-24 @ 07:01  -  12-06-24 @ 16:37  --------------------------------------------------------  IN:    Lactated Ringers: 120 mL    Oral Fluid: 480 mL  Total IN: 600 mL    OUT:  Total OUT: 0 mL    Total NET: 600 mL          LABS:                      RADIOLOGY:  No new post-operative imaging    ASSESSMENT: 73y/o Female POD#0 s/  excision of left superior parathyroid gland & excision of ectopic thyroid    PLAN:  - VSSAF  - f/u AM labs   - Continue diet, ADAT  - Pain/nausea control as needed  - Encourage OOB/ambulation & incentive spirometry  - DVT ppx with SCDs    To be discussed with Dr. Snell    Surgical Team Spectralink: [id number]

## 2024-12-07 NOTE — CASE MANAGEMENT PROGRESS NOTE - NSCMPROGRESSNOTE_GEN_ALL_CORE
/Per MD pt is medically cleared for discharge today 12/7/24. CM met with patient to discuss transition of care. Pt is to be transitioned to home with no formal home care services, pt offered home care services, pt declined home care services. CM explained home care services, expectations, process, insurance provisions and home safety with pt verbalizing understanding.  Pt states she has 2 daughters and her daughters will assist post discharge.Pt aware of plan and in agreement / verbalizes understanding. Pt verbalized understanding. Confirmed pharmacy is [facility]. community MD is Dr. Bauer. Pt stated they would make their own follow up appointments and her daughter will assist. Pt denies DME and need for usage. Pt stated her daughter Radha will transport pt home. All questions answered. CM discussed plan with MD and RN. CM to remain available through hospitalization.

## 2024-12-07 NOTE — CAREGIVER ENGAGEMENT NOTE - CAREGIVER OUTREACH NOTES - FREE TEXT
Met patient at bedside who gave consent to speak with caregiver.  CM left message with call back information. Contact information given in discharge/ transitions resource folder left at bedside with patient.

## 2024-12-10 ENCOUNTER — NON-APPOINTMENT (OUTPATIENT)
Age: 74
End: 2024-12-10

## 2024-12-10 LAB — SURGICAL PATHOLOGY STUDY: SIGNIFICANT CHANGE UP

## 2024-12-16 ENCOUNTER — APPOINTMENT (OUTPATIENT)
Dept: SURGERY | Facility: CLINIC | Age: 74
End: 2024-12-16

## 2024-12-16 PROBLEM — E04.2 NONTOXIC MULTINODULAR GOITER: Chronic | Status: ACTIVE | Noted: 2024-11-22

## 2024-12-16 PROBLEM — C50.919 MALIGNANT NEOPLASM OF UNSPECIFIED SITE OF UNSPECIFIED FEMALE BREAST: Chronic | Status: ACTIVE | Noted: 2024-11-22

## 2024-12-17 PROBLEM — R79.1 ABNORMAL COAGULATION PROFILE: Chronic | Status: ACTIVE | Noted: 2024-11-22

## 2024-12-19 ENCOUNTER — APPOINTMENT (OUTPATIENT)
Dept: SURGERY | Facility: CLINIC | Age: 74
End: 2024-12-19
Payer: MEDICARE

## 2024-12-19 VITALS
DIASTOLIC BLOOD PRESSURE: 81 MMHG | WEIGHT: 180 LBS | HEIGHT: 63 IN | HEART RATE: 80 BPM | SYSTOLIC BLOOD PRESSURE: 168 MMHG | BODY MASS INDEX: 31.89 KG/M2 | OXYGEN SATURATION: 98 %

## 2024-12-19 DIAGNOSIS — E04.2 NONTOXIC MULTINODULAR GOITER: ICD-10-CM

## 2024-12-19 DIAGNOSIS — Z98.890 OTHER SPECIFIED POSTPROCEDURAL STATES: ICD-10-CM

## 2024-12-19 DIAGNOSIS — M81.0 AGE-RELATED OSTEOPOROSIS W/OUT CURRENT PATHOLOGICAL FRACTURE: ICD-10-CM

## 2024-12-19 DIAGNOSIS — Z90.89 OTHER SPECIFIED POSTPROCEDURAL STATES: ICD-10-CM

## 2024-12-19 DIAGNOSIS — Z09 ENCOUNTER FOR FOLLOW-UP EXAMINATION AFTER COMPLETED TREATMENT FOR CONDITIONS OTHER THAN MALIGNANT NEOPLASM: ICD-10-CM

## 2024-12-19 PROBLEM — E21.3 HYPERPARATHYROIDISM, UNSPECIFIED: Chronic | Status: ACTIVE | Noted: 2024-11-22

## 2024-12-19 PROCEDURE — 99024 POSTOP FOLLOW-UP VISIT: CPT

## 2024-12-20 ENCOUNTER — NON-APPOINTMENT (OUTPATIENT)
Age: 74
End: 2024-12-20

## 2024-12-20 LAB
25(OH)D3 SERPL-MCNC: 31.6 NG/ML
CALCIUM SERPL-MCNC: 10.2 MG/DL
CALCIUM SERPL-MCNC: 10.2 MG/DL
PARATHYROID HORMONE INTACT: 32 PG/ML

## 2024-12-22 ENCOUNTER — NON-APPOINTMENT (OUTPATIENT)
Age: 74
End: 2024-12-22

## 2024-12-22 LAB — CA-I SERPL-SCNC: 5.3 MG/DL

## 2025-01-08 ENCOUNTER — RX RENEWAL (OUTPATIENT)
Age: 75
End: 2025-01-08

## 2025-01-08 ENCOUNTER — APPOINTMENT (OUTPATIENT)
Dept: ENDOCRINOLOGY | Facility: CLINIC | Age: 75
End: 2025-01-08
Payer: MEDICARE

## 2025-01-08 VITALS
HEIGHT: 63 IN | RESPIRATION RATE: 18 BRPM | BODY MASS INDEX: 31.89 KG/M2 | WEIGHT: 180 LBS | HEART RATE: 69 BPM | TEMPERATURE: 98.5 F | SYSTOLIC BLOOD PRESSURE: 156 MMHG | DIASTOLIC BLOOD PRESSURE: 86 MMHG | OXYGEN SATURATION: 96 %

## 2025-01-08 DIAGNOSIS — E11.9 TYPE 2 DIABETES MELLITUS W/OUT COMPLICATIONS: ICD-10-CM

## 2025-01-08 DIAGNOSIS — Z90.89 OTHER SPECIFIED POSTPROCEDURAL STATES: ICD-10-CM

## 2025-01-08 DIAGNOSIS — Z98.890 OTHER SPECIFIED POSTPROCEDURAL STATES: ICD-10-CM

## 2025-01-08 DIAGNOSIS — E78.00 PURE HYPERCHOLESTEROLEMIA, UNSPECIFIED: ICD-10-CM

## 2025-01-08 DIAGNOSIS — I10 ESSENTIAL (PRIMARY) HYPERTENSION: ICD-10-CM

## 2025-01-08 DIAGNOSIS — M81.0 AGE-RELATED OSTEOPOROSIS W/OUT CURRENT PATHOLOGICAL FRACTURE: ICD-10-CM

## 2025-01-08 LAB — GLUCOSE BLDC GLUCOMTR-MCNC: 177

## 2025-01-08 PROCEDURE — 99214 OFFICE O/P EST MOD 30 MIN: CPT | Mod: 25

## 2025-01-08 RX ORDER — SPIRONOLACTONE 25 MG/1
25 TABLET ORAL
Qty: 30 | Refills: 5 | Status: ACTIVE | COMMUNITY
Start: 2025-01-08 | End: 1900-01-01

## 2025-01-08 NOTE — H&P PST ADULT - OPHTHALMOLOGIC
1/8/25 1323: New pt referral received. Referred by Rosa Mathur NP at [facility]. Dx: Cirrhosis and elevated LFT's. Routine appt. Records in [facility]   negative

## 2025-01-11 LAB
25(OH)D3 SERPL-MCNC: 30.3 NG/ML
ALBUMIN SERPL ELPH-MCNC: 4.2 G/DL
ALP BLD-CCNC: 72 U/L
ALT SERPL-CCNC: 10 U/L
ANION GAP SERPL CALC-SCNC: 13 MMOL/L
AST SERPL-CCNC: 13 U/L
BILIRUB SERPL-MCNC: 0.3 MG/DL
BUN SERPL-MCNC: 11 MG/DL
CA-I SERPL-SCNC: 5.4 MG/DL
CALCIUM SERPL-MCNC: 10.1 MG/DL
CALCIUM SERPL-MCNC: 10.1 MG/DL
CHLORIDE SERPL-SCNC: 100 MMOL/L
CHOLEST SERPL-MCNC: 179 MG/DL
CO2 SERPL-SCNC: 26 MMOL/L
CREAT SERPL-MCNC: 0.74 MG/DL
EGFR: 84 ML/MIN/1.73M2
ESTIMATED AVERAGE GLUCOSE: 143 MG/DL
FOLATE SERPL-MCNC: 13 NG/ML
FRUCTOSAMINE SERPL-MCNC: 271 UMOL/L
GLUCOSE SERPL-MCNC: 134 MG/DL
HBA1C MFR BLD HPLC: 6.6 %
HDLC SERPL-MCNC: 64 MG/DL
LDLC SERPL CALC-MCNC: 105 MG/DL
NONHDLC SERPL-MCNC: 115 MG/DL
PARATHYROID HORMONE INTACT: 43 PG/ML
POTASSIUM SERPL-SCNC: 4.8 MMOL/L
PROT SERPL-MCNC: 6.9 G/DL
SODIUM SERPL-SCNC: 139 MMOL/L
T4 FREE SERPL-MCNC: 1.5 NG/DL
TRIGL SERPL-MCNC: 50 MG/DL
TSH SERPL-ACNC: 1.49 UIU/ML
VIT B12 SERPL-MCNC: 387 PG/ML

## 2025-01-20 ENCOUNTER — RX RENEWAL (OUTPATIENT)
Age: 75
End: 2025-01-20

## 2025-01-24 ENCOUNTER — APPOINTMENT (OUTPATIENT)
Dept: OBGYN | Facility: CLINIC | Age: 75
End: 2025-01-24

## 2025-02-11 NOTE — REASON FOR VISIT
cranial nerves 2-12 intact/extra-ocular movements intact/tongue is midline [Follow-Up] : a follow-up visit [Family Member] : family member [Pre-Visit Preparation] : pre-visit preparation was done [Intercurrent Specialty/Sub-specialty Visits] : the patient has intercurrent specialty/sub-specialty visits [FreeTextEntry1] : for chronic conditions [FreeTextEntry3] : PCP, Pulmonary, Endo

## 2025-02-28 ENCOUNTER — APPOINTMENT (OUTPATIENT)
Dept: OBGYN | Facility: CLINIC | Age: 75
End: 2025-02-28

## 2025-03-18 ENCOUNTER — APPOINTMENT (OUTPATIENT)
Dept: PULMONOLOGY | Facility: CLINIC | Age: 75
End: 2025-03-18
Payer: MEDICARE

## 2025-03-18 VITALS
TEMPERATURE: 97.9 F | BODY MASS INDEX: 31.71 KG/M2 | DIASTOLIC BLOOD PRESSURE: 80 MMHG | HEART RATE: 72 BPM | OXYGEN SATURATION: 98 % | HEIGHT: 63 IN | WEIGHT: 179 LBS | SYSTOLIC BLOOD PRESSURE: 140 MMHG | RESPIRATION RATE: 16 BRPM

## 2025-03-18 DIAGNOSIS — J82.83 EOSINOPHILIC ASTHMA: ICD-10-CM

## 2025-03-18 DIAGNOSIS — K21.9 GASTRO-ESOPHAGEAL REFLUX DISEASE W/OUT ESOPHAGITIS: ICD-10-CM

## 2025-03-18 DIAGNOSIS — J43.9 EMPHYSEMA, UNSPECIFIED: ICD-10-CM

## 2025-03-18 DIAGNOSIS — J45.50 SEVERE PERSISTENT ASTHMA, UNCOMPLICATED: ICD-10-CM

## 2025-03-18 DIAGNOSIS — R06.02 SHORTNESS OF BREATH: ICD-10-CM

## 2025-03-18 DIAGNOSIS — R93.89 ABNORMAL FINDINGS ON DIAGNOSTIC IMAGING OF OTHER SPECIFIED BODY STRUCTURES: ICD-10-CM

## 2025-03-18 DIAGNOSIS — D3A.090 BENIGN CARCINOID TUMOR OF THE BRONCHUS AND LUNG: ICD-10-CM

## 2025-03-18 DIAGNOSIS — J98.4 EMPHYSEMA, UNSPECIFIED: ICD-10-CM

## 2025-03-18 PROCEDURE — 94727 GAS DIL/WSHOT DETER LNG VOL: CPT

## 2025-03-18 PROCEDURE — ZZZZZ: CPT

## 2025-03-18 PROCEDURE — 94799 UNLISTED PULMONARY SVC/PX: CPT

## 2025-03-18 PROCEDURE — 94729 DIFFUSING CAPACITY: CPT

## 2025-03-18 PROCEDURE — 94010 BREATHING CAPACITY TEST: CPT

## 2025-03-18 PROCEDURE — 99214 OFFICE O/P EST MOD 30 MIN: CPT | Mod: 25

## 2025-03-18 RX ORDER — PREDNISONE 10 MG/1
10 TABLET ORAL
Qty: 50 | Refills: 0 | Status: ACTIVE | COMMUNITY
Start: 2025-03-18 | End: 1900-01-01

## 2025-03-21 NOTE — H&P PST ADULT - HAS THE PATIENT HAD A SIGNIFICANT CHANGE IN FUNCTIONAL STATUS DUE TO CVA, HEAD TRAUMA, ORTHOPEDIC TRAUMA/SURGERY, OR FALL, WITH THE WEEK PRIOR TO ADMISSION
Group Topic:  Therapeutic Activity    Date: 3/20/2025  Start Time: 2000  End Time: 2030  Facilitators: Sahil Beatty CNA        Method: Group  Attendance: Present  Participation: Active  Patient Response: Appropriate feedback and Attentive  Mood: Normal  Affect: Type: Euthymic (normal mood)   Range: Full (normal)   Congruency: Congruent   Stability: Stable  Behavior/Socialization: Appropriate to group and Cooperative  Thought Process: Abstract  Task Performance: Follows directions  Patient Evaluation: Independent - full participation     no

## 2025-03-24 ENCOUNTER — RX RENEWAL (OUTPATIENT)
Age: 75
End: 2025-03-24

## 2025-03-26 ENCOUNTER — APPOINTMENT (OUTPATIENT)
Dept: ENDOCRINOLOGY | Facility: CLINIC | Age: 75
End: 2025-03-26

## 2025-03-26 ENCOUNTER — RX RENEWAL (OUTPATIENT)
Age: 75
End: 2025-03-26

## 2025-03-27 RX ORDER — LABETALOL HYDROCHLORIDE 200 MG/1
200 TABLET, FILM COATED ORAL
Qty: 180 | Refills: 1 | Status: ACTIVE | COMMUNITY
Start: 2025-03-26 | End: 1900-01-01

## 2025-03-30 ENCOUNTER — NON-APPOINTMENT (OUTPATIENT)
Age: 75
End: 2025-03-30

## 2025-04-04 ENCOUNTER — APPOINTMENT (OUTPATIENT)
Dept: PULMONOLOGY | Facility: CLINIC | Age: 75
End: 2025-04-04

## 2025-04-08 ENCOUNTER — APPOINTMENT (OUTPATIENT)
Dept: ENDOCRINOLOGY | Facility: CLINIC | Age: 75
End: 2025-04-08
Payer: MEDICARE

## 2025-04-08 VITALS
BODY MASS INDEX: 31.89 KG/M2 | DIASTOLIC BLOOD PRESSURE: 79 MMHG | RESPIRATION RATE: 16 BRPM | SYSTOLIC BLOOD PRESSURE: 149 MMHG | OXYGEN SATURATION: 97 % | HEART RATE: 66 BPM | HEIGHT: 63 IN | WEIGHT: 180 LBS

## 2025-04-08 DIAGNOSIS — E11.9 TYPE 2 DIABETES MELLITUS W/OUT COMPLICATIONS: ICD-10-CM

## 2025-04-08 DIAGNOSIS — I10 ESSENTIAL (PRIMARY) HYPERTENSION: ICD-10-CM

## 2025-04-08 DIAGNOSIS — E78.00 PURE HYPERCHOLESTEROLEMIA, UNSPECIFIED: ICD-10-CM

## 2025-04-08 DIAGNOSIS — E04.2 NONTOXIC MULTINODULAR GOITER: ICD-10-CM

## 2025-04-08 LAB — GLUCOSE BLDC GLUCOMTR-MCNC: 147

## 2025-04-08 PROCEDURE — 82962 GLUCOSE BLOOD TEST: CPT

## 2025-04-08 PROCEDURE — 99214 OFFICE O/P EST MOD 30 MIN: CPT | Mod: 25

## 2025-04-09 ENCOUNTER — TRANSCRIPTION ENCOUNTER (OUTPATIENT)
Age: 75
End: 2025-04-09

## 2025-04-09 LAB
25(OH)D3 SERPL-MCNC: 28.5 NG/ML
ALBUMIN SERPL ELPH-MCNC: 4.2 G/DL
ALP BLD-CCNC: 83 U/L
ALT SERPL-CCNC: 17 U/L
ANION GAP SERPL CALC-SCNC: 8 MMOL/L
AST SERPL-CCNC: 19 U/L
BILIRUB SERPL-MCNC: 0.3 MG/DL
BUN SERPL-MCNC: 12 MG/DL
CALCIUM SERPL-MCNC: 10.2 MG/DL
CALCIUM SERPL-MCNC: 10.2 MG/DL
CHLORIDE SERPL-SCNC: 102 MMOL/L
CHOLEST SERPL-MCNC: 120 MG/DL
CO2 SERPL-SCNC: 30 MMOL/L
CREAT SERPL-MCNC: 0.78 MG/DL
EGFRCR SERPLBLD CKD-EPI 2021: 79 ML/MIN/1.73M2
ESTIMATED AVERAGE GLUCOSE: 131 MG/DL
FOLATE SERPL-MCNC: 14.7 NG/ML
GLUCOSE SERPL-MCNC: 111 MG/DL
HBA1C MFR BLD HPLC: 6.2 %
HDLC SERPL-MCNC: 67 MG/DL
LDLC SERPL-MCNC: 43 MG/DL
NONHDLC SERPL-MCNC: 53 MG/DL
PARATHYROID HORMONE INTACT: 49 PG/ML
POTASSIUM SERPL-SCNC: 4.8 MMOL/L
PROT SERPL-MCNC: 6.8 G/DL
SODIUM SERPL-SCNC: 141 MMOL/L
T4 FREE SERPL-MCNC: 1.2 NG/DL
TRIGL SERPL-MCNC: 38 MG/DL
TSH SERPL-ACNC: 1.26 UIU/ML
VIT B12 SERPL-MCNC: 321 PG/ML

## 2025-04-10 LAB — CA-I SERPL-SCNC: 5.5 MG/DL

## 2025-05-07 RX ORDER — MEPOLIZUMAB 100 MG/ML
100 INJECTION, SOLUTION SUBCUTANEOUS
Qty: 1 | Refills: 5 | Status: ACTIVE | COMMUNITY
Start: 2025-05-06 | End: 1900-01-01

## 2025-05-27 ENCOUNTER — APPOINTMENT (OUTPATIENT)
Dept: ENDOCRINOLOGY | Facility: CLINIC | Age: 75
End: 2025-05-27
Payer: MEDICARE

## 2025-05-27 ENCOUNTER — RX RENEWAL (OUTPATIENT)
Age: 75
End: 2025-05-27

## 2025-05-27 VITALS
WEIGHT: 180 LBS | DIASTOLIC BLOOD PRESSURE: 71 MMHG | OXYGEN SATURATION: 98 % | RESPIRATION RATE: 16 BRPM | HEART RATE: 72 BPM | HEIGHT: 63 IN | SYSTOLIC BLOOD PRESSURE: 135 MMHG | BODY MASS INDEX: 31.89 KG/M2

## 2025-05-27 DIAGNOSIS — E11.9 TYPE 2 DIABETES MELLITUS W/OUT COMPLICATIONS: ICD-10-CM

## 2025-05-27 DIAGNOSIS — M81.0 AGE-RELATED OSTEOPOROSIS W/OUT CURRENT PATHOLOGICAL FRACTURE: ICD-10-CM

## 2025-05-27 DIAGNOSIS — E04.2 NONTOXIC MULTINODULAR GOITER: ICD-10-CM

## 2025-05-27 DIAGNOSIS — I10 ESSENTIAL (PRIMARY) HYPERTENSION: ICD-10-CM

## 2025-05-27 LAB — GLUCOSE BLDC GLUCOMTR-MCNC: 94

## 2025-05-27 PROCEDURE — 82962 GLUCOSE BLOOD TEST: CPT

## 2025-05-27 PROCEDURE — 99214 OFFICE O/P EST MOD 30 MIN: CPT | Mod: 25

## 2025-05-27 RX ORDER — BLOOD SUGAR DIAGNOSTIC
STRIP MISCELLANEOUS 3 TIMES DAILY
Qty: 3 | Refills: 6 | Status: ACTIVE | COMMUNITY
Start: 2025-05-27 | End: 1900-01-01

## 2025-05-27 RX ORDER — LANCETS
EACH MISCELLANEOUS
Qty: 300 | Refills: 3 | Status: ACTIVE | COMMUNITY
Start: 2025-05-27 | End: 1900-01-01

## 2025-06-12 ENCOUNTER — APPOINTMENT (OUTPATIENT)
Dept: SURGERY | Facility: CLINIC | Age: 75
End: 2025-06-12

## 2025-07-06 ENCOUNTER — NON-APPOINTMENT (OUTPATIENT)
Age: 75
End: 2025-07-06

## 2025-07-24 NOTE — ED PROVIDER NOTE - CARDIOVASCULAR [-], MLM
Rx Refill Request     Name: Mag Pierce  :  1951   Date of last appointment:  2025   Date of next appointment:  10/7/2025   Best number to reach patient:  [phone]     
no chest pain/no syncope

## 2025-08-18 ENCOUNTER — APPOINTMENT (OUTPATIENT)
Dept: CARDIOLOGY | Facility: CLINIC | Age: 75
End: 2025-08-18
Payer: MEDICARE

## 2025-08-18 ENCOUNTER — APPOINTMENT (OUTPATIENT)
Dept: PULMONOLOGY | Facility: CLINIC | Age: 75
End: 2025-08-18

## 2025-08-18 VITALS
WEIGHT: 178 LBS | SYSTOLIC BLOOD PRESSURE: 126 MMHG | BODY MASS INDEX: 31.54 KG/M2 | DIASTOLIC BLOOD PRESSURE: 68 MMHG | HEART RATE: 67 BPM | TEMPERATURE: 97.5 F | RESPIRATION RATE: 16 BRPM | OXYGEN SATURATION: 100 % | HEIGHT: 63 IN

## 2025-08-18 DIAGNOSIS — E78.00 PURE HYPERCHOLESTEROLEMIA, UNSPECIFIED: ICD-10-CM

## 2025-08-18 DIAGNOSIS — E11.9 TYPE 2 DIABETES MELLITUS W/OUT COMPLICATIONS: ICD-10-CM

## 2025-08-18 DIAGNOSIS — R00.2 PALPITATIONS: ICD-10-CM

## 2025-08-18 DIAGNOSIS — I10 ESSENTIAL (PRIMARY) HYPERTENSION: ICD-10-CM

## 2025-08-18 DIAGNOSIS — I51.7 CARDIOMEGALY: ICD-10-CM

## 2025-08-18 PROCEDURE — 99214 OFFICE O/P EST MOD 30 MIN: CPT | Mod: 25

## 2025-08-18 PROCEDURE — 93000 ELECTROCARDIOGRAM COMPLETE: CPT

## 2025-08-18 RX ORDER — QUETIAPINE FUMARATE 25 MG/1
25 TABLET ORAL
Qty: 30 | Refills: 0 | Status: ACTIVE | COMMUNITY
Start: 2025-06-30

## 2025-09-04 ENCOUNTER — APPOINTMENT (OUTPATIENT)
Dept: ENDOCRINOLOGY | Facility: CLINIC | Age: 75
End: 2025-09-04

## 2025-09-08 ENCOUNTER — RX RENEWAL (OUTPATIENT)
Age: 75
End: 2025-09-08

## 2025-09-15 ENCOUNTER — RX RENEWAL (OUTPATIENT)
Age: 75
End: 2025-09-15

## (undated) DEVICE — DRSG TEGADERM 2.5 X 3"

## (undated) DEVICE — SPONGE PEANUT AUTO COUNT

## (undated) DEVICE — SOL IRR POUR NS 0.9% 1000ML

## (undated) DEVICE — SUT SOFSILK 3-0 12X18" TIES

## (undated) DEVICE — SUT POLYSORB 2-0 60" REEL

## (undated) DEVICE — DRAPE THYROID 77" X 123"

## (undated) DEVICE — PLV/PSP-ESU FORCEFX F8J7721A: Type: DURABLE MEDICAL EQUIPMENT

## (undated) DEVICE — SUT POLYSORB 2-0 30" V-20 UNDYED

## (undated) DEVICE — PACK MINOR WITH LAP

## (undated) DEVICE — GLV 7.5 PROTEXIS (WHITE)

## (undated) DEVICE — VENODYNE/SCD SLEEVE CALF MEDIUM

## (undated) DEVICE — WARMING BLANKET LOWER ADULT

## (undated) DEVICE — Device

## (undated) DEVICE — MARKING PEN W RULER

## (undated) DEVICE — SOL IRR POUR H2O 1000ML

## (undated) DEVICE — PROTECTOR HEEL / ELBOW FLUFFY

## (undated) DEVICE — DRAPE TOWEL BLUE 17" X 24"

## (undated) DEVICE — TUBING FOR SMOKE EVACUATOR (PURPLE END)

## (undated) DEVICE — GLV 7.5 PROTEXIS (BLUE)

## (undated) DEVICE — NDL HYPO REGULAR BEVEL 25G X 1.5" (BLUE)

## (undated) DEVICE — ELCTR BOVIE PENCIL HANDPIECE

## (undated) DEVICE — DRSG TELFA 3 X 8

## (undated) DEVICE — TRACHEOSTOMY TRAY PEDIATRIC DISP

## (undated) DEVICE — PREP ALCOHOL PAD

## (undated) DEVICE — LIGASURE SMALL JAW

## (undated) DEVICE — DRSG STERISTRIPS 0.5 X 4"

## (undated) DEVICE — DRAPE INSTRUMENT POUCH 6.75" X 11"

## (undated) DEVICE — DRAIN RESERVOIR FOR JACKSON PRATT 100CC CARDINAL

## (undated) DEVICE — DRSG MASTISOL

## (undated) DEVICE — ELCTR BOVIE TIP BLADE INSULATED 2.75" EDGE

## (undated) DEVICE — POSITIONER FOAM EGG CRATE ULNAR 2PCS (PINK)

## (undated) DEVICE — SUT SOFSILK 2-0 18" C-15

## (undated) DEVICE — DRAPE MAGNETIC INSTRUMENT MEDIUM

## (undated) DEVICE — DRAPE 3/4 SHEET 52X76"

## (undated) DEVICE — POSITIONER FOAM SLOTTED HEAD CRADLE (PINK)

## (undated) DEVICE — BLADE SCALPEL SAFETYLOCK #15

## (undated) DEVICE — VISITEC 4X4

## (undated) DEVICE — VENODYNE/SCD SLEEVE CALF LARGE

## (undated) DEVICE — SUT POLYSORB 3-0 30" V-20 UNDYED

## (undated) DEVICE — STAPLER SKIN VISI-STAT 35 WIDE

## (undated) DEVICE — ELCTR MONOPOLAR STIMULATOR PROBE FLUSH-TIP

## (undated) DEVICE — SUT MONOCRYL 4-0 27" PS-2 UNDYED

## (undated) DEVICE — SPECIMEN CONTAINER 4OZ

## (undated) DEVICE — PREP BETADINE KIT

## (undated) DEVICE — SUT POLYSORB 3-0 60" REEL